# Patient Record
Sex: MALE | Race: WHITE | NOT HISPANIC OR LATINO | Employment: FULL TIME | URBAN - METROPOLITAN AREA
[De-identification: names, ages, dates, MRNs, and addresses within clinical notes are randomized per-mention and may not be internally consistent; named-entity substitution may affect disease eponyms.]

---

## 2017-01-05 DIAGNOSIS — E78.2 MIXED HYPERLIPIDEMIA: ICD-10-CM

## 2017-01-05 DIAGNOSIS — M79.642 HAND PAIN, LEFT: ICD-10-CM

## 2017-01-05 RX ORDER — AMLODIPINE BESYLATE 10 MG/1
TABLET ORAL
Qty: 90 TABLET | Refills: 1 | Status: SHIPPED | OUTPATIENT
Start: 2017-01-05 | End: 2017-07-15 | Stop reason: SDUPTHER

## 2017-01-05 RX ORDER — METOPROLOL SUCCINATE 100 MG/1
TABLET, EXTENDED RELEASE ORAL
Qty: 30 TABLET | Refills: 3 | Status: SHIPPED | OUTPATIENT
Start: 2017-01-05 | End: 2017-05-13 | Stop reason: SDUPTHER

## 2017-01-05 RX ORDER — ATORVASTATIN CALCIUM 80 MG/1
TABLET, FILM COATED ORAL
Qty: 30 TABLET | Refills: 3 | Status: SHIPPED | OUTPATIENT
Start: 2017-01-05 | End: 2017-05-09 | Stop reason: SDUPTHER

## 2017-01-16 ENCOUNTER — OFFICE VISIT (OUTPATIENT)
Dept: SPORTS MEDICINE | Facility: CLINIC | Age: 62
End: 2017-01-16
Payer: COMMERCIAL

## 2017-01-16 ENCOUNTER — HOSPITAL ENCOUNTER (OUTPATIENT)
Dept: RADIOLOGY | Facility: HOSPITAL | Age: 62
Discharge: HOME | End: 2017-01-16
Attending: FAMILY MEDICINE
Payer: COMMERCIAL

## 2017-01-16 VITALS — HEIGHT: 67 IN | BODY MASS INDEX: 31.39 KG/M2 | TEMPERATURE: 99 F | WEIGHT: 200 LBS

## 2017-01-16 DIAGNOSIS — M25.552 LEFT HIP PAIN: Primary | ICD-10-CM

## 2017-01-16 DIAGNOSIS — M25.552 LEFT HIP PAIN: ICD-10-CM

## 2017-01-16 PROCEDURE — 73502 X-RAY EXAM HIP UNI 2-3 VIEWS: CPT | Mod: 26,LT,, | Performed by: RADIOLOGY

## 2017-01-16 PROCEDURE — 99999 PR PBB SHADOW E&M-EST. PATIENT-LVL III: CPT | Mod: PBBFAC,,, | Performed by: FAMILY MEDICINE

## 2017-01-16 PROCEDURE — 73502 X-RAY EXAM HIP UNI 2-3 VIEWS: CPT | Mod: TC,PO,LT

## 2017-01-16 PROCEDURE — 99214 OFFICE O/P EST MOD 30 MIN: CPT | Mod: S$GLB,,, | Performed by: FAMILY MEDICINE

## 2017-01-16 PROCEDURE — 1159F MED LIST DOCD IN RCRD: CPT | Mod: S$GLB,,, | Performed by: FAMILY MEDICINE

## 2017-01-16 NOTE — PROGRESS NOTES
Subjective:          Chief Complaint: Dallas Garcia is a 61 y.o. male who had no chief complaint listed for this encounter.    HPI  #1  Location: posterior thigh, left   Onset: insidious,   Palliative:    Relative rest   Oral analgesics (tylenol)    HgPT, noncompliant   Provocative:    Prolonged sitting   Prior:    PMH R knee pain  Progression: plateau discomfort  Quality:    Sharp pain    Pulsating   Radiation: none  Severity: per nursing documentation  Timing: intermittent w/ use  Trauma: none     ROS  Review of systems (ROS):  A 10+ review of systems was performed with pertinent positives and negatives noted above in the history of present illness. Other systems were negative unless otherwise specified.    Pain Related Questions  Over the past 3 days, what was your average pain during activity? (I.e. running, jogging, walking, climbing stairs, getting dressed, ect.): 6  Over the past 3 days, what was your highest pain level?: 6  Over the past 3 days, what was your lowest pain level? : 4    Other  How many nights a week are you awakened by your affected body part?: 0  Was the patient's HEIGHT measured or patient reported?: Patient Reported  Was the patient's WEIGHT measured or patient reported?: Measured      Objective:        General: Dallas is well-developed, well-nourished, appears stated age, in no acute distress, alert and oriented to time, place and person.     Ortho/SPM Exam    Constitutional:     -Vital signs: height, weight, and temperature: reviewed     -General appearance: no apparent distress  Dermatologic/skin:     -Inspection: No acute lesions, ulceration, or induration of the skin noted about   the area evaluated   -Palpation: No subcutaneous crepitus noted about the area evaluated  Musculoskeletal:   Observation: There is no edema, erythema, or ecchymoses about the hip  Gait: ANTALGIC   Standing alignment: No pathologic varus or valgus deformity  Feet: No pathologic pes planus or pes  cavus   Squatting: PAINFUL      Straight leg raise:   No evidence of neuropathy at 30 degrees passive hip flexion   No evidence of neuropathy at 60 degrees passive hip flexion  Log roll is negative for evidence of intra articular hip pathology  Resisted femoral internal rotation is negative for evidence of intra articular hip pathology  Passive hip FABIR is negative for evidence of intra articular hip pathology  No tenderness with palpation of anterior iliac crest or ASIS  No pain with resisted hip flexion  Bridge test is POS for evidence of core weakness  No tenderness with palpation of greater tuberosity  Yvettes test is negative for evidence of IT band tightness  No pain with resisted hip extension  POS tenderness ischial bursa     Knee examination:  ROM appropriate.  No pain elicited with passive ROM.        Cardiovascular:   -Examined extremity is warm and well perfused   Neurologic:   -Examined extremitys sensation is appropriate     AAFP/FPM: Pocket Guide Documentation Guidelines, (c)2014    (99803=1+ systems/areas or 12+ bullets (8 MSK)   45355=3+ systems/areas or 18+ bullets (12 MSK))      Assessment:       Encounter Diagnosis   Name Primary?    Left hip pain Yes          Plan:       Assessment:   #1 concern for tearing of acetabular labrum, left   W/ [place] Grade I osteoarthritis of hip    No evidence of neurologic pathology  No evidence of vascular pathology    Imaging studies reviewed:  X-ray hip, left 17.01   Note radiologist ? Of [facility]    Plan:    Given extreme dysfunction and discomfort, coupled with physical examination suggesting acetabular labral pathology, and with non-diagnostic x-ray imaging, we will obtain MRI arthrogram imaging for further evaluation of the acetabular labrum and associated soft tissue structures of the hip.    [We discussed options including:  #1 watchful waiting  #2 physical therapy aimed at:   Core stability   RoM hip   Strengthening quadriceps  #3 injection therapy:   CSI  iahip   orthobiologics   #4 consultation re: hip  #5 further evaluation of lumbar spine     The patient chooses #]    Pain management required:    Handout given  Bracing required: none  Physical therapy required:   Activity (e.g. sports, work) restrictions: as tolerated   School/vocation: both prior [employer] and [employer] affiliations    Also see for knee     Follow up after MRIa hip  Consider csi iahip left  Should sx worsen or fail to resolve, consider:  Revisiting the above options          Patient questionnaires may have been collected.

## 2017-01-16 NOTE — MR AVS SNAPSHOT
[facility]  [address]  [facility] [address]  Phone: [phone]                  Dallas Garcia   2017 3:30 PM   Appointment    Description:  Male : 1955   Provider:  Erik Cohen MD   Department:  [facility]                To Do List           Future Appointments        Provider Department Dept Phone    2017 3:30 PM Erik Cohen MD [facility] [phone]    3/3/2017 8:30 AM LAB, [place] [facility] [phone]    3/8/2017 10:30 AM [facility] [address] [phone]      Goals (5 Years of Data)     None      [facility]     [facility] -  Assistance  Registered nurses in the [facility] provide clinical advisement, health education, appointment booking, and other advisory services.  Call for this free service at [phone].             Medications           Message regarding Medications     Verify the changes and/or additions to your medication regime listed below are the same as discussed with your clinician today.  If any of these changes or additions are incorrect, please notify your healthcare provider.             Verify that the below list of medications is an accurate representation of the medications you are currently taking.  If none reported, the list may be blank. If incorrect, please contact your healthcare provider. Carry this list with you in case of emergency.           Current Medications     amlodipine (NORVASC) 10 MG tablet take 1 tablet by mouth once daily for blood pressure    atorvastatin (LIPITOR) 80 MG tablet take 1 tablet by mouth once daily    blood sugar diagnostic (ONETOUCH ULTRA TEST) Strp TEST 4 TIMES A DAY. 90 day supply.    busPIRone (BUSPAR) 5 MG Tab Take 1 tablet (5 mg total) by mouth 2 (two) times daily.    fenofibrate 160 MG Tab take 1 tablet by mouth once daily    fenofibrate  "nanocrystallized 160 mg Tab 1 tab daily for Cholesterol/Triglycerides    fish oil-omega-3 fatty acids 300-1,000 mg capsule Take 2 g by mouth once daily.    insulin lispro (HUMALOG KWIKPEN) 100 unit/mL InPn pen Inject into skin 24 units with breakfast, 24 units with lunch, 20 units with dinner.    insulin needles, disposable, 31 X 5/16 " Ndle Use with Levemir injection daily    JENTADUETO 2.5-1,000 mg Tab TAKE 1 TABLET BY MOUTH TWICE DAILY WITH MEALS FOR DIABETES IN PLACE OF JANUMET    lancets (ONETOUCH ULTRASOFT LANCETS) Misc 1 lancet by Misc.(Non-Drug; Combo Route) route 4 (four) times daily before meals and nightly.    LEVEMIR FLEXTOUCH 100 unit/mL (3 mL) InPn pen inject 50 units subcutaneously every evening    lisinopril (PRINIVIL,ZESTRIL) 40 MG tablet Take 1 tablet (40 mg total) by mouth once daily. For Blood Pressure    metoprolol succinate (TOPROL-XL) 100 MG 24 hr tablet take 1 tablet by mouth once daily for blood pressure    omeprazole (PRILOSEC) 20 MG capsule Take 1 capsule (20 mg total) by mouth once daily. For GERD    pen needle, diabetic (BD ULTRA-FINE ADRIEN PEN NEEDLES) 32 gauge x 5/32" Ndle Use 4 times a day. 90 day supply.           Clinical Reference Information           Allergies as of 1/16/2017     No Known Allergies      Immunizations Administered on Date of Encounter - 1/16/2017     None      "

## 2017-01-18 RX ORDER — LISINOPRIL 40 MG/1
TABLET ORAL
Qty: 30 TABLET | Refills: 6 | Status: SHIPPED | OUTPATIENT
Start: 2017-01-18 | End: 2017-08-18 | Stop reason: SDUPTHER

## 2017-01-27 ENCOUNTER — HOSPITAL ENCOUNTER (OUTPATIENT)
Dept: RADIOLOGY | Facility: HOSPITAL | Age: 62
Discharge: HOME | End: 2017-01-27
Attending: FAMILY MEDICINE
Payer: COMMERCIAL

## 2017-01-27 DIAGNOSIS — M25.552 LEFT HIP PAIN: ICD-10-CM

## 2017-01-27 PROCEDURE — A9585 GADOBUTROL INJECTION: HCPCS | Performed by: FAMILY MEDICINE

## 2017-01-27 PROCEDURE — 73722 MRI JOINT OF LWR EXTR W/DYE: CPT | Mod: 26,LT,, | Performed by: RADIOLOGY

## 2017-01-27 PROCEDURE — 27093 INJECTION FOR HIP X-RAY: CPT | Mod: ,,, | Performed by: RADIOLOGY

## 2017-01-27 PROCEDURE — 73525 CONTRAST X-RAY OF HIP: CPT | Mod: TC

## 2017-01-27 PROCEDURE — 25000003 PHARM REV CODE 250: Performed by: FAMILY MEDICINE

## 2017-01-27 PROCEDURE — 73722 MRI JOINT OF LWR EXTR W/DYE: CPT | Mod: TC,LT

## 2017-01-27 PROCEDURE — 73525 CONTRAST X-RAY OF HIP: CPT | Mod: 26,,, | Performed by: RADIOLOGY

## 2017-01-27 PROCEDURE — 25500020 PHARM REV CODE 255: Performed by: FAMILY MEDICINE

## 2017-01-27 RX ORDER — LIDOCAINE HYDROCHLORIDE 10 MG/ML
3 INJECTION, SOLUTION EPIDURAL; INFILTRATION; INTRACAUDAL; PERINEURAL ONCE
Status: COMPLETED | OUTPATIENT
Start: 2017-01-27 | End: 2017-01-27

## 2017-01-27 RX ORDER — GADOBUTROL 604.72 MG/ML
5.5 INJECTION INTRAVENOUS
Status: COMPLETED | OUTPATIENT
Start: 2017-01-27 | End: 2017-01-27

## 2017-01-27 RX ADMIN — LIDOCAINE HYDROCHLORIDE 30 MG: 10 INJECTION, SOLUTION EPIDURAL; INFILTRATION; INTRACAUDAL; PERINEURAL at 01:01

## 2017-01-27 RX ADMIN — GADOBUTROL 5.5 ML: 604.72 INJECTION INTRAVENOUS at 01:01

## 2017-01-27 RX ADMIN — IOHEXOL 6 ML: 300 INJECTION, SOLUTION INTRAVENOUS at 01:01

## 2017-01-30 ENCOUNTER — TELEPHONE (OUTPATIENT)
Dept: INTERNAL MEDICINE | Facility: CLINIC | Age: 62
End: 2017-01-30

## 2017-01-31 ENCOUNTER — OFFICE VISIT (OUTPATIENT)
Dept: SPORTS MEDICINE | Facility: CLINIC | Age: 62
End: 2017-01-31
Payer: COMMERCIAL

## 2017-01-31 VITALS — HEIGHT: 67 IN | WEIGHT: 200 LBS | BODY MASS INDEX: 31.39 KG/M2 | TEMPERATURE: 98 F

## 2017-01-31 DIAGNOSIS — M70.62 GREATER TROCHANTERIC BURSITIS, LEFT: ICD-10-CM

## 2017-01-31 DIAGNOSIS — M53.3 CHRONIC LEFT SACROILIAC PAIN: Primary | ICD-10-CM

## 2017-01-31 DIAGNOSIS — M25.552 LEFT HIP PAIN: ICD-10-CM

## 2017-01-31 DIAGNOSIS — G89.29 CHRONIC LEFT SACROILIAC PAIN: Primary | ICD-10-CM

## 2017-01-31 PROCEDURE — 1159F MED LIST DOCD IN RCRD: CPT | Mod: S$GLB,,, | Performed by: FAMILY MEDICINE

## 2017-01-31 PROCEDURE — 20610 DRAIN/INJ JOINT/BURSA W/O US: CPT | Mod: 59,LT,S$GLB, | Performed by: FAMILY MEDICINE

## 2017-01-31 PROCEDURE — 99214 OFFICE O/P EST MOD 30 MIN: CPT | Mod: 25,S$GLB,, | Performed by: FAMILY MEDICINE

## 2017-01-31 PROCEDURE — 20611 DRAIN/INJ JOINT/BURSA W/US: CPT | Mod: LT,S$GLB,, | Performed by: FAMILY MEDICINE

## 2017-01-31 PROCEDURE — 99999 PR PBB SHADOW E&M-EST. PATIENT-LVL III: CPT | Mod: PBBFAC,,, | Performed by: FAMILY MEDICINE

## 2017-01-31 RX ORDER — TRIAMCINOLONE ACETONIDE 40 MG/ML
40 INJECTION, SUSPENSION INTRA-ARTICULAR; INTRAMUSCULAR
Status: DISCONTINUED | OUTPATIENT
Start: 2017-01-31 | End: 2017-01-31 | Stop reason: HOSPADM

## 2017-01-31 RX ADMIN — TRIAMCINOLONE ACETONIDE 40 MG: 40 INJECTION, SUSPENSION INTRA-ARTICULAR; INTRAMUSCULAR at 09:01

## 2017-01-31 NOTE — PROCEDURES
Large Joint Aspiration/Injection  Date/Time: 1/31/2017 9:22 AM  Performed by: JACOB FLORES  Authorized by: JACOB FLORES     Consent Done?:  Yes (Verbal)  Indications:  Pain  Procedure site marked: Yes    Timeout: Prior to procedure the correct patient, procedure, and site was verified      Location:  Hip  Site:  L hip joint  Prep: Patient was prepped and draped in usual sterile fashion    Ultrasonic Guidance for needle placement: Yes  Images are saved and documented.  Needle size:  20 G  Approach:  Posterior  Medications:  40 mg triamcinolone acetonide 40 mg/mL  Patient tolerance:  Patient tolerated the procedure well with no immediate complications    Additional Comments: Sacroiliac joint    Description of ultrasound utilization for needle guidance:   Ultrasound guidance used for needle localization.  Images saved and stored for documentation.  The sacroiliac joint was visualized.  Dynamic visualization of the 20g x 3.5  needle was continuous throughout the procedure.

## 2017-01-31 NOTE — PROGRESS NOTES
Subjective:        Chief Complaint: Dallas Garcia is a 61 y.o. male who had concerns including Pain of the Left Hip.    Pain     #1  Location: posterior thigh, left   Onset: insidious,   Palliative:    Relative rest   Oral analgesics (tylenol)    HgPT, noncompliant   Provocative:    ADLs   Prolonged sitting   Prior:    PMH R knee pain  Progression: plateau discomfort  Quality:    Sharp pain    Pulsating   Radiation: none  Severity: per nursing documentation  Timing: intermittent w/ use  Trauma: none     ROS  Review of systems (ROS):  A 10+ review of systems was performed with pertinent positives and negatives noted above in the history of present illness. Other systems were negative unless otherwise specified.    Pain Related Questions  Over the past 3 days, what was your average pain during activity? (I.e. running, jogging, walking, climbing stairs, getting dressed, ect.): 6  Over the past 3 days, what was your highest pain level?: 6  Over the past 3 days, what was your lowest pain level? : 3    Other  How many nights a week are you awakened by your affected body part?: 0  Was the patient's HEIGHT measured or patient reported?: Patient Reported  Was the patient's WEIGHT measured or patient reported?: Patient Reported      Objective:      General: Dallas is well-developed, well-nourished, appears stated age, in no acute distress, alert and oriented to time, place and person.     Ortho/SPM Exam    Constitutional:     -Vital signs: height, weight, and temperature: reviewed     -General appearance: no apparent distress  Dermatologic/skin:     -Inspection: No acute lesions, ulceration, or induration of the skin noted about   the area evaluated   -Palpation: No subcutaneous crepitus noted about the area evaluated  Musculoskeletal:   Observation: There is no edema, erythema, or ecchymoses about the hip  Gait: ANTALGIC   Standing alignment: No pathologic varus or valgus deformity  Feet: No pathologic pes planus  or pes cavus   Squatting: PAINFUL      Straight leg raise:   No evidence of neuropathy at 30 degrees passive hip flexion   No evidence of neuropathy at 60 degrees passive hip flexion  Log roll is negative for evidence of intra articular hip pathology  Resisted femoral internal rotation is negative for evidence of intra articular hip pathology  Passive hip FABIR is negative for evidence of intra articular hip pathology  No tenderness with palpation of anterior iliac crest or ASIS  No pain with resisted hip flexion  Bridge test is POS for evidence of core weakness  No tenderness with palpation of greater tuberosity  Yvettes test is negative for evidence of IT band tightness  No pain with resisted hip extension  POS tenderness ischial bursa     Knee examination:  ROM appropriate.  No pain elicited with passive ROM.        Cardiovascular:   -Examined extremity is warm and well perfused   Neurologic:   -Examined extremitys sensation is appropriate     AAFP/FPM: Pocket Guide Documentation Guidelines, (c)2014    (37179=3+ systems/areas or 12+ bullets (8 MSK)   15331=1+ systems/areas or 18+ bullets (12 MSK))      Assessment:       Encounter Diagnoses   Name Primary?    Left hip pain     Chronic left sacroiliac pain Yes    Greater trochanteric bursitis, left           Plan:       Assessment:   #1 sacroiliac dysfunction, left  #2 great troc bursitis  No evidence of neurologic pathology  No evidence of vascular pathology    Imaging studies reviewed:  X-ray hip, left 17.01   Note radiologist ? of [facility]  MRIa hip, left 17.01    Plan:    We discussed options including:  #1 watchful waiting  #2 physical therapy aimed at:   Core stability   RoM hip   Strengthening quadriceps  #3 injection therapy:   CSI gtb    Left,    CSI si    Left,    orthobiologics   #4 further evaluation of lumbar spine     The patient chooses #3 csi gtb and #3 csi si    Pain management required:    Handout given  Bracing required: none  Physical therapy  required:   Activity (e.g. sports, work) restrictions: as tolerated   School/vocation: both prior [employer] and [employer] affiliations    Also see for knee     Follow up in 7 days BY PHONE to report-->1) did it work? And 2) what % better?  A/e csi gtb  A/e csi si  Should sx worsen or fail to resolve, consider:  Revisiting the above options      Patient questionnaires may have been collected.

## 2017-01-31 NOTE — PROCEDURES
Large Joint Aspiration/Injection  Date/Time: 1/31/2017 9:23 AM  Performed by: JACOB FLORES  Authorized by: JACOB FLORES     Consent Done?:  Yes (Verbal)  Indications:  Pain  Procedure site marked: Yes    Timeout: Prior to procedure the correct patient, procedure, and site was verified      Location:  Hip  Site:  L greater trochanteric bursa  Prep: Patient was prepped and draped in usual sterile fashion    Ultrasonic Guidance for needle placement: No  Needle size:  22 G  Approach:  Lateral  Medications:  40 mg triamcinolone acetonide 40 mg/mL  Patient tolerance:  Patient tolerated the procedure well with no immediate complications

## 2017-02-07 ENCOUNTER — PATIENT MESSAGE (OUTPATIENT)
Dept: SPORTS MEDICINE | Facility: CLINIC | Age: 62
End: 2017-02-07

## 2017-03-01 RX ORDER — INSULIN LISPRO 100 [IU]/ML
INJECTION, SOLUTION INTRAVENOUS; SUBCUTANEOUS
Qty: 4 BOX | Refills: 2 | Status: SHIPPED | OUTPATIENT
Start: 2017-03-01 | End: 2017-12-17 | Stop reason: SDUPTHER

## 2017-03-02 ENCOUNTER — OFFICE VISIT (OUTPATIENT)
Dept: OPTOMETRY | Facility: CLINIC | Age: 62
End: 2017-03-02
Payer: COMMERCIAL

## 2017-03-02 DIAGNOSIS — E11.9 TYPE 2 DIABETES MELLITUS WITHOUT RETINOPATHY: ICD-10-CM

## 2017-03-02 DIAGNOSIS — Z13.5 SCREENING FOR OTHER EYE CONDITIONS: ICD-10-CM

## 2017-03-02 DIAGNOSIS — H52.222: ICD-10-CM

## 2017-03-02 DIAGNOSIS — H25.13 NUCLEAR SCLEROSIS, BILATERAL: Primary | ICD-10-CM

## 2017-03-02 DIAGNOSIS — H26.9 CORTICAL CATARACT OF RIGHT EYE: ICD-10-CM

## 2017-03-02 DIAGNOSIS — H52.4 PRESBYOPIA OU: ICD-10-CM

## 2017-03-02 DIAGNOSIS — I10 ESSENTIAL HYPERTENSION: ICD-10-CM

## 2017-03-02 PROCEDURE — 99999 PR PBB SHADOW E&M-EST. PATIENT-LVL III: CPT | Mod: PBBFAC,,, | Performed by: OPTOMETRIST

## 2017-03-02 PROCEDURE — 92015 DETERMINE REFRACTIVE STATE: CPT | Mod: S$GLB,,, | Performed by: OPTOMETRIST

## 2017-03-02 PROCEDURE — 92014 COMPRE OPH EXAM EST PT 1/>: CPT | Mod: S$GLB,,, | Performed by: OPTOMETRIST

## 2017-03-02 NOTE — PATIENT INSTRUCTIONS
Discussed findings.   Good ocular health in both eyes.  Possible trace nuclear sclerotic lens changes in both eyes, consistent with age.  Early peripheral cortical cataract in the right eye.  No need for cataract surgery in either eye.  No evidence of diabetic retinal changes in either eye.  Emmetropia at distance in the right eye, and slight astigmatic refractive error in the left eyel  Presbyopia consistent with age.   New spectacle lens Rx issued for use as desired.  Okay to use over the counter reading glasses if happy with near vision with those glasses and if happy with unaided distance VA.   Recheck in one year, or prior, if any problems in the interim.

## 2017-03-02 NOTE — MR AVS SNAPSHOT
[facility]  [address]  [facility] [address]  Phone: [phone]  Fax: [phone]                  Dallas RUFFIN Jose   3/2/2017 9:30 AM   Office Visit    Description:  Male : 1955   Provider:  Vinh Todd OD   Department:  [facility]           Reason for Visit     Diabetic Eye Exam                To Do List           Future Appointments        Provider Department Dept Phone    3/3/2017 8:30 AM LAB, [place] [facility] [phone]    3/8/2017 10:30 AM Lisette Corado MD [facility] [address] [phone]      Goals (5 Years of Data)     None      [facility]     [facility] Care [facility] -  Assistance  Registered nurses in the [facility] provide clinical advisement, health education, appointment booking, and other advisory services.  Call for this free service at [phone].             Medications           Message regarding Medications     Verify the changes and/or additions to your medication regime listed below are the same as discussed with your clinician today.  If any of these changes or additions are incorrect, please notify your healthcare provider.             Verify that the below list of medications is an accurate representation of the medications you are currently taking.  If none reported, the list may be blank. If incorrect, please contact your healthcare provider. Carry this list with you in case of emergency.           Current Medications     amlodipine (NORVASC) 10 MG tablet take 1 tablet by mouth once daily for blood pressure    atorvastatin (LIPITOR) 80 MG tablet take 1 tablet by mouth once daily    blood sugar diagnostic (ONETOUCH ULTRA TEST) Strp TEST 4 TIMES A DAY. 90 day supply.    busPIRone (BUSPAR) 5 MG Tab Take 1 tablet (5 mg total) by mouth 2 (two) times daily.    fenofibrate 160 MG Tab take 1 tablet by mouth once daily    fenofibrate nanocrystallized 160 mg Tab 1 tab  "daily for Cholesterol/Triglycerides    fish oil-omega-3 fatty acids 300-1,000 mg capsule Take 2 g by mouth once daily.    HUMALOG KWIKPEN 100 unit/mL InPn pen inject 24 units subcutaneously WITH BREAKFAST AND LUNCH THEN 20 UNITS WITH DINNER    insulin needles, disposable, 31 X 5/16 " Ndle Use with Levemir injection daily    JENTADUETO 2.5-1,000 mg Tab TAKE 1 TABLET BY MOUTH TWICE DAILY WITH MEALS FOR DIABETES IN PLACE OF JANUMET    lancets (ONETOUCH ULTRASOFT LANCETS) Misc 1 lancet by Misc.(Non-Drug; Combo Route) route 4 (four) times daily before meals and nightly.    LEVEMIR FLEXTOUCH 100 unit/mL (3 mL) InPn pen inject 50 units subcutaneously every evening    lisinopril (PRINIVIL,ZESTRIL) 40 MG tablet take 1 tablet by mouth once daily    metoprolol succinate (TOPROL-XL) 100 MG 24 hr tablet take 1 tablet by mouth once daily for blood pressure    omeprazole (PRILOSEC) 20 MG capsule Take 1 capsule (20 mg total) by mouth once daily. For GERD    pen needle, diabetic (BD ULTRA-FINE ADRIEN PEN NEEDLES) 32 gauge x 5/32" Ndle Use 4 times a day. 90 day supply.           Clinical Reference Information           Allergies as of 3/2/2017     No Known Allergies      Immunizations Administered on Date of Encounter - 3/2/2017     None      Instructions    Discussed findings.   Good ocular health in both eyes.  Possible trace nuclear sclerotic lens changes in both eyes, consistent with age.  Early peripheral cortical cataract in the right eye.  No need for cataract surgery in either eye.  No evidence of diabetic retinal changes in either eye.  Emmetropia at distance in the right eye, and slight astigmatic refractive error in the left eyel  Presbyopia consistent with age.   New spectacle lens Rx issued for use as desired.  Okay to use over the counter reading glasses if happy with near vision with those glasses and if happy with unaided distance VA.   Recheck in one year, or prior, if any problems in the interim.       Language " Assistance Services     ATTENTION: Language assistance services are available, free of charge. Please call [phone].      ATENCIÓN: Si habla kaushik, tiene a bee disposición servicios gratuitos de asistencia lingüística. Llame al [phone].     CHÚ Ý: N?u b?n nói Ti?ng Vi?t, có các d?ch v? h? tr? ngôn ng? mi?n phí dành cho b?n. G?i s? [phone].         [facility] complies with applicable Federal civil rights laws and does not discriminate on the basis of race, color, national origin, age, disability, or sex.

## 2017-03-02 NOTE — PROGRESS NOTES
"HPI     Diabetic Eye Exam    Additional comments: annual general eye examination and refraction.    Diabetic eye exam.  Uses glasses "sometimes' for near work and computer   work, only.            Comments   61 yr old WM           Approximate date of last eye examination:  04/11/2014  Name of last eye doctor seen:  Dr Todd  Wears glasses? Yes for reading only      If yes, wears full-time or part-time?  Part time    Present glasses are bifocal / S V Distance / S V Reading:  bifocals  Approximate age of present glasses:  6 yrs old   Got new glasses following last exam, or subsequently?:  no   Any problem with VA with glasses?  No  Wears CLs?:  no  Headaches? no  Eye pain/discomfort?  no                                                                                       Flashes?  no  Floaters?  no  Diplopia/Double vision?  no  Patient's Ocular History:         Any eye surgeries?  no         Any eye injury?  no         Any treatment for eye disease?  no  Family history of eye disease?  none  Significant patient medical history:         1. Diabetes?  yes       If yes, IDDM or NIDDM? IDDM x yrs- not checking blood glucose level           Hemoglobin A1C       Date                     Value               Ref Range             Status                04/13/2016               8.4 (H)             4.5 - 6.2 %           Final                 01/20/2016               9.4 (H)             4.5 - 6.2 %           Final                 01/20/2016               9.4 (H)             4.5 - 6.2 %           Final            ----------     2. HBP?  yes              3. Other (describe):  High cholesterol   ! OTC eyedrops currently using:  no   ! Prescription eye meds currently using:  no   ! Any history of allergy/adverse reaction to any eye meds used   previously?  no    ! Any history of allergy/adverse reaction to eyedrops used during prior   eye exam(s)? no    ! Any history of allergy/adverse reaction to Novacaine or similar meds? " "  no   ! Any history of allergy/adverse reaction to Epinephrine or similar meds?   no    ! Patient okay with use of anesthetic eyedrops to check eye pressure? yes            ! Patient okay with use of eyedrops to dilate pupils today? yes    !  Allergies/Medications/Medical History/Family History reviewed today?    yes    PD =   64/60  Desired reading distance =  17.5"                                                                      Last edited by Vinh Todd, OD on 3/2/2017 10:25 AM. (History)            Assessment /Plan     For exam results, see Encounter Report.    1. Nuclear sclerosis, bilateral     2. Cortical cataract of right eye     3. Type 2 diabetes mellitus without retinopathy     4. Regular astigmatism, left     5. Presbyopia OU     6. Essential hypertension     7. Screening for other eye conditions                  Discussed findings.   Good ocular health in both eyes.  Possible trace nuclear sclerotic lens changes in both eyes, consistent with age.  Early peripheral cortical cataract in the right eye.  No need for cataract surgery in either eye.  No evidence of diabetic retinal changes in either eye.  Emmetropia at distance in the right eye, and slight astigmatic refractive error in the left eyel  Presbyopia consistent with age.   New spectacle lens Rx issued for use as desired.  Okay to use over the counter reading glasses if happy with near vision with those glasses and if happy with unaided distance VA.   Recheck in one year, or prior, if any problems in the interim.        "

## 2017-03-24 RX ORDER — INSULIN DETEMIR 100 [IU]/ML
INJECTION, SOLUTION SUBCUTANEOUS
Qty: 15 ML | Refills: 12 | Status: SHIPPED | OUTPATIENT
Start: 2017-03-24 | End: 2017-10-22 | Stop reason: CLARIF

## 2017-03-29 ENCOUNTER — PATIENT MESSAGE (OUTPATIENT)
Dept: SPORTS MEDICINE | Facility: CLINIC | Age: 62
End: 2017-03-29

## 2017-03-30 ENCOUNTER — PATIENT MESSAGE (OUTPATIENT)
Dept: SPORTS MEDICINE | Facility: CLINIC | Age: 62
End: 2017-03-30

## 2017-04-12 ENCOUNTER — LAB VISIT (OUTPATIENT)
Dept: LAB | Facility: HOSPITAL | Age: 62
End: 2017-04-12
Attending: INTERNAL MEDICINE
Payer: COMMERCIAL

## 2017-04-12 DIAGNOSIS — E78.2 MIXED HYPERLIPIDEMIA: ICD-10-CM

## 2017-04-12 DIAGNOSIS — E11.9 DIABETES MELLITUS WITHOUT COMPLICATION: ICD-10-CM

## 2017-04-12 LAB
25(OH)D3+25(OH)D2 SERPL-MCNC: 16 NG/ML
ALBUMIN SERPL BCP-MCNC: 3.9 G/DL
ALP SERPL-CCNC: 78 U/L
ALT SERPL W/O P-5'-P-CCNC: 35 U/L
ANION GAP SERPL CALC-SCNC: 10 MMOL/L
AST SERPL-CCNC: 30 U/L
BASOPHILS # BLD AUTO: 0.02 K/UL
BASOPHILS NFR BLD: 0.3 %
BILIRUB DIRECT SERPL-MCNC: 0.3 MG/DL
BILIRUB SERPL-MCNC: 0.8 MG/DL
BUN SERPL-MCNC: 13 MG/DL
CALCIUM SERPL-MCNC: 9.5 MG/DL
CHLORIDE SERPL-SCNC: 101 MMOL/L
CHOLEST/HDLC SERPL: 5.1 {RATIO}
CO2 SERPL-SCNC: 25 MMOL/L
COMPLEXED PSA SERPL-MCNC: 0.17 NG/ML
CREAT SERPL-MCNC: 1.1 MG/DL
DIFFERENTIAL METHOD: ABNORMAL
EOSINOPHIL # BLD AUTO: 0.1 K/UL
EOSINOPHIL NFR BLD: 1.9 %
ERYTHROCYTE [DISTWIDTH] IN BLOOD BY AUTOMATED COUNT: 12.2 %
EST. GFR  (AFRICAN AMERICAN): >60 ML/MIN/1.73 M^2
EST. GFR  (NON AFRICAN AMERICAN): >60 ML/MIN/1.73 M^2
GLUCOSE SERPL-MCNC: 320 MG/DL
HCT VFR BLD AUTO: 40.2 %
HDL/CHOLESTEROL RATIO: 19.5 %
HDLC SERPL-MCNC: 174 MG/DL
HDLC SERPL-MCNC: 34 MG/DL
HGB BLD-MCNC: 13.8 G/DL
LDLC SERPL CALC-MCNC: ABNORMAL MG/DL
LYMPHOCYTES # BLD AUTO: 1 K/UL
LYMPHOCYTES NFR BLD: 16.6 %
MCH RBC QN AUTO: 30.9 PG
MCHC RBC AUTO-ENTMCNC: 34.3 %
MCV RBC AUTO: 90 FL
MONOCYTES # BLD AUTO: 0.6 K/UL
MONOCYTES NFR BLD: 9.3 %
NEUTROPHILS # BLD AUTO: 4.3 K/UL
NEUTROPHILS NFR BLD: 71.9 %
NONHDLC SERPL-MCNC: 140 MG/DL
PLATELET # BLD AUTO: 214 K/UL
PMV BLD AUTO: 10.1 FL
POTASSIUM SERPL-SCNC: 4.3 MMOL/L
PROT SERPL-MCNC: 7.3 G/DL
RBC # BLD AUTO: 4.47 M/UL
SODIUM SERPL-SCNC: 136 MMOL/L
TRIGL SERPL-MCNC: 488 MG/DL
TSH SERPL DL<=0.005 MIU/L-ACNC: 0.74 UIU/ML
WBC # BLD AUTO: 5.91 K/UL

## 2017-04-12 PROCEDURE — 80061 LIPID PANEL: CPT

## 2017-04-12 PROCEDURE — 80076 HEPATIC FUNCTION PANEL: CPT

## 2017-04-12 PROCEDURE — 85025 COMPLETE CBC W/AUTO DIFF WBC: CPT | Mod: PO

## 2017-04-12 PROCEDURE — 80048 BASIC METABOLIC PNL TOTAL CA: CPT

## 2017-04-12 PROCEDURE — 84443 ASSAY THYROID STIM HORMONE: CPT

## 2017-04-12 PROCEDURE — 83036 HEMOGLOBIN GLYCOSYLATED A1C: CPT

## 2017-04-12 PROCEDURE — 36415 COLL VENOUS BLD VENIPUNCTURE: CPT | Mod: PO

## 2017-04-12 PROCEDURE — 84153 ASSAY OF PSA TOTAL: CPT

## 2017-04-12 PROCEDURE — 82306 VITAMIN D 25 HYDROXY: CPT

## 2017-04-13 LAB
ESTIMATED AVG GLUCOSE: 212 MG/DL
HBA1C MFR BLD HPLC: 9 %

## 2017-04-14 ENCOUNTER — NURSE TRIAGE (OUTPATIENT)
Dept: ADMINISTRATIVE | Facility: CLINIC | Age: 62
End: 2017-04-14

## 2017-04-14 NOTE — TELEPHONE ENCOUNTER
LM x2 at 343pm     Reason for Disposition   Message left on identified answering machine.    Protocols used: ST NO CONTACT OR DUPLICATE CONTACT CALL-A-AH

## 2017-04-17 ENCOUNTER — TELEPHONE (OUTPATIENT)
Dept: INTERNAL MEDICINE | Facility: CLINIC | Age: 62
End: 2017-04-17

## 2017-04-17 NOTE — TELEPHONE ENCOUNTER
Jeanie Ventura RN   MickieCristian Knox  Staff 3 days ago                 Pt called re abx. :LM x2 fri. Thank you  (Routing comment)                        MISAEL Arias 3 days ago                       MISAEL Arias 3 days ago                       Jeanie Ventura RN 3 days ago                 LM x2 at 343pm      Reason for Disposition   Message left on identified answering machine.    Protocols used: ST NO CONTACT OR DUPLICATE CONTACT CALL-A-AH                        Documentation

## 2017-04-17 NOTE — TELEPHONE ENCOUNTER
----- Message from Marlyn Spencer sent at 4/14/2017  9:22 AM CDT -----  Contact: pt  Pt would like a new prescription on z-pack and his sick and his ent is close for the day  [facility] on [address] [facility] [phone] pt also stated that he has a sinus infection    Pt can be reached at [phone]  When ready for

## 2017-04-18 NOTE — TELEPHONE ENCOUNTER
Spoke with patient about message left needing a z pack, stated he has cough, congestion and some bronchitis going on since Thursday, and z pack has worked in the past.     Would like prescription sent to [facility] on [facility],  Phone# [phone]

## 2017-04-18 NOTE — TELEPHONE ENCOUNTER
I recommend he see someone in UC clinic or I can see him and check him out on this Thursday when I see him.  thanks

## 2017-04-19 ENCOUNTER — TELEPHONE (OUTPATIENT)
Dept: INTERNAL MEDICINE | Facility: CLINIC | Age: 62
End: 2017-04-19

## 2017-04-19 NOTE — TELEPHONE ENCOUNTER
----- Message from Aleida Romo sent at 4/19/2017  2:28 PM CDT -----  Contact: call pt at [phone]  Patient is calling to check on status of a z pack that was supposed to be called into his [facility]

## 2017-04-19 NOTE — TELEPHONE ENCOUNTER
Anais, please let Mr Garcia know that I'd like to see and examine him before I send an antibiotic in.  Thanks

## 2017-04-19 NOTE — TELEPHONE ENCOUNTER
Called pt back and he is stating that he keeps getting URI every year and he is having his normal symptoms. He sent over a message on Monday and Dr Corado was not in so forwarded the message to the Dr who was covering for her she sent back a message stating she would need more info before witting it. i had no phone where i was on Monday so i asked the other nurse to call the pt and get more info. The other nurse stated that she called. When speaking with the pt he said that some one called him on yesterday saying that the Rx was called in but when he went to pick it up the pharmacy said they had no record of it. He says he has an appt mick at 930am but he would like it called in today. i stated that i would send the message to the  today

## 2017-04-20 ENCOUNTER — OFFICE VISIT (OUTPATIENT)
Dept: INTERNAL MEDICINE | Facility: CLINIC | Age: 62
End: 2017-04-20
Payer: COMMERCIAL

## 2017-04-20 VITALS
TEMPERATURE: 98 F | OXYGEN SATURATION: 96 % | HEART RATE: 88 BPM | DIASTOLIC BLOOD PRESSURE: 74 MMHG | HEIGHT: 67 IN | SYSTOLIC BLOOD PRESSURE: 124 MMHG

## 2017-04-20 DIAGNOSIS — E55.9 VITAMIN D DEFICIENCY: ICD-10-CM

## 2017-04-20 DIAGNOSIS — J30.2 SEASONAL ALLERGIC RHINITIS, UNSPECIFIED ALLERGIC RHINITIS TRIGGER: ICD-10-CM

## 2017-04-20 DIAGNOSIS — E78.49 FAMILIAL HYPERLIPIDEMIA: ICD-10-CM

## 2017-04-20 DIAGNOSIS — J40 BRONCHITIS: ICD-10-CM

## 2017-04-20 DIAGNOSIS — Z00.00 ANNUAL PHYSICAL EXAM: ICD-10-CM

## 2017-04-20 DIAGNOSIS — Z79.4 DIABETES MELLITUS DUE TO UNDERLYING CONDITION, UNCONTROLLED, WITH HYPEROSMOLARITY WITHOUT COMA, WITH LONG-TERM CURRENT USE OF INSULIN: Primary | ICD-10-CM

## 2017-04-20 DIAGNOSIS — E08.00 DIABETES MELLITUS DUE TO UNDERLYING CONDITION, UNCONTROLLED, WITH HYPEROSMOLARITY WITHOUT COMA, WITH LONG-TERM CURRENT USE OF INSULIN: Primary | ICD-10-CM

## 2017-04-20 LAB
CREAT UR-MCNC: 77 MG/DL
MICROALBUMIN UR DL<=1MG/L-MCNC: 9 UG/ML
MICROALBUMIN/CREATININE RATIO: 11.7 UG/MG

## 2017-04-20 PROCEDURE — 3078F DIAST BP <80 MM HG: CPT | Mod: S$GLB,,, | Performed by: INTERNAL MEDICINE

## 2017-04-20 PROCEDURE — 99999 PR PBB SHADOW E&M-EST. PATIENT-LVL V: CPT | Mod: PBBFAC,,, | Performed by: INTERNAL MEDICINE

## 2017-04-20 PROCEDURE — 3074F SYST BP LT 130 MM HG: CPT | Mod: S$GLB,,, | Performed by: INTERNAL MEDICINE

## 2017-04-20 PROCEDURE — 82570 ASSAY OF URINE CREATININE: CPT

## 2017-04-20 PROCEDURE — 99396 PREV VISIT EST AGE 40-64: CPT | Mod: S$GLB,,, | Performed by: INTERNAL MEDICINE

## 2017-04-20 RX ORDER — AZITHROMYCIN 250 MG/1
TABLET, FILM COATED ORAL
Qty: 6 TABLET | Refills: 0 | Status: SHIPPED | OUTPATIENT
Start: 2017-04-20 | End: 2017-04-25

## 2017-04-20 RX ORDER — LORATADINE 10 MG/1
10 TABLET ORAL DAILY
Qty: 30 TABLET | Refills: 1 | Status: SHIPPED | OUTPATIENT
Start: 2017-04-20 | End: 2018-03-19 | Stop reason: SDUPTHER

## 2017-04-20 RX ORDER — ERGOCALCIFEROL 1.25 MG/1
50000 CAPSULE ORAL
Qty: 4 CAPSULE | Refills: 12 | Status: SHIPPED | OUTPATIENT
Start: 2017-04-20 | End: 2018-06-05 | Stop reason: SDUPTHER

## 2017-04-20 NOTE — PROGRESS NOTES
Mr. Garcia is a 61-year-old gentleman, known to myself, who presents   today for way past due annual followup diabetes and with complaints of cough.    HISTORY OF PRESENT ILLNESS:  Mr. Garcia, presents for followup of the   above.  He notes that he has had a 1-week history of cough with postnasal drip.    He has a lot of chest congestion.  He notes he has problems sleeping at night   because of the postnasal drip.  He has no fevers.  He notes the mucus is clear,   but notes that in the past he only clears if he has a course of Zithromax   antibiotics, which has worked well for him.  He has no chest pain, no shortness   of breath.  He notes that he gave up alcohol completely as of 03/01/2017 and   sees improvement in the triglyceride or lipid panel, which he is happy about.    He notes that things have been a little bit difficult.  His mother-in-law passed   away yesterday.  He notes that he unfortunately last saw Endocrinology about a   year ago, became frustrated with their inability to be more aggressive with his   insulin.    PAST MEDICAL, SURGICAL, SOCIAL HISTORY:  Please see as thoroughly stated in EPIC   chart, which has been reviewed.    REVIEW OF SYSTEMS:  HEENT:  Positive for cough, congestion, postnasal drip, see history of present   illness.  CARDIOPULMONARY:  No chest pain, no shortness of breath.  GASTROINTESTINAL:  Positive for cough and congestion, see history of present   illness.  GENITOURINARY:  No BPH symptoms.  No UTI symptoms.  EXTREMITIES:  Positive for some chronic left ankle pain and decreased range of   motion.  He has had surgery in the past.    PHYSICAL EXAMINATION:  VITAL SIGNS:  Weight per last visit in January at 200 pounds, blood pressure is   124/74, pulse 88.  GENERAL:  The patient looks uncomfortable, but in no acute distress.  HEENT:  Sinuses nontender.  Retropharynx shows erythema, but no exudates.  TMs   are slightly injected, but otherwise clear, conjunctivae  bilaterally are   injected.  No purulent drainage from either.  NECK:  Supple.  Negative for masses or thyromegaly, negative for   lymphadenopathy.  LUNGS:  Show an occasional wheeze here and there consistent with bronchitis,   otherwise clear.  HEART:  Regular rate and rhythm without arrhythmias, murmurs or gallops.  ABDOMEN:  Soft, nontender.  EXTREMITIES:  Negative edema.  Bilateral foot examination performed in toto   showing decreased sensation, rest normal.    WORKUP:  Lab work done on 04/12/2017 showing CBC unremarkable.  Basic   chemistries with glucose 320, hemoglobin A1c is 9 with last at 8.4.  Lipids   shows cholesterol 174, triglycerides 488, actually much improved from last.  Her   triglycerides had been as high as greater than 3600, PSA, TSH normal, vitamin D   is low at 16.    ASSESSMENT AND PLAN:  1.  Diabetes mellitus type 2, insulin-dependent and uncontrolled.  A.  For now, the patient continue on present therapy, which includes Jentadueto   2.5/1000 mg one p.o. b.i.d. and Humalog, which the patient notes he is presently   taking at 30 units in the a.m. and 30 units in the p.m.  B. Return to diabetes empowerment for more aggressive intervention and   assistance with diabetic control.  She will check spot urine for microalbumin   and creatinine.  2.  Mixed familial hyperlipidemia, under acceptable and best control in quite a   while.  A. Continue with Lipitor 80 mg daily, fenofibrate 160 mg daily.  B. Check lab work with return to clinic in six months.  3.  Vitamin D deficiency.  A. Ergocalciferol 50,000 unit cap once weekly.  B. Check vitamin D in 6-12 months.  4.  Bronchitis, felt to be secondary to allergies and viral infection.  A. Claritin 10 mg daily with over-the-counter Mucinex.  B. The patient's urgency have given prescription for Zithromax to be taken if no   improvement with conservative Claritin and Mucinex in the week.  5.  Essential hypertension, stable on amlodipine 10 mg daily along  with   lisinopril 40 mg one a day and metoprolol  mg daily.  6.  Health maintenance.  Screening.  Last eye check in March 2017 and normal.  Last colonoscopy in November 2014 showing diverticular disease, rest normal.  A. Return to clinic in six months with fasting prior lab work to include BMP,   LFTs, hemoglobin A1c, lipid panel, go from there or see the patient sooner if   needed.      FMS/HN  dd: 04/20/2017 10:54:36 (CDT)  td: 04/21/2017 01:52:28 (CDT)  Doc ID   #[id number]  Job ID #[id number]    CC:     This office note has been dictated.    Protective Sensation (w/ 10 gram monofilament):  Right: Decreased  Left: Decreased    Visual Inspection:  Normal -  Bilateral    Pedal Pulses:   Right: Present  Left: Present    Posterior tibialis:   Right:Present  Left: Present

## 2017-04-20 NOTE — MR AVS SNAPSHOT
[facility] [address]  [address]  [address]  [facility] [address]  Phone: [phone]                  Dallas Garcia   2017 9:30 AM   Office Visit    Description:  Male : 1955   Provider:  Lisette Corado MD   Department:  [facility] [address]           Reason for Visit     Annual Exam           Diagnoses this Visit        Comments    Diabetes mellitus due to underlying condition, uncontrolled, with hyperosmolarity without coma, with long-term current use of insulin    -  Primary     Annual physical exam         Familial hyperlipidemia         Vitamin D deficiency         Bronchitis         Seasonal allergic rhinitis, unspecified allergic rhinitis trigger                To Do List           Goals (5 Years of Data)     None      Follow-Up and Disposition     Return in about 6 months (around 10/20/2017).       These Medications        Disp Refills Start End    ergocalciferol (ERGOCALCIFEROL) 50,000 unit Cap 4 capsule 12 2017     Take 1 capsule (50,000 Units total) by mouth every 7 days. - Oral    [address] Ph #: [phone]       loratadine (CLARITIN) 10 mg tablet 30 tablet 1 2017    Take 1 tablet (10 mg total) by mouth once daily. 1 tab daily for allergy symptoms - Oral    [address] Ph #: [phone]       azithromycin (Z-AUDI) 250 MG tablet 6 tablet 0 2017    Take 2 tablets by mouth on day 1; Take 1 tablet by mouth on days 2-5    Pharmacy: [address] Ph #: [phone]         [facility]     [facility] Care [facility] -  Assistance  Unless otherwise directed by your provider, please contact [facility], our nurse care line that is available for 24/ assistance.     Registered nurses in the  [facility] provide: appointment scheduling, clinical advisement, health education, and other advisory services.  Call: [phone] (toll free)               Medications           Message regarding Medications     Verify the changes and/or additions to your medication regime listed below are the same as discussed with your clinician today.  If any of these changes or additions are incorrect, please notify your healthcare provider.        START taking these NEW medications        Refills    ergocalciferol (ERGOCALCIFEROL) 50,000 unit Cap 12    Sig: Take 1 capsule (50,000 Units total) by mouth every 7 days.    Class: Normal    Route: Oral    loratadine (CLARITIN) 10 mg tablet 1    Sig: Take 1 tablet (10 mg total) by mouth once daily. 1 tab daily for allergy symptoms    Class: Print    Route: Oral    azithromycin (Z-AUDI) 250 MG tablet 0    Sig: Take 2 tablets by mouth on day 1; Take 1 tablet by mouth on days 2-5    Class: Print           Verify that the below list of medications is an accurate representation of the medications you are currently taking.  If none reported, the list may be blank. If incorrect, please contact your healthcare provider. Carry this list with you in case of emergency.           Current Medications     amlodipine (NORVASC) 10 MG tablet take 1 tablet by mouth once daily for blood pressure    atorvastatin (LIPITOR) 80 MG tablet take 1 tablet by mouth once daily    azithromycin (Z-AUDI) 250 MG tablet Take 2 tablets by mouth on day 1; Take 1 tablet by mouth on days 2-5    blood sugar diagnostic (ONETOUCH ULTRA TEST) Strp TEST 4 TIMES A DAY. 90 day supply.    busPIRone (BUSPAR) 5 MG Tab Take 1 tablet (5 mg total) by mouth 2 (two) times daily.    ergocalciferol (ERGOCALCIFEROL) 50,000 unit Cap Take 1 capsule (50,000 Units total) by mouth every 7 days.    fenofibrate 160 MG Tab take 1 tablet by mouth once daily    fenofibrate nanocrystallized 160 mg Tab 1 tab daily for  "Cholesterol/Triglycerides    fish oil-omega-3 fatty acids 300-1,000 mg capsule Take 2 g by mouth once daily.    HUMALOG KWIKPEN 100 unit/mL InPn pen inject 24 units subcutaneously WITH BREAKFAST AND LUNCH THEN 20 UNITS WITH DINNER    insulin needles, disposable, 31 X 5/16 " Ndle Use with Levemir injection daily    JENTADUETO 2.5-1,000 mg Tab TAKE 1 TABLET BY MOUTH TWICE DAILY WITH MEALS FOR DIABETES IN PLACE OF JANUMET    lancets (ONETOUCH ULTRASOFT LANCETS) Misc 1 lancet by Misc.(Non-Drug; Combo Route) route 4 (four) times daily before meals and nightly.    LEVEMIR FLEXTOUCH 100 unit/mL (3 mL) InPn pen inject 50 units subcutaneously every evening    lisinopril (PRINIVIL,ZESTRIL) 40 MG tablet take 1 tablet by mouth once daily    loratadine (CLARITIN) 10 mg tablet Take 1 tablet (10 mg total) by mouth once daily. 1 tab daily for allergy symptoms    metoprolol succinate (TOPROL-XL) 100 MG 24 hr tablet take 1 tablet by mouth once daily for blood pressure    omeprazole (PRILOSEC) 20 MG capsule Take 1 capsule (20 mg total) by mouth once daily. For GERD    pen needle, diabetic (BD ULTRA-FINE ADRIEN PEN NEEDLES) 32 gauge x 5/32" Ndle Use 4 times a day. 90 day supply.           Clinical Reference Information           Your Vitals Were     BP Pulse Temp Height SpO2       124/74 88 98 °F (36.7 °C) (Oral) 5' 7" (1.702 m) 96%       Blood Pressure          Most Recent Value    BP  124/74      Allergies as of 4/20/2017     No Known Allergies      Immunizations Administered on Date of Encounter - 4/20/2017     None      Orders Placed During Today's Visit      Normal Orders This Visit    Ambulatory Referral to Diabetes Education     Microalbumin/creatinine urine ratio       Language Assistance Services     ATTENTION: Language assistance services are available, free of charge. Please call [phone].      ATENCIÓN: Si habla español, tiene a bee disposición servicios gratuitos de asistencia lingüística. Llame al " [phone].     NEWTON Ý: N?u b?n nói Ti?ng Vi?t, có các d?ch v? h? tr? ngôn ng? mi?n phí dành cho b?n. G?i s? [phone].         [facility] [address] complies with applicable Federal civil rights laws and does not discriminate on the basis of race, color, national origin, age, disability, or sex.

## 2017-04-24 ENCOUNTER — TELEPHONE (OUTPATIENT)
Dept: DIABETES | Facility: CLINIC | Age: 62
End: 2017-04-24

## 2017-04-24 NOTE — TELEPHONE ENCOUNTER
Spoke with pt to schedule Diabetes education.  Pt states that he has already had education before and feels that he does not need it.  Pt mentioned something about seeing someone in the diabetes specialty clinic.  I explained to the pt that he needs to be seen by the educator first prior to going to the [facility], that the educator is the one who will schedule him for that if he meets the criteria.  Pt states that he would rather wait to do this because he has too much going on right now.

## 2017-05-02 DIAGNOSIS — E11.9 DIABETES MELLITUS WITHOUT COMPLICATION: ICD-10-CM

## 2017-05-03 RX ORDER — LINAGLIPTIN AND METFORMIN HYDROCHLORIDE 2.5; 1 MG/1; MG/1
TABLET, FILM COATED ORAL
Qty: 60 TABLET | Refills: 4 | Status: SHIPPED | OUTPATIENT
Start: 2017-05-03 | End: 2017-09-23 | Stop reason: SDUPTHER

## 2017-05-09 DIAGNOSIS — E78.2 MIXED HYPERLIPIDEMIA: ICD-10-CM

## 2017-05-09 RX ORDER — ATORVASTATIN CALCIUM 80 MG/1
TABLET, FILM COATED ORAL
Qty: 30 TABLET | Refills: 6 | Status: SHIPPED | OUTPATIENT
Start: 2017-05-09 | End: 2017-12-15 | Stop reason: SDUPTHER

## 2017-05-15 RX ORDER — METOPROLOL SUCCINATE 100 MG/1
TABLET, EXTENDED RELEASE ORAL
Qty: 30 TABLET | Refills: 6 | Status: SHIPPED | OUTPATIENT
Start: 2017-05-15 | End: 2018-02-02 | Stop reason: SDUPTHER

## 2017-06-09 ENCOUNTER — HOSPITAL ENCOUNTER (OUTPATIENT)
Dept: RADIOLOGY | Facility: HOSPITAL | Age: 62
Discharge: HOME | End: 2017-06-09
Attending: FAMILY MEDICINE
Payer: COMMERCIAL

## 2017-06-09 ENCOUNTER — OFFICE VISIT (OUTPATIENT)
Dept: SPORTS MEDICINE | Facility: CLINIC | Age: 62
End: 2017-06-09
Payer: COMMERCIAL

## 2017-06-09 VITALS — BODY MASS INDEX: 31.39 KG/M2 | WEIGHT: 200 LBS | TEMPERATURE: 99 F | HEIGHT: 67 IN

## 2017-06-09 DIAGNOSIS — M25.561 RIGHT KNEE PAIN, UNSPECIFIED CHRONICITY: ICD-10-CM

## 2017-06-09 DIAGNOSIS — M17.11 PRIMARY OSTEOARTHRITIS OF RIGHT KNEE: Primary | ICD-10-CM

## 2017-06-09 PROCEDURE — 73564 X-RAY EXAM KNEE 4 OR MORE: CPT | Mod: TC,PO,RT

## 2017-06-09 PROCEDURE — 20611 DRAIN/INJ JOINT/BURSA W/US: CPT | Mod: RT,S$GLB,, | Performed by: FAMILY MEDICINE

## 2017-06-09 PROCEDURE — 73562 X-RAY EXAM OF KNEE 3: CPT | Mod: 26,59,LT, | Performed by: RADIOLOGY

## 2017-06-09 PROCEDURE — 99214 OFFICE O/P EST MOD 30 MIN: CPT | Mod: 25,S$GLB,, | Performed by: FAMILY MEDICINE

## 2017-06-09 PROCEDURE — 73564 X-RAY EXAM KNEE 4 OR MORE: CPT | Mod: 26,RT,, | Performed by: RADIOLOGY

## 2017-06-09 PROCEDURE — 99999 PR PBB SHADOW E&M-EST. PATIENT-LVL III: CPT | Mod: PBBFAC,,, | Performed by: FAMILY MEDICINE

## 2017-06-09 RX ORDER — TRIAMCINOLONE ACETONIDE 40 MG/ML
40 INJECTION, SUSPENSION INTRA-ARTICULAR; INTRAMUSCULAR
Status: DISCONTINUED | OUTPATIENT
Start: 2017-06-09 | End: 2017-06-09 | Stop reason: HOSPADM

## 2017-06-09 RX ADMIN — TRIAMCINOLONE ACETONIDE 40 MG: 40 INJECTION, SUSPENSION INTRA-ARTICULAR; INTRAMUSCULAR at 12:06

## 2017-06-09 NOTE — PROGRESS NOTES
Dallas Garcia, a 61 y.o. male, presents today for evaluation of his RIGHT knee.      HISTORY OF PRESENT ILLNESS   Location:  R knee, anterior  Onset: insidious, 2015    Palliative:     Relative rest   Oral analgesics   Asp/CSI, Nimesh, 09/24/16, significant impovement   CSI, Nimesh, 05/13/16  Provocative:     ADLs  Prior:  none  Progression: worsening discomfort   Quality:     Sharp at times   Ache   Swollen   Radiation:  none  Severity:  per nursing documentation  Timing:  Currently none, had been Intermittent w/ use  Trauma:  none    Review of systems (ROS):  A 10+ review of systems was performed with pertinent positives and negatives noted above in the history of present illness. Other systems were negative unless otherwise specified.      PHYSICAL EXAMINATION  General:  The patient is alert and oriented x 3. Mood is pleasant. Observation of ears, eyes and nose reveal no gross abnormalities. HEENT: NCAT, sclera anicteric.   Lungs: Respirations are equal and unlabored.  Gait is coordinated. Patient can toe walk and heel walk without difficulty.    RIGHT KNEE EXAMINATION    Observation/Inspection  Gait:   Antalgic   Alignment:  Neutral   Scars:   None   Muscle atrophy: Mild  Effusion:  Present   Warmth:  None   Discoloration:   none     Tenderness / Crepitus (T / C):         T / C      T / C  Patella   - / -   Lateral joint line   - / -     Peripatellar medial  -  Medial joint line    + / -  Peripatellar lateral -  Medial plica   - / -  Patellar tendon -   Popliteal fossa   - / -  Quad tendon   -   Gastrocnemius   -  Prepatellar Bursa - / -   Quadricep   -  Tibial tubercle  -  Thigh/hamstring  -  Pes anserine/HS -  Fibula    -  ITB   - / -  Tibia     -  Tib/fib joint  - / -  LCL    -    MFC   - / -   MCL: Proximal  -    LFC   - / -   Distal    -          ROM: (* = pain)  PASSIVE   ACTIVE    Left :   5 / 0 / 145   5 / 0 / 145     Right :    5 / 0 / 145*   5 / 0 / 145*    Patellofemoral examination:  See  above noted areas of tenderness.   Patella position    Subluxation / dislocation: Centered        Sup. / Inf;   Normal   Crepitus (PF):    Absent   Patellar Mobility:       Medial-lateral:   Normal    Superior-inferior:  Normal    Inferior tilt   Normal    Patellar tendon:  Normal   Lateral tilt:    Normal   J-sign:     None   Patellofemoral grind:   No pain       Meniscal Signs:     Pain on terminal extension:  +*  Pain on terminal flexion:  +*  Annmaries maneuver:  +*  Squat     NT    Ligament Examination:  ACL / Lachman:  WNL  PCL-Post.  drawer: normal 0 to 2mm  MCL- Valgus:  normal 0 to 2mm  LCL- Varus:    normal 0 to 2mm  Pivot shift:  guarding   Dial Test:   difference c/w other side   At 30° flexion: normal (< 5°)    At 90° flexion: normal (< 5°)   Reverse Pivot Shift:   normal (Equal)     Strength: (* = with pain) Painful Side  Quadriceps   5/5  Hamstrin/5    Extremity Neuro-vascular Examination:   Sensation:  Grossly intact to light touch all dermatomal regions.   Motor Function:  Fully intact motor function at hip, knee, foot and ankle    DTRs;  quadriceps and  achilles 2+.  No clonus and downgoing Babinski.    Vascular status:  DP and PT pulses 2+, brisk capillary refill, symmetric.     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, bilateral    W/ suspected medial meniscus tearing, and associated recurrent effusions, right     No evidence of neurologic pathology  No evidence of vascular pathology     Imaging studies reviewed:   X-ray knee, bilateral 17.06     Plan:    We discussed the importance of appropriate diet, weight, and regular exercise including quadriceps strengthening     We discussed options including:  #1 watchful waiting  #2 physical therapy aimed at:   Core stability   RoM knee   Strengthening quadriceps   Gait training   #3 injection therapy:   CSI iaknee     Right,     Left,    VSI iaknee    Right,     Left,     Orthobiologics   #4 MRI for further evaluation   #5 consultation      The patient chooses #3 csi iaknee       Pain management required: handout given  Bracing required:   Physical therapy required: hgPT, handout given, continue as above  Activity (e.g. sports, work) restrictions: as tolerated   school/vocation: both prior  and [employer] affiliations    Also see pt for hip     Follow up per pt  A/e csi iaknee right  Should symptoms worsen or fail to resolve, consider:  Revisiting the above options

## 2017-06-09 NOTE — PROCEDURES
"Large Joint Aspiration/Injection  Date/Time: 6/9/2017 12:08 PM  Performed by: JACOB FLORES  Authorized by: JACOB FLORES     Consent Done?:  Yes (Verbal)  Indications:  Pain  Procedure site marked: Yes    Timeout: Prior to procedure the correct patient, procedure, and site was verified      Location:  Knee  Site:  R knee  Prep: Patient was prepped and draped in usual sterile fashion    Ultrasonic Guidance for needle placement: Yes  Images are saved and documented.  Needle size:  18 G  Approach:  Lateral  Medications:  40 mg triamcinolone acetonide 40 mg/mL  Aspirate amount (ml):  20  Aspirate:  Clear  Patient tolerance:  Patient tolerated the procedure well with no immediate complications    Additional Comments: Description of ultrasound utilization for needle guidance:   Ultrasound guidance used for needle localization.  Images saved and stored for documentation.  The knee joint was visualized.  Dynamic visualization of the 20g x 1.5"  needle was continuous throughout the procedure.      "

## 2017-07-10 RX ORDER — FENOFIBRATE 160 MG/1
TABLET ORAL
Qty: 30 TABLET | Refills: 6 | Status: SHIPPED | OUTPATIENT
Start: 2017-07-10 | End: 2018-02-11 | Stop reason: SDUPTHER

## 2017-07-15 DIAGNOSIS — M79.642 HAND PAIN, LEFT: ICD-10-CM

## 2017-07-17 RX ORDER — AMLODIPINE BESYLATE 10 MG/1
TABLET ORAL
Qty: 90 TABLET | Refills: 1 | Status: SHIPPED | OUTPATIENT
Start: 2017-07-17 | End: 2018-01-23 | Stop reason: SDUPTHER

## 2017-07-24 PROBLEM — Z00.00 ANNUAL PHYSICAL EXAM: Status: RESOLVED | Noted: 2017-04-20 | Resolved: 2017-07-24

## 2017-07-26 RX ORDER — BUSPIRONE HYDROCHLORIDE 5 MG/1
TABLET ORAL
Qty: 60 TABLET | Refills: 3 | Status: SHIPPED | OUTPATIENT
Start: 2017-07-26 | End: 2018-02-22 | Stop reason: SDUPTHER

## 2017-08-01 ENCOUNTER — OFFICE VISIT (OUTPATIENT)
Dept: SPORTS MEDICINE | Facility: CLINIC | Age: 62
End: 2017-08-01
Payer: COMMERCIAL

## 2017-08-01 VITALS — HEIGHT: 67 IN | BODY MASS INDEX: 31.39 KG/M2 | TEMPERATURE: 98 F | WEIGHT: 200 LBS

## 2017-08-01 DIAGNOSIS — M53.3 SI (SACROILIAC) PAIN: ICD-10-CM

## 2017-08-01 DIAGNOSIS — M17.11 PRIMARY OSTEOARTHRITIS OF RIGHT KNEE: Primary | ICD-10-CM

## 2017-08-01 DIAGNOSIS — M25.561 MECHANICAL KNEE PAIN, RIGHT: ICD-10-CM

## 2017-08-01 DIAGNOSIS — M25.461 KNEE EFFUSION, RIGHT: ICD-10-CM

## 2017-08-01 PROCEDURE — 99214 OFFICE O/P EST MOD 30 MIN: CPT | Mod: 25,S$GLB,, | Performed by: FAMILY MEDICINE

## 2017-08-01 PROCEDURE — 99999 PR PBB SHADOW E&M-EST. PATIENT-LVL III: CPT | Mod: PBBFAC,,, | Performed by: FAMILY MEDICINE

## 2017-08-01 PROCEDURE — 20611 DRAIN/INJ JOINT/BURSA W/US: CPT | Mod: LT,S$GLB,, | Performed by: FAMILY MEDICINE

## 2017-08-01 RX ORDER — TRIAMCINOLONE ACETONIDE 40 MG/ML
40 INJECTION, SUSPENSION INTRA-ARTICULAR; INTRAMUSCULAR
Status: DISCONTINUED | OUTPATIENT
Start: 2017-08-01 | End: 2017-08-01 | Stop reason: HOSPADM

## 2017-08-01 RX ADMIN — TRIAMCINOLONE ACETONIDE 40 MG: 40 INJECTION, SUSPENSION INTRA-ARTICULAR; INTRAMUSCULAR at 09:08

## 2017-08-01 NOTE — PROGRESS NOTES
Dallas Garcia, a 61 y.o. male, is here for evaluation of LEFT hip.     HISTORY OF PRESENT ILLNESS   Location: posterior thigh, left   Onset: insidious,   Palliative:    Relative rest   Oral analgesics (tylenol)    HgPT, noncompliant    CSI, SI joint/GTB, 01/31/17, 80-90% improvement   Provocative:    ADLs  Prior:    PMH R knee pain  Progression: worsening discomfort  Quality:    Sharp pain    Pulsating   Radiation: none  Severity: per nursing documentation  Timing: intermittent w/ use  Trauma: none     Review of systems (ROS):  A 10+ review of systems was performed with pertinent positives and negatives noted above in the history of present illness. Other systems were negative unless otherwise specified.      PHYSICAL EXAMINATION  General:  The patient is alert and oriented x 3.  Mood is pleasant.  Observation of ears, eyes and nose reveal no gross abnormalities.  HEENT: NCAT, sclera nonicteric  Lungs: Respirations are equal and unlabored.   Gait is coordinated. Patient can toe walk and heel walk without difficulty.    HIP/PELVIS EXAMINATION    Observation/Inspection  Gait:   Nonantalgic   Alignment:  Neutral   Scars:   None   Muscle atrophy: None   Effusion:  None   Warmth:  None   Discoloration:   None   Leg lengths:   Equal   Pelvis:   Level     Tenderness/Crepitus (T/C):      T / C  Trochanteric bursa   - / -  Piriformis    - / -  SI joint    + / -  Psoas tendon   - / -  Rectus insertion  - / -  Adductor insertion  - / -  Pubic symphysis  - / -    ROM: (* = pain)    Flexion:      120 degrees*  External rotation:   40 degrees*  Internal rotation with axial load:  30 degrees  Internal rotation without axial load:  40 degrees  Abduction:    45 degrees  Adduction:     20 degrees    Special Tests:  Pain w/ forced internal rotation (FADIR):  +  Pain w/ forced external rotation (IMMANUEL):  +  Circumduction test:     -  Stinchfield test:     -   Log roll:       -   Snapping hip (internal):    -   Sit-up  pain:      -   Resisted sit-up pain:     -   Resisted sit-up with adductor contraction pain:  -   Step-down test:     +  Trendelenburg test:     -  Bridge test      +     Extremity Neuro-vascular Examination:   Sensation:  Grossly intact to light touch all dermatomal regions.   Motor Function:  Fully intact motor function at hip, knee, foot and ankle    DTRs;  quadriceps and  achilles 2+.  No clonus and downgoing Babinski.    Vascular status:  DP and PT pulses 2+, brisk capillary refill, symmetric.    Skin:  intact, compartments soft.    Other Findings:    ASSESSMENT & PLAN  Assessment:   #1 sacroiliac dysfunction, left  #2 great troc bursitis    No evidence of neurologic pathology  No evidence of vascular pathology     Imaging studies reviewed:  X-ray hip, left 17.01   Note radiologist ? of MM  MRIa hip, left 17.01     Plan:    We discussed options including:  #1 watchful waiting  #2 physical therapy aimed at:   Core stability   RoM hip   Strengthening quadriceps  #3 injection therapy:   CSI gtb    Left,    CSI si    Left, effective good%, repeat   orthobiologics   #4 further evaluation of lumbar spine                           The patient chooses #3 csi si left     Pain management required: Handout given  Bracing required:   Physical therapy required:   Activity (e.g. sports, work) restrictions: as tolerated   School/vocation: both prior  and [employer] affiliations    Also see for knee      Follow up in 2 w  Did he f/u w/ pcp re: MM?  A/e csi si left  Should sx worsen or fail to resolve, consider:  Revisiting the above options

## 2017-08-01 NOTE — PROCEDURES
"Large Joint Aspiration/Injection  Date/Time: 8/1/2017 9:03 AM  Performed by: JACOB FLORES  Authorized by: JACOB FLORES     Consent Done?:  Yes (Verbal)  Indications:  Pain  Procedure site marked: Yes    Timeout: Prior to procedure the correct patient, procedure, and site was verified      Location:  Knee  Site:  R knee  Prep: Patient was prepped and draped in usual sterile fashion    Ultrasonic Guidance for needle placement: Yes  Images are saved and documented.  Needle size:  18 G  Approach:  Lateral  Medications:  40 mg triamcinolone acetonide 40 mg/mL  Aspirate amount (ml):  53  Aspirate:  Clear  Patient tolerance:  Patient tolerated the procedure well with no immediate complications    Additional Comments: Description of ultrasound utilization for needle guidance:   Ultrasound guidance used for needle localization.  Images saved and stored for documentation.  The knee joint was visualized.  Dynamic visualization of the 20g x 1.5"  needle was continuous throughout the procedure.      "

## 2017-08-01 NOTE — PROGRESS NOTES
"Dallas Garcia, a 61 y.o. male, presents today for evaluation of his RIGHT knee.      HISTORY OF PRESENT ILLNESS   Location:  R knee, anterior  Onset: insidious, 2015    Palliative:     Relative rest   Oral analgesics   Asp/CSI, Nimesh, 09/24/16, significant impovement   CSI, iaStarlaee, 05/13/16   CSI, iaKnee, 06/09/17, no improvement   Provocative:     ADLs  Prior:  none  Progression: worsening discomfort   Quality:     Sharp at times   Ache   Swollen    "feels like floating calcium moving from one side to another"    Decreased ROM    Painful everyday   Pulsating   Radiation:  none  Severity:  per nursing documentation  Timing:  Currently none, had been Intermittent w/ use  Trauma:  none    Review of systems (ROS):  A 10+ review of systems was performed with pertinent positives and negatives noted above in the history of present illness. Other systems were negative unless otherwise specified.      PHYSICAL EXAMINATION  General:  The patient is alert and oriented x 3. Mood is pleasant. Observation of ears, eyes and nose reveal no gross abnormalities. HEENT: NCAT, sclera anicteric.   Lungs: Respirations are equal and unlabored.  Gait is coordinated. Patient cannot toe walk and heel walk without difficulty.    RIGHT KNEE EXAMINATION    Observation/Inspection  Gait:   Antalgic   Alignment:  Neutral   Scars:   None   Muscle atrophy: Mild  Effusion:  Present   Warmth:  None   Discoloration:   none     Tenderness / Crepitus (T / C):         T / C      T / C  Patella   - / -   Lateral joint line   + / -     Peripatellar medial  -  Medial joint line    + / -  Peripatellar lateral -  Medial plica   - / -  Patellar tendon -   Popliteal fossa   - / -  Quad tendon   -   Gastrocnemius   -  Prepatellar Bursa - / -   Quadricep   -  Tibial tubercle  -  Thigh/hamstring  -  Pes anserine/HS -  Fibula    -  ITB   - / -  Tibia     -  Tib/fib joint  - / -  LCL    -    MFC   - / -   MCL: Proximal  -    LFC   - / - "   Distal    -          ROM: (* = pain)  PASSIVE   ACTIVE    Left :   5 / 0 / 145   5 / 0 / 145     Right :    5 / 0 / 115*   5 / 0 / 115*    Patellofemoral examination:  See above noted areas of tenderness.   Patella position    Subluxation / dislocation: Centered        Sup. / Inf;   Normal   Crepitus (PF):    Absent   Patellar Mobility:       Medial-lateral:   Normal    Superior-inferior:  Normal    Inferior tilt   Normal    Patellar tendon:  Normal   Lateral tilt:    Normal   J-sign:     None   Patellofemoral grind:   No pain       Meniscal Signs:     Pain on terminal extension:  +*  Pain on terminal flexion:  +*  Annmaries maneuver:  +*  Squat     NT    Ligament Examination:  ACL / Lachman:  WNL  PCL-Post.  drawer: normal 0 to 2mm  MCL- Valgus:  normal 0 to 2mm  LCL- Varus:    normal 0 to 2mm  Pivot shift:  guarding   Dial Test:   difference c/w other side   At 30° flexion: normal (< 5°)    At 90° flexion: normal (< 5°)   Reverse Pivot Shift:   normal (Equal)     Strength: (* = with pain) Painful Side  Quadriceps   5/5  Hamstrin/5    Extremity Neuro-vascular Examination:   Sensation:  Grossly intact to light touch all dermatomal regions.   Motor Function:  Fully intact motor function at hip, knee, foot and ankle    DTRs;  quadriceps and  achilles 2+.  No clonus and downgoing Babinski.    Vascular status:  DP and PT pulses 2+, brisk capillary refill, symmetric.     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    [We discussed the importance of appropriate diet, weight, and regular exercise including quadriceps strengthening     We discussed options including:  #1 watchful waiting  #2 re start physical therapy aimed at:   Core stability   RoM knee   Strengthening quadriceps   Gait training   #3 injection therapy:   CSI iaknee     Right, effective mild%, repeat     Left,    VSI iaknee    Right,     Left,    Orthobiologics   #4 consultation re: knee arthroscopy      The patient chooses #3 csi iaknee right]     Pain management required: handout given  Bracing required:   Physical therapy required:    hgPT, handout given, prior as above  Activity (e.g. sports, work) restrictions: as tolerated   school/vocation: both prior  and [employer] affiliations    Also see pt for hip     Follow up after MRI knee right  A/e asp+csi iaknee right  Anticipate #4   Should symptoms worsen or fail to resolve, consider:  Revisiting the above options

## 2017-08-01 NOTE — PROCEDURES
"Large Joint Aspiration/Injection  Date/Time: 8/1/2017 9:03 AM  Performed by: JACOB FLORES  Authorized by: JACOB FLORES     Consent Done?:  Yes (Verbal)  Indications:  Pain  Procedure site marked: Yes    Timeout: Prior to procedure the correct patient, procedure, and site was verified      Location:  Hip  Site:  L hip joint  Prep: Patient was prepped and draped in usual sterile fashion    Ultrasonic Guidance for needle placement: Yes  Images are saved and documented.  Needle size:  20 G  Approach:  Posterior  Medications:  40 mg triamcinolone acetonide 40 mg/mL  Patient tolerance:  Patient tolerated the procedure well with no immediate complications    Additional Comments: Sacroiliac joint    Description of ultrasound utilization for needle guidance:   Ultrasound guidance used for needle localization.  Images saved and stored for documentation.  The sacroiliac joint was visualized.  Dynamic visualization of the 20g x 3.5"  needle was continuous throughout the procedure.      "

## 2017-08-04 ENCOUNTER — HOSPITAL ENCOUNTER (OUTPATIENT)
Dept: RADIOLOGY | Facility: HOSPITAL | Age: 62
Discharge: HOME | End: 2017-08-04
Attending: FAMILY MEDICINE
Payer: COMMERCIAL

## 2017-08-04 DIAGNOSIS — M25.561 MECHANICAL KNEE PAIN, RIGHT: ICD-10-CM

## 2017-08-04 PROCEDURE — 73721 MRI JNT OF LWR EXTRE W/O DYE: CPT | Mod: TC,RT

## 2017-08-04 PROCEDURE — 73721 MRI JNT OF LWR EXTRE W/O DYE: CPT | Mod: 26,RT,, | Performed by: RADIOLOGY

## 2017-08-08 ENCOUNTER — TELEPHONE (OUTPATIENT)
Dept: INTERNAL MEDICINE | Facility: CLINIC | Age: 62
End: 2017-08-08

## 2017-08-08 ENCOUNTER — OFFICE VISIT (OUTPATIENT)
Dept: SPORTS MEDICINE | Facility: CLINIC | Age: 62
End: 2017-08-08
Payer: COMMERCIAL

## 2017-08-08 VITALS — TEMPERATURE: 98 F | HEIGHT: 67 IN | BODY MASS INDEX: 31.39 KG/M2 | WEIGHT: 200 LBS

## 2017-08-08 DIAGNOSIS — M23.203 OLD BUCKET HANDLE TEAR OF MEDIAL MENISCUS OF RIGHT KNEE: Primary | ICD-10-CM

## 2017-08-08 DIAGNOSIS — M94.261 CHONDROMALACIA OF KNEE, RIGHT: ICD-10-CM

## 2017-08-08 DIAGNOSIS — M23.41 LOOSE, BODY, JOINT, KNEE, RIGHT: ICD-10-CM

## 2017-08-08 DIAGNOSIS — M17.11 PRIMARY OSTEOARTHRITIS OF RIGHT KNEE: Primary | ICD-10-CM

## 2017-08-08 PROCEDURE — 99999 PR PBB SHADOW E&M-EST. PATIENT-LVL III: CPT | Mod: PBBFAC,,, | Performed by: FAMILY MEDICINE

## 2017-08-08 PROCEDURE — 3008F BODY MASS INDEX DOCD: CPT | Mod: S$GLB,,, | Performed by: FAMILY MEDICINE

## 2017-08-08 PROCEDURE — 99214 OFFICE O/P EST MOD 30 MIN: CPT | Mod: S$GLB,,, | Performed by: FAMILY MEDICINE

## 2017-08-08 NOTE — TELEPHONE ENCOUNTER
----- Message from Freddie Lutz MA sent at 8/8/2017  2:46 PM CDT -----  Contact: self - [phone]   Having a right knee scope on 8/30/17 done by Dr Lucio Sena and will need preop clearance. Labs / EKG / Clearance. Please call. Thanks!

## 2017-08-08 NOTE — TELEPHONE ENCOUNTER
I was looking on patients chart and I noticed he has an appointment in Pre-Op clearance clinic on 8-28-17 @ 11:30am, do you need to see him also?

## 2017-08-08 NOTE — PROGRESS NOTES
"Dallas Garcia, a 61 y.o. male, presents today for evaluation of his RIGHT knee.      HISTORY OF PRESENT ILLNESS   Location:  R knee, anterior  Onset: insidious, 2015    Palliative:     Relative rest   Oral analgesics   Asp/CSI, Nimesh, 09/24/16, significant impovement   CSI, iaKnee, 05/13/16   CSI, iaKnee, 06/09/17, no improvement    CSI, iaKnee, 08/07/17, 90% improvement, but he states it will get worse once "the fluid fills back up"  Provocative:     Decreased pain with     ADLs  Prior:  none  Progression: resolving discomfort   Quality:     Sharp at times   Ache   Swollen    "feels like floating calcium moving from one side to another"    Decreased ROM    Painful everyday   Pulsating   Radiation:  none  Severity:  per nursing documentation  Timing:   intermittent w/ use  Trauma:  none    Review of systems (ROS):  A 10+ review of systems was performed with pertinent positives and negatives noted above in the history of present illness. Other systems were negative unless otherwise specified.      PHYSICAL EXAMINATION  General:  The patient is alert and oriented x 3. Mood is pleasant. Observation of ears, eyes and nose reveal no gross abnormalities. HEENT: NCAT, sclera anicteric.   Lungs: Respirations are equal and unlabored.  Gait is coordinated. Patient cannot toe walk and heel walk without difficulty.    RIGHT KNEE EXAMINATION    Observation/Inspection  Gait:   Antalgic   Alignment:  Neutral   Scars:   None   Muscle atrophy: Mild  Effusion:  Present   Warmth:  None   Discoloration:   none     Tenderness / Crepitus (T / C):         T / C      T / C  Patella   - / -   Lateral joint line   + / -     Peripatellar medial  -  Medial joint line    + / -  Peripatellar lateral -  Medial plica   - / -  Patellar tendon -   Popliteal fossa   - / -  Quad tendon   -   Gastrocnemius   -  Prepatellar Bursa - / -   Quadricep   -  Tibial tubercle  -  Thigh/hamstring  -  Pes anserine/HS -  Fibula    -  ITB   - / -  Tibia "     -  Tib/fib joint  - / -  LCL    -    MFC   - / -   MCL: Proximal  -    LFC   - / -   Distal    -          ROM: (* = pain)  PASSIVE   ACTIVE    Left :   5 / 0 / 145   5 / 0 / 145     Right :    5 / 0 / 115*   5 / 0 / 115*    Patellofemoral examination:  See above noted areas of tenderness.   Patella position    Subluxation / dislocation: Centered        Sup. / Inf;   Normal   Crepitus (PF):    Absent   Patellar Mobility:       Medial-lateral:   Normal    Superior-inferior:  Normal    Inferior tilt   Normal    Patellar tendon:  Normal   Lateral tilt:    Normal   J-sign:     None   Patellofemoral grind:   No pain       Meniscal Signs:     Pain on terminal extension:  +*  Pain on terminal flexion:  +*  Annmaries maneuver:  +*  Squat     NT    Ligament Examination:  ACL / Lachman:  WNL  PCL-Post.  drawer: normal 0 to 2mm  MCL- Valgus:  normal 0 to 2mm  LCL- Varus:    normal 0 to 2mm  Pivot shift:  guarding   Dial Test:   difference c/w other side   At 30° flexion: normal (< 5°)    At 90° flexion: normal (< 5°)   Reverse Pivot Shift:   normal (Equal)     Strength: (* = with pain) Painful Side  Quadriceps   5/5  Hamstrin/5    Extremity Neuro-vascular Examination:   Sensation:  Grossly intact to light touch all dermatomal regions.   Motor Function:  Fully intact motor function at hip, knee, foot and ankle    DTRs;  quadriceps and  achilles 2+.  No clonus and downgoing Babinski.    Vascular status:  DP and PT pulses 2+, brisk capillary refill, symmetric.     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, right   W/ complex popliteal cyst, w/ loose bodies   W/ degenerative changes in med and lat menisci     W/ recurrent effusions    No evidence of neurologic pathology  No evidence of vascular pathology     Imaging studies reviewed:   X-ray knee, bilateral 17.06  MRI knee, right 17.08     Plan:    We discussed the importance of appropriate diet,  weight, and regular exercise including quadriceps strengthening     We discussed options including:  #1 watchful waiting  #2 re start physical therapy aimed at:   Core stability   RoM knee   Strengthening quadriceps   Gait training   #3 injection therapy:   CSI iaknee     Right, effective 90%, repeat frequency    Left,    VSI iaknee    Right,     Left,    Orthobiologics   #4 consultation re: knee arthroscopy      The patient chooses #4     Pain management required: handout given  Bracing required:   Physical therapy required:    hgPT, handout given, prior as above  Activity (e.g. sports, work) restrictions: as tolerated   school/vocation: both prior  and [employer] affiliations    Also see pt for hip     Follow up should surgical options be deferred  Should symptoms worsen or fail to resolve, consider:  Revisiting the above options

## 2017-08-08 NOTE — TELEPHONE ENCOUNTER
Manasa, please schedule pt to be seen in the Pre-op Clearance clinic to be cleared for surgery.  Thanks

## 2017-08-11 ENCOUNTER — PATIENT MESSAGE (OUTPATIENT)
Dept: INTERNAL MEDICINE | Facility: CLINIC | Age: 62
End: 2017-08-11

## 2017-08-14 ENCOUNTER — TELEPHONE (OUTPATIENT)
Dept: INTERNAL MEDICINE | Facility: CLINIC | Age: 62
End: 2017-08-14

## 2017-08-14 NOTE — TELEPHONE ENCOUNTER
Prudence, please schedule pt an appt in the Pre-op Clearance clinic this week and call him with appt. He has Knee replacement 8/28. Please let me know if there's a problem.  Thanks

## 2017-08-17 ENCOUNTER — PATIENT MESSAGE (OUTPATIENT)
Dept: INTERNAL MEDICINE | Facility: CLINIC | Age: 62
End: 2017-08-17

## 2017-08-18 RX ORDER — LISINOPRIL 40 MG/1
TABLET ORAL
Qty: 30 TABLET | Refills: 6 | Status: SHIPPED | OUTPATIENT
Start: 2017-08-18 | End: 2018-03-27 | Stop reason: SDUPTHER

## 2017-08-24 ENCOUNTER — INITIAL CONSULT (OUTPATIENT)
Dept: INTERNAL MEDICINE | Facility: CLINIC | Age: 62
End: 2017-08-24
Payer: COMMERCIAL

## 2017-08-24 VITALS
HEART RATE: 81 BPM | HEIGHT: 67 IN | OXYGEN SATURATION: 95 % | DIASTOLIC BLOOD PRESSURE: 65 MMHG | WEIGHT: 204 LBS | TEMPERATURE: 97 F | BODY MASS INDEX: 32.02 KG/M2 | SYSTOLIC BLOOD PRESSURE: 121 MMHG

## 2017-08-24 DIAGNOSIS — I10 HTN (HYPERTENSION), BENIGN: ICD-10-CM

## 2017-08-24 DIAGNOSIS — G47.30 SLEEP APNEA, UNSPECIFIED TYPE: ICD-10-CM

## 2017-08-24 DIAGNOSIS — R60.9 EDEMA, UNSPECIFIED TYPE: ICD-10-CM

## 2017-08-24 DIAGNOSIS — E66.09 NON MORBID OBESITY DUE TO EXCESS CALORIES: ICD-10-CM

## 2017-08-24 DIAGNOSIS — K76.0 FATTY LIVER: ICD-10-CM

## 2017-08-24 DIAGNOSIS — K21.9 GASTROESOPHAGEAL REFLUX DISEASE, ESOPHAGITIS PRESENCE NOT SPECIFIED: ICD-10-CM

## 2017-08-24 DIAGNOSIS — F10.10 ALCOHOL ABUSE: ICD-10-CM

## 2017-08-24 DIAGNOSIS — Z01.818 PREOP EXAMINATION: Primary | ICD-10-CM

## 2017-08-24 PROCEDURE — 3074F SYST BP LT 130 MM HG: CPT | Mod: S$GLB,,, | Performed by: HOSPITALIST

## 2017-08-24 PROCEDURE — 99999 PR PBB SHADOW E&M-EST. PATIENT-LVL III: CPT | Mod: PBBFAC,,, | Performed by: HOSPITALIST

## 2017-08-24 PROCEDURE — 3045F PR MOST RECENT HEMOGLOBIN A1C LEVEL 7.0-9.0%: CPT | Mod: S$GLB,,, | Performed by: HOSPITALIST

## 2017-08-24 PROCEDURE — 4010F ACE/ARB THERAPY RXD/TAKEN: CPT | Mod: S$GLB,,, | Performed by: HOSPITALIST

## 2017-08-24 PROCEDURE — 3078F DIAST BP <80 MM HG: CPT | Mod: S$GLB,,, | Performed by: HOSPITALIST

## 2017-08-24 PROCEDURE — 3008F BODY MASS INDEX DOCD: CPT | Mod: S$GLB,,, | Performed by: HOSPITALIST

## 2017-08-24 PROCEDURE — 99215 OFFICE O/P EST HI 40 MIN: CPT | Mod: S$GLB,,, | Performed by: HOSPITALIST

## 2017-08-24 RX ORDER — IBUPROFEN 200 MG
200 TABLET ORAL
COMMUNITY

## 2017-08-24 NOTE — ASSESSMENT & PLAN NOTE
Edema- I suggested avoidance of added salt,avoidance of NSAID's and suggested Limb elevation and caitlin hose use

## 2017-08-24 NOTE — PATIENT INSTRUCTIONS
Your surgery has been scheduled for:___Wed 8/30___     You should report to:  _____[facility], located on the [place] side of the first floor of the [facility] ([phone])  ______[facility], located on the [facility] side of the Second floor of the [facility] ([phone])  ______3rd Floor [facility] located on the [facility] side of the [facility] [phone]    ___X___ Sabianism Alexxistal    Please Note  -Tell your doctors if you take asprin, products containing aspirin, herbal medications or blood thinners, such as Coumadin, Ticlid, or Plavix. (Consult your provider regarding holding or stopping before surgery).  -Arrange for someone to drive you home following surgery. You will not be allowed to leave the surgical facility alone or drive yourself home following sedation and anesthesia.      Outpatient Encounter Prescriptions as of 8/24/2017   Medication Sig Note Dispense Refill    amlodipine (NORVASC) 10 MG tablet take 1 tablet by mouth once daily for blood pressure (Patient taking differently: take 1 tablet by mouth once daily for blood pressure am) 8/24/2017: Take AM of surgery 90 tablet 1    atorvastatin (LIPITOR) 80 MG tablet take 1 tablet by mouth once daily (Patient taking differently: take 1 tablet by mouth once daily am) 8/24/2017: Take AM of surgery 30 tablet 6    busPIRone (BUSPAR) 5 MG Tab take 1 tablet by mouth twice a day (Patient taking differently: take 1 tablet by mouth twice a day  prn) 8/24/2017: Take as needed 60 tablet 3    ergocalciferol (ERGOCALCIFEROL) 50,000 unit Cap Take 1 capsule (50,000 Units total) by mouth every 7 days. 8/24/2017: Take as sched 4 capsule 12    fenofibrate 160 MG Tab take 1 tablet by mouth once daily (Patient taking differently: take 1 tablet by mouth once daily  pm) 8/24/2017: Take as sched PM 30 tablet 6    fish oil-omega-3 fatty acids 300-1,000 mg capsule Take 2 g by  "mouth 2 (two) times daily.  8/24/2017: Hold until after surgery      HUMALOG KWIKPEN 100 unit/mL InPn pen inject 24 units subcutaneously WITH BREAKFAST AND LUNCH THEN 20 UNITS WITH DINNER 8/24/2017: Hold AM of surgery 4 Box 2    JENTADUETO 2.5-1,000 mg Tab take 1 tablet by mouth twice a day with food 8/24/2017: Hold PM prior and AM of surgery 60 tablet 4    LEVEMIR FLEXTOUCH 100 unit/mL (3 mL) InPn pen inject 50 units subcutaneously every evening (Patient taking differently: inject 50 units subcutaneously every evening  - pt taking 30 units in AM and 30 units in PM) 8/24/2017: Take 80%= 24 units PM prior and 50%= 15 units AM of surgery 15 mL 12    lisinopril (PRINIVIL,ZESTRIL) 40 MG tablet take 1 tablet by mouth once daily (Patient taking differently: take 1 tablet by mouth once daily pm) 8/24/2017: Take as sched PM 30 tablet 6    loratadine (CLARITIN) 10 mg tablet Take 1 tablet (10 mg total) by mouth once daily. 1 tab daily for allergy symptoms (Patient taking differently: Take 10 mg by mouth daily as needed. 1 tab daily for allergy symptoms) 8/24/2017: Take as needed 30 tablet 1    metoprolol succinate (TOPROL-XL) 100 MG 24 hr tablet take 1 tablet by mouth once daily for blood pressure (Patient taking differently: take 1 tablet by mouth once daily for blood pressure  am) 8/24/2017: Take AM of surgery 30 tablet 6    omeprazole (PRILOSEC) 20 MG capsule Take 1 capsule (20 mg total) by mouth once daily. For GERD (Patient taking differently: Take 20 mg by mouth every morning. For GERD) 8/24/2017: Take AM of surgery 30 capsule 11    blood sugar diagnostic (ONETOUCH ULTRA TEST) Strp TEST 4 TIMES A DAY. 90 day supply.  400 each 4    fenofibrate nanocrystallized 160 mg Tab 1 tab daily for Cholesterol/Triglycerides 8/24/2017: duplicate 30 tablet 6    insulin needles, disposable, 31 X 5/16 " Ndle Use with Levemir injection daily  100 each 6    lancets (ONETOUCH ULTRASOFT LANCETS) Misc 1 lancet by Misc.(Non-Drug; " "Combo Route) route 4 (four) times daily before meals and nightly.  150 each 6    pen needle, diabetic (BD ULTRA-FINE ADRIEN PEN NEEDLES) 32 gauge x 5/32" Ndle Use 4 times a day. 90 day supply.  400 each 4     No facility-administered encounter medications on file as of 8/24/2017.          Please review the instructions regarding your above listed medications.    Before Surgery  -Stop taking all herbal medications 14 days prior to surgery  -Stop taking asprin, products containing asprin 7 days before surgery  -Stop taking blood thinners   days before surgery  -Refrain from drinking alcoholic beverages for 24 hours before and after surgery  -Stop or limit smoking   days before surgery    Night before Surgery  -DO NOT EAT OR DRINK ANYTHING AFTER MIDNIGHT, INCLUDING GUM, HARD CANDY, MINTS, OR CHEWING TOBACCO  -Take a shower or bath (shower is recommended). Bathe with Hibiciens soap or an antibacterial soap from the neck down. If not supplied by your surgeon, Hibiciens soap will need to be purchased over the counter in the pharmacy. Rinse soap off thoroughly.  -Shampoo your hair with your regular shampoo    The Day of Surgery  -Take another bath or shower with Hibiciens or any antibacterial soap, to reduce the chance of infection.  -Take heart and blood pressure medications with a small sip of water, as advised by the perioperative team.  -Do not take fluid pills  -You may brush your teeth and rinse your mouth, but do not swallow any additional water.  -Do not apply perfumes, powder, body lotions, or deodorant on the day of surgery.  -Nail polish should be removed  -Do not wear makeup or moisturizer  -Wear comfortable clothes, such as a button front shirt and loose fitting pants.  -Leave all jewelry, including body piercings, and valuables at home.  -Bring any devices you will need after surgery such as crutches or canes.  -If you have sleep apnea, please bring your CPAP machine    In the event of your physical conditions " including the onset of a cold or respiratory illness, or if you have to delay or cancel your surgery, please notify your surgeon.     If you have any questions or concerns, please don't hesitate to call.    Sincerely,    Leydi [phone]

## 2017-08-24 NOTE — HPI
History of present illness- I had the pleasure of meeting this pleasant 61 y.o. gentleman in the pre op clinic prior to his elective Orthopedic surgery. The patient is new to me . Goes by Kurt    I have obtained the history by speaking to the patient and by reviewing the electronic health records.    Events leading up to surgery / History of presenting illness -    He has been troubled with  mild- moderate Rt knee  pain for 4 years . Pain increases with certain angle , using the break pedal and activity and decreases with draining - having had to get drain more frequently .  Was athletic over the years   '  Relevant health conditions of significance for the perioperative period/ History of presenting illness -    HTN (hypertension)    Usual BP-120/80's    Fatty liver - suggested weight loss, Alcohol cessation  Not known to have cirrhosis, jaundice, liver failure      Sleep apnea   Not using CPAP .Informed the risk of worsening sleep apnea in the perioperative period   Avoidance of  supine sleep, weight gain and alcoholic beverages discussed since all of these can worsen LOUANN     Alcohol abuse- none this week and until after surgery    usual alcohol use 3-4  ( 6-8 ounces of spirits most evening )  No with drawl history , recently stopped alcohol for 90 days until June to loose weight and also held Alcohol during Lent last year     Obesity- notices that with alcohol cessation,looses weight , glucose, triglycerides  get better    Type 2  Diabetes Mellitus  On treatment with oral agents, Insulin    Hemoglobin A1c- 9.0 April 2017  Capillary glucose check-does not check very often   Pre breakfast -220  Had hypoglycemia symptoms this AM ( although the glucose was not low , it was 88 ) responded to orange juice   neuropathy ,Diabetes with proteinuria   Eats a lot of fast food  I had educated that uncontrolled DM can cause post op complications,risk of infection, wound healing problem,increased length of stay in hospital and  its associated complications.I suggest exercise as much as possible and follow diabetic diet    Suggested diet compliance , Alcohol , fast food avoidance and logging glucose regularly   American Diabetes association targets - Capillary glucose Pre meal ( pre prandial) ,1-2 hr after beginning of the meal ( post prandial ) less than 180 were discussed  Given the chronic hyperglycemia , will aim for higher end of normal readings  Suggested keeping in touch with glucose readings    Not known to have heart disease , , Lung disease

## 2017-08-24 NOTE — ASSESSMENT & PLAN NOTE
Sleep apnea- I suggest a sleep study and suggest caution with usage of medication that can cause respiratory suppression in the perioperative period  potential ramifications of untreated sleep apnea, which could include daytime sleepiness, hypertension, heart disease and stroke were discussed

## 2017-08-24 NOTE — ASSESSMENT & PLAN NOTE
Diabetes Mellitus-I suggest monitoring the glucose in the perioperative period ( Before meals and bed time,if the patient is on oral feeds or every 6 hourly ,if the patient is NPO )  Blood glucose targets in hospitalized patients are ( Critical care patients 140-180 mg/dl, Med/Surg patients ( non critical care) 100-140 mg/dl, patients on continuous enteral or parenteral nutrition 140-180 mg/dl )  .Oral Hypoglycemic agents are generally avoided during the hospital stay . If glucose is consistently elevated ,I suggest using basal ,prandial Insulin regimen to control the glucose , as elevated glucose can be associated with adverse surgical out comes. Please consider involving Hospital Medicine or Endocrinology ,if any help is needed with Glucose control. Patient will be instructed based on the pre op clinic guidelines  about adjustment of diabetic treatment  considering the NPO status for Surgery     Will follow Glucose

## 2017-08-24 NOTE — ASSESSMENT & PLAN NOTE
Hypertension-  Blood pressure is acceptable . I suggest continuation of Amlodipine, Toprol XL, Lisinopril( taking Nightly ) - during the entire perioperative period. I suggest  blood pressure, electrolyte and renal function.I suggest addressing pain control as uncontrolled pain can increased blood pressure

## 2017-08-24 NOTE — OUTPATIENT SUBJECTIVE & OBJECTIVE
Outpatient Subjective & Objective     Chief complaint-Preoperative evaluation, Perioperative Medical management, complication reduction plan     Relevant health conditions of significance for the perioperative period/ History of presenting illness -    Active cardiac conditions- none    Revised cardiac risk index predictors- insulin requiring diabetes mellitus    Functional capacity -Examples of physical activity, yard work, walks, walks at the park, swims in his back yard,   can take 1 flight of stairs, cutting the grass with a mower, raking lawn, painting, planting shrubs, weeding garden, swimming, dancing and digging----- He can undertake all the above activities without  chest pain,chest tightness, Shortness of breath ,dizziness,lightheadedness making his exercise tolerance more,   than 4 Mets.       Review of Systems   Constitutional: Negative for chills and fever.        10 pounds weight gain since back on alcohol since June 2017    HENT:        Sleep apnea   Eyes:        Uses glasses to work   Respiratory:        No Cough ,Phlegm       Cardiovascular:        As noted   Gastrointestinal:        Bowels- Regular  No overt GI/ blood losses   Endocrine:        Recent steroid use- none   Genitourinary: Negative for dysuria.        No hesitancy   Musculoskeletal:        As above      Skin: Negative for rash.   Neurological: Negative for syncope.        No unilateral weakness   Hematological:        Current use of Anticoagulants- none   Psychiatric/Behavioral:        No Depression,Anxiety       Past Medical History:   Diagnosis Date    Allergy     Anxiety     Arthritis     Blood transfusion     Cataract     Depression     Diabetes mellitus     Diabetes mellitus type II     Hyperlipidemia     Hypertension    no vascular stenting   Family History   Problem Relation Age of Onset    Hypertension Father     Diabetes Father     Diabetes Mother     Diabetes Brother     Amblyopia Neg Hx     Blindness Neg Hx      Cancer Neg Hx     Cataracts Neg Hx     Glaucoma Neg Hx     Macular degeneration Neg Hx     Retinal detachment Neg Hx     Strabismus Neg Hx     Stroke Neg Hx     Thyroid disease Neg Hx      Past Surgical History:   Procedure Laterality Date    ANKLE FUSION      left    CARPAL TUNNEL RELEASE      left    Uvalectomy      VASECTOMY     No anesthesia, bleeding , cardiac problems , PONV with previous surgeries/ procedures   FH- No anesthesia, thrombosis , early onset heart disease in family     Lives with ex wife in his house along with 2daughters  Medications and Allergies reviewed in epic.   Suggested that his insulin requirements may come down with compliance - knows to adjust insulin     Review of Medicine tests    EKG- I had independently reviewed the EKG from--April 2013   It was reported to be showing     Normal sinus rhythm  Normal ECG  When compared with ECG of 17-NOV-2008 13:19,  No significant change was found    Review of clinical lab tests-Date---4/12/207 Creatinine-1.1  Date--4/12/2017 Hemoglobin-- 13.8 Platelet count--214 2013     1 - Normal left ventricular function (EF 60%).     2 - Normal diastolic function.     No evidence of stress induced myocardial ischemia.         Physical Exam   HENT:   Head: Normocephalic.       Physical Exam   HENT:   Head: Normocephalic.     Constitutional- Vitals - Body mass index is 31.95 kg/m².,   Vitals:    08/24/17 1057   BP: 121/65   Pulse: 81   Temp: 97.1 °F (36.2 °C)     General appearance-Conscious,Coherent  Eyes- No conjunctival icterus,pupils  round  and reactive to light   ENT-Oral cavity- moist  , Hearing grossly normal   Neck- No thyromegaly ,Trachea -central, No jugular venous distension,   No Carotid Bruit   Cardiovascular -Heart Sounds- Normal  and  no murmur   , No gallop rhythm   Respiratory - Normal Respiratory Effort, Normal breath sounds,  CrepitationsRt base,  no wheeze  and  no forced expiratory wheeze    Peripheral  pitting pedal edema-- mild, no calf pain   Gastrointestinal -Soft abdomen, No palpable masses, Non Tender,Liver,Spleen not palpable. No-- free fluid and shifting dullness  Musculoskeletal- No finger Clubbing. Strength grossly normal   Lymphatic-No Palpable cervical, axillary,Inguinal lymphadenopathy   Psychiatric - normal effect,Orientation  Rt Dorsalis pedis pulses-palpable    Lt Dorsalis pedis pulses- palpable   Rt Posterior tibial pulses -palpable   Left posterior tibial pulses -palpable   Miscellaneous -  no asterixis,  no suggestion of bacterial feet infection,  no renal bruit and  onychomycosis    Investigations  .      Review of old records- Was done and information gathered regards to events leading to surgery and health conditions of significance in the perioperative period.    Outpatient Subjective & Objective

## 2017-08-24 NOTE — LETTER
August 24, 2017      Lisette Corado MD  [address]  [place] [address]           [facility]  [address] [facility] [address]  Phone: [phone]          Patient: Dallas Garcia   MR Number: [id number]   YOB: 1955   Date of Visit: 8/24/2017       Dear Dr. Lisette Corado:    Thank you for referring Dallas Garcia to me for evaluation. Attached you will find relevant portions of my assessment and plan of care.    If you have questions, please do not hesitate to call me. I look forward to following Dallas Garcia along with you.    Sincerely,    Shima Duong MD    Enclosure  CC:  MD Erik Curry MD    If you would like to receive this communication electronically, please contact [email or web address] or [phone] to request more information on [facility] access.    For providers and/or their staff who would like to refer a patient to [facility], please contact us through our one-stop-shop provider referral line, [facility] [place], at [phone].    If you feel you have received this communication in error or would no longer like to receive these types of communications, please e-mail [email or web address]

## 2017-08-24 NOTE — PROGRESS NOTES
[facility] - Pre Op Consult  Progress Note    Patient Name: Dallas Garcia  MRN: [id number]  Date of Evaluation- 08/24/2017  PCP- Lisette Corado MD    Future cases for Dallas Garcia [[id number]]     Case ID Status Date Time [place] Procedure Provider Location    [id number] [facility] 8/30/2017  7:30 AM 90 ARTHROSCOPY-KNEE Lucio Sena MD [[id number]] [facility] OR      Rt    HPI:  History of present illness- I had the pleasure of meeting this pleasant 61 y.o. gentleman in the pre op clinic prior to his elective Orthopedic surgery. The patient is new to me . Goes by Kurt    I have obtained the history by speaking to the patient and by reviewing the electronic health records.    Events leading up to surgery / History of presenting illness -    He has been troubled with  mild- moderate Rt knee  pain for 4 years . Pain increases with certain angle , using the break pedal and activity and decreases with draining - having had to get drain more frequently .  Was athletic over the years   '  Relevant health conditions of significance for the perioperative period/ History of presenting illness -    HTN (hypertension)    Usual BP-120/80's    Fatty liver - suggested weight loss, Alcohol cessation  Not known to have cirrhosis, jaundice, liver failure      Sleep apnea   Not using CPAP .Informed the risk of worsening sleep apnea in the perioperative period   Avoidance of  supine sleep, weight gain and alcoholic beverages discussed since all of these can worsen LOUANN     Alcohol abuse- none this week and until after surgery    usual alcohol use 3-4  ( 6-8 ounces of spirits most evening )  No with drawl history , recently stopped alcohol for 90 days until June to loose weight and also held Alcohol during Lent last year     Obesity- notices that with alcohol cessation,looses weight , glucose, triglycerides  get better    Type 2  Diabetes Mellitus  On treatment with oral agents, Insulin    Hemoglobin A1c- 9.0 April 2017  Capillary glucose  check-does not check very often   Pre breakfast -220  Had hypoglycemia symptoms this AM ( although the glucose was not low , it was 88 ) responded to orange juice   neuropathy ,Diabetes with proteinuria   Eats a lot of fast food  I had educated that uncontrolled DM can cause post op complications,risk of infection, wound healing problem,increased length of stay in hospital and its associated complications.I suggest exercise as much as possible and follow diabetic diet    Suggested diet compliance , Alcohol , fast food avoidance and logging glucose regularly   American Diabetes association targets - Capillary glucose Pre meal ( pre prandial) ,1-2 hr after beginning of the meal ( post prandial ) less than 180 were discussed  Given the chronic hyperglycemia , will aim for higher end of normal readings  Suggested keeping in touch with glucose readings    Not known to have heart disease , , Lung disease         Subjective/ Objective:          Chief complaint-Preoperative evaluation, Perioperative Medical management, complication reduction plan     Relevant health conditions of significance for the perioperative period/ History of presenting illness -    Active cardiac conditions- none    Revised cardiac risk index predictors- insulin requiring diabetes mellitus    Functional capacity -Examples of physical activity, yard work, walks, walks at the park, swims in his back yard,   can take 1 flight of stairs, cutting the grass with a mower, raking lawn, painting, planting shrubs, weeding garden, swimming, dancing and digging----- He can undertake all the above activities without  chest pain,chest tightness, Shortness of breath ,dizziness,lightheadedness making his exercise tolerance more,   than 4 Mets.       Review of Systems   Constitutional: Negative for chills and fever.        10 pounds weight gain since back on alcohol since June 2017    HENT:        Sleep apnea   Eyes:        Uses glasses to work   Respiratory:         No Cough ,Phlegm       Cardiovascular:        As noted   Gastrointestinal:        Bowels- Regular  No overt GI/ blood losses   Endocrine:        Recent steroid use- none   Genitourinary: Negative for dysuria.        No hesitancy   Musculoskeletal:        As above      Skin: Negative for rash.   Neurological: Negative for syncope.        No unilateral weakness   Hematological:        Current use of Anticoagulants- none   Psychiatric/Behavioral:        No Depression,Anxiety       Past Medical History:   Diagnosis Date    Allergy     Anxiety     Arthritis     Blood transfusion     Cataract     Depression     Diabetes mellitus     Diabetes mellitus type II     Hyperlipidemia     Hypertension    no vascular stenting   Family History   Problem Relation Age of Onset    Hypertension Father     Diabetes Father     Diabetes Mother     Diabetes Brother     Amblyopia Neg Hx     Blindness Neg Hx     Cancer Neg Hx     Cataracts Neg Hx     Glaucoma Neg Hx     Macular degeneration Neg Hx     Retinal detachment Neg Hx     Strabismus Neg Hx     Stroke Neg Hx     Thyroid disease Neg Hx      Past Surgical History:   Procedure Laterality Date    ANKLE FUSION      left    CARPAL TUNNEL RELEASE      left    Uvalectomy      VASECTOMY     No anesthesia, bleeding , cardiac problems , PONV with previous surgeries/ procedures   FH- No anesthesia, thrombosis , early onset heart disease in family     Lives with ex wife in his house along with 2daughters  Medications and Allergies reviewed in epic.   Suggested that his insulin requirements may come down with compliance - knows to adjust insulin     Review of Medicine tests    EKG- I had independently reviewed the EKG from--April 2013   It was reported to be showing     Normal sinus rhythm  Normal ECG  When compared with ECG of 17-NOV-2008 13:19,  No significant change was found    Review of clinical lab tests-Date---4/12/207  Creatinine-1.1  Date--4/12/2017 Hemoglobin-- 13.8 Platelet count--214    2013     1 - Normal left ventricular function (EF 60%).     2 - Normal diastolic function.     No evidence of stress induced myocardial ischemia.         Physical Exam   HENT:   Head: Normocephalic.       Physical Exam   HENT:   Head: Normocephalic.     Constitutional- Vitals - Body mass index is 31.95 kg/m².,   Vitals:    08/24/17 1057   BP: 121/65   Pulse: 81   Temp: 97.1 °F (36.2 °C)     General appearance-Conscious,Coherent  Eyes- No conjunctival icterus,pupils  round  and reactive to light   ENT-Oral cavity- moist  , Hearing grossly normal   Neck- No thyromegaly ,Trachea -central, No jugular venous distension,   No Carotid Bruit   Cardiovascular -Heart Sounds- Normal  and  no murmur   , No gallop rhythm   Respiratory - Normal Respiratory Effort, Normal breath sounds,  CrepitationsRt base,  no wheeze  and  no forced expiratory wheeze    Peripheral pitting pedal edema-- mild, no calf pain   Gastrointestinal -Soft abdomen, No palpable masses, Non Tender,Liver,Spleen not palpable. No-- free fluid and shifting dullness  Musculoskeletal- No finger Clubbing. Strength grossly normal   Lymphatic-No Palpable cervical, axillary,Inguinal lymphadenopathy   Psychiatric - normal effect,Orientation  Rt Dorsalis pedis pulses-palpable    Lt Dorsalis pedis pulses- palpable   Rt Posterior tibial pulses -palpable   Left posterior tibial pulses -palpable   Miscellaneous -  no asterixis,  no suggestion of bacterial feet infection,  no renal bruit and  onychomycosis    Investigations  .      Review of old records- Was done and information gathered regards to events leading to surgery and health conditions of significance in the perioperative period.        Assessment/Plan:     Alcohol abuse  Suggested abstinence   No suggestion of liver failure    Sleep apnea    Sleep apnea- I suggest a sleep study and suggest caution with usage of medication that can cause  respiratory suppression in the perioperative period  potential ramifications of untreated sleep apnea, which could include daytime sleepiness, hypertension, heart disease and stroke were discussed          HTN (hypertension), benign  Hypertension-  Blood pressure is acceptable . I suggest continuation of Amlodipine, Toprol XL, Lisinopril( taking Nightly ) - during the entire perioperative period. I suggest  blood pressure, electrolyte and renal function.I suggest addressing pain control as uncontrolled pain can increased blood pressure     Type 2 diabetes, uncontrolled, with neuropathy    Diabetes Mellitus-I suggest monitoring the glucose in the perioperative period ( Before meals and bed time,if the patient is on oral feeds or every 6 hourly ,if the patient is NPO )  Blood glucose targets in hospitalized patients are ( Critical care patients 140-180 mg/dl, Med/Surg patients ( non critical care) 100-140 mg/dl, patients on continuous enteral or parenteral nutrition 140-180 mg/dl )  .Oral Hypoglycemic agents are generally avoided during the hospital stay . If glucose is consistently elevated ,I suggest using basal ,prandial Insulin regimen to control the glucose , as elevated glucose can be associated with adverse surgical out comes. Please consider involving Hospital Medicine or Endocrinology ,if any help is needed with Glucose control. Patient will be instructed based on the pre op clinic guidelines  about adjustment of diabetic treatment  considering the NPO status for Surgery     Will follow Glucose    Non morbid obesity due to excess calories  Suggested fast food avoidance   Weight loss suggested    Fatty liver  Suggested weight loss, alcohol avoidance     GERD (gastroesophageal reflux disease)  GERD-  I suggest continuation of the Proton pump inhibitor in the perioperative period . I suggest aspiration precautions    Edema  Edema- I suggested avoidance of added salt,avoidance of NSAID's and suggested Limb  elevation and caitlin footee use      Preoperative cardiac risk assessment-  The patient does not have any active cardiac conditions . Revised cardiac risk index predictors- 1---.Functional capacity is more than 4 Mets. He will be undergoing a Orthopedic procedure that carries a intermediate risk     The estimated risk of the rate of adverse cardiac outcomes  0.9%    No further cardiac work up is indicated prior to proceeding with the surgery     Orders Placed This Encounter    Hemoglobin A1c       American Society of Anesthesiologists Physical status classification ( ASA ) class- - 3    Postoperative pulmonary complication risk assessment    ARISCAT ( [place]) risk index- risk class - low ( < 1 hour surgery)     [place]acia Respiratory failure index- percentage risk of respiratory failure- 0.5 %    Preventive perioperative care    Thromboembolic prophylaxis:  His risk factors for thrombosis include obesity, surgical procedure and age.I suggest  thromboembolic prophylaxis ( mechanical/pharmacological, weighing the risk benefits of pharmacological agent use considering agusto procedural bleeding )  during the perioperative period.I suggested being active in the post operative period.      Postoperative pulmonary complication prophylaxis-Risk factors for post operative pulmonary complications include obesity, sleep apnea  ASA class >2- I suggest incentive spirometry use, early ambulation and end tidal carbon dioxide monitoring , oral care, head end of bed elevation      Renal complication prophylaxis-Risk factors for renal complications include diabetes mellitus and hypertension . I suggest keeping him well hydrated .I suggested drinking 2 litre's of water a day      Surgical site Infection Prophylaxis-I  suggest appropriate antibiotic for Prophylaxis against Surgical site infections    In view of regional anesthesia, the patient  is at risk of postoperative urinary retention.  I suggest avoidance / minimizing the of   Benzodiazepines,Anticholinergic medication,antihistamines ( Benadryl) , if possible in the perioperative period. I suggest using the minimum possible use of opioids for the minimum period of time in the perioperative period. Benadryl avoidance suggested      This visit was focused on Preoperative evaluation, Perioperative Medical management, complication reduction plans. I suggest that the patient follows up with primary care or relevant sub specialists for ongoing health care.    I appreciate the opportunity to be involved in this patients care. Please feel free to contact me if there were any questions about this consultation.    Patient is pending optimization- Will follow glucose readings     Shima Duong MD  Perioperative Medicine  [facility]   Pager [phone]  ---------------------------------    8/26-- 19 41    A1c 9.6    Followed glucose readings    8/26- -223 ( 1 hour post insulin 151), 154  8/25- 230 ( 2 hours post insulin 135)  ---------------------------------------------    8/28- 14 26     Followed on glucose readings    8/27- Morning before insulin 221,2 hours after with no food 111  No alcohol this week.Lost a couple of pounds  Suggested care with short acting Insulin use without meals - due to risk of hypoglycemia    8/28 morning,226 before insulin,2nd reading after insulin 160  2 hours later after insulin no breakfast 116    Spoke to patient - trend noted of AM glucose higher - having desert with milk at bed time - suggested healthy snack like a  Piece of fruit   Suggested care with short acting Insulin use without meals - due to risk of hypoglycemia  ----    8/29-- 12 42     166 at 6.20 before dinner and insulin  Morning before insulin -196- better- had half of desert   Called and spoke to patient   No changes to medication  Insulin instructions discusesd

## 2017-08-28 ENCOUNTER — HOSPITAL ENCOUNTER (OUTPATIENT)
Dept: PREADMISSION TESTING | Facility: OTHER | Age: 62
Discharge: HOME | End: 2017-08-28
Attending: ORTHOPAEDIC SURGERY
Payer: COMMERCIAL

## 2017-08-28 ENCOUNTER — ANESTHESIA EVENT (OUTPATIENT)
Dept: SURGERY | Facility: OTHER | Age: 62
End: 2017-08-28
Payer: COMMERCIAL

## 2017-08-28 ENCOUNTER — OFFICE VISIT (OUTPATIENT)
Dept: SPORTS MEDICINE | Facility: CLINIC | Age: 62
End: 2017-08-28
Payer: COMMERCIAL

## 2017-08-28 VITALS
OXYGEN SATURATION: 97 % | WEIGHT: 204 LBS | HEART RATE: 77 BPM | RESPIRATION RATE: 16 BRPM | DIASTOLIC BLOOD PRESSURE: 80 MMHG | HEIGHT: 67 IN | TEMPERATURE: 98 F | BODY MASS INDEX: 32.02 KG/M2 | SYSTOLIC BLOOD PRESSURE: 139 MMHG

## 2017-08-28 VITALS
SYSTOLIC BLOOD PRESSURE: 126 MMHG | WEIGHT: 204 LBS | BODY MASS INDEX: 32.02 KG/M2 | HEIGHT: 67 IN | DIASTOLIC BLOOD PRESSURE: 81 MMHG | HEART RATE: 80 BPM

## 2017-08-28 DIAGNOSIS — M23.91 INTERNAL DERANGEMENT OF RIGHT KNEE: Primary | ICD-10-CM

## 2017-08-28 DIAGNOSIS — E11.40 TYPE 2 DIABETES MELLITUS WITH DIABETIC NEUROPATHY: ICD-10-CM

## 2017-08-28 PROCEDURE — 99999 PR PBB SHADOW E&M-EST. PATIENT-LVL V: CPT | Mod: PBBFAC,,, | Performed by: PHYSICIAN ASSISTANT

## 2017-08-28 PROCEDURE — 99499 UNLISTED E&M SERVICE: CPT | Mod: S$GLB,,, | Performed by: PHYSICIAN ASSISTANT

## 2017-08-28 RX ORDER — FAMOTIDINE 20 MG/1
20 TABLET, FILM COATED ORAL
Status: CANCELLED | OUTPATIENT
Start: 2017-08-28 | End: 2017-08-28

## 2017-08-28 RX ORDER — TRAMADOL HYDROCHLORIDE 50 MG/1
50-100 TABLET ORAL EVERY 6 HOURS PRN
Qty: 30 TABLET | Refills: 0 | Status: SHIPPED | OUTPATIENT
Start: 2017-08-28 | End: 2019-05-22

## 2017-08-28 RX ORDER — ASPIRIN 325 MG
325 TABLET, DELAYED RELEASE (ENTERIC COATED) ORAL EVERY 12 HOURS
Qty: 84 TABLET | Refills: 0 | Status: SHIPPED | OUTPATIENT
Start: 2017-08-28 | End: 2020-12-14 | Stop reason: ALTCHOICE

## 2017-08-28 RX ORDER — PROMETHAZINE HYDROCHLORIDE 25 MG/1
25 TABLET ORAL EVERY 6 HOURS PRN
Qty: 16 TABLET | Refills: 0 | Status: SHIPPED | OUTPATIENT
Start: 2017-08-28 | End: 2019-05-22

## 2017-08-28 RX ORDER — HYDROCODONE BITARTRATE AND ACETAMINOPHEN 10; 325 MG/1; MG/1
TABLET ORAL
Qty: 30 TABLET | Refills: 0 | Status: SHIPPED | OUTPATIENT
Start: 2017-08-28 | End: 2019-05-22

## 2017-08-28 RX ORDER — SODIUM CHLORIDE, SODIUM LACTATE, POTASSIUM CHLORIDE, CALCIUM CHLORIDE 600; 310; 30; 20 MG/100ML; MG/100ML; MG/100ML; MG/100ML
INJECTION, SOLUTION INTRAVENOUS CONTINUOUS
Status: CANCELLED | OUTPATIENT
Start: 2017-08-28

## 2017-08-28 RX ORDER — BLOOD SUGAR DIAGNOSTIC
STRIP MISCELLANEOUS
Qty: 400 STRIP | Refills: 0 | Status: SHIPPED | OUTPATIENT
Start: 2017-08-28 | End: 2019-03-04 | Stop reason: SDUPTHER

## 2017-08-28 NOTE — ANESTHESIA PREPROCEDURE EVALUATION
08/28/2017  Dallas Garcia is a 61 y.o., male.    Anesthesia Evaluation    I have reviewed the Patient Summary Reports.    I have reviewed the Nursing Notes.   I have reviewed the Medications.     Review of Systems  Anesthesia Hx:  No problems with previous Anesthesia  Denies Family Hx of Anesthesia complications.   Denies Personal Hx of Anesthesia complications.   Social:  Non-Smoker    Hematology/Oncology:  Hematology Normal   Oncology Normal     EENT/Dental:EENT/Dental Normal   Cardiovascular:   Exercise tolerance: good Hypertension, well controlled    Pulmonary:   Sleep Apnea    Renal/:   Denies Chronic Renal Disease.     Hepatic/GI:   GERD Liver Disease,    Musculoskeletal:  Musculoskeletal Normal    Neurological:   Neuromuscular Disease,    Endocrine:   Diabetes, poorly controlled, type 1, using insulin    Dermatological:  Skin Normal    Psych:   anxiety depression          Physical Exam  General:  Obesity    Airway/Jaw/Neck:  Airway Findings: Mouth Opening: Small, but > 3cm Jaw/Neck Findings:  Neck ROM: Extension Decreased, Mod.                 Anesthesia Plan  Type of Anesthesia, risks & benefits discussed:  Anesthesia Type:  general  Patient's Preference:   Intra-op Monitoring Plan:   Intra-op Monitoring Plan Comments:   Post Op Pain Control Plan:   Post Op Pain Control Plan Comments:   Induction:   IV  Beta Blocker:         Informed Consent: Patient understands risks and agrees with Anesthesia plan.  Questions answered. Anesthesia consent signed with patient.  ASA Score: 3     Day of Surgery Review of History & Physical:    H&P update referred to the surgeon.         Ready For Surgery From Anesthesia Perspective.

## 2017-08-28 NOTE — DISCHARGE INSTRUCTIONS
PRE-ADMIT TESTING -  [phone]    [address]  [facility]        OUTPATIENT SURGERY UNIT - [phone]    Your surgery has been scheduled at [facility]. We are pleased to have the opportunity to serve you. For Further Information please call [phone].    On the day of surgery please report to the Information Desk on the 1st floor.    · CONTACT YOUR PHYSICIAN'S OFFICE THE DAY PRIOR TO YOUR SURGERY TO OBTAIN YOUR ARRIVAL TIME.     · The evening before surgery do not eat anything after 9 p.m. ( this includes hard candy, chewing gum and mints).  You may only have GATORADE, POWERADE AND WATER  from 9 p.m. until you leave your home.   DO NOT DRINK ANY LIQUIDS ON THE WAY TO THE HOSPITAL.      SPECIAL MEDICATION INSTRUCTIONS: TAKE medications checked off by the Anesthesiologist on your Medication List.    Angiogram Patients: Take medications as instructed by your physician, including aspirin.     Surgery Patients:    If you take ASPIRIN - Your PHYSICIAN/SURGEON will need to inform you IF/OR when you need to stop taking aspirin prior to your surgery.     Do Not take any medications containing IBUPROFEN.  Do Not Wear any make-up or dark nail polish   (especially eye make-up) to surgery. If you come to surgery with makeup on you will be required to remove the makeup or nail polish.    Do not shave your surgical area at least 5 days prior to your surgery. The surgical prep will be performed at the hospital according to Infection Control regulations.    Leave all valuables at home.   Do Not wear any jewelry or watches, including any metal in body piercings.  Contact Lens must be removed before surgery. Either do not wear the contact lens or bring a case and solution for storage.  Please bring a container for eyeglasses or dentures as required.  Bring any paperwork your physician has provided, such as consent forms,  history and physicals, doctor's orders, etc.   Bring comfortable clothes  that are loose fitting to wear upon discharge. Take into consideration the type of surgery being performed.  Maintain your diet as advised per your physician the day prior to surgery.      Adequate rest the night before surgery is advised.   Park in the Parking lot behind the hospital or in the [facility] [facility] across the street from the parking lot. Parking is complimentary.  If you will be discharged the same day as your procedure, please arrange for a responsible adult to drive you home or to accompany you if traveling by taxi.   YOU WILL NOT BE PERMITTED TO DRIVE OR TO LEAVE THE HOSPITAL ALONE AFTER SURGERY.   It is strongly recommended that you arrange for someone to remain with you for the first 24 hrs following your surgery.       Thank you for your cooperation.  The Staff of [facility].        Bathing Instructions                                                                 Please shower the evening before and morning of your procedure with    ANTIBACTERIAL SOAP. ( DIAL, etc )  Concentrate on the surgical area   for at least 3 minutes and rinse completely. Dry off as usual.   Do not use any deodorant, powder, body lotions, perfume, after shave or    cologne.

## 2017-08-28 NOTE — PRE-PROCEDURE INSTRUCTIONS
Reviewed 's knee arthroscopy discharge instructions and demonstrated the postoperative equipment (polar ice), with verbalized understanding per patient.  The patient states, he has crutches fitted to him at home.                        .

## 2017-08-28 NOTE — TELEPHONE ENCOUNTER
Please call patient and inform him I will refill this prescription with no refill as he has not been seen in our clinic since April 2016. He needs to schedule a follow up appointment if he would like for us to continue to refill his prescriptions.

## 2017-08-28 NOTE — H&P
Dallas RUFFIN Jose  is here for a completion of his perioperative paperwork. he  Is scheduled to undergo     Right knee menisectomy, chondroplasty, and LBR on 8/30/17.      He is a healthy individual and does need clearance for this procedure which he has received from Dr. Duong at [facility].     Risks, indications and benefits of the surgical procedure were discussed with the patient. All questions with regard to surgery, rehab, expected return to functional activities, activities of daily living and recreational endeavors were answered to his satisfaction.    The patient verbalized that he/she does not have any additional clotting, bleeding, or blood disorders, other than what is list in her chart on today's review.     Then a brief history and physical exam were performed.      PHYSICAL EXAM:  GEN: A&Ox3, WD WN NAD  HEENT: WNL  CHEST: CTAB, no W/R/R  HEART: RRR, no M/R/G  ABD: Soft, NT ND, BS x4 QUADS  MS; See Epic  NEURO: CN II-XII intact       The surgical consent was then reviewed with the patient, who agreed with all the contents of the consent form and it was signed. he was then given the [facility] surgery packet to bring with him to [facility] for the anesthesia portion of his perioperative paperwork.   For all physicians except for Dr. Sena, we will email and possibly fax the consent forms and booking sheets to [facility] pre-admit.    The patient was given the opportunity to ask questions about the surgical plan and consent form, and once no other questions were asked, I proceeded with the pre-op appointment.    PHYSICAL THERAPY:  He was also instructed regarding physical therapy and will begin on  POD1. He was given a copy of the original prescription to schedule. Another copy of this prescription was also faxed to [facility].    POST OP CARE:instructions were reviewed including care of the wound and dressing after surgery and when he can shower.     PAIN MANAGEMENT: Dallas RUFFIN  Jose was also given his pain management regime, which includes the TENS unit given to him by chi along with the education required for its use. He was also instructed regarding the Polar ice unit that will be in place after surgery and his postoperative pain medications.     PAIN MEDICATION:  norco 10/325mg 1 po q 4-6 hours prn pain  Ultram 50 mg Take 1-2 p.o. q.6 hours p.r.n. breakthrough pain,   Phenergan 25 mg one p.o. q.6 hours p.r.n. nausea and vomiting.    DVT prophylaxis was discussed with the patient today including risk factors for developing DVTs and history of DVTs. The patient was asked if any specific recommendations were given from the doctor/s that did pre-operative surgical clearance. The patient was then given an education sheet about DVTs and PE with warning signs and symptoms of both and steps to take if they suspect either of these.    This along with the Modified Caprini risk assessment model for VTE in general surgical patients was used to determine the patient's DVT risk.     From: Justice MK, Rosendo DA, Crory SM, et al. Prevention of VTE in nonorthopedic surgical patients: antithrombotic therapy and prevention of thrombosis, 9th ed: American College of Chest Physicians evidence-based clinical practical guidelines. Chest 2012; 141:e227S. Copyright © 2012. Reproduced with permission from the American College of Chest Physicians.    The below listed DVT prophylaxis regimen along with bilateral RUBEN compression stockings will be used post-op. Length of treatment has been determined to be 10-42 days post-op by the above noted Caprini assessment model.     The patient was instructed to buy and take:  Aspirin 325mg BID x 6 weeks for DVT prophylaxis starting on the evening after surgery.  Patient will also use bilateral TEDs on lower extremities, SCDs during surgery, and early ambulation post-op. If the patient was previously taking 81mg baby aspirin, they were told to not take it will using  the above stated aspirin and to restart the 81mg aspirin after completion of the aspirin dose.     Patient was also told to buy over the counter Prilosec medication and take it once daily for GI protection as long as they are taking NSAIDs or Aspirin.     Published data in Charlie SHEARER, et al. J Arthroplasty. Oct; 31(10):2237-40, 2016; showed that aspirin use as prophylaxis during revision total joint arthroplasty was more effective than warfarin in preventing symptomatic venous thromboembolic events and was associated with lower complications.  Patients in the study were also treated with intermittent pneumatic compression devices. Compression stockings would be our method of mechanical prophylaxis, which has been shown to be similar to pneumatic compression in the systematic review, Maynor RJ, et al. Salma Surg. Feb; 239(2): 162-171, 2004.     Results showed a significantly higher incidence of symptomatic venous thromboembolic events among patients in the warfarin group vs. the aspirin group (1.75% vs. 0.56%). Researchers also noted a bleeding event rate of 1.5% among patients who received warfarin compared with a rate of 0.4% among patients who received aspirin.    Patient denies history of seizures.       The patient was told that narcotic pain medications may make them drowsy and instructions were given to not sign legal documents, drive or operate heavy machinery, cars, or equipment while under the influence of narcotic medications.     As there were no other questions to be asked, he was given my business card along with Lucio Sena MD business card if he has any questions or concerns prior to surgery or in the postop period.

## 2017-08-29 ENCOUNTER — TELEPHONE (OUTPATIENT)
Dept: SPORTS MEDICINE | Facility: CLINIC | Age: 62
End: 2017-08-29

## 2017-08-30 ENCOUNTER — SURGERY (OUTPATIENT)
Age: 62
End: 2017-08-30

## 2017-08-30 ENCOUNTER — ANESTHESIA (OUTPATIENT)
Dept: SURGERY | Facility: OTHER | Age: 62
End: 2017-08-30
Payer: COMMERCIAL

## 2017-08-30 ENCOUNTER — HOSPITAL ENCOUNTER (OUTPATIENT)
Facility: OTHER | Age: 62
Discharge: HOME | End: 2017-08-30
Attending: ORTHOPAEDIC SURGERY | Admitting: ORTHOPAEDIC SURGERY
Payer: COMMERCIAL

## 2017-08-30 VITALS
RESPIRATION RATE: 18 BRPM | HEART RATE: 84 BPM | BODY MASS INDEX: 32.02 KG/M2 | OXYGEN SATURATION: 97 % | DIASTOLIC BLOOD PRESSURE: 79 MMHG | HEIGHT: 67 IN | WEIGHT: 204 LBS | TEMPERATURE: 98 F | SYSTOLIC BLOOD PRESSURE: 159 MMHG

## 2017-08-30 DIAGNOSIS — M23.91 INTERNAL DERANGEMENT OF RIGHT KNEE: ICD-10-CM

## 2017-08-30 LAB — POCT GLUCOSE: 198 MG/DL (ref 70–110)

## 2017-08-30 PROCEDURE — 25000003 PHARM REV CODE 250: Performed by: ORTHOPAEDIC SURGERY

## 2017-08-30 PROCEDURE — 25000003 PHARM REV CODE 250: Performed by: ANESTHESIOLOGY

## 2017-08-30 PROCEDURE — 71000016 HC POSTOP RECOV ADDL HR: Performed by: ORTHOPAEDIC SURGERY

## 2017-08-30 PROCEDURE — 37000009 HC ANESTHESIA EA ADD 15 MINS: Performed by: ORTHOPAEDIC SURGERY

## 2017-08-30 PROCEDURE — 36000711: Performed by: ORTHOPAEDIC SURGERY

## 2017-08-30 PROCEDURE — 63600175 PHARM REV CODE 636 W HCPCS: Performed by: NURSE ANESTHETIST, CERTIFIED REGISTERED

## 2017-08-30 PROCEDURE — 37000008 HC ANESTHESIA 1ST 15 MINUTES: Performed by: ORTHOPAEDIC SURGERY

## 2017-08-30 PROCEDURE — 27201423 OPTIME MED/SURG SUP & DEVICES STERILE SUPPLY: Performed by: ORTHOPAEDIC SURGERY

## 2017-08-30 PROCEDURE — 29881 ARTHRS KNE SRG MNISECTMY M/L: CPT | Mod: RT,,, | Performed by: ORTHOPAEDIC SURGERY

## 2017-08-30 PROCEDURE — 36000710: Performed by: ORTHOPAEDIC SURGERY

## 2017-08-30 PROCEDURE — 63600175 PHARM REV CODE 636 W HCPCS: Performed by: ORTHOPAEDIC SURGERY

## 2017-08-30 PROCEDURE — 71000033 HC RECOVERY, INTIAL HOUR: Performed by: ORTHOPAEDIC SURGERY

## 2017-08-30 PROCEDURE — 63600175 PHARM REV CODE 636 W HCPCS: Performed by: PHYSICIAN ASSISTANT

## 2017-08-30 PROCEDURE — 82962 GLUCOSE BLOOD TEST: CPT | Performed by: ORTHOPAEDIC SURGERY

## 2017-08-30 PROCEDURE — 71000015 HC POSTOP RECOV 1ST HR: Performed by: ORTHOPAEDIC SURGERY

## 2017-08-30 PROCEDURE — 25000003 PHARM REV CODE 250: Performed by: NURSE ANESTHETIST, CERTIFIED REGISTERED

## 2017-08-30 RX ORDER — FENTANYL CITRATE 50 UG/ML
INJECTION, SOLUTION INTRAMUSCULAR; INTRAVENOUS
Status: DISCONTINUED | OUTPATIENT
Start: 2017-08-30 | End: 2017-08-30

## 2017-08-30 RX ORDER — MEPERIDINE HYDROCHLORIDE 50 MG/ML
12.5 INJECTION INTRAMUSCULAR; INTRAVENOUS; SUBCUTANEOUS ONCE AS NEEDED
Status: DISCONTINUED | OUTPATIENT
Start: 2017-08-30 | End: 2017-08-30 | Stop reason: HOSPADM

## 2017-08-30 RX ORDER — FAMOTIDINE 20 MG/1
20 TABLET, FILM COATED ORAL
Status: COMPLETED | OUTPATIENT
Start: 2017-08-30 | End: 2017-08-30

## 2017-08-30 RX ORDER — ONDANSETRON 8 MG/1
8 TABLET, ORALLY DISINTEGRATING ORAL EVERY 8 HOURS PRN
Status: DISCONTINUED | OUTPATIENT
Start: 2017-08-30 | End: 2017-08-30 | Stop reason: HOSPADM

## 2017-08-30 RX ORDER — FENTANYL CITRATE 50 UG/ML
25 INJECTION, SOLUTION INTRAMUSCULAR; INTRAVENOUS EVERY 5 MIN PRN
Status: DISCONTINUED | OUTPATIENT
Start: 2017-08-30 | End: 2017-08-30 | Stop reason: HOSPADM

## 2017-08-30 RX ORDER — MIDAZOLAM HYDROCHLORIDE 1 MG/ML
INJECTION INTRAMUSCULAR; INTRAVENOUS
Status: DISCONTINUED | OUTPATIENT
Start: 2017-08-30 | End: 2017-08-30

## 2017-08-30 RX ORDER — OXYCODONE HYDROCHLORIDE 5 MG/1
5 TABLET ORAL EVERY 4 HOURS PRN
Status: DISCONTINUED | OUTPATIENT
Start: 2017-08-30 | End: 2017-08-30 | Stop reason: HOSPADM

## 2017-08-30 RX ORDER — BUPIVACAINE HYDROCHLORIDE 2.5 MG/ML
INJECTION, SOLUTION EPIDURAL; INFILTRATION; INTRACAUDAL
Status: DISCONTINUED | OUTPATIENT
Start: 2017-08-30 | End: 2017-08-30 | Stop reason: HOSPADM

## 2017-08-30 RX ORDER — HYDROMORPHONE HYDROCHLORIDE 2 MG/ML
0.4 INJECTION, SOLUTION INTRAMUSCULAR; INTRAVENOUS; SUBCUTANEOUS EVERY 5 MIN PRN
Status: DISCONTINUED | OUTPATIENT
Start: 2017-08-30 | End: 2017-08-30 | Stop reason: HOSPADM

## 2017-08-30 RX ORDER — PROMETHAZINE HYDROCHLORIDE 25 MG/1
25 TABLET ORAL EVERY 6 HOURS PRN
Status: DISCONTINUED | OUTPATIENT
Start: 2017-08-30 | End: 2017-08-30 | Stop reason: HOSPADM

## 2017-08-30 RX ORDER — ONDANSETRON 2 MG/ML
INJECTION INTRAMUSCULAR; INTRAVENOUS
Status: DISCONTINUED | OUTPATIENT
Start: 2017-08-30 | End: 2017-08-30

## 2017-08-30 RX ORDER — SODIUM CHLORIDE 0.9 % (FLUSH) 0.9 %
3 SYRINGE (ML) INJECTION
Status: DISCONTINUED | OUTPATIENT
Start: 2017-08-30 | End: 2017-08-30 | Stop reason: HOSPADM

## 2017-08-30 RX ORDER — GLYCOPYRROLATE 0.2 MG/ML
INJECTION INTRAMUSCULAR; INTRAVENOUS
Status: DISCONTINUED | OUTPATIENT
Start: 2017-08-30 | End: 2017-08-30

## 2017-08-30 RX ORDER — ACETAMINOPHEN 10 MG/ML
INJECTION, SOLUTION INTRAVENOUS
Status: DISCONTINUED | OUTPATIENT
Start: 2017-08-30 | End: 2017-08-30

## 2017-08-30 RX ORDER — OXYCODONE HYDROCHLORIDE 5 MG/1
5 TABLET ORAL
Status: DISCONTINUED | OUTPATIENT
Start: 2017-08-30 | End: 2017-08-30 | Stop reason: HOSPADM

## 2017-08-30 RX ORDER — LIDOCAINE HCL/PF 100 MG/5ML
SYRINGE (ML) INTRAVENOUS
Status: DISCONTINUED | OUTPATIENT
Start: 2017-08-30 | End: 2017-08-30

## 2017-08-30 RX ORDER — PHENYLEPHRINE HYDROCHLORIDE 10 MG/ML
INJECTION INTRAVENOUS
Status: DISCONTINUED | OUTPATIENT
Start: 2017-08-30 | End: 2017-08-30

## 2017-08-30 RX ORDER — HYDROMORPHONE HYDROCHLORIDE 2 MG/ML
1 INJECTION, SOLUTION INTRAMUSCULAR; INTRAVENOUS; SUBCUTANEOUS EVERY 4 HOURS PRN
Status: DISCONTINUED | OUTPATIENT
Start: 2017-08-30 | End: 2017-08-30 | Stop reason: HOSPADM

## 2017-08-30 RX ORDER — SODIUM CHLORIDE, SODIUM LACTATE, POTASSIUM CHLORIDE, CALCIUM CHLORIDE 600; 310; 30; 20 MG/100ML; MG/100ML; MG/100ML; MG/100ML
INJECTION, SOLUTION INTRAVENOUS CONTINUOUS
Status: DISCONTINUED | OUTPATIENT
Start: 2017-08-30 | End: 2017-08-30 | Stop reason: HOSPADM

## 2017-08-30 RX ORDER — EPINEPHRINE 1 MG/ML
INJECTION, SOLUTION INTRACARDIAC; INTRAMUSCULAR; INTRAVENOUS; SUBCUTANEOUS
Status: DISCONTINUED | OUTPATIENT
Start: 2017-08-30 | End: 2017-08-30 | Stop reason: HOSPADM

## 2017-08-30 RX ORDER — CEFAZOLIN SODIUM 2 G/50ML
2 SOLUTION INTRAVENOUS
Status: COMPLETED | OUTPATIENT
Start: 2017-08-30 | End: 2017-08-30

## 2017-08-30 RX ORDER — PROPOFOL 10 MG/ML
VIAL (ML) INTRAVENOUS
Status: DISCONTINUED | OUTPATIENT
Start: 2017-08-30 | End: 2017-08-30

## 2017-08-30 RX ADMIN — EPHEDRINE SULFATE 10 MG: 50 INJECTION INTRAMUSCULAR; INTRAVENOUS; SUBCUTANEOUS at 10:08

## 2017-08-30 RX ADMIN — CEFAZOLIN SODIUM 2 G: 2 SOLUTION INTRAVENOUS at 09:08

## 2017-08-30 RX ADMIN — EPINEPHRINE 9 ML: 1 INJECTION, SOLUTION INTRAMUSCULAR; SUBCUTANEOUS at 10:08

## 2017-08-30 RX ADMIN — LIDOCAINE HYDROCHLORIDE 75 MG: 20 INJECTION, SOLUTION INTRAVENOUS at 09:08

## 2017-08-30 RX ADMIN — GLYCOPYRROLATE 0.2 MG: 0.2 INJECTION, SOLUTION INTRAMUSCULAR; INTRAVENOUS at 09:08

## 2017-08-30 RX ADMIN — OXYCODONE HYDROCHLORIDE 5 MG: 5 TABLET ORAL at 10:08

## 2017-08-30 RX ADMIN — FENTANYL CITRATE 100 MCG: 50 INJECTION, SOLUTION INTRAMUSCULAR; INTRAVENOUS at 09:08

## 2017-08-30 RX ADMIN — ACETAMINOPHEN 1000 MG: 10 INJECTION, SOLUTION INTRAVENOUS at 09:08

## 2017-08-30 RX ADMIN — CARBOXYMETHYLCELLULOSE SODIUM 2 DROP: 2.5 SOLUTION/ DROPS OPHTHALMIC at 09:08

## 2017-08-30 RX ADMIN — PROPOFOL 200 MG: 10 INJECTION, EMULSION INTRAVENOUS at 09:08

## 2017-08-30 RX ADMIN — ONDANSETRON 4 MG: 2 INJECTION INTRAMUSCULAR; INTRAVENOUS at 10:08

## 2017-08-30 RX ADMIN — SODIUM CHLORIDE, SODIUM LACTATE, POTASSIUM CHLORIDE, AND CALCIUM CHLORIDE: 600; 310; 30; 20 INJECTION, SOLUTION INTRAVENOUS at 06:08

## 2017-08-30 RX ADMIN — FAMOTIDINE 20 MG: 20 TABLET, FILM COATED ORAL at 06:08

## 2017-08-30 RX ADMIN — MIDAZOLAM HYDROCHLORIDE 2 MG: 1 INJECTION, SOLUTION INTRAMUSCULAR; INTRAVENOUS at 07:08

## 2017-08-30 RX ADMIN — BUPIVACAINE HYDROCHLORIDE 30 ML: 2.5 INJECTION, SOLUTION EPIDURAL; INFILTRATION; INTRACAUDAL; PERINEURAL at 10:08

## 2017-08-30 RX ADMIN — PHENYLEPHRINE HYDROCHLORIDE 200 MCG: 10 INJECTION INTRAVENOUS at 09:08

## 2017-08-30 RX ADMIN — PHENYLEPHRINE HYDROCHLORIDE 100 MCG: 10 INJECTION INTRAVENOUS at 09:08

## 2017-08-30 NOTE — H&P (VIEW-ONLY)
Dallas RUFFIN Jose  is here for a completion of his perioperative paperwork. he  Is scheduled to undergo     Right knee menisectomy, chondroplasty, and LBR on 8/30/17.      He is a healthy individual and does need clearance for this procedure which he has received from Dr. Duong at [facility].     Risks, indications and benefits of the surgical procedure were discussed with the patient. All questions with regard to surgery, rehab, expected return to functional activities, activities of daily living and recreational endeavors were answered to his satisfaction.    The patient verbalized that he/she does not have any additional clotting, bleeding, or blood disorders, other than what is list in her chart on today's review.     Then a brief history and physical exam were performed.      PHYSICAL EXAM:  GEN: A&Ox3, WD WN NAD  HEENT: WNL  CHEST: CTAB, no W/R/R  HEART: RRR, no M/R/G  ABD: Soft, NT ND, BS x4 QUADS  MS; See Epic  NEURO: CN II-XII intact       The surgical consent was then reviewed with the patient, who agreed with all the contents of the consent form and it was signed. he was then given the [facility] surgery packet to bring with him to [facility] for the anesthesia portion of his perioperative paperwork.   For all physicians except for Dr. Sena, we will email and possibly fax the consent forms and booking sheets to [facility] pre-admit.    The patient was given the opportunity to ask questions about the surgical plan and consent form, and once no other questions were asked, I proceeded with the pre-op appointment.    PHYSICAL THERAPY:  He was also instructed regarding physical therapy and will begin on  POD1. He was given a copy of the original prescription to schedule. Another copy of this prescription was also faxed to [facility].    POST OP CARE:instructions were reviewed including care of the wound and dressing after surgery and when he can shower.     PAIN MANAGEMENT: Dallas RUFFIN  Jose was also given his pain management regime, which includes the TENS unit given to him by chi along with the education required for its use. He was also instructed regarding the Polar ice unit that will be in place after surgery and his postoperative pain medications.     PAIN MEDICATION:  norco 10/325mg 1 po q 4-6 hours prn pain  Ultram 50 mg Take 1-2 p.o. q.6 hours p.r.n. breakthrough pain,   Phenergan 25 mg one p.o. q.6 hours p.r.n. nausea and vomiting.    DVT prophylaxis was discussed with the patient today including risk factors for developing DVTs and history of DVTs. The patient was asked if any specific recommendations were given from the doctor/s that did pre-operative surgical clearance. The patient was then given an education sheet about DVTs and PE with warning signs and symptoms of both and steps to take if they suspect either of these.    This along with the Modified Caprini risk assessment model for VTE in general surgical patients was used to determine the patient's DVT risk.     From: Justice MK, Rosendo DA, Corry SM, et al. Prevention of VTE in nonorthopedic surgical patients: antithrombotic therapy and prevention of thrombosis, 9th ed: American College of Chest Physicians evidence-based clinical practical guidelines. Chest 2012; 141:e227S. Copyright © 2012. Reproduced with permission from the American College of Chest Physicians.    The below listed DVT prophylaxis regimen along with bilateral RUBEN compression stockings will be used post-op. Length of treatment has been determined to be 10-42 days post-op by the above noted Caprini assessment model.     The patient was instructed to buy and take:  Aspirin 325mg BID x 6 weeks for DVT prophylaxis starting on the evening after surgery.  Patient will also use bilateral TEDs on lower extremities, SCDs during surgery, and early ambulation post-op. If the patient was previously taking 81mg baby aspirin, they were told to not take it will using  the above stated aspirin and to restart the 81mg aspirin after completion of the aspirin dose.     Patient was also told to buy over the counter Prilosec medication and take it once daily for GI protection as long as they are taking NSAIDs or Aspirin.     Published data in Charlie SHEARER, et al. J Arthroplasty. Oct; 31(10):2237-40, 2016; showed that aspirin use as prophylaxis during revision total joint arthroplasty was more effective than warfarin in preventing symptomatic venous thromboembolic events and was associated with lower complications.  Patients in the study were also treated with intermittent pneumatic compression devices. Compression stockings would be our method of mechanical prophylaxis, which has been shown to be similar to pneumatic compression in the systematic review, Maynor RJ, et al. Salma Surg. Feb; 239(2): 162-171, 2004.     Results showed a significantly higher incidence of symptomatic venous thromboembolic events among patients in the warfarin group vs. the aspirin group (1.75% vs. 0.56%). Researchers also noted a bleeding event rate of 1.5% among patients who received warfarin compared with a rate of 0.4% among patients who received aspirin.    Patient denies history of seizures.       The patient was told that narcotic pain medications may make them drowsy and instructions were given to not sign legal documents, drive or operate heavy machinery, cars, or equipment while under the influence of narcotic medications.     As there were no other questions to be asked, he was given my business card along with Lucio Sena MD business card if he has any questions or concerns prior to surgery or in the postop period.

## 2017-08-30 NOTE — BRIEF OP NOTE
[facility]  Brief Operative Note     SUMMARY     Surgery Date: 8/30/2017     Surgeon(s) and Role:     * Lucio Sena MD - Primary    Assisting Surgeon: None    Pre-op Diagnosis:  Chondromalacia of knee, right [M94.261]  Loose, body, joint, knee, right [M23.41]  Old bucket handle tear of medial meniscus of right knee [M23.203]    Post-op Diagnosis:  Post-Op Diagnosis Codes:     * Chondromalacia of knee, right [M94.261]     * Loose, body, joint, knee, right [M23.41]     * Old bucket handle tear of medial meniscus of right knee [M23.203]    Procedure(s) (LRB):  ARTHROSCOPY-KNEE (Right)  MENISCECTOMY (Right)  CHONDROPLASTY-KNEE (Right)    Anesthesia: General    Description of the findings of the procedure: above    Findings/Key Components: above    Estimated Blood Loss: * No values recorded between 8/30/2017 10:02 AM and 8/30/2017 10:27 AM *         Specimens:   Specimen (12h ago through future)    None          Discharge Note    SUMMARY     Admit Date: 8/30/2017    Discharge Date and Time:  08/30/2017     Hospital Course (synopsis of major diagnoses, care, treatment, and services provided during the course of the hospital stay): Pt admitted for outpatient procedure, tolerated well.  Recovered in PACU and was discharged home on day of surgery.        Final Diagnosis: Post-Op Diagnosis Codes:     * Chondromalacia of knee, right [M94.261]     * Loose, body, joint, knee, right [M23.41]     * Old bucket handle tear of medial meniscus of right knee [M23.203]    Disposition: Home or Self Care    Follow Up/Patient Instructions:     Medications:  Reconciled Home Medications:   Current Discharge Medication List      CONTINUE these medications which have NOT CHANGED    Details   amlodipine (NORVASC) 10 MG tablet take 1 tablet by mouth once daily for blood pressure  Qty: 90 tablet, Refills: 1    Associated Diagnoses: Hand pain, left      atorvastatin (LIPITOR) 80 MG tablet take 1 tablet by mouth once  daily  Qty: 30 tablet, Refills: 6    Associated Diagnoses: Mixed hyperlipidemia      busPIRone (BUSPAR) 5 MG Tab take 1 tablet by mouth twice a day  Qty: 60 tablet, Refills: 3      ergocalciferol (ERGOCALCIFEROL) 50,000 unit Cap Take 1 capsule (50,000 Units total) by mouth every 7 days.  Qty: 4 capsule, Refills: 12    Associated Diagnoses: Vitamin D deficiency      fenofibrate 160 MG Tab take 1 tablet by mouth once daily  Qty: 30 tablet, Refills: 6      fish oil-omega-3 fatty acids 300-1,000 mg capsule Take 2 g by mouth 2 (two) times daily.       HUMALOG KWIKPEN 100 unit/mL InPn pen inject 24 units subcutaneously WITH BREAKFAST AND LUNCH THEN 20 UNITS WITH DINNER  Qty: 4 Box, Refills: 2    Associated Diagnoses: Type 2 diabetes, uncontrolled, with neuropathy      lancets (ONETOUCH ULTRASOFT LANCETS) Misc 1 lancet by Misc.(Non-Drug; Combo Route) route 4 (four) times daily before meals and nightly.  Qty: 150 each, Refills: 6    Associated Diagnoses: Type 2 diabetes mellitus without complication      LEVEMIR FLEXTOUCH 100 unit/mL (3 mL) InPn pen inject 50 units subcutaneously every evening  Qty: 15 mL, Refills: 12      lisinopril (PRINIVIL,ZESTRIL) 40 MG tablet take 1 tablet by mouth once daily  Qty: 30 tablet, Refills: 6      loratadine (CLARITIN) 10 mg tablet Take 1 tablet (10 mg total) by mouth once daily. 1 tab daily for allergy symptoms  Qty: 30 tablet, Refills: 1    Associated Diagnoses: Seasonal allergic rhinitis, unspecified allergic rhinitis trigger      metoprolol succinate (TOPROL-XL) 100 MG 24 hr tablet take 1 tablet by mouth once daily for blood pressure  Qty: 30 tablet, Refills: 6      omeprazole (PRILOSEC) 20 MG capsule Take 1 capsule (20 mg total) by mouth once daily. For GERD  Qty: 30 capsule, Refills: 11      ONETOUCH ULTRA TEST Strp TEST four times a day  Qty: 400 strip, Refills: 0    Associated Diagnoses: Type 2 diabetes mellitus with diabetic neuropathy      pen needle, diabetic (BD ULTRA-FINE ADRIEN  "PEN NEEDLES) 32 gauge x 5/32" Ndle Use 4 times a day. 90 day supply.  Qty: 400 each, Refills: 4    Associated Diagnoses: Type 2 diabetes mellitus with diabetic neuropathy, unspecified long term insulin use status      aspirin (ECOTRIN) 325 MG EC tablet Take 1 tablet (325 mg total) by mouth every 12 (twelve) hours.  Qty: 84 tablet, Refills: 0    Associated Diagnoses: Internal derangement of right knee      fenofibrate nanocrystallized 160 mg Tab 1 tab daily for Cholesterol/Triglycerides  Qty: 30 tablet, Refills: 6    Associated Diagnoses: Hyperlipidemia      hydrocodone-acetaminophen 10-325mg (NORCO)  mg Tab Take 1 tablet by mouth every 4-6 hours for pain.  Qty: 30 tablet, Refills: 0    Associated Diagnoses: Internal derangement of right knee      ibuprofen (ADVIL) 200 MG tablet Take 200 mg by mouth as needed for Pain.      insulin needles, disposable, 31 X 5/16 " Ndle Use with Levemir injection daily  Qty: 100 each, Refills: 6    Associated Diagnoses: Uncontrolled type 2 diabetes with neuropathy      JENTADUETO 2.5-1,000 mg Tab take 1 tablet by mouth twice a day with food  Qty: 60 tablet, Refills: 4    Associated Diagnoses: Diabetes mellitus without complication      promethazine (PHENERGAN) 25 MG tablet Take 1 tablet (25 mg total) by mouth every 6 (six) hours as needed for Nausea.  Qty: 16 tablet, Refills: 0    Associated Diagnoses: Internal derangement of right knee      tramadol (ULTRAM) 50 mg tablet Take 1-2 tablets ( mg total) by mouth every 6 (six) hours as needed.  Qty: 30 tablet, Refills: 0    Associated Diagnoses: Internal derangement of right knee             Discharge Procedure Orders  Diet general     Shower on day dressing removed (No bath)     Ice to affected area     Call MD for:  temperature >100.4     Call MD for:  persistent nausea and vomiting     Call MD for:  severe uncontrolled pain     Call MD for:  difficulty breathing, headache or visual disturbances     Call MD for:  redness, " tenderness, or signs of infection (pain, swelling, redness, odor or green/yellow discharge around incision site)     Call MD for:  hives     Call MD for:  persistent dizziness or light-headedness     Call MD for:  extreme fatigue     Remove dressing in 72 hours

## 2017-08-30 NOTE — PLAN OF CARE
Patient prefers to have Elmira (ex-wife) present for discharge teaching. Please contact them @[phone].

## 2017-08-30 NOTE — ANESTHESIA POSTPROCEDURE EVALUATION
"Anesthesia Post Evaluation    Patient: Dallas Garcia    Procedure(s) Performed: Procedure(s) (LRB):  ARTHROSCOPY-KNEE (Right)  MENISCECTOMY (Right)  CHONDROPLASTY-KNEE (Right)    Final Anesthesia Type: general  Patient location during evaluation: PACU  Patient participation: Yes- Able to Participate  Level of consciousness: awake and alert  Post-procedure vital signs: reviewed and stable  Pain management: adequate  Airway patency: patent  PONV status at discharge: No PONV  Anesthetic complications: no      Cardiovascular status: blood pressure returned to baseline  Respiratory status: unassisted, spontaneous ventilation and room air  Hydration status: euvolemic  Follow-up not needed.        Visit Vitals  BP (!) 148/82   Pulse 77   Temp 36.6 °C (97.8 °F) (Oral)   Resp 18   Ht 5' 7" (1.702 m)   Wt 92.5 kg (204 lb)   SpO2 97%   BMI 31.95 kg/m²       Pain/Paulo Score: Pain Assessment Performed: Yes (8/30/2017 10:32 AM)  Presence of Pain: complains of pain/discomfort (8/30/2017 10:32 AM)  Pain Rating Prior to Med Admin: 2 (8/30/2017 10:46 AM)  Pain Rating Post Med Admin: 2 (8/30/2017 11:20 AM)  Paulo Score: 10 (8/30/2017 11:20 AM)      "

## 2017-08-30 NOTE — OP NOTE
DATE OF PROCEDURE: 8/30/2017    PREOPERATIVE DIAGNOSES:   1. Right knee chondromalacia  2. Right knee lateral meniscus tear     POSTOPERATIVE DIAGNOSES:   1. Right knee chondromalacia  2. Right knee lateral meniscus tear     PROCEDURES:   1. Right knee arthroscopic chondroplasty  2. Right knee arthroscopic partial lateral meniscectomy      SURGEON: Lucio Sena M.D.     ASSISTANT: Joey Hu MD     COMPLICATIONS: None.     POSITION: Supine with arthroscopic leg matthews.     ANESTHESIA: General endotracheal plus local.     COMPLICATIONS: None.     TOURNIQUET TIME:  None    EBL:  Minimal    EXAMINATION UNDER ANESTHESIA:   Knee: full range of motion, no evidence of instability.     ARTHROSCOPIC FINDINGS:   1. Intact medial meniscus.   2. Free edge tear,  lateral meniscus    INDICATIONS: The patient is a 61 y.o.-year-old male with a history of right knee pain. MRI confirms chondromalacia and possible meniscus tear.  After the risks and benefits of surgery were discussed with the patient, including bleeding, infection, scarring, persistent pain, risk of blood clots (DVT), pulmonary embolism, compartment syndrome, damage to cartilage, damage to neurovascular structures, plus the risks of anesthesia, including, death, stroke, and heart attack, the patient wished to proceed with operative intervention.      DESCRIPTION OF PROCEDURE:     The patient and correct limb were identified and marked in the pre-op holding area.     The patient was brought in the room. After undergoing general endotracheal anesthesia,  he was placed on a well-padded operating table. his right lower extremity was prepped and draped in usual sterile fashion. A nonsterile tourniquet was placed high up around the thigh. A well padded arthroscopic leg matthews was used during the case. The contralateral leg was placed in a well padded Brett stirrup with bony prominences all being well padded.      After infiltrating the joint and portal sites with a  total of 30 cc of 0.25% marcaine, the standard inferolateral and inferomedial portals were created. Diagnostic arthroscopy performed.     CHONDROPLASTY:  The patellofemoral joint was entered. There was no significant chondromalacia on the trochlea.  The underface of the patella had grade 2/3 changes over a 1 x 0.5 cm area.  Loose flaps were seen.  Using a combination of biters and jaya, the unstable rim of cartilage was trimmed down to a stable rim.    There was a mild synovitis noted within the anterior interval. An VAPR device was used to remove areas of irritating synovium from the overlying trochlea in the anterior interval to allow for visualization to minimize abrasion.    Attention turned to the medial compartment. Medial femoral condyle had some small areas of grade 2/3 changes.  Loose flaps were trimmed out. The medial meniscus was intact and stable to probe.     Attention was turned to the intercondylar notch. The ACL and PCL were intact.     Attention was turned to the lateral compartment. The lateral meniscus had a free edge tear.  Using a combination of biters and jaya, the unstable central 10-15% of the meniscus was trimmed down to a stable rim.  The lateral femoral condyle and lateral tibial plateau were intact.      Portal sites were closed with 4-0 Monocryl, covered with Mastisol, Steri-Strips, Xeroform, 4 x 4 fluffs, ABD pads and thigh-high RUBEN hose.    The patient was extubated in the room, transferred to Recovery Room in stable condition accompanied by his physician.  There were no complications in the case.     I was present, scrubbed and did perform all critical portions of the case.      JYOTSNA CONTRERAS MD

## 2017-08-30 NOTE — DISCHARGE INSTRUCTIONS
Anesthesia: After Your Surgery  Youve just had surgery. During surgery, you received medication called anesthesia to keep you comfortable and pain-free. After surgery, you may experience some pain or nausea. This is common. Here are some tips for feeling better and recovering after surgery.    Going home  Your doctor or nurse will show you how to take care of yourself when you go home. He or she will also answer your questions. Have an adult family member or friend drive you home. For the first 24 hours after your surgery:  · Do not drive or use heavy equipment.  · Do not make important decisions or sign legal documents.  · Avoid alcohol.  · Have someone stay with you, if needed. He or she can watch for problems and help keep you safe.  Be sure to keep all follow-up appointments with your doctor. And rest after your procedure for as long as your doctor tells you to.    Coping with pain  If you have pain after surgery, pain medication will help you feel better. Take it as directed, before pain becomes severe. Also, ask your doctor or pharmacist about other ways to control pain, such as with heat, ice, and relaxation. And follow any other instructions your surgeon or nurse gives you.    Tips for taking pain medication  To get the best relief possible, remember these points:  · Pain medications can upset your stomach. Taking them with a little food may help.  · Most pain relievers taken by mouth need at least 20 to 30 minutes to take effect.  · Taking medication on a schedule can help you remember to take it. Try to time your medication so that you can take it before beginning an activity, such as dressing, walking, or sitting down for dinner.  · Constipation is a common side effect of pain medications. Contact your doctor before taking any medications like laxatives or stool softeners to help relieve constipation. Also ask about any dietary restrictions, because drinking lots of fluids and eating foods like fruits  and vegetables that are high in fiber can also help. Remember, dont take laxatives unless your surgeon has prescribed them.  · Mixing alcohol and pain medication can cause dizziness and slow your breathing. It can even be fatal. Dont drink alcohol while taking pain medication.  · Pain medication can slow your reflexes. Dont drive or operate machinery while taking pain medication.  If your health care provider tells you to take acetaminophen to help relieve your pain, ask him or her how much you are supposed to take each day. (Acetaminophen is the generic name for Tylenol and other brand-name pain relievers.) Acetaminophen or other pain relievers may interact with your prescription medicines or other over-the-counter (OTC) drugs. Some prescription medications contain acetaminophen along with other active ingredients. Using both prescription and OTC acetaminophen for pain can cause you to overdose. The FDA recommends that you read the labels on your OTC medications carefully. This will help you to clearly understand the list of active ingredients, dosing instructions, and any warnings. It may also help you avoid taking too much acetaminophen. If you have questions or don't understand the information, ask your pharmacist or health care provider to explain it to you before you take the OTC medication.    Managing nausea  Some people have an upset stomach after surgery. This is often due to anesthesia, pain, pain medications, or the stress of surgery. The following tips will help you manage nausea and get good nutrition as you recover. If you were on a special diet before surgery, ask your doctor if you should follow it during recovery. These tips may help:  · Dont push yourself to eat. Your body will tell you when to eat and how much.  · Start off with clear liquids and soup. They are easier to digest.  · Progress to semi-solid foods (mashed potatoes, applesauce, and gelatin) as you feel ready.  · Slowly move to  solid foods. Dont eat fatty, rich, or spicy foods at first.  · Dont force yourself to have three large meals a day. Instead, eat smaller amounts more often.  · Take pain medications with a small amount of solid food, such as crackers or toast to avoid nausea.      Call your surgeon if  · You still have pain an hour after taking medication (it may not be strong enough).  · You feel too sleepy, dizzy, or groggy (medication may be too strong).  · You have side effects like nausea, vomiting, or skin changes (rash, itching, or hives).   © 6444-9271 [employer] [address] [place] [address], [place] [address]. All rights reserved. This information is not intended as a substitute for professional medical care. Always follow your healthcare professional's instructions.                     [facility] [address], [place]                                                                                          [phone]                   Postoperative Instructions for Knee Surgery                 Your Surgery Included:   Open               Arthroscopic   [] Ligament Repair       [x] Diagnostic           [] ACL     [] PCL     [] MCL     [] PLLC      [] Synovectomy / Plica Removal [] Meniscal Cartilage Repair / Transplantation      [] Lysis of Adhesions / Manipulation [] Articular Cartilage Repair      [] Interval Release           [] Microfracture       [] OATS   [] ACI      [x] Meniscectomy           [] Osteochondral Allograft      [] Meniscal Cartilage Repair  [] Patellar Realignment       [x] Debridement / Chondroplasty         [] Lateral Release   [] Ligament Repair      [] Articular Cartilage Repair          [] Extensor Mechanism             []   Microfracture  []  OATS         []  Cartilage Biopsy [] Tendon Repair          [] Ligament Reconstruction          [] Patella                  [] Quadriceps             []   ACL    []   PCL  [] High Tibial Osteotomy       [] PRP Arthrocentesis  []  Joint Replacement         [] Amniox Arthrocentesis           [] Unicompartmental   [] Patellofemoral                    [] Total Knee                  Call our office ([phone]) immediately if you experience any of the following:       Excessive bleeding or pus like drainage at the incision site       Uncontrollable pain not relieved by pain medication       Excessive swelling or redness at the incision site       Fever above 101.5 degrees not controlled with Tylenol or Motrin       Shortness of Breath       Any foul odor or blistering from the surgery site    FOR EMERGENCIES: If any unusual problems or difficulties occur, call our office at [phone], or page the  at [phone] who will direct your call appropriately    1.   Pain Management: A cold therapy cuff, pain medications, local injections, and in some cases, regional anesthesia injections are used to manage your post-operative pain. The decision to use each of these options is based on their risks and benefits.     Medications: You were given one or more of the following medication prescriptions before leaving the hospital. Have the prescriptions filled at a pharmacy on your way home and follow the instructions on the bottles. If you need a refill, please call your pharmacy.      Narcotic Medication (usually Vicodin ES, Lortab, Percocet or Nucynta): Begin taking the medication before your knee starts to hurt. Some patients do not like to take any medication, but if you wait until your pain is severe before taking, you will be very uncomfortable for several hours waiting for the narcotic to work. Always take with food.     Nausea / Vomiting: For this issue, we prescribe Phenergan, use this medication as directed.     Cold Therapy: You may have been sent home with a [facility] cold therapy unit and wrap for your knee. Fill with ice and water to the indicated fill line and use throughout the day for the first two days and then as  needed to help relieve pain and control swelling.      Regional Anesthesia Injections (Blocks): You may have been given a regional nerve block either before or after surgery. This may make your entire leg numb for 24-36 hours.                            * Proceed with caution when bearing weight on your leg.     2.   Diet: Eat a bland diet for the first day after surgery. Progress your diet as tolerated. Constipation may occur with Narcotic usage, contact our office if you are experiencing constipation.    3.   Activity: Limit your activity during the first 48 hours, keep your leg elevated with pillows under your heel. After the first 48 hours at home, increase your activity level based on your symptoms.    4.   Dressing Change: Remove the dressing on the 3rd day. It is normal for some blood to be seen on the dressings. It is also normal for you to see apparent bruising on the skin around your knee when you remove the dressing. If present, leave the steri-strip tape across the incisions. If you are concerned by the drainage or the appearance of your knee, please call one of the numbers listed below.    5.   Showering: You may shower on the 3rd day after surgery with waterproof bandages over small incisions. If you have an incision with Prineo (clear mesh), it does not need to be covered. Do not submerge in any water until after your postoperative appointment in clinic.    6.   Your procedure did not require a post-operative brace.    7.   Your procedure did not require a Continuous Passive Motion (CPM) device.    8.   Weight Bearing: You may have been sent home with crutches. If instructed (see below), use these crutches at all times unless at complete rest.      [] Non-weight bearing for     {NUMBER:82634} weeks (you may touch your toes to the floor)      [] Partial weight bearing for  {NUMBER:26465} weeks    [] 25% Body Weight   [] 50% Body Weight      [x] Full weight bearing            [x]  NOW    []  after  "{NUMBER:65386} weeks     9.  Knee Exercises: Begin these exercises the first day after surgery in order to help you regain your motion and strength. You may do the following marked exercises:     [x] Quad Sets - Begin activating your quadriceps muscle by driving your          knee downward into full knee extension while seated on a table or bed   with a towel rolled and propped under your heel     [x] Straight Leg Raise (SLR) - While neda your quadriceps muscle, lift     your fully extended leg to the level of your non-operative knee (as shown)     [x] Heel Slides - With the knee straight, slide your heel slowly toward your   buttocks, hold at the endpoint for 10-15 seconds, then slowly straighten     [x] Ankle pumps - With your knee straight, move your ankle in a "pumping"    fashion to activate your calf and leg muscles      10.  Physical Therapy: Physical therapy is an essential component to your recovery from surgery. Your physical therapy will start in 3 days.    FIRST POSTOPERATIVE VISIT: As scheduled.       "

## 2017-08-30 NOTE — TRANSFER OF CARE
"Anesthesia Transfer of Care Note    Patient: Dallas Garcia    Procedure(s) Performed: Procedure(s) (LRB):  ARTHROSCOPY-KNEE (Right)  MENISCECTOMY (Right)  CHONDROPLASTY-KNEE (Right)    Patient location: PACU    Anesthesia Type: general    Transport from OR: Transported from OR on room air with adequate spontaneous ventilation    Post pain: adequate analgesia    Post assessment: no apparent anesthetic complications    Post vital signs: stable    Level of consciousness: awake and alert    Nausea/Vomiting: no nausea/vomiting    Complications: none    Transfer of care protocol was followed      Last vitals:   Visit Vitals  BP (!) 154/88 (BP Location: Left arm, Patient Position: Lying)   Pulse 76   Temp 36.8 °C (98.3 °F) (Oral)   Resp 16   Ht 5' 7" (1.702 m)   Wt 92.5 kg (204 lb)   SpO2 97%   BMI 31.95 kg/m²     "

## 2017-08-30 NOTE — ANESTHESIA POSTPROCEDURE EVALUATION
"Anesthesia Post Evaluation    Patient: Dallas Garcia    Procedure(s) Performed: Procedure(s) (LRB):  ARTHROSCOPY-KNEE (Right)  MENISCECTOMY (Right)  CHONDROPLASTY-KNEE (Right)    Final Anesthesia Type: general  Patient location during evaluation: PACU  Patient participation: Yes- Able to Participate  Level of consciousness: awake and alert and oriented  Post-procedure vital signs: reviewed and stable  Pain management: adequate  Airway patency: patent  PONV status at discharge: No PONV  Anesthetic complications: no      Cardiovascular status: blood pressure returned to baseline and hemodynamically stable  Respiratory status: unassisted, spontaneous ventilation and room air  Hydration status: euvolemic  Follow-up not needed.        Visit Vitals  BP (!) 148/82   Pulse 77   Temp 36.6 °C (97.8 °F) (Oral)   Resp 18   Ht 5' 7" (1.702 m)   Wt 92.5 kg (204 lb)   SpO2 97%   BMI 31.95 kg/m²       Pain/Paulo Score: Pain Assessment Performed: Yes (8/30/2017 10:32 AM)  Presence of Pain: complains of pain/discomfort (8/30/2017 10:32 AM)  Pain Rating Prior to Med Admin: 2 (8/30/2017 10:46 AM)  Pain Rating Post Med Admin: 2 (8/30/2017 11:20 AM)  Paulo Score: 10 (8/30/2017 11:20 AM)      "

## 2017-08-30 NOTE — INTERVAL H&P NOTE
The patient has been examined and the H&P has been reviewed:    I concur with the findings and no changes have occurred since H&P was written.    Anesthesia/Surgery risks, benefits and alternative options discussed and understood by patient/family.          Active Hospital Problems    Diagnosis  POA    Internal derangement of right knee [M23.91]  Yes      Resolved Hospital Problems    Diagnosis Date Resolved POA   No resolved problems to display.

## 2017-08-30 NOTE — ANESTHESIA POSTPROCEDURE EVALUATION
"Anesthesia Post Evaluation    Patient: Dallas Garcia    Procedure(s) Performed: Procedure(s) (LRB):  ARTHROSCOPY-KNEE (Right)  MENISCECTOMY (Right)  CHONDROPLASTY-KNEE (Right)    Final Anesthesia Type: general  Patient location during evaluation: PACU  Patient participation: Yes- Able to Participate  Level of consciousness: awake and alert  Post-procedure vital signs: reviewed and stable  Pain management: adequate  Airway patency: patent  PONV status at discharge: No PONV  Anesthetic complications: no      Cardiovascular status: blood pressure returned to baseline  Respiratory status: unassisted  Hydration status: euvolemic  Follow-up not needed.        Visit Vitals  BP (!) 154/85 (BP Location: Right arm, Patient Position: Lying)   Pulse 85   Temp 36.6 °C (97.8 °F) (Oral)   Resp 20   Ht 5' 7" (1.702 m)   Wt 92.5 kg (204 lb)   SpO2 98%   BMI 31.95 kg/m²       Pain/Paulo Score: Pain Assessment Performed: Yes (8/30/2017 11:30 AM)  Presence of Pain: denies (8/30/2017 11:30 AM)  Pain Rating Prior to Med Admin: 2 (8/30/2017 10:46 AM)  Pain Rating Post Med Admin: 2 (8/30/2017 11:20 AM)  Paulo Score: 10 (8/30/2017 11:20 AM)      "

## 2017-08-30 NOTE — PLAN OF CARE
Dallas Garcia has met all discharge criteria from Phase II. Vital Signs are stable, ambulating  without difficulty. Discharge instructions given, patient verbalized understanding. Discharged from facility via wheelchair in stable condition.

## 2017-08-31 ENCOUNTER — CLINICAL SUPPORT (OUTPATIENT)
Dept: REHABILITATION | Facility: HOSPITAL | Age: 62
End: 2017-08-31
Attending: PHYSICIAN ASSISTANT
Payer: COMMERCIAL

## 2017-08-31 DIAGNOSIS — Z98.890 S/P MENISCECTOMY: ICD-10-CM

## 2017-08-31 DIAGNOSIS — M25.561 ACUTE PAIN OF RIGHT KNEE: Primary | ICD-10-CM

## 2017-08-31 PROCEDURE — G8978 MOBILITY CURRENT STATUS: HCPCS | Mod: CL | Performed by: INTERNAL MEDICINE

## 2017-08-31 PROCEDURE — 97161 PT EVAL LOW COMPLEX 20 MIN: CPT | Performed by: INTERNAL MEDICINE

## 2017-08-31 PROCEDURE — G8979 MOBILITY GOAL STATUS: HCPCS | Mod: CJ | Performed by: INTERNAL MEDICINE

## 2017-08-31 PROCEDURE — 97110 THERAPEUTIC EXERCISES: CPT | Performed by: INTERNAL MEDICINE

## 2017-09-01 ENCOUNTER — TELEPHONE (OUTPATIENT)
Dept: REHABILITATION | Facility: HOSPITAL | Age: 62
End: 2017-09-01

## 2017-09-01 DIAGNOSIS — G89.29 CHRONIC PAIN OF RIGHT KNEE: Primary | ICD-10-CM

## 2017-09-01 DIAGNOSIS — M25.561 CHRONIC PAIN OF RIGHT KNEE: Primary | ICD-10-CM

## 2017-09-01 PROBLEM — Z98.890 S/P MENISCECTOMY: Status: ACTIVE | Noted: 2017-09-01

## 2017-09-01 NOTE — PROGRESS NOTES
Physician:Chidi Santos III, */ Dr. Sena  Diagnosis:   Encounter Diagnoses   Name Primary?    Acute pain of right knee Yes    S/P meniscectomy       DOS/ PROCEDURE:  8/30/2017  Procedure(s) (LRB):  ARTHROSCOPY-KNEE (Right)  MENISCECTOMY (Right)  CHONDROPLASTY-KNEE (Right)     Orders:  Physical Therapy evaluate and treat  Date of eval:  8/31/2017    Subjective:    Onset of pain /Mechanism of Onset: nsmoi pain last 3 yrs R knee. Reports probably injuring knee playing sports.    Chief complaint:  No pain, taking med as ordered. Having trouble connecting TENS unit. Feels he can walk without AD but was told not to do so.  Radicular symptoms:  None  Aggravating factors:   rom  Easing factors:  Rest, ice  :   Previous functional status includes    Golf, walking 3-4 miles / 3 x per week. Golf ( walks 1 1/2 miles), fishing  Current functional status:   Cont with L ankle pain with rainy weather . Cont with SC amb.     Medical history   L triple arthrodesis  Ankle 15 yrs ago with pins removed 5 yrs after sx., DM II, HTN, hyperlipidema    Educational needs:no barriers noted   Spiritual/Cultural: no specific needs identified      Work:                        Job description includes  sitting        Pts goals:  Decrease pain, r/t golf and fishing  Pain:  Pain ranges from 0 to 1 on VAS scale of 0- 10.    OBJECTIVE:       Functional assessment:   - walking:   SC MI L UE        - sit to stand: MI     AROM:  Flex R 95, L 135  Ext R -2 , L + 6  prone    8/31 Functional Limitations Reports - G Codes  Category: mobility  Tool: FOTO  Score: 39  Current/  60-80%  Goal/ : 20-40%  Discharge/   NA  Reviewed FOTO scores with pt.    Special tests:   Tyra's   Neg R    Palpation/ joint mob: :  Compression stocking and dressings intact from surgery with no signs of infection. Ice wrap in place.  Good patellar jm    TREATMENT:    Ed pt okay to remove dressings after 3 days as instructed by MD and to  call MD/ go to er if drainage is pus- like with pt verb understanding.   Ed in  wb orders : WBAT, cont to use sc for next couple days . Then wean off as tristan. Pt verbalized  understanding.     Pt performed LE rom and strengthening as tolerated, including:     QS 15 x  ankle pumps x 15  SLR  10x  side hip abd  10 x  Side hip add  10 x  gss seated with strap 30 sec x 3  Soleus stretch seated with strap 30 sec x 3  aarom seated knee flex 20 sec x 3  supine ext hang 3 min    Exercises were reviewed and pt was able to demonstrate them prior to the end of the session. Pt received a written copy of exercises to perform at home.  Ed in importance of rom alessandra ext.    Pt received patella tendon m/ l mobs, quad tendon m/ l mobs, inf patella glides 30 sec x 3. Performed jm on pt x 4 min as tristan.    Ed in use of tens unit and cp frequency.    Ed pt to use ice prn 10- 20 min when pain or swelling occurs.    Treatment today also included: cp x 10 min  Jason Jensen issued another pair of tens wires and ed pt in using machine.    ASSESSMENT:  PT diagnosis: R knee pain, decreased rom, edema s/p sx   Patient can benefit from outpatient physical therapy and a home program  Prognosis is Good.    Medical necessity is demonstrated by the following  IMPAIRMENTS:  Unable to participate in daily activities, Continued inability to participate in vocational pursuits, Pain limits function of effected part for some activities, Unable to participate fully in daily activities, Requires skilled supervision to complete and progress HEP and Weakness    GOALS:    12_   weeks. Pt agrees with goals set  1. Independent with HEP.  2. Report decreased    R    pain  <   / =  1  /10 with adls such as walking, playing golf  3. Increased MMT  for  R LE to 5/5    4. Increased arom  for  R knee flex > 125,  R knee ext to + 5  5.   Demonstrate improved functional ability on FOTO mobility knee score   To 20-40%      History  Co-morbidities and personal factors that may  impact the plan of care Low    Stable Clinical Presentation     Co-morbidities:       Personal Factors:     Body regions    Body systems    Activity Limitations    Participation Restrictons   No personal factors and/ or  comorbidites          Address 1-2 elements:     ROM impaired  MMT impaired  Gait impaired  Decreased FOTO score           PLAN:  Outpatient physical therapy 1- 2 times weekly to include: pt ed, hep, therapeutic exercises, neuromuscular re-education/ balance exercises, joint mobilizations, modalities prn.    Pt may see PTA as part of treatment plan.  Cont PT for  12         weeks.   I certify the need for these services   furnished under this plan of treatment and while under my   care.____________________________________ Physician/Referring Practitioner Date   of Signature

## 2017-09-05 ENCOUNTER — TELEPHONE (OUTPATIENT)
Dept: INTERNAL MEDICINE | Facility: CLINIC | Age: 62
End: 2017-09-05

## 2017-09-05 ENCOUNTER — CLINICAL SUPPORT (OUTPATIENT)
Dept: REHABILITATION | Facility: HOSPITAL | Age: 62
End: 2017-09-05
Attending: PHYSICIAN ASSISTANT
Payer: COMMERCIAL

## 2017-09-05 PROCEDURE — 97110 THERAPEUTIC EXERCISES: CPT

## 2017-09-05 PROCEDURE — 97140 MANUAL THERAPY 1/> REGIONS: CPT

## 2017-09-05 NOTE — TELEPHONE ENCOUNTER
----- Message from Freddie Lutz MA sent at 9/5/2017  4:28 PM CDT -----  Contact: self - [phone]   Type: Orders Request    What orders/ testing are being requested? Labs     Is there a future appointment scheduled for the patient with PCP? Yes     When? 10/26/17    Comments: Please call. Thanks!

## 2017-09-05 NOTE — TELEPHONE ENCOUNTER
Manasa, I have placed orders for fasting lab to be scheduled 1 week prior to pt's RTC Appt with me.  Thanks

## 2017-09-05 NOTE — PROGRESS NOTES
"Physician:Chidi Santos III, */ Dr. Sena  Diagnosis:   No diagnosis found.   DOS/ PROCEDURE:  8/30/2017  Procedure(s) (LRB):  ARTHROSCOPY-KNEE (Right)  MENISCECTOMY (Right)  CHONDROPLASTY-KNEE (Right)     Orders:  Physical Therapy   Date of eval:  8/31/2017    Time in 0830  Time out 0930    Subjective:  Pt denies pain when arrived for tx. Stated compliance with HEP. Pain scale 0/10.    OBJECTIVE:  NAD when arrived for tx w/o AD    TREATMENT:    Pt performed LE rom and strengthening as tolerated, including:     Stationary bike x 10' for increased ROM and strength  Manual therapy R knee Patella MOBS, PROM and stretching x10'  QS 3x10/3"  SLR 3x10/3"  R hip abd 3x10  R hip add 3x10  Bridges 3x10  Prone knee hang 5'  Prone R hip ext 3x10'     CP x10'    ASSESSMENT:   Pt tolerating tx well, No increased pain in R knee but noted min fatigue after tx. VC/TC for correcting form/technique along with quad and core engagement.    PT diagnosis: R knee pain, decreased rom, edema s/p sx   Patient can benefit from outpatient physical therapy and a home program  Prognosis is Good.    Medical necessity is demonstrated by the following  IMPAIRMENTS:  Unable to participate in daily activities, Continued inability to participate in vocational pursuits, Pain limits function of effected part for some activities, Unable to participate fully in daily activities, Requires skilled supervision to complete and progress HEP and Weakness    GOALS:    12_   weeks. Pt agrees with goals set  1. Independent with HEP.  2. Report decreased    R    pain  <   / =  1  /10 with adls such as walking, playing golf  3. Increased MMT  for  R LE to 5/5    4. Increased arom  for  R knee flex > 125,  R knee ext to + 5  5.   Demonstrate improved functional ability on FOTO mobility knee score   To 20-40%            PLAN:  Outpatient physical therapy 1- 2 times weekly to include: pt ed, hep, therapeutic exercises, neuromuscular re-education/ balance " exercises, joint mobilizations, modalities prn.    Pt may see PTA as part of treatment plan.  Cont PT for  12         weeks.     Lucio Malone PTA, STS

## 2017-09-07 ENCOUNTER — CLINICAL SUPPORT (OUTPATIENT)
Dept: REHABILITATION | Facility: HOSPITAL | Age: 62
End: 2017-09-07
Attending: PHYSICIAN ASSISTANT
Payer: COMMERCIAL

## 2017-09-07 DIAGNOSIS — M25.561 CHRONIC PAIN OF RIGHT KNEE: Primary | ICD-10-CM

## 2017-09-07 DIAGNOSIS — G89.29 CHRONIC PAIN OF RIGHT KNEE: Primary | ICD-10-CM

## 2017-09-07 PROCEDURE — 97110 THERAPEUTIC EXERCISES: CPT

## 2017-09-07 PROCEDURE — 97140 MANUAL THERAPY 1/> REGIONS: CPT

## 2017-09-07 NOTE — PROGRESS NOTES
"Physician:Chidi Santos III, */ Dr. Sena  Diagnosis:   Encounter Diagnosis   Name Primary?    Chronic pain of right knee Yes      DOS/ PROCEDURE:  8/30/2017  Procedure(s) (LRB):  ARTHROSCOPY-KNEE (Right)  MENISCECTOMY (Right)  CHONDROPLASTY-KNEE (Right)     Orders:  Physical Therapy   Date of eval:  8/31/2017    Time in 0830  Time out 0930    Subjective:  Pt reporting very minimal pain R knee this morning. Pain scale 0.5/10.  Stated compliance with HEP but limited compliance with RICE.     OBJECTIVE:  NAD when arrived for tx w/o AD. Min swelling R quad tendon.   R knee AROM 0-120', PROM 125'    TREATMENT:    Pt performed LE rom and strengthening as tolerated, including:     Stationary bike x 10' for increased ROM and strength  Manual therapy R knee Patella MOBS, PROM and stretching x10'  QS 3x10/3"  SLR 3x10/3"  R hip abd 3x10  R hip add 3x10  Bridges 3x10  Prone knee hang 5'  Prone R hip ext 3x10'   Prone B knee TKE 30x.3"    CP x10'    ASSESSMENT:   Pt tolerating tx well, with min fatigue after.  VC/TC for correcting form/technique along with quad and core engagement.    PT diagnosis: R knee pain, decreased rom, edema s/p sx   Patient can benefit from outpatient physical therapy and a home program  Prognosis is Good.    Medical necessity is demonstrated by the following  IMPAIRMENTS:  Unable to participate in daily activities, Continued inability to participate in vocational pursuits, Pain limits function of effected part for some activities, Unable to participate fully in daily activities, Requires skilled supervision to complete and progress HEP and Weakness    GOALS:    12_   weeks. Pt agrees with goals set  1. Independent with HEP.  2. Report decreased    R    pain  <   / =  1  /10 with adls such as walking, playing golf  3. Increased MMT  for  R LE to 5/5    4. Increased arom  for  R knee flex > 125,  R knee ext to + 5  5.   Demonstrate improved functional ability on FOTO mobility knee score   To " 20-40%            PLAN:  Outpatient physical therapy 1- 2 times weekly to include: pt ed, hep, therapeutic exercises, neuromuscular re-education/ balance exercises, joint mobilizations, modalities prn.    Pt may see PTA as part of treatment plan.  Cont PT for  12         weeks.     Lucio Malone PTA, STS

## 2017-09-07 NOTE — TELEPHONE ENCOUNTER
Left VM for patient regarding below message, appointment scheduled for 10-19-17 @ 8 am.  Appointment letter mailed to patient.

## 2017-09-11 ENCOUNTER — CLINICAL SUPPORT (OUTPATIENT)
Dept: REHABILITATION | Facility: HOSPITAL | Age: 62
End: 2017-09-11
Attending: PHYSICIAN ASSISTANT
Payer: COMMERCIAL

## 2017-09-11 DIAGNOSIS — Z98.890 S/P MENISCECTOMY: ICD-10-CM

## 2017-09-11 DIAGNOSIS — G89.29 CHRONIC PAIN OF RIGHT KNEE: Primary | ICD-10-CM

## 2017-09-11 DIAGNOSIS — M25.561 CHRONIC PAIN OF RIGHT KNEE: Primary | ICD-10-CM

## 2017-09-11 PROCEDURE — 97110 THERAPEUTIC EXERCISES: CPT | Performed by: INTERNAL MEDICINE

## 2017-09-11 NOTE — PROGRESS NOTES
Physician:Chidi Santos III, */ Dr. Sena  Diagnosis:   Encounter Diagnoses   Name Primary?    Acute pain of right knee Yes    S/P meniscectomy       DOS/ PROCEDURE:  8/30/2017  Procedure(s) (LRB):  ARTHROSCOPY-KNEE (Right)  MENISCECTOMY (Right)  CHONDROPLASTY-KNEE (Right)     Orders:  Physical Therapy evaluate and treat  Date of eval:  8/31/2017    Subjective:  Pt reports mild pain R post/ lat knee starting yesterday. 2/10        Pts goals:  Decrease pain, r/t golf and fishing  Pain:  Pain ranges from 0 to 1 on VAS scale of 0- 10.    OBJECTIVE:       Functional assessment:   - walking:   I, good rom  - sit to stand: MI     AROM:  Flex R  125, L 135  Ext R + 3 , L + 6  prone    8/31 Functional Limitations Reports - G Codes  Category: mobility  Tool: FOTO  Score: 39  Current/  60-80%  Goal/ : 20-40%  Discharge/   NA  Reviewed FOTO scores with pt.    Palpation/ joint mob:   Good patellar jm    TREATMENT:      Pt performed LE rom and strengthening as tolerated, including:     Bike 10 min  QS 15 x np  Prone knee flex 2 min strap  HR U 20x  SLR  10x 3  Lat step otb 60 ft ( L hs pain with moving L )  gss slant 90 sec  sls 15 sec x 3 B  U leg press 37# 30x  m squats 15x  laq 20x  hss table 90 sec B  Prone  ext hang 5 min    Exercises were reviewed and pt was able to demonstrate them prior to the end of the session. Pt received a written copy of exercises to perform at home- U HR, m squats,  SLS, slr, laq, hss, prone flex , prone hang.    Pt received patella tendon m/ l mobs, quad tendon m/ l mobs, inf patella glides 30 sec x 3. Performed jm on pt x 4 min as tristan.    Treatment today also included: cp x 10 min      ASSESSMENT:  Progressing with improved rom , decreased pain, and improved gait. Patient can benefit from outpatient physical therapy and a home program  Prognosis is Good.    Medical necessity is demonstrated by the following  IMPAIRMENTS:  Unable to participate in daily activities,  Continued inability to participate in vocational pursuits, Pain limits function of effected part for some activities, Unable to participate fully in daily activities, Requires skilled supervision to complete and progress HEP and Weakness    GOALS:    12_   weeks. Pt agrees with goals set  1. Independent with HEP.  2. Report decreased    R    pain  <   / =  1  /10 with adls such as walking, playing golf  3. Increased MMT  for  R LE to 5/5    4. Increased arom  for  R knee flex > 125,  R knee ext to + 5  5.   Demonstrate improved functional ability on FOTO mobility knee score   To 20-40%        PLAN:  Outpatient physical therapy 1- 2 times weekly to include: pt ed, hep, therapeutic exercises, neuromuscular re-education/ balance exercises, joint mobilizations, modalities prn.    Pt may see PTA as part of treatment plan.  Cont PT for  11        weeks.

## 2017-09-14 ENCOUNTER — CLINICAL SUPPORT (OUTPATIENT)
Dept: REHABILITATION | Facility: HOSPITAL | Age: 62
End: 2017-09-14
Attending: PHYSICIAN ASSISTANT
Payer: COMMERCIAL

## 2017-09-14 DIAGNOSIS — G89.29 CHRONIC PAIN OF RIGHT KNEE: Primary | ICD-10-CM

## 2017-09-14 DIAGNOSIS — Z98.890 S/P MENISCECTOMY: ICD-10-CM

## 2017-09-14 DIAGNOSIS — M25.561 CHRONIC PAIN OF RIGHT KNEE: Primary | ICD-10-CM

## 2017-09-14 PROCEDURE — 97110 THERAPEUTIC EXERCISES: CPT | Performed by: INTERNAL MEDICINE

## 2017-09-14 NOTE — PROGRESS NOTES
Physician:Chidi Santos III, */ Dr. Sena  Diagnosis:   Encounter Diagnoses   Name Primary?    Acute pain of right knee Yes    S/P meniscectomy       DOS/ PROCEDURE:  8/30/2017  Procedure(s) (LRB):  ARTHROSCOPY-KNEE (Right)  MENISCECTOMY (Right)  CHONDROPLASTY-KNEE (Right)     Orders:  Physical Therapy evaluate and treat  Date of eval:  8/31/2017    Subjective:  Pt reports mild medial pain R with stairs 2/10. Admits to slight jogging a couple of times playing ball with his children in the park.         Pts goals:  Decrease pain, r/t golf and fishing  Pain:  Pain ranges from 0 to 1 on VAS scale of 0- 10.    OBJECTIVE:       Functional assessment:   - walking:   I, good rom  - sit to stand: MI     AROM:  Flex R  126, L 135  Ext R + 3 , L + 6  prone    8/31 Functional Limitations Reports - G Codes  Category: mobility  Tool: FOTO  Score: 39  Current/  60-80%  Goal/ : 20-40%  Discharge/   NA    Palpation/ joint mob:   Good patellar jm    TREATMENT:      Pt performed LE rom and strengthening as tolerated, including:     Bike 10 min  Stick itb 3 min  QS 15 x np  Prone knee flex 2 min strap  HR U 20x  SLR  10x 3  Lat step otb 60 ft ( L hs pain with moving L ) np  gss slant 90 sec np  hss 20 sec x 3 strap GSS B  sls 15 sec x 3 B  U leg press 37# 30x  m squats 15x 2  laq 30x 2#  Prone  ext hang 5 min      Pt received patella tendon m/ l mobs, quad tendon m/ l mobs, inf patella glides 30 sec x 3. Performed jm on pt x 4 min as tristan.    Treatment today also included: cp x 10 min      ASSESSMENT:  Progressing with improved rom , decreased pain, and improved gait. Patient can benefit from outpatient physical therapy and a home program  Prognosis is Good.    Medical necessity is demonstrated by the following  IMPAIRMENTS:  Unable to participate in daily activities, Continued inability to participate in vocational pursuits, Pain limits function of effected part for some activities, Unable to participate  fully in daily activities, Requires skilled supervision to complete and progress HEP and Weakness    GOALS:    12_   weeks. Pt agrees with goals set  1. Independent with HEP.  2. Report decreased    R    pain  <   / =  1  /10 with adls such as walking, playing golf  3. Increased MMT  for  R LE to 5/5    4. Increased arom  for  R knee flex > 125,  R knee ext to + 5  5.   Demonstrate improved functional ability on FOTO mobility knee score   To 20-40%        PLAN:  Outpatient physical therapy 1- 2 times weekly to include: pt ed, hep, therapeutic exercises, neuromuscular re-education/ balance exercises, joint mobilizations, modalities prn.    Pt may see PTA as part of treatment plan.  Cont PT for  11        weeks.

## 2017-09-15 ENCOUNTER — OFFICE VISIT (OUTPATIENT)
Dept: SPORTS MEDICINE | Facility: CLINIC | Age: 62
End: 2017-09-15
Payer: COMMERCIAL

## 2017-09-15 VITALS
SYSTOLIC BLOOD PRESSURE: 144 MMHG | BODY MASS INDEX: 32.02 KG/M2 | HEART RATE: 84 BPM | WEIGHT: 204 LBS | DIASTOLIC BLOOD PRESSURE: 88 MMHG | HEIGHT: 67 IN

## 2017-09-15 DIAGNOSIS — Z98.890 S/P ARTHROSCOPIC SURGERY OF RIGHT KNEE: Primary | ICD-10-CM

## 2017-09-15 PROCEDURE — 99999 PR PBB SHADOW E&M-EST. PATIENT-LVL V: CPT | Mod: PBBFAC,,, | Performed by: PHYSICIAN ASSISTANT

## 2017-09-15 PROCEDURE — 99499 UNLISTED E&M SERVICE: CPT | Mod: S$GLB,,, | Performed by: PHYSICIAN ASSISTANT

## 2017-09-15 NOTE — PROGRESS NOTES
HISTORY OF PRESENT ILLNESS:   Pt is here today for first post-operative followup of knee arthroscopy.  he is doing well.  We have reviewed his findings and discussed plan of care and future treatment options.      He states that the knee is feeling great. Only has occasional has some medial knee pain with stairs.     DATE OF PROCEDURE: 8/30/2017     PROCEDURES:   1. Right knee arthroscopic chondroplasty  2. Right knee arthroscopic partial lateral meniscectomy     SURGEON: Lucio Sena M.D.                                                                                PHYSICAL EXAMINATION:     Incision sites healed well  No evidence of any erythema, infection or induration  Range of motion -5-115 degrees  Mild effusion  2+ DP pulse  no swelling, no calf tenderness  - Frantz's sign  Negative medial joint line tendernes  Moderate quad atrophy                                                                                 ASSESSMENT:                                                                                                                                               1. Status post above, doing well.                                                                                                                               PLAN:                                                                                                                                                     1. Continue with PT. Patient given knee sleeve today.    2. Emphasized quad function.  3. I have discussed return to activity in detail.  4.Patient will see us back at 6 week post-op nettie.                                    5. All questions were answered and patient should contact us if he  has any questions or concerns in the interim.

## 2017-09-18 ENCOUNTER — CLINICAL SUPPORT (OUTPATIENT)
Dept: REHABILITATION | Facility: HOSPITAL | Age: 62
End: 2017-09-18
Attending: PHYSICIAN ASSISTANT
Payer: COMMERCIAL

## 2017-09-18 DIAGNOSIS — M25.561 CHRONIC PAIN OF RIGHT KNEE: Primary | ICD-10-CM

## 2017-09-18 DIAGNOSIS — Z98.890 S/P MENISCECTOMY: ICD-10-CM

## 2017-09-18 DIAGNOSIS — G89.29 CHRONIC PAIN OF RIGHT KNEE: Primary | ICD-10-CM

## 2017-09-18 PROCEDURE — G8978 MOBILITY CURRENT STATUS: HCPCS | Mod: CJ | Performed by: INTERNAL MEDICINE

## 2017-09-18 PROCEDURE — 97110 THERAPEUTIC EXERCISES: CPT | Performed by: INTERNAL MEDICINE

## 2017-09-18 PROCEDURE — G8979 MOBILITY GOAL STATUS: HCPCS | Mod: CJ | Performed by: INTERNAL MEDICINE

## 2017-09-18 PROCEDURE — 97112 NEUROMUSCULAR REEDUCATION: CPT | Performed by: INTERNAL MEDICINE

## 2017-09-18 NOTE — PROGRESS NOTES
Physician:Chidi Santos III, */ Dr. Sena  Diagnosis:   Encounter Diagnoses   Name Primary?    Acute pain of right knee Yes    S/P meniscectomy       DOS/ PROCEDURE:  8/30/2017  Procedure(s) (LRB):  ARTHROSCOPY-KNEE (Right)  MENISCECTOMY (Right)  CHONDROPLASTY-KNEE (Right)     Orders:  Physical Therapy evaluate and treat  Date of eval:  8/31/2017    Subjective:  Pt reports no c/o. PA stated he was doing well.        Pts goals:  Decrease pain, r/t golf and fishing  Pain:  Pain ranges from 0 to 1 on VAS scale of 0- 10.    OBJECTIVE:       Functional assessment:   - walking:   I, good rom  - sit to stand: MI     AROM:  Flex R  126, L 135  Ext R + 4 , L + 6  prone    9/18 Functional Limitations Reports - G Codes  Category: mobility  Tool: FOTO  Score: 77  Current/ 20-40%  Goal/ : 20-40%  Discharge/   NA    Palpation/ joint mob:   Good patellar jm    TREATMENT:      Pt performed LE rom and strengthening as tolerated, including:     Bike 10 min  Stick itb 3 min np  QS 15 x np  Prone knee flex 2 min strap  HR U 30x both legs  SLR  10x 3 np  Hip circles 15x B   Lat step otb 60 ft ( L hs pain with moving L ) np  gss slant 90 sec   hss 20 sec x 3 strap GSS B  sls 15 sec x 3 B  U leg press 37# 30x np  m squats 15x 2 np  Wall sits 20 sec x 3  Lat step ups 2 in 20x  laq 30x 3#  laq hammer nv  Prone  ext hang 5 min      Pt received patella tendon m/ l mobs, quad tendon m/ l mobs, inf patella glides 30 sec x 3. Performed jm on pt x 4 min as tristan.    Treatment today also included: cp x 10 min      ASSESSMENT:  Progressing with improved rom , decreased pain, and improved gait. Patient can benefit from outpatient physical therapy and a home program  Prognosis is Good.    Medical necessity is demonstrated by the following  IMPAIRMENTS:  Unable to participate in daily activities, Continued inability to participate in vocational pursuits, Pain limits function of effected part for some activities, Unable to  participate fully in daily activities, Requires skilled supervision to complete and progress HEP and Weakness    GOALS:    12_   weeks.   1. Independent with HEP.  2. Report decreased    R    pain  <   / =  1  /10 with adls such as walking, playing golf  3. Increased MMT  for  R LE to 5/5    4. Increased arom  for  R knee flex > 125,  R knee ext to + 5  5.   Demonstrate improved functional ability on FOTO mobility knee score   To 20-40% (MET)        PLAN:  Outpatient physical therapy 1- 2 times weekly to include: pt ed, hep, therapeutic exercises, neuromuscular re-education/ balance exercises, joint mobilizations, modalities prn.    Pt may see PTA as part of treatment plan.  Cont PT for  8       weeks.

## 2017-09-21 ENCOUNTER — CLINICAL SUPPORT (OUTPATIENT)
Dept: REHABILITATION | Facility: HOSPITAL | Age: 62
End: 2017-09-21
Attending: PHYSICIAN ASSISTANT
Payer: COMMERCIAL

## 2017-09-21 DIAGNOSIS — M25.561 CHRONIC PAIN OF RIGHT KNEE: Primary | ICD-10-CM

## 2017-09-21 DIAGNOSIS — Z98.890 S/P MENISCECTOMY: ICD-10-CM

## 2017-09-21 DIAGNOSIS — G89.29 CHRONIC PAIN OF RIGHT KNEE: Primary | ICD-10-CM

## 2017-09-21 PROCEDURE — 97110 THERAPEUTIC EXERCISES: CPT | Performed by: INTERNAL MEDICINE

## 2017-09-21 NOTE — PROGRESS NOTES
Physician:Chidi Santos III, */ Dr. Sena  Diagnosis:   Encounter Diagnoses   Name Primary?    Acute pain of right knee Yes    S/P meniscectomy       DOS/ PROCEDURE:  8/30/2017  Procedure(s) (LRB):  ARTHROSCOPY-KNEE (Right)  MENISCECTOMY (Right)  CHONDROPLASTY-KNEE (Right)     Orders:  Physical Therapy evaluate and treat  Date of eval:  8/31/2017    Subjective:  Pt reports no c/o. Walked 3/5 mile yesterday with no c/o.        Pts goals:  Decrease pain, r/t golf and fishing  Pain:  Pain ranges from 0 to 1 on VAS scale of 0- 10.    OBJECTIVE:       Functional assessment:   - walking:   I, good rom  - sit to stand: MI     AROM:  Flex R  126, L 133  Ext R + 4 , L + 6  prone      9/18 Functional Limitations Reports - G Codes  Category: mobility  Tool: FOTO  Score: 77  Current/ 20-40%  Goal/ : 20-40%  Discharge/   NA    Palpation/ joint mob:   Good patellar jm    TREATMENT:      Pt performed LE rom and strengthening as tolerated, including:     Bike 10 min  Stick itb 3 min np  QS 15 x np  Prone knee flex 2 min strap  HR U 30x both legs  SLR  10x 3 np  Hip circles 15x B   Lat step otb 60 ft ( L hs pain with moving L ) np  gss slant 90 sec   hss 20 sec x 3 strap GSS B  sls 20 sec x 3 B  U leg press 50# 30x   m squats 15x 2 np  Wall sits 20 sec x 3  Lat step ups 3  in 30x  laq 30x 7. 5 # hammer    Prone  ext hang 5 min 2#      Pt received patella tendon m/ l mobs, quad tendon m/ l mobs, inf patella glides 30 sec x 3. Performed jm on pt x 2 min as tristan.    Treatment today also included: cp x 10 min      ASSESSMENT:  Progressing with improved rom , decreased pain, and improved gait. Patient can benefit from outpatient physical therapy and a home program  Prognosis is Good.    Medical necessity is demonstrated by the following  IMPAIRMENTS:  Continued inability to participate in vocational pursuits, Pain limits function of effected part for some activities, Unable to participate fully in daily  activities, Requires skilled supervision to complete and progress HEP and Weakness    GOALS:    12_   weeks.   1. Independent with HEP.  2. Report decreased    R    pain  <   / =  1  /10 with adls such as walking, playing golf  3. Increased MMT  for  R LE to 5/5    4. Increased arom  for  R knee flex > 125,  R knee ext to + 5  5.   Demonstrate improved functional ability on FOTO mobility knee score   To 20-40% (MET)        PLAN:  Outpatient physical therapy 1- 2 times weekly to include: pt ed, hep, therapeutic exercises, neuromuscular re-education/ balance exercises, joint mobilizations, modalities prn.    Pt may see PTA as part of treatment plan.  Cont PT for  7      weeks.

## 2017-09-23 DIAGNOSIS — E11.9 DIABETES MELLITUS WITHOUT COMPLICATION: ICD-10-CM

## 2017-09-25 ENCOUNTER — CLINICAL SUPPORT (OUTPATIENT)
Dept: REHABILITATION | Facility: HOSPITAL | Age: 62
End: 2017-09-25
Attending: PHYSICIAN ASSISTANT
Payer: COMMERCIAL

## 2017-09-25 ENCOUNTER — TELEPHONE (OUTPATIENT)
Dept: INTERNAL MEDICINE | Facility: CLINIC | Age: 62
End: 2017-09-25

## 2017-09-25 DIAGNOSIS — M25.561 CHRONIC PAIN OF RIGHT KNEE: Primary | ICD-10-CM

## 2017-09-25 DIAGNOSIS — Z98.890 S/P MENISCECTOMY: ICD-10-CM

## 2017-09-25 DIAGNOSIS — G89.29 CHRONIC PAIN OF RIGHT KNEE: Primary | ICD-10-CM

## 2017-09-25 PROCEDURE — 97110 THERAPEUTIC EXERCISES: CPT | Performed by: INTERNAL MEDICINE

## 2017-09-25 NOTE — TELEPHONE ENCOUNTER
Prudence, please call and R/S pt; Freddie on the phones over-rode him into an  slot. Please hold that time for our use. Thanks.

## 2017-09-26 RX ORDER — LINAGLIPTIN AND METFORMIN HYDROCHLORIDE 2.5; 1 MG/1; MG/1
TABLET, FILM COATED ORAL
Qty: 60 TABLET | Refills: 6 | Status: SHIPPED | OUTPATIENT
Start: 2017-09-26 | End: 2018-06-28 | Stop reason: SDUPTHER

## 2017-09-26 NOTE — PROGRESS NOTES
Physician:Chidi Santos III, */ Dr. Sena  Diagnosis:   Encounter Diagnoses   Name Primary?    Acute pain of right knee Yes    S/P meniscectomy       DOS/ PROCEDURE:  8/30/2017  Procedure(s) (LRB):  ARTHROSCOPY-KNEE (Right)  MENISCECTOMY (Right)  CHONDROPLASTY-KNEE (Right)     Orders:  Physical Therapy evaluate and treat  Date of eval:  8/31/2017    Subjective:  Pt reports no c/o. Walked 3/5 mile yesterday with no c/o.        Pts goals:  Decrease pain, r/t golf and fishing  Pain:  Pain ranges from 0 to 1 on VAS scale of 0- 10.    OBJECTIVE:  Functional assessment:   - walking:   I, good rom  - sit to stand: MI     AROM:  Flex R  126, L 133  Ext R + 4 , L + 6  prone      9/18 Functional Limitations Reports - G Codes  Category: mobility  Tool: FOTO  Score: 77  Current/ 20-40%  Goal/ : 20-40%  Discharge/   NA    Palpation/ joint mob:   Good patellar jm    TREATMENT:      Pt performed LE rom and strengthening as tolerated, including:     Bike 10 min  Stick itb 3 min np  QS 15 x np  Prone knee flex 2 min strap  HR U 30x both legs  SLR  10x 3 np  Hip circles 15x B   Lat step otb 60 ft ( L hs pain with moving L ) np  gss slant 90 sec   hss 20 sec x 3 strap GSS B  sls 30 sec x 3 B  U leg press 80# 30x   Wall sits 20 sec x 3  Lat step ups 4  in 30x  laq 30x 10 # hammer  HS hammmer U  15 # 3x 10     Prone  ext hang 5 min 2#      Pt received patella tendon m/ l mobs, quad tendon m/ l mobs, inf patella glides 30 sec x 3. Performed jm on pt x 2 min as tristan.    Treatment today also included: cp x 10 min      ASSESSMENT:  Progressing with improved rom , decreased pain, and improved gait. Patient can benefit from outpatient physical therapy and a home program  Prognosis is Good.    Medical necessity is demonstrated by the following  IMPAIRMENTS:  Continued inability to participate in vocational pursuits, Pain limits function of effected part for some activities, Unable to participate fully in daily  activities, Requires skilled supervision to complete and progress HEP and Weakness    GOALS:    12_   weeks.   1. Independent with HEP.  2. Report decreased    R    pain  <   / =  1  /10 with adls such as walking, playing golf  3. Increased MMT  for  R LE to 5/5    4. Increased arom  for  R knee flex > 125,  R knee ext to + 5  5.   Demonstrate improved functional ability on FOTO mobility knee score   To 20-40% (MET)        PLAN:  Outpatient physical therapy 1- 2 times weekly to include: pt ed, hep, therapeutic exercises, neuromuscular re-education/ balance exercises, joint mobilizations, modalities prn.    Pt may see PTA as part of treatment plan.  Cont PT for  7      weeks.

## 2017-09-28 ENCOUNTER — CLINICAL SUPPORT (OUTPATIENT)
Dept: REHABILITATION | Facility: HOSPITAL | Age: 62
End: 2017-09-28
Attending: PHYSICIAN ASSISTANT
Payer: COMMERCIAL

## 2017-09-28 DIAGNOSIS — Z98.890 S/P MENISCECTOMY: Primary | ICD-10-CM

## 2017-09-28 DIAGNOSIS — G89.29 CHRONIC PAIN OF RIGHT KNEE: ICD-10-CM

## 2017-09-28 DIAGNOSIS — M25.561 CHRONIC PAIN OF RIGHT KNEE: ICD-10-CM

## 2017-09-28 PROCEDURE — 97110 THERAPEUTIC EXERCISES: CPT | Performed by: INTERNAL MEDICINE

## 2017-09-28 NOTE — PROGRESS NOTES
Physician:Chidi Santos III, */ Dr. Sena  Diagnosis:   Encounter Diagnoses   Name Primary?    Acute pain of right knee Yes    S/P meniscectomy       DOS/ PROCEDURE:  8/30/2017  Procedure(s) (LRB):  ARTHROSCOPY-KNEE (Right)  MENISCECTOMY (Right)  CHONDROPLASTY-KNEE (Right)     Orders:  Physical Therapy evaluate and treat  Date of eval:  8/31/2017    Subjective:  Pt reports min c/o superior patella with descending 2 flights stairs yesterday.        Pts goals:  Decrease pain, r/t golf and fishing  Pain:  Pain ranges from 0 to 1 on VAS scale of 0- 10.    OBJECTIVE:  Functional assessment:   - walking:   I, good rom  - sit to stand: MI     AROM:  Flex R  126, L 133  Ext R + 4 , L + 6  prone      9/18 Functional Limitations Reports - G Codes  Category: mobility  Tool: FOTO  Score: 77  Current/ 20-40%  Goal/ : 20-40%  Discharge/   NA    Palpation/ joint mob:   Good patellar jm    TREATMENT:      Pt performed LE rom and strengthening as tolerated, including:     Bike 10 min  Stick itb 3 min np  QS 15 x np  Prone knee flex 2 min strap  HR U 30x both legs  SLR  10x 3 np  Hip circles 15x B   Lat step otb 60 ft ( L hs pain with moving L ) np  gss slant 90 sec   hss 20 sec x 3 strap GSS B  sls 30 sec x 3 B np  March 3 hold foam 3 min  U leg press 80# 30x    Wall sits 20 sec x 3  Lat step ups 4  in 30x  laq 30x 10 # hammer  HS hammmer U  15 # 3x 10     Prone  ext hang 5 min 2#      Pt received patella tendon m/ l mobs, quad tendon m/ l mobs, inf patella glides 30 sec x 3. Performed jm on pt x 2 min as tristan.    Treatment today also included: cp x 10 min      ASSESSMENT:  Progressing with improved rom , decreased pain, and improved gait. Patient can benefit from outpatient physical therapy and a home program  Prognosis is Good.    Medical necessity is demonstrated by the following  IMPAIRMENTS:  Continued inability to participate in vocational pursuits, Pain limits function of effected part for some  activities, Unable to participate fully in daily activities, Requires skilled supervision to complete and progress HEP and Weakness    GOALS:    12_   weeks.   1. Independent with HEP.  2. Report decreased    R    pain  <   / =  1  /10 with adls such as walking, playing golf  3. Increased MMT  for  R LE to 5/5    4. Increased arom  for  R knee flex > 125,  R knee ext to + 5  5.   Demonstrate improved functional ability on FOTO mobility knee score   To 20-40% (MET)        PLAN:  Outpatient physical therapy 1- 2 times weekly to include: pt ed, hep, therapeutic exercises, neuromuscular re-education/ balance exercises, joint mobilizations, modalities prn.    Pt may see PTA as part of treatment plan.  Cont PT for  7      weeks.

## 2017-09-29 DIAGNOSIS — E11.9 TYPE 2 DIABETES MELLITUS WITHOUT COMPLICATION: ICD-10-CM

## 2017-09-29 RX ORDER — PEN NEEDLE, DIABETIC 31 GX5/16"
NEEDLE, DISPOSABLE MISCELLANEOUS
Qty: 100 EACH | Refills: 6 | Status: SHIPPED | OUTPATIENT
Start: 2017-09-29 | End: 2018-10-03 | Stop reason: SDUPTHER

## 2017-09-29 RX ORDER — LANCETS
EACH MISCELLANEOUS
Qty: 150 EACH | Refills: 6 | Status: SHIPPED | OUTPATIENT
Start: 2017-09-29 | End: 2021-09-16 | Stop reason: SDUPTHER

## 2017-10-03 ENCOUNTER — CLINICAL SUPPORT (OUTPATIENT)
Dept: REHABILITATION | Facility: HOSPITAL | Age: 62
End: 2017-10-03
Attending: PHYSICIAN ASSISTANT
Payer: COMMERCIAL

## 2017-10-03 DIAGNOSIS — G89.29 CHRONIC PAIN OF RIGHT KNEE: Primary | ICD-10-CM

## 2017-10-03 DIAGNOSIS — M25.561 CHRONIC PAIN OF RIGHT KNEE: Primary | ICD-10-CM

## 2017-10-03 DIAGNOSIS — Z98.890 S/P MENISCECTOMY: ICD-10-CM

## 2017-10-03 PROCEDURE — 97110 THERAPEUTIC EXERCISES: CPT | Performed by: INTERNAL MEDICINE

## 2017-10-03 NOTE — PROGRESS NOTES
Physician:Chidi Santos III, */ Dr. Sena  Diagnosis:   Encounter Diagnoses   Name Primary?    Acute pain of right knee Yes    S/P meniscectomy       DOS/ PROCEDURE:  8/30/2017  Procedure(s) (LRB):  ARTHROSCOPY-KNEE (Right)  MENISCECTOMY (Right)  CHONDROPLASTY-KNEE (Right)     Orders:  Physical Therapy evaluate and treat  Date of eval:  8/31/2017    Subjective:  Pt reports min c/o medial  patella pain last week intermittently one day. Feels comfortable with this week being his last of PT.    He cannot jog more than 10 yards bc of his L ankle.  Pts goals:  Decrease pain, r/t golf and fishing  Pain:  Pain ranges from 0 to 1 on VAS scale of 0- 10.    OBJECTIVE:  Functional assessment:   - walking:   I, good rom  - sit to stand: MI     AROM:  Flex R  126, L 133  Ext R + 4 , L + 6  prone    Hip abd/ Hip ext  R 5/5    9/18 Functional Limitations Reports - G Codes  Category: mobility  Tool: FOTO  Score: 77  Current/ 20-40%  Goal/ : 20-40%  Discharge/   NA    Palpation/ joint mob:   Good patellar jm    TREATMENT:      Pt performed LE rom and strengthening as tolerated, including:     Bike 10 min  Stick itb 3 min np  QS 15 x np  Prone knee flex 2 min strap  HR U 30x both legs  SLR  10x 3 np  Hip circles 15x B   Lat step otb 60 ft ( L hs pain with moving L ) np  gss slant 90 sec   hss 20 sec x 3 strap GSS B  sls 30 sec x 3 B np  March 3 hold foam 3 min  U shuttle  67# 30x    Wall sits 20 sec x 3  Lat step ups 5 in 30x  laq 30x 10 # hammer  HS hammmer U  15 # 3x 10   u bridges 5 sec x 10  Prone  ext hang 5 min 2#      Pt received patella tendon m/ l mobs, quad tendon m/ l mobs, inf patella glides 30 sec x 3. Performed jm on pt x 2 min as tristan.    Treatment today also included: cp x 10 min      ASSESSMENT:  Progressing with  decreased pain, and improved gait. Patient can benefit from outpatient physical therapy and a home program  Prognosis is Good.    Medical necessity is demonstrated by the following   IMPAIRMENTS:  Continued inability to participate in vocational pursuits, Pain limits function of effected part for some activities, Unable to participate fully in daily activities, Requires skilled supervision to complete and progress HEP and Weakness    GOALS:    12_   weeks.   1. Independent with HEP.  2. Report decreased    R    pain  <   / =  1  /10 with adls such as walking, playing golf  3. Increased MMT  for  R LE to 5/5    4. Increased arom  for  R knee flex > 125,  R knee ext to + 5  5.   Demonstrate improved functional ability on FOTO mobility knee score   To 20-40% (MET)        PLAN:  Outpatient physical therapy 1- 2 times weekly to include: pt ed, hep, therapeutic exercises, neuromuscular re-education/ balance exercises, joint mobilizations, modalities prn.    Pt may see PTA as part of treatment plan.  Cont PT for  1-2 visits

## 2017-10-06 ENCOUNTER — TELEPHONE (OUTPATIENT)
Dept: SPORTS MEDICINE | Facility: CLINIC | Age: 62
End: 2017-10-06

## 2017-10-06 NOTE — TELEPHONE ENCOUNTER
----- Message from Henrietta Al sent at 10/6/2017 11:40 AM CDT -----  Contact: pt  Pt requests to reschedule his time frame on 10/12 for 8:30 am. Pt can be reached at [phone].

## 2017-10-06 NOTE — TELEPHONE ENCOUNTER
----- Message from Kyree Brooks sent at 10/6/2017  2:29 PM CDT -----  Contact: Self/Home [phone]  Pt returning Dolores's call in regards to 10/12 appt for 8:30.

## 2017-10-19 NOTE — TELEPHONE ENCOUNTER
"----- Message from Nia Henderson sent at 10/18/2017  4:38 PM CDT -----  Contact: Self [phone]  .Medication question / problems.  Pt"s insurance has moved Levemir to a higher tier which will cost him more.  Basaglar is the alternative insulin.   ..  [facility]. - [place], [place] - [address] [id number] [facility] [address]  Phone: [phone] Fax: [phone]        "

## 2017-10-22 RX ORDER — INSULIN GLARGINE 100 [IU]/ML
INJECTION, SOLUTION SUBCUTANEOUS
Qty: 15 ML | Refills: 0 | Status: SHIPPED | OUTPATIENT
Start: 2017-10-22 | End: 2017-12-07 | Stop reason: SDUPTHER

## 2017-10-23 NOTE — TELEPHONE ENCOUNTER
Last seen 4/2016.  Refill 30 days only.   Needs appointment in DM clinic or may have additional fills sent to PCP.  Please notify pt.

## 2017-11-17 ENCOUNTER — DOCUMENTATION ONLY (OUTPATIENT)
Dept: REHABILITATION | Facility: HOSPITAL | Age: 62
End: 2017-11-17

## 2017-11-17 NOTE — PROGRESS NOTES
Physician:Chidi Santos III, */ Dr. Sena  Diagnosis:   Encounter Diagnoses   Name Primary?    Acute pain of right knee Yes    S/P meniscectomy        DOS/ PROCEDURE:  8/30/2017  Procedure(s) (LRB):  ARTHROSCOPY-KNEE (Right)  MENISCECTOMY (Right)  CHONDROPLASTY-KNEE (Right)     Orders:  Physical Therapy evaluate and treat  Date of eval:  8/31/2017 9/18 Functional Limitations Reports - G Codes  Category: mobility  Tool: FOTO  Score: 77    Goal/ : 20-40%  Discharge/   20-40%     TREATMENT:        Pt ed, hep, nmr, ex, cp        GOALS:    12_   weeks. Pt missed last visit. Appeared to have met all goals.  1. Independent with HEP.  2. Report decreased    R    pain  <   / =  1  /10 with adls such as walking, playing golf  3. Increased MMT  for  R LE to 5/5    4. Increased arom  for  R knee flex > 125,  R knee ext to + 5  5.   Demonstrate improved functional ability on FOTO mobility knee score   To 20-40% (MET)

## 2017-12-07 RX ORDER — INSULIN GLARGINE 100 [IU]/ML
INJECTION, SOLUTION SUBCUTANEOUS
Qty: 15 SYRINGE | Refills: 0 | Status: SHIPPED | OUTPATIENT
Start: 2017-12-07 | End: 2018-02-22 | Stop reason: SDUPTHER

## 2017-12-15 DIAGNOSIS — E11.42 DIABETIC POLYNEUROPATHY ASSOCIATED WITH TYPE 2 DIABETES MELLITUS: Primary | ICD-10-CM

## 2017-12-15 DIAGNOSIS — E78.2 MIXED HYPERLIPIDEMIA: ICD-10-CM

## 2017-12-15 DIAGNOSIS — Z12.5 PROSTATE CANCER SCREENING: ICD-10-CM

## 2017-12-15 RX ORDER — ATORVASTATIN CALCIUM 80 MG/1
TABLET, FILM COATED ORAL
Qty: 30 TABLET | Refills: 4 | Status: SHIPPED | OUTPATIENT
Start: 2017-12-15 | End: 2018-06-07 | Stop reason: SDUPTHER

## 2017-12-15 NOTE — TELEPHONE ENCOUNTER
Silvana, please call Mr Garcia to schedule a next available Physical with 1 week prior fasting lab.  He's due by April. Thanks

## 2017-12-18 RX ORDER — INSULIN LISPRO 100 [IU]/ML
INJECTION, SOLUTION INTRAVENOUS; SUBCUTANEOUS
Qty: 60 SYRINGE | Refills: 2 | Status: SHIPPED | OUTPATIENT
Start: 2017-12-18 | End: 2018-09-04 | Stop reason: SDUPTHER

## 2017-12-20 ENCOUNTER — TELEPHONE (OUTPATIENT)
Dept: INTERNAL MEDICINE | Facility: CLINIC | Age: 62
End: 2017-12-20

## 2017-12-20 NOTE — TELEPHONE ENCOUNTER
Silvana, please call Mr Garcia to schedule a next available Physical with 1 week prior fasting lab.  He's due by April. Thanks    Patient schedule for Phys aoot/labs.  Mail out to home address on file.

## 2018-01-13 DIAGNOSIS — E11.40 TYPE 2 DIABETES MELLITUS WITH DIABETIC NEUROPATHY, UNSPECIFIED LONG TERM INSULIN USE STATUS: ICD-10-CM

## 2018-01-15 RX ORDER — PEN NEEDLE, DIABETIC 31 GX5/16"
NEEDLE, DISPOSABLE MISCELLANEOUS
Qty: 400 EACH | Refills: 4 | Status: SHIPPED | OUTPATIENT
Start: 2018-01-15 | End: 2021-01-27 | Stop reason: SDUPTHER

## 2018-01-23 DIAGNOSIS — M79.642 HAND PAIN, LEFT: ICD-10-CM

## 2018-01-23 RX ORDER — AMLODIPINE BESYLATE 10 MG/1
TABLET ORAL
Qty: 90 TABLET | Refills: 1 | Status: SHIPPED | OUTPATIENT
Start: 2018-01-23 | End: 2018-08-02 | Stop reason: SDUPTHER

## 2018-02-02 RX ORDER — METOPROLOL SUCCINATE 100 MG/1
TABLET, EXTENDED RELEASE ORAL
Qty: 30 TABLET | Refills: 6 | Status: SHIPPED | OUTPATIENT
Start: 2018-02-02 | End: 2018-09-12 | Stop reason: SDUPTHER

## 2018-02-11 RX ORDER — FENOFIBRATE 160 MG/1
TABLET ORAL
Qty: 30 TABLET | Refills: 6 | Status: SHIPPED | OUTPATIENT
Start: 2018-02-11 | End: 2018-09-12 | Stop reason: SDUPTHER

## 2018-02-22 RX ORDER — INSULIN GLARGINE 100 [IU]/ML
INJECTION, SOLUTION SUBCUTANEOUS
Qty: 45 ML | Refills: 0 | Status: SHIPPED | OUTPATIENT
Start: 2018-02-22 | End: 2018-05-04 | Stop reason: SDUPTHER

## 2018-02-22 RX ORDER — BUSPIRONE HYDROCHLORIDE 5 MG/1
TABLET ORAL
Qty: 60 TABLET | Refills: 3 | Status: SHIPPED | OUTPATIENT
Start: 2018-02-22 | End: 2018-09-04 | Stop reason: SDUPTHER

## 2018-03-16 ENCOUNTER — TELEPHONE (OUTPATIENT)
Dept: DERMATOLOGY | Facility: CLINIC | Age: 63
End: 2018-03-16

## 2018-03-16 NOTE — TELEPHONE ENCOUNTER
----- Message from Orlin Dela Cruz MA sent at 3/16/2018  1:13 PM CDT -----  Contact: Pt  Pt called and would like to schedule a appt regarding a rash on left side of his face.    Pt can be reached at [phone].    Thanks

## 2018-03-19 DIAGNOSIS — J30.2 SEASONAL ALLERGIC RHINITIS: ICD-10-CM

## 2018-03-19 RX ORDER — LORATADINE 10 MG/1
TABLET ORAL
Qty: 30 TABLET | Refills: 3 | Status: SHIPPED | OUTPATIENT
Start: 2018-03-19 | End: 2022-06-10

## 2018-03-27 RX ORDER — LISINOPRIL 40 MG/1
TABLET ORAL
Qty: 30 TABLET | Refills: 2 | Status: SHIPPED | OUTPATIENT
Start: 2018-03-27 | End: 2018-06-28 | Stop reason: SDUPTHER

## 2018-04-18 ENCOUNTER — PATIENT MESSAGE (OUTPATIENT)
Dept: INTERNAL MEDICINE | Facility: CLINIC | Age: 63
End: 2018-04-18

## 2018-05-04 RX ORDER — INSULIN GLARGINE 100 [IU]/ML
INJECTION, SOLUTION SUBCUTANEOUS
Qty: 45 ML | Refills: 0 | Status: SHIPPED | OUTPATIENT
Start: 2018-05-04 | End: 2018-07-27 | Stop reason: SDUPTHER

## 2018-05-29 DIAGNOSIS — E78.2 MIXED HYPERLIPIDEMIA: ICD-10-CM

## 2018-05-29 RX ORDER — ATORVASTATIN CALCIUM 80 MG/1
TABLET, FILM COATED ORAL
Qty: 30 TABLET | Refills: 4 | OUTPATIENT
Start: 2018-05-29

## 2018-06-05 DIAGNOSIS — E55.9 VITAMIN D DEFICIENCY: ICD-10-CM

## 2018-06-05 DIAGNOSIS — E78.2 MIXED HYPERLIPIDEMIA: ICD-10-CM

## 2018-06-05 RX ORDER — ERGOCALCIFEROL 1.25 MG/1
CAPSULE ORAL
Qty: 4 CAPSULE | Refills: 6 | Status: SHIPPED | OUTPATIENT
Start: 2018-06-05 | End: 2018-12-20 | Stop reason: SDUPTHER

## 2018-06-05 RX ORDER — ATORVASTATIN CALCIUM 80 MG/1
TABLET, FILM COATED ORAL
Qty: 30 TABLET | Refills: 4 | Status: CANCELLED | OUTPATIENT
Start: 2018-06-05

## 2018-06-07 DIAGNOSIS — E78.2 MIXED HYPERLIPIDEMIA: ICD-10-CM

## 2018-06-07 RX ORDER — ATORVASTATIN CALCIUM 80 MG/1
80 TABLET, FILM COATED ORAL DAILY
Qty: 30 TABLET | Refills: 4 | Status: SHIPPED | OUTPATIENT
Start: 2018-06-07 | End: 2018-11-28 | Stop reason: SDUPTHER

## 2018-06-28 DIAGNOSIS — E11.9 DIABETES MELLITUS WITHOUT COMPLICATION: ICD-10-CM

## 2018-06-28 RX ORDER — LISINOPRIL 40 MG/1
TABLET ORAL
Qty: 30 TABLET | Refills: 2 | Status: SHIPPED | OUTPATIENT
Start: 2018-06-28 | End: 2018-09-13 | Stop reason: SDUPTHER

## 2018-06-28 RX ORDER — LINAGLIPTIN AND METFORMIN HYDROCHLORIDE 2.5; 1 MG/1; MG/1
TABLET, FILM COATED ORAL
Qty: 60 TABLET | Refills: 2 | Status: SHIPPED | OUTPATIENT
Start: 2018-06-28 | End: 2018-11-08 | Stop reason: SDUPTHER

## 2018-07-18 ENCOUNTER — OFFICE VISIT (OUTPATIENT)
Dept: URGENT CARE | Facility: CLINIC | Age: 63
End: 2018-07-18
Payer: COMMERCIAL

## 2018-07-18 VITALS
TEMPERATURE: 98 F | WEIGHT: 200 LBS | SYSTOLIC BLOOD PRESSURE: 126 MMHG | BODY MASS INDEX: 31.39 KG/M2 | HEART RATE: 80 BPM | OXYGEN SATURATION: 98 % | DIASTOLIC BLOOD PRESSURE: 73 MMHG | HEIGHT: 67 IN

## 2018-07-18 DIAGNOSIS — L40.9 PSORIASIS: ICD-10-CM

## 2018-07-18 DIAGNOSIS — L03.811 CELLULITIS OF SCALP: Primary | ICD-10-CM

## 2018-07-18 PROCEDURE — 99214 OFFICE O/P EST MOD 30 MIN: CPT | Mod: S$GLB,,, | Performed by: PHYSICIAN ASSISTANT

## 2018-07-18 PROCEDURE — 3074F SYST BP LT 130 MM HG: CPT | Mod: CPTII,S$GLB,, | Performed by: PHYSICIAN ASSISTANT

## 2018-07-18 PROCEDURE — 3078F DIAST BP <80 MM HG: CPT | Mod: CPTII,S$GLB,, | Performed by: PHYSICIAN ASSISTANT

## 2018-07-18 RX ORDER — CLINDAMYCIN HYDROCHLORIDE 150 MG/1
300 CAPSULE ORAL 4 TIMES DAILY
Qty: 80 CAPSULE | Refills: 0 | Status: SHIPPED | OUTPATIENT
Start: 2018-07-18 | End: 2018-07-28

## 2018-07-18 NOTE — PROGRESS NOTES
"Subjective:       Patient ID: Dallas Garcia is a 62 y.o. male.    Vitals:  height is 5' 7" (1.702 m) and weight is 90.7 kg (200 lb). His tympanic temperature is 97.9 °F (36.6 °C). His blood pressure is 126/73 and his pulse is 80. His oxygen saturation is 98%.     Chief Complaint: Abscess    Pt noticed a bump on the back of his head/neck for two months now. It began to become bothersome on Monday. Also has psoriases.       Abscess   Chronicity:  NewProgression Since Onset: rapidly worsening  Location:  Head/neck  Associated Symptoms: no fever, no chills  Characteristics: itching, painful, redness, dryness and scaling    Pain Scale:  6/10  Treatments Tried:  NSAIDs (ibuprofin)  Relieved by:  Nothing    Review of Systems   Constitution: Negative for chills and fever.   HENT: Negative for sore throat.    Eyes: Negative for blurred vision.   Cardiovascular: Negative for chest pain.   Respiratory: Negative for shortness of breath.    Skin: Positive for dry skin and itching. Negative for rash.   Musculoskeletal: Negative for back pain and joint pain.   Gastrointestinal: Negative for abdominal pain, diarrhea, nausea and vomiting.   Neurological: Positive for headaches.   Psychiatric/Behavioral: The patient is not nervous/anxious.        Objective:      Physical Exam   Constitutional: He is oriented to person, place, and time. He appears well-developed and well-nourished. No distress.   HENT:   Head: Normocephalic and atraumatic.   Eyes: Conjunctivae are normal.   Neck: Normal range of motion. Neck supple.   Cardiovascular: Normal rate and regular rhythm.  Exam reveals no gallop and no friction rub.    No murmur heard.  Pulmonary/Chest: Effort normal and breath sounds normal. He has no wheezes. He has no rales.   Musculoskeletal: Normal range of motion.   Neurological: He is alert and oriented to person, place, and time.   Skin: Skin is warm and dry. No rash noted. No erythema.   He has plaque psoriasis to the " posterior scalp.  There is an area to the left lower posterior scalp that is more erythematous and TTP.  No induration or fluctuance.  No obvious abscess.   Psychiatric: He has a normal mood and affect. His behavior is normal. Judgment and thought content normal.   Nursing note and vitals reviewed.      Assessment:       1. Cellulitis of scalp    2. Psoriasis        Plan:         Cellulitis of scalp  -     clindamycin (CLEOCIN) 150 MG capsule; Take 2 capsules (300 mg total) by mouth 4 (four) times daily. for 10 days  Dispense: 80 capsule; Refill: 0    Psoriasis      Dallas was seen today for abscess.    Diagnoses and all orders for this visit:    Cellulitis of scalp  -     clindamycin (CLEOCIN) 150 MG capsule; Take 2 capsules (300 mg total) by mouth 4 (four) times daily. for 10 days    Psoriasis      Patient Instructions   - Rest.    - Drink plenty of fluids.    - Tylenol or Ibuprofen as directed as needed for fever/pain.    - Warm compresses to the affected area for 20 minutes at a time.   - Follow up with your PCP or specialty clinic as directed in the next 1-2 weeks if not improved or as needed.  You can call [phone] to schedule an appointment with the appropriate provider.    - Go to the ED if your symptoms worsen.    Cellulitis  Cellulitis is an infection of the deep layers of skin. A break in the skin, such as a cut or scratch, can let bacteria under the skin. If the bacteria get to deep layers of the skin, it can be serious. If not treated, cellulitis can get into the bloodstream and lymph nodes. The infection can then spread throughout the body. This causes serious illness.  Cellulitis causes the affected skin to become red, swollen, warm, and sore. The reddened areas have a visible border. An open sore may leak fluid (pus). You may have a fever, chills, and pain.  Cellulitis is treated with antibiotics taken for 7 to 10 days. An open sore may be cleaned and covered with cool wet gauze. Symptoms  should get better 1 to 2 days after treatment is started. Make sure to take all the antibiotics for the full number of days until they are gone. Keep taking the medicine even if your symptoms go away.  Home care  Follow these tips:  · Limit the use of the part of your body with cellulitis.   · If the infection is on your leg, keep your leg raised while sitting. This will help to reduce swelling.  · Take all of the antibiotic medicine exactly as directed until it is gone. Do not miss any doses, especially during the first 7 days. Dont stop taking the medicine when your symptoms get better.  · Keep the affected area clean and dry.  · Wash your hands with soap and warm water before and after touching your skin. Anyone else who touches your skin should also wash his or her hands. Don't share towels.  Follow-up care  Follow up with your healthcare provider, or as advised. If your infection does not go away on the first antibiotic, your healthcare provider will prescribe a different one.  When to seek medical advice  Call your healthcare provider right away if any of these occur:  · Red areas that spread  · Swelling or pain that gets worse  · Fluid leaking from the skin (pus)  · Fever higher of 100.4º F (38.0º C) or higher after 2 days on antibiotics  Date Last Reviewed: 9/1/2016 © 2000-2017 [employer]. [address] [place] [address], [place] [address]. All rights reserved. This information is not intended as a substitute for professional medical care. Always follow your healthcare professional's instructions.        Psoriasis  Psoriasis is an inflammatory condition that affects the skin and nails. You may have patches of thick, red skin (plaques) covered with silvery scales. These often appear on the elbows, knees, legs, lower back, and scalp.  The plaques itch and can be painful. People with this condition are more likely to have emotional stress and depression.  Psoriasis is not contagious. It cant spread to  someone else who touches it. But it can be inherited. It is an autoimmune skin disease. This means that the immune system has an abnormal reaction. It treats healthy skin like it is a foreign substance. This causes skin cells to grow faster than normal and to stack up in raised red patches. Psoriasis is a long-term (chronic) disease. You will have flare-ups that come and go over time.  Smoking, sun exposure, and alcohol use may affect how often the psoriasis occurs and how long the flare-ups last.  There is no cure, but treatments can offer relief. Treatment can include topical creams, light therapy (phototherapy), and oral or injectable medicines.  Home care  · No specific diet is needed. Eat a healthy, well-balanced diet that includes fresh fruits and vegetables, whole grains, and lean meats. Psoriasis can increase your risk for diabetes and heart disease.  · Increasing omega-3 fatty acids in your diet can help improve dry skin. The best dietary sources are fatty fish (salmon, mackerel, lake trout, albacore tuna) or fish oil (such as cod liver oil). A great way to take fish oil is to add it to a juice, shake, or smoothie. Flaxseeds and flaxseed oil, canola oil, walnuts, soybean, and tofu are converted to omega-3 fatty acid in the body.  · Stay at a healthy weight. Overlapping skin folds can be a site for psoriasis plaques. If you are overweight, talk to your healthcare provider about a weight-loss program.  · Bathing daily can help remove scales and calm inflamed skin. Use lukewarm water and mild soaps that have added oils, fats, and moisturizers. Avoid deodorants, antiperspirants, and antibacterial soaps. These have a drying effect. Many people find it helpful to soak in a tub with added bath oils, oatmeal, apple cider vinegar, or Epsom salts.  · After bathing, put on skin cream (or a skin oil for a stronger effect).  · Some exposure to UV rays from the sun can improve psoriasis. But too much sun can trigger an  outbreak. It also raises your risk for skin cancer. Limit sun exposure and use sunscreen on healthy skin (at least 15 SPF).  · If you are prescribed medicine, take it as directed.  · Unless another steroid cream was prescribed, you may use over-the-counter hydrocortisone cream for a few weeks during symptom flare-ups.  · Stop smoking. If you are a long-time smoker, this can be hard. Think about joining a stop-smoking program.  · Tell your provider if your joints start to ache or get stiff.  · Tell your provider if you notice changes in your fingernails.  · Depression is more common among psoriasis patients. Get help if you notice changes in your mood.  Follow-up care  Follow up with your healthcare provider, or as advised.  When to seek medical advice  Call your healthcare provider right away if any of these occur:  · Skin pain gets worse  · Bleeding from the skin plaques that is hard to control  · Signs of skin infection (redness, increasing pain, swelling, pus)  · Fever of 1 degree, or higher, above your normal temperature, or as directed by your provider  Date Last Reviewed: 8/1/2016 © 2000-2017 [employer]. [address] [place] [id number]. All rights reserved. This information is not intended as a substitute for professional medical care. Always follow your healthcare professional's instructions.

## 2018-07-18 NOTE — PATIENT INSTRUCTIONS
- Rest.    - Drink plenty of fluids.    - Tylenol or Ibuprofen as directed as needed for fever/pain.    - Warm compresses to the affected area for 20 minutes at a time.   - Follow up with your PCP or specialty clinic as directed in the next 1-2 weeks if not improved or as needed.  You can call [phone] to schedule an appointment with the appropriate provider.    - Go to the ED if your symptoms worsen.    Cellulitis  Cellulitis is an infection of the deep layers of skin. A break in the skin, such as a cut or scratch, can let bacteria under the skin. If the bacteria get to deep layers of the skin, it can be serious. If not treated, cellulitis can get into the bloodstream and lymph nodes. The infection can then spread throughout the body. This causes serious illness.  Cellulitis causes the affected skin to become red, swollen, warm, and sore. The reddened areas have a visible border. An open sore may leak fluid (pus). You may have a fever, chills, and pain.  Cellulitis is treated with antibiotics taken for 7 to 10 days. An open sore may be cleaned and covered with cool wet gauze. Symptoms should get better 1 to 2 days after treatment is started. Make sure to take all the antibiotics for the full number of days until they are gone. Keep taking the medicine even if your symptoms go away.  Home care  Follow these tips:  · Limit the use of the part of your body with cellulitis.   · If the infection is on your leg, keep your leg raised while sitting. This will help to reduce swelling.  · Take all of the antibiotic medicine exactly as directed until it is gone. Do not miss any doses, especially during the first 7 days. Dont stop taking the medicine when your symptoms get better.  · Keep the affected area clean and dry.  · Wash your hands with soap and warm water before and after touching your skin. Anyone else who touches your skin should also wash his or her hands. Don't share towels.  Follow-up care  Follow up with  your healthcare provider, or as advised. If your infection does not go away on the first antibiotic, your healthcare provider will prescribe a different one.  When to seek medical advice  Call your healthcare provider right away if any of these occur:  · Red areas that spread  · Swelling or pain that gets worse  · Fluid leaking from the skin (pus)  · Fever higher of 100.4º F (38.0º C) or higher after 2 days on antibiotics  Date Last Reviewed: 9/1/2016 © 2000-2017 [employer]. [address] [place] [address]. All rights reserved. This information is not intended as a substitute for professional medical care. Always follow your healthcare professional's instructions.        Psoriasis  Psoriasis is an inflammatory condition that affects the skin and nails. You may have patches of thick, red skin (plaques) covered with silvery scales. These often appear on the elbows, knees, legs, lower back, and scalp.  The plaques itch and can be painful. People with this condition are more likely to have emotional stress and depression.  Psoriasis is not contagious. It cant spread to someone else who touches it. But it can be inherited. It is an autoimmune skin disease. This means that the immune system has an abnormal reaction. It treats healthy skin like it is a foreign substance. This causes skin cells to grow faster than normal and to stack up in raised red patches. Psoriasis is a long-term (chronic) disease. You will have flare-ups that come and go over time.  Smoking, sun exposure, and alcohol use may affect how often the psoriasis occurs and how long the flare-ups last.  There is no cure, but treatments can offer relief. Treatment can include topical creams, light therapy (phototherapy), and oral or injectable medicines.  Home care  · No specific diet is needed. Eat a healthy, well-balanced diet that includes fresh fruits and vegetables, whole grains, and lean meats. Psoriasis can increase your risk  for diabetes and heart disease.  · Increasing omega-3 fatty acids in your diet can help improve dry skin. The best dietary sources are fatty fish (salmon, mackerel, lake trout, albacore tuna) or fish oil (such as cod liver oil). A great way to take fish oil is to add it to a juice, shake, or smoothie. Flaxseeds and flaxseed oil, canola oil, walnuts, soybean, and tofu are converted to omega-3 fatty acid in the body.  · Stay at a healthy weight. Overlapping skin folds can be a site for psoriasis plaques. If you are overweight, talk to your healthcare provider about a weight-loss program.  · Bathing daily can help remove scales and calm inflamed skin. Use lukewarm water and mild soaps that have added oils, fats, and moisturizers. Avoid deodorants, antiperspirants, and antibacterial soaps. These have a drying effect. Many people find it helpful to soak in a tub with added bath oils, oatmeal, apple cider vinegar, or Epsom salts.  · After bathing, put on skin cream (or a skin oil for a stronger effect).  · Some exposure to UV rays from the sun can improve psoriasis. But too much sun can trigger an outbreak. It also raises your risk for skin cancer. Limit sun exposure and use sunscreen on healthy skin (at least 15 SPF).  · If you are prescribed medicine, take it as directed.  · Unless another steroid cream was prescribed, you may use over-the-counter hydrocortisone cream for a few weeks during symptom flare-ups.  · Stop smoking. If you are a long-time smoker, this can be hard. Think about joining a stop-smoking program.  · Tell your provider if your joints start to ache or get stiff.  · Tell your provider if you notice changes in your fingernails.  · Depression is more common among psoriasis patients. Get help if you notice changes in your mood.  Follow-up care  Follow up with your healthcare provider, or as advised.  When to seek medical advice  Call your healthcare provider right away if any of these occur:  · Skin pain  gets worse  · Bleeding from the skin plaques that is hard to control  · Signs of skin infection (redness, increasing pain, swelling, pus)  · Fever of 1 degree, or higher, above your normal temperature, or as directed by your provider  Date Last Reviewed: 8/1/2016  © 6327-7945 [employer]. [address] [place] [address], [place] [address]. All rights reserved. This information is not intended as a substitute for professional medical care. Always follow your healthcare professional's instructions.

## 2018-07-27 RX ORDER — INSULIN GLARGINE 100 [IU]/ML
50 INJECTION, SOLUTION SUBCUTANEOUS NIGHTLY
Qty: 45 ML | Refills: 0 | Status: SHIPPED | OUTPATIENT
Start: 2018-07-27 | End: 2018-10-22 | Stop reason: SDUPTHER

## 2018-08-02 DIAGNOSIS — M79.642 HAND PAIN, LEFT: ICD-10-CM

## 2018-08-03 RX ORDER — AMLODIPINE BESYLATE 10 MG/1
10 TABLET ORAL DAILY
Qty: 90 TABLET | Refills: 1 | Status: SHIPPED | OUTPATIENT
Start: 2018-08-03 | End: 2018-12-17 | Stop reason: SDUPTHER

## 2018-08-17 ENCOUNTER — LAB VISIT (OUTPATIENT)
Dept: LAB | Facility: HOSPITAL | Age: 63
End: 2018-08-17
Attending: INTERNAL MEDICINE
Payer: COMMERCIAL

## 2018-08-17 DIAGNOSIS — E78.2 MIXED HYPERLIPIDEMIA: ICD-10-CM

## 2018-08-17 DIAGNOSIS — Z12.5 PROSTATE CANCER SCREENING: ICD-10-CM

## 2018-08-17 DIAGNOSIS — E11.42 DIABETIC POLYNEUROPATHY ASSOCIATED WITH TYPE 2 DIABETES MELLITUS: ICD-10-CM

## 2018-08-17 LAB
25(OH)D3+25(OH)D2 SERPL-MCNC: 25 NG/ML
ALBUMIN SERPL BCP-MCNC: 4.1 G/DL
ALP SERPL-CCNC: 82 U/L
ALT SERPL W/O P-5'-P-CCNC: 47 U/L
ANION GAP SERPL CALC-SCNC: 10 MMOL/L
AST SERPL-CCNC: 48 U/L
BASOPHILS # BLD AUTO: 0.02 K/UL
BASOPHILS NFR BLD: 0.5 %
BILIRUB SERPL-MCNC: 0.6 MG/DL
BUN SERPL-MCNC: 15 MG/DL
CALCIUM SERPL-MCNC: 10 MG/DL
CHLORIDE SERPL-SCNC: 105 MMOL/L
CHOLEST SERPL-MCNC: 149 MG/DL
CHOLEST/HDLC SERPL: 4.3 {RATIO}
CO2 SERPL-SCNC: 25 MMOL/L
COMPLEXED PSA SERPL-MCNC: 0.16 NG/ML
CREAT SERPL-MCNC: 1.1 MG/DL
DIFFERENTIAL METHOD: ABNORMAL
EOSINOPHIL # BLD AUTO: 0.2 K/UL
EOSINOPHIL NFR BLD: 5.1 %
ERYTHROCYTE [DISTWIDTH] IN BLOOD BY AUTOMATED COUNT: 11.7 %
EST. GFR  (AFRICAN AMERICAN): >60 ML/MIN/1.73 M^2
EST. GFR  (NON AFRICAN AMERICAN): >60 ML/MIN/1.73 M^2
ESTIMATED AVG GLUCOSE: 212 MG/DL
GLUCOSE SERPL-MCNC: 184 MG/DL
HBA1C MFR BLD HPLC: 9 %
HCT VFR BLD AUTO: 40.3 %
HDLC SERPL-MCNC: 35 MG/DL
HDLC SERPL: 23.5 %
HGB BLD-MCNC: 13.9 G/DL
IMM GRANULOCYTES # BLD AUTO: 0.01 K/UL
IMM GRANULOCYTES NFR BLD AUTO: 0.2 %
LDLC SERPL CALC-MCNC: 52.4 MG/DL
LYMPHOCYTES # BLD AUTO: 1.4 K/UL
LYMPHOCYTES NFR BLD: 34.8 %
MCH RBC QN AUTO: 31.9 PG
MCHC RBC AUTO-ENTMCNC: 34.5 G/DL
MCV RBC AUTO: 92 FL
MONOCYTES # BLD AUTO: 0.4 K/UL
MONOCYTES NFR BLD: 10.5 %
NEUTROPHILS # BLD AUTO: 2 K/UL
NEUTROPHILS NFR BLD: 48.9 %
NONHDLC SERPL-MCNC: 114 MG/DL
NRBC BLD-RTO: 0 /100 WBC
PLATELET # BLD AUTO: 236 K/UL
PMV BLD AUTO: 11.6 FL
POTASSIUM SERPL-SCNC: 4.4 MMOL/L
PROT SERPL-MCNC: 7.1 G/DL
RBC # BLD AUTO: 4.36 M/UL
SODIUM SERPL-SCNC: 140 MMOL/L
TRIGL SERPL-MCNC: 308 MG/DL
TSH SERPL DL<=0.005 MIU/L-ACNC: 1.05 UIU/ML
WBC # BLD AUTO: 4.11 K/UL

## 2018-08-17 PROCEDURE — 82306 VITAMIN D 25 HYDROXY: CPT

## 2018-08-17 PROCEDURE — 84153 ASSAY OF PSA TOTAL: CPT

## 2018-08-17 PROCEDURE — 80061 LIPID PANEL: CPT

## 2018-08-17 PROCEDURE — 84443 ASSAY THYROID STIM HORMONE: CPT

## 2018-08-17 PROCEDURE — 36415 COLL VENOUS BLD VENIPUNCTURE: CPT | Mod: PO

## 2018-08-17 PROCEDURE — 83036 HEMOGLOBIN GLYCOSYLATED A1C: CPT

## 2018-08-17 PROCEDURE — 85025 COMPLETE CBC W/AUTO DIFF WBC: CPT

## 2018-08-17 PROCEDURE — 80053 COMPREHEN METABOLIC PANEL: CPT

## 2018-08-24 ENCOUNTER — OFFICE VISIT (OUTPATIENT)
Dept: INTERNAL MEDICINE | Facility: CLINIC | Age: 63
End: 2018-08-24
Payer: COMMERCIAL

## 2018-08-24 ENCOUNTER — TELEPHONE (OUTPATIENT)
Dept: INTERNAL MEDICINE | Facility: CLINIC | Age: 63
End: 2018-08-24

## 2018-08-24 VITALS
TEMPERATURE: 98 F | HEART RATE: 80 BPM | OXYGEN SATURATION: 96 % | SYSTOLIC BLOOD PRESSURE: 124 MMHG | DIASTOLIC BLOOD PRESSURE: 80 MMHG | HEIGHT: 67 IN | WEIGHT: 195 LBS | BODY MASS INDEX: 30.61 KG/M2

## 2018-08-24 DIAGNOSIS — F41.9 ANXIETY: ICD-10-CM

## 2018-08-24 DIAGNOSIS — E78.49 FAMILIAL HYPERLIPIDEMIA: ICD-10-CM

## 2018-08-24 DIAGNOSIS — K76.0 FATTY LIVER: ICD-10-CM

## 2018-08-24 DIAGNOSIS — I10 HTN (HYPERTENSION), BENIGN: ICD-10-CM

## 2018-08-24 DIAGNOSIS — Z00.00 ANNUAL PHYSICAL EXAM: Primary | ICD-10-CM

## 2018-08-24 LAB
ALBUMIN/CREAT UR: 18.7 UG/MG
CREAT UR-MCNC: 123 MG/DL
MICROALBUMIN UR DL<=1MG/L-MCNC: 23 UG/ML

## 2018-08-24 PROCEDURE — 3074F SYST BP LT 130 MM HG: CPT | Mod: CPTII,S$GLB,, | Performed by: INTERNAL MEDICINE

## 2018-08-24 PROCEDURE — 82043 UR ALBUMIN QUANTITATIVE: CPT

## 2018-08-24 PROCEDURE — 3045F PR MOST RECENT HEMOGLOBIN A1C LEVEL 7.0-9.0%: CPT | Mod: CPTII,S$GLB,, | Performed by: INTERNAL MEDICINE

## 2018-08-24 PROCEDURE — 99396 PREV VISIT EST AGE 40-64: CPT | Mod: S$GLB,,, | Performed by: INTERNAL MEDICINE

## 2018-08-24 PROCEDURE — 99999 PR PBB SHADOW E&M-EST. PATIENT-LVL IV: CPT | Mod: PBBFAC,,, | Performed by: INTERNAL MEDICINE

## 2018-08-24 PROCEDURE — 3079F DIAST BP 80-89 MM HG: CPT | Mod: CPTII,S$GLB,, | Performed by: INTERNAL MEDICINE

## 2018-08-24 NOTE — TELEPHONE ENCOUNTER
----- Message from Mary Anne Yoon sent at 8/24/2018 12:02 PM CDT -----  Contact: Dallas Lockeworth [phone]  Caller is requesting a sooner appointment. Caller declined first available appointment listed below. Caller will not accept being placed on the wait list and is requesting a message be sent to the provider.    When is the next available appointment:  N/A  Did you offer to schedule the next available appt and put the patient on the wait list?:     What visit type: Ep  Symptoms:    Patient preference of timeframe to be scheduled:  11/22 at 8:30  What is the reason the patient is requesting a sooner appointment? (insurance terminating, changing jobs):  3 mo follow up   Would you prefer an answer via [employer]?:  No  Comments:  Patient would also like a call back to know if he needs labs to go along with this appointment.
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
## 2018-08-29 ENCOUNTER — TELEPHONE (OUTPATIENT)
Dept: INTERNAL MEDICINE | Facility: CLINIC | Age: 63
End: 2018-08-29

## 2018-08-29 NOTE — TELEPHONE ENCOUNTER
----- Message from Jarrod Gtz sent at 8/29/2018  1:24 PM CDT -----  Contact: pt   PT missed a call and would the nurse to return their call    Pt can be reached at [phone]

## 2018-09-04 RX ORDER — BUSPIRONE HYDROCHLORIDE 5 MG/1
5 TABLET ORAL 2 TIMES DAILY
Qty: 60 TABLET | Refills: 3 | Status: SHIPPED | OUTPATIENT
Start: 2018-09-04 | End: 2018-12-05 | Stop reason: SDUPTHER

## 2018-09-04 RX ORDER — INSULIN LISPRO 100 [IU]/ML
INJECTION, SOLUTION INTRAVENOUS; SUBCUTANEOUS
Qty: 60 SYRINGE | Refills: 2 | Status: SHIPPED | OUTPATIENT
Start: 2018-09-04 | End: 2019-03-09 | Stop reason: SDUPTHER

## 2018-09-04 NOTE — TELEPHONE ENCOUNTER
Ruth LARKIN Davenport Lisette RUFFIN Staff   Caller: [phone] (Today, 10:50 AM)             Patient states he really needs these medication refilled busPIRone (BUSPAR) 5 MG Tab, and HUMALOG KWIKPEN 100 unit/mL InPn pen.  Patient states he and pharmacy called and never got a response.     Please advise, thank you

## 2018-09-11 ENCOUNTER — TELEPHONE (OUTPATIENT)
Dept: INTERNAL MEDICINE | Facility: CLINIC | Age: 63
End: 2018-09-11

## 2018-09-11 NOTE — TELEPHONE ENCOUNTER
----- Message from Luisa Gracia sent at 9/11/2018 11:35 AM CDT -----  Contact: /[facility]/[phone] ref # [id number]  Pharmacy is calling to speak with someone in the office in regards to the medication insulin lispro (HUMALOG KWIKPEN INSULIN) 100 unit/mL InPn pen. Please advise.        Thanks

## 2018-09-11 NOTE — TELEPHONE ENCOUNTER
----- Message from Jennifer THUY Colón sent at 9/11/2018  4:16 PM CDT -----  Contact: Pt Mobile/Home [phone]  Patient is calling in regards to trying to get his medications for insulin lispro (HUMALOG KWIKPEN INSULIN) 100 unit/mL InPn pen, and busPIRone (BUSPAR) 5 MG Tab refilled. He said one of them was cancel because [facility] said that they have not received a script request since the end of August for one of the medications. Patient would like to know which one was cancelled because he would like to have it refilled please? Here is the order number that [facility] sent to him by e-mail [id number].    Patient's pharmacy: [facility] - [address]  Phone# [phone], Fax# [phone]

## 2018-09-12 RX ORDER — FENOFIBRATE 160 MG/1
160 TABLET ORAL DAILY
Qty: 30 TABLET | Refills: 6 | Status: SHIPPED | OUTPATIENT
Start: 2018-09-12 | End: 2018-10-17 | Stop reason: SDUPTHER

## 2018-09-12 RX ORDER — METOPROLOL SUCCINATE 100 MG/1
100 TABLET, EXTENDED RELEASE ORAL DAILY
Qty: 30 TABLET | Refills: 6 | Status: SHIPPED | OUTPATIENT
Start: 2018-09-12 | End: 2019-04-02 | Stop reason: SDUPTHER

## 2018-09-12 RX ORDER — METOPROLOL SUCCINATE 100 MG/1
100 TABLET, EXTENDED RELEASE ORAL DAILY
Qty: 30 TABLET | Refills: 6 | Status: CANCELLED | OUTPATIENT
Start: 2018-09-12

## 2018-09-12 RX ORDER — FENOFIBRATE 160 MG/1
160 TABLET ORAL DAILY
Qty: 30 TABLET | Refills: 6 | Status: CANCELLED | OUTPATIENT
Start: 2018-09-12

## 2018-09-12 NOTE — TELEPHONE ENCOUNTER
"----- Message from Manasalinda Hill sent at 9/12/2018  2:41 PM CDT -----  Contact: self/[phone]  RX request - refill or new RX.  Is this a refill or new RX: refill   RX name and strength: metoprolol succinate (TOPROL-XL) 100 MG 24 hr tablet  fenofibrate nanocrystallized 160 mg Tab  Directions:   Is this a 30 day or 90 day RX:    Local pharmacy or mail order pharmacy:  local  Pharmacy name and phone # (DON'T enter "on file" or "in chart"): [id number] - [place], [place] - [address] AT [address] & [address] [phone] (Phone)[phone] (Fax)  Comments:  Patient is out of the metoprolol. Please call and advise. Thank you!!!        "

## 2018-09-13 RX ORDER — LISINOPRIL 40 MG/1
40 TABLET ORAL DAILY
Qty: 30 TABLET | Refills: 6 | Status: SHIPPED | OUTPATIENT
Start: 2018-09-13 | End: 2019-04-02 | Stop reason: SDUPTHER

## 2018-10-03 RX ORDER — PEN NEEDLE, DIABETIC 30 GX3/16"
NEEDLE, DISPOSABLE MISCELLANEOUS
Qty: 100 EACH | Refills: 6 | Status: SHIPPED | OUTPATIENT
Start: 2018-10-03 | End: 2019-12-20

## 2018-10-17 RX ORDER — FENOFIBRATE 160 MG/1
160 TABLET ORAL DAILY
Qty: 30 TABLET | Refills: 6 | Status: SHIPPED | OUTPATIENT
Start: 2018-10-17 | End: 2019-04-02 | Stop reason: SDUPTHER

## 2018-10-22 RX ORDER — INSULIN GLARGINE 100 [IU]/ML
INJECTION, SOLUTION SUBCUTANEOUS
Qty: 45 ML | Refills: 0 | Status: SHIPPED | OUTPATIENT
Start: 2018-10-22 | End: 2018-12-17 | Stop reason: SDUPTHER

## 2018-11-08 DIAGNOSIS — E11.9 DIABETES MELLITUS WITHOUT COMPLICATION: ICD-10-CM

## 2018-11-08 NOTE — TELEPHONE ENCOUNTER
----- Message from Luisa Basil sent at 11/8/2018  1:37 PM CST -----  Contact: self/[phone]  Type: Rx    Name of medication(s): JENTADUETO 2.5-1,000 mg Tab    Is this a refill? New rx? Refill     Who prescribed medication? Dr.St Foster     Pharmacy Name, Phone, & Location:[id number] - [place] - [address] AT [address]    Comments: Pharmacy has sent medication over for a refill request 3 times already will be faxing for a forth time. Pt is running out of medication. Please advise.          Thanks

## 2018-11-12 DIAGNOSIS — E78.2 MIXED HYPERLIPIDEMIA: ICD-10-CM

## 2018-11-12 RX ORDER — ATORVASTATIN CALCIUM 80 MG/1
TABLET, FILM COATED ORAL
Qty: 30 TABLET | Refills: 0 | OUTPATIENT
Start: 2018-11-12

## 2018-11-20 DIAGNOSIS — E78.2 MIXED HYPERLIPIDEMIA: ICD-10-CM

## 2018-11-20 RX ORDER — ATORVASTATIN CALCIUM 80 MG/1
TABLET, FILM COATED ORAL
Qty: 90 TABLET | Refills: 0 | OUTPATIENT
Start: 2018-11-20

## 2018-11-26 PROBLEM — Z00.00 ANNUAL PHYSICAL EXAM: Status: RESOLVED | Noted: 2017-04-20 | Resolved: 2018-11-26

## 2018-11-28 DIAGNOSIS — E78.2 MIXED HYPERLIPIDEMIA: ICD-10-CM

## 2018-11-28 DIAGNOSIS — E11.65 UNCONTROLLED TYPE 2 DIABETES MELLITUS WITH HYPERGLYCEMIA: Primary | ICD-10-CM

## 2018-11-28 RX ORDER — ATORVASTATIN CALCIUM 80 MG/1
80 TABLET, FILM COATED ORAL DAILY
Qty: 30 TABLET | Refills: 4 | Status: SHIPPED | OUTPATIENT
Start: 2018-11-28 | End: 2019-04-02 | Stop reason: SDUPTHER

## 2018-11-28 NOTE — TELEPHONE ENCOUNTER
Zita, please call pt to schedule a RTC by March with 1 week prior fasting lab.  I've placed lab orders.  Thanks

## 2018-11-28 NOTE — TELEPHONE ENCOUNTER
----- Message from Merary Garecs sent at 11/28/2018 10:15 AM CST -----  Contact: [phone]  Patient is requesting a refill on medication atorvastatin (LIPITOR) 80 MG tablet.      [id number] - [place], [place] - [address] AT [address]   [phone] (Phone)  [phone] (Fax)      Please advise, thanks

## 2018-12-01 NOTE — TELEPHONE ENCOUNTER
Norris, please call Mr Garcia and schedule him a RTC by March with prior lab.  He wants an 8:30 but none available; see if he can do a 1PM?

## 2018-12-05 RX ORDER — BUSPIRONE HYDROCHLORIDE 5 MG/1
5 TABLET ORAL 2 TIMES DAILY
Qty: 60 TABLET | Refills: 3 | Status: SHIPPED | OUTPATIENT
Start: 2018-12-05 | End: 2018-12-12 | Stop reason: SDUPTHER

## 2018-12-12 RX ORDER — BUSPIRONE HYDROCHLORIDE 5 MG/1
5 TABLET ORAL 2 TIMES DAILY
Qty: 60 TABLET | Refills: 3 | Status: SHIPPED | OUTPATIENT
Start: 2018-12-12 | End: 2019-05-22

## 2018-12-17 DIAGNOSIS — M79.642 HAND PAIN, LEFT: ICD-10-CM

## 2018-12-17 RX ORDER — INSULIN GLARGINE 100 [IU]/ML
INJECTION, SOLUTION SUBCUTANEOUS
Qty: 45 ML | Refills: 0 | Status: SHIPPED | OUTPATIENT
Start: 2018-12-17 | End: 2019-03-09 | Stop reason: SDUPTHER

## 2018-12-17 RX ORDER — AMLODIPINE BESYLATE 10 MG/1
TABLET ORAL
Qty: 90 TABLET | Refills: 1 | Status: SHIPPED | OUTPATIENT
Start: 2018-12-17 | End: 2019-04-02 | Stop reason: SDUPTHER

## 2018-12-19 DIAGNOSIS — E55.9 VITAMIN D DEFICIENCY: ICD-10-CM

## 2018-12-20 RX ORDER — ERGOCALCIFEROL 1.25 MG/1
50000 CAPSULE ORAL
Qty: 4 CAPSULE | Refills: 6 | Status: SHIPPED | OUTPATIENT
Start: 2018-12-20 | End: 2019-07-01 | Stop reason: SDUPTHER

## 2019-01-22 ENCOUNTER — OFFICE VISIT (OUTPATIENT)
Dept: SPORTS MEDICINE | Facility: CLINIC | Age: 64
End: 2019-01-22
Payer: COMMERCIAL

## 2019-01-22 ENCOUNTER — HOSPITAL ENCOUNTER (OUTPATIENT)
Dept: RADIOLOGY | Facility: HOSPITAL | Age: 64
Discharge: HOME | End: 2019-01-22
Attending: FAMILY MEDICINE
Payer: COMMERCIAL

## 2019-01-22 VITALS — HEIGHT: 67 IN | WEIGHT: 195 LBS | BODY MASS INDEX: 30.61 KG/M2 | TEMPERATURE: 98 F

## 2019-01-22 DIAGNOSIS — M25.522 LEFT ELBOW PAIN: ICD-10-CM

## 2019-01-22 DIAGNOSIS — M77.12 LATERAL EPICONDYLITIS, LEFT ELBOW: Primary | ICD-10-CM

## 2019-01-22 PROCEDURE — 99999 PR PBB SHADOW E&M-EST. PATIENT-LVL III: ICD-10-PCS | Mod: PBBFAC,,, | Performed by: FAMILY MEDICINE

## 2019-01-22 PROCEDURE — 73080 X-RAY EXAM OF ELBOW: CPT | Mod: 26,LT,, | Performed by: RADIOLOGY

## 2019-01-22 PROCEDURE — 20551 TENDON ORIGIN: L ELBOW: ICD-10-PCS | Mod: LT,S$GLB,, | Performed by: FAMILY MEDICINE

## 2019-01-22 PROCEDURE — 99214 PR OFFICE/OUTPT VISIT, EST, LEVL IV, 30-39 MIN: ICD-10-PCS | Mod: 25,S$GLB,, | Performed by: FAMILY MEDICINE

## 2019-01-22 PROCEDURE — 1101F PR PT FALLS ASSESS DOC 0-1 FALLS W/OUT INJ PAST YR: ICD-10-PCS | Mod: CPTII,S$GLB,, | Performed by: FAMILY MEDICINE

## 2019-01-22 PROCEDURE — 3008F PR BODY MASS INDEX (BMI) DOCUMENTED: ICD-10-PCS | Mod: CPTII,S$GLB,, | Performed by: FAMILY MEDICINE

## 2019-01-22 PROCEDURE — 73080 X-RAY EXAM OF ELBOW: CPT | Mod: TC,FY,PO,LT

## 2019-01-22 PROCEDURE — 73080 XR ELBOW COMPLETE 3 VIEW LEFT: ICD-10-PCS | Mod: 26,LT,, | Performed by: RADIOLOGY

## 2019-01-22 PROCEDURE — 99999 PR PBB SHADOW E&M-EST. PATIENT-LVL III: CPT | Mod: PBBFAC,,, | Performed by: FAMILY MEDICINE

## 2019-01-22 PROCEDURE — 99214 OFFICE O/P EST MOD 30 MIN: CPT | Mod: 25,S$GLB,, | Performed by: FAMILY MEDICINE

## 2019-01-22 PROCEDURE — 76942 ECHO GUIDE FOR BIOPSY: CPT | Mod: 26,S$GLB,, | Performed by: FAMILY MEDICINE

## 2019-01-22 PROCEDURE — 76942 TENDON ORIGIN: L ELBOW: ICD-10-PCS | Mod: 26,S$GLB,, | Performed by: FAMILY MEDICINE

## 2019-01-22 PROCEDURE — 1101F PT FALLS ASSESS-DOCD LE1/YR: CPT | Mod: CPTII,S$GLB,, | Performed by: FAMILY MEDICINE

## 2019-01-22 PROCEDURE — 3008F BODY MASS INDEX DOCD: CPT | Mod: CPTII,S$GLB,, | Performed by: FAMILY MEDICINE

## 2019-01-22 PROCEDURE — 20551 NJX 1 TENDON ORIGIN/INSJ: CPT | Mod: LT,S$GLB,, | Performed by: FAMILY MEDICINE

## 2019-01-22 RX ORDER — TRIAMCINOLONE ACETONIDE 40 MG/ML
40 INJECTION, SUSPENSION INTRA-ARTICULAR; INTRAMUSCULAR
Status: DISCONTINUED | OUTPATIENT
Start: 2019-01-22 | End: 2019-01-22 | Stop reason: HOSPADM

## 2019-01-22 RX ADMIN — TRIAMCINOLONE ACETONIDE 40 MG: 40 INJECTION, SUSPENSION INTRA-ARTICULAR; INTRAMUSCULAR at 02:01

## 2019-01-22 NOTE — PROGRESS NOTES
Dallas Garcia, a 63 y.o. male, is here for evaluation of Left elbow pain.     HISTORY OF PRESENT ILLNESS  Hand dominance: left, golf's right handed    Location: lateral elbow, left  Onset: 18.12.25   Palliative:    Relative rest   Oral analgesics     Provocative:   ADLs   Gripping, grasping, wrist extension  Prior: none  Progression: plateaued discomfort  Quality:   no quality - sharp when stressed   Radiation: none  Severity: per nursing documentation  Timing: intermittent w/ use  Trauma: 18.12.25: Patient was playing golf and chunked the ball a couple of times. Patient was not able to finish the round after hurting his elbow    Review of systems (ROS):  A 10+ review of systems was performed with pertinent positives and negatives noted above in the history of present illness. Other systems were negative unless otherwise specified.      PHYSICAL EXAMINATION  General: Patient appears alert and oriented x 3.  Mood is pleasant.  Observation of ears, eyes and nose reveal no gross abnormalities. HEENT: NCAT, sclera nonicteric  Lungs: Respirations are equal and unlabored.  Well nourished, in no acute distress and ambulates with a non-antalgic gait with no assistive devices.    Skin: Skin intact bilaterally. Sensation intact bilaterally. Compartments soft. No evidence of edema, infection, or induration.     LEFT ELBOW EXAMINATION    Observation/Inspection  Swelling  none    Deformity  none  Discoloration  none     Scars   none    Atrophy  none    Tenderness / Crepitus (T/C):          T / C        Medial epicondyle   - / -    Med. (Ulnar) collateral ligament  - / -    Flexor pronator Musculature   - / -   Biceps tendon    - / -   Head of radius    - / -    Lateral epicondyle   - / -    Extensor Musculature   + / -   Brachioradialis   - / -   Triceps tendon   - / -   Triceps muscle   - / -   Olecranon    - / -   Olecranon bursa   - / -   Cubital fossa    - / -   Anterior jointline   - / -   Radial tunnel    -/  -             ROM: ('*' = with pain)    Right Elbow  AROM (PROM)     Extension   0 deg  (5 deg)   Flexion   145 deg (145 deg)   Pronation  90 deg  (90 deg)  Supination   80 deg  (80 deg)     Left Elbow  AROM (PROM)   Extension   0 deg  (5 deg)  Flexion   145 deg (145 deg)   Pronation  90 deg  (90 deg)   Supination   80 deg  (80 deg)      Right Wrist  AROM (PROM)   Extension   80 deg (85 deg)  Flexion   80 deg (85 deg)   Ulnar Deviation   35 deg (40 deg)  Radial Deviation 35 deg (40 deg)     Left Wrist   AROM (PROM)  Extension   80 deg (85 deg)  Flexion   80 deg (85 deg)   Ulnar Deviation   35 deg (40 deg)  Radial Deviation 35 deg (40 deg)     Strength: ('*' = with pain)    Elbow Flexion   5/5  Elbow Extension  5/5  Wrist Flexion   5/5  Wrist Extension  5/5      5/5  Intrinsics   5/5  EPL (Ext. pollicis longus) 5/5  Pinch Mechanism  5/5    Elbow Examination:  See above noted areas of tenderness.   Test for Ligamentous Instability - UCL normal  Test for Ligamentous Instability - LUCL normal  PLRI       -  Tinel's (Percussion) Test - Cubital  -  Tennis Elbow Test    +  Golfer's Elbow Test    -  Radial Capitellar Grind Test   -  Valgus/Extension Overload Test  -  Resisted Long Finger Extension Pain -  Moving Valgus    -  Forearm pain with resisted supination -  Yeargeson' s (elbow pain)   -  Hook test     -    Wrist Examination:  See above noted areas of tenderness.   Finkelstein's Test    -  Tinel's Test - Carpal Tunnel   -  Phalen's Test     -  Median Nerve Compression Test  -  Ulnar-sided Compression Test  -  LT Ballottment Test    -  Snuff box tenderness    -  Maldonado's Test     -  LT Instability     -  Hook of Hamate Tenderness   -     Extremity Neuro-vascular Testing: Sensation grossly intact to light touch all dermatomal regions. DTR 2+ Biceps, Triceps, BR and Negative Anand's sign. Grossly intact motor function at Elbow, Wrist and Hand. Distal pulses radial and ulnar 2+, brisk cap refill,  symmetric.    Other Findings:    ASSESSMENT & PLAN   Assessment:   #1 lateral epicondylitis, left (dom, but golf's right)  No evidence of osseous pathology  No evidence of neurologic pathology  No evidence of vascular pathology    Imaging studies reviewed:   X-ray elbow, left 19.01    Plan:      We discussed options including:  #1 watchful waiting  #2 physical/occupational therapy aimed at:   RoM elbow   Eccentric strengthening common extensor musculature   Stretching common extensor tendon  #3 injection therapy:   CSI, origin of common extensor tendon of forearm     Right,     Left,      Discussed the likely transient therapeutic effects of CSIs in the     treatment of epicondylitis  #4 MRI for further evaluation     The patient chooses #3 csi common extensor tendon of forearm     Pain management required: handout given  Bracing required:    Tennis elbow strap, fit with at today's visit   Wrist immobilization brace, fit with at today's visit   Physical therapy required:   Activity (e.g. sports, work) restrictions: as tolerated   school/vocation: both prior [employer] and [employer] affiliations    2 teenage daughters    Follow up appointment offered, deferred by patient   Ineffective-->offer PRP  Should symptoms worsen or fail to resolve, consider:  Revisiting the above options

## 2019-01-22 NOTE — PROCEDURES
"Tendon Origin: L elbow  Date/Time: 1/22/2019 2:14 PM  Performed by: Erik Cohen MD  Authorized by: Erik Cohen MD     Consent Done?:  Yes (Verbal)  Timeout: prior to procedure the correct patient, procedure, and site was verified    Indications:  Pain  Site marked: the procedure site was marked    Timeout: prior to procedure the correct patient, procedure, and site was verified    Location:  Elbow  Site:  L elbow  Prep: patient was prepped and draped in usual sterile fashion    Ultrasonic Guidance for Needle Placement?: Yes    Needle size:  25 G  Approach:  Anterolateral  Medications:  40 mg triamcinolone acetonide 40 mg/mL  Patient tolerance:  Patient tolerated the procedure well with no immediate complications   Origin of common extensor tendon at the lateral epicondyle  Description of ultrasound utilization for needle guidance:   Ultrasound guidance used for needle localization. Images saved and stored for documentation. The common extensor tendon was visualized at its origin at the lateral epicondyle. Dynamic visualization of the 25g x 1.5" needle was continuous throughout the procedure.      "

## 2019-02-05 RX ORDER — BUSPIRONE HYDROCHLORIDE 5 MG/1
TABLET ORAL
Qty: 60 TABLET | Refills: 3 | Status: SHIPPED | OUTPATIENT
Start: 2019-02-05 | End: 2020-11-09 | Stop reason: SDUPTHER

## 2019-03-04 DIAGNOSIS — E11.40 TYPE 2 DIABETES MELLITUS WITH DIABETIC NEUROPATHY: ICD-10-CM

## 2019-03-11 RX ORDER — INSULIN LISPRO 100 [IU]/ML
INJECTION, SOLUTION INTRAVENOUS; SUBCUTANEOUS
Qty: 75 ML | Refills: 1 | Status: SHIPPED | OUTPATIENT
Start: 2019-03-11 | End: 2019-04-02 | Stop reason: SDUPTHER

## 2019-03-11 RX ORDER — INSULIN GLARGINE 100 [IU]/ML
INJECTION, SOLUTION SUBCUTANEOUS
Qty: 45 ML | Refills: 1 | Status: SHIPPED | OUTPATIENT
Start: 2019-03-11 | End: 2019-03-11 | Stop reason: SDUPTHER

## 2019-03-11 RX ORDER — INSULIN GLARGINE 100 [IU]/ML
INJECTION, SOLUTION SUBCUTANEOUS
Qty: 45 ML | Refills: 1 | Status: SHIPPED | OUTPATIENT
Start: 2019-03-11 | End: 2019-04-02

## 2019-03-11 NOTE — TELEPHONE ENCOUNTER
----- Message from Rocío Livingston sent at 3/11/2019  9:10 AM CDT -----  Contact: Self  Pt is calling to speak with Staff regarding his insulin prescription.  Also, wants to change the prescription from [facility] to [facility].  Pt says it is important.    He can be reached at [phone].    Thank you.

## 2019-03-26 ENCOUNTER — LAB VISIT (OUTPATIENT)
Dept: LAB | Facility: HOSPITAL | Age: 64
End: 2019-03-26
Attending: INTERNAL MEDICINE
Payer: COMMERCIAL

## 2019-03-26 DIAGNOSIS — E78.2 MIXED HYPERLIPIDEMIA: ICD-10-CM

## 2019-03-26 DIAGNOSIS — E11.65 UNCONTROLLED TYPE 2 DIABETES MELLITUS WITH HYPERGLYCEMIA: ICD-10-CM

## 2019-03-26 LAB
ALBUMIN SERPL BCP-MCNC: 3.9 G/DL (ref 3.5–5.2)
ALP SERPL-CCNC: 127 U/L (ref 55–135)
ALT SERPL W/O P-5'-P-CCNC: 56 U/L (ref 10–44)
ANION GAP SERPL CALC-SCNC: 9 MMOL/L (ref 8–16)
AST SERPL-CCNC: 66 U/L (ref 10–40)
BILIRUB SERPL-MCNC: 0.6 MG/DL (ref 0.1–1)
BUN SERPL-MCNC: 12 MG/DL (ref 8–23)
CALCIUM SERPL-MCNC: 9.6 MG/DL (ref 8.7–10.5)
CHLORIDE SERPL-SCNC: 105 MMOL/L (ref 95–110)
CHOLEST SERPL-MCNC: 158 MG/DL (ref 120–199)
CHOLEST/HDLC SERPL: 4.5 {RATIO} (ref 2–5)
CO2 SERPL-SCNC: 27 MMOL/L (ref 23–29)
CREAT SERPL-MCNC: 0.9 MG/DL (ref 0.5–1.4)
EST. GFR  (AFRICAN AMERICAN): >60 ML/MIN/1.73 M^2
EST. GFR  (NON AFRICAN AMERICAN): >60 ML/MIN/1.73 M^2
ESTIMATED AVG GLUCOSE: 214 MG/DL (ref 68–131)
GLUCOSE SERPL-MCNC: 137 MG/DL (ref 70–110)
HBA1C MFR BLD HPLC: 9.1 % (ref 4–5.6)
HDLC SERPL-MCNC: 35 MG/DL (ref 40–75)
HDLC SERPL: 22.2 % (ref 20–50)
LDLC SERPL CALC-MCNC: ABNORMAL MG/DL (ref 63–159)
NONHDLC SERPL-MCNC: 123 MG/DL
POTASSIUM SERPL-SCNC: 4 MMOL/L (ref 3.5–5.1)
PROT SERPL-MCNC: 7.2 G/DL (ref 6–8.4)
SODIUM SERPL-SCNC: 141 MMOL/L (ref 136–145)
TRIGL SERPL-MCNC: 632 MG/DL (ref 30–150)

## 2019-03-26 PROCEDURE — 80053 COMPREHEN METABOLIC PANEL: CPT

## 2019-03-26 PROCEDURE — 36415 COLL VENOUS BLD VENIPUNCTURE: CPT | Mod: PO

## 2019-03-26 PROCEDURE — 80061 LIPID PANEL: CPT

## 2019-03-26 PROCEDURE — 83036 HEMOGLOBIN GLYCOSYLATED A1C: CPT

## 2019-04-02 ENCOUNTER — OFFICE VISIT (OUTPATIENT)
Dept: INTERNAL MEDICINE | Facility: CLINIC | Age: 64
End: 2019-04-02
Payer: COMMERCIAL

## 2019-04-02 DIAGNOSIS — E11.9 DIABETES MELLITUS WITHOUT COMPLICATION: ICD-10-CM

## 2019-04-02 DIAGNOSIS — K21.9 GASTROESOPHAGEAL REFLUX DISEASE, ESOPHAGITIS PRESENCE NOT SPECIFIED: ICD-10-CM

## 2019-04-02 DIAGNOSIS — E78.2 MIXED HYPERLIPIDEMIA: ICD-10-CM

## 2019-04-02 DIAGNOSIS — M79.642 HAND PAIN, LEFT: ICD-10-CM

## 2019-04-02 DIAGNOSIS — Z29.9 PREVENTIVE MEASURE: ICD-10-CM

## 2019-04-02 DIAGNOSIS — R74.8 ELEVATED LIVER ENZYMES: ICD-10-CM

## 2019-04-02 DIAGNOSIS — I10 ESSENTIAL HYPERTENSION: Primary | ICD-10-CM

## 2019-04-02 PROCEDURE — 99214 OFFICE O/P EST MOD 30 MIN: CPT | Mod: 25,S$GLB,, | Performed by: INTERNAL MEDICINE

## 2019-04-02 PROCEDURE — 3079F PR MOST RECENT DIASTOLIC BLOOD PRESSURE 80-89 MM HG: ICD-10-PCS | Mod: CPTII,S$GLB,, | Performed by: INTERNAL MEDICINE

## 2019-04-02 PROCEDURE — 90471 PNEUMOCOCCAL CONJUGATE VACCINE 13-VALENT LESS THAN 5YO & GREATER THAN: ICD-10-PCS | Mod: S$GLB,,, | Performed by: INTERNAL MEDICINE

## 2019-04-02 PROCEDURE — 3288F PR FALLS RISK ASSESSMENT DOCUMENTED: ICD-10-PCS | Mod: CPTII,S$GLB,, | Performed by: INTERNAL MEDICINE

## 2019-04-02 PROCEDURE — 90471 IMMUNIZATION ADMIN: CPT | Mod: S$GLB,,, | Performed by: INTERNAL MEDICINE

## 2019-04-02 PROCEDURE — 99999 PR PBB SHADOW E&M-EST. PATIENT-LVL V: ICD-10-PCS | Mod: PBBFAC,,, | Performed by: INTERNAL MEDICINE

## 2019-04-02 PROCEDURE — 3046F HEMOGLOBIN A1C LEVEL >9.0%: CPT | Mod: CPTII,S$GLB,, | Performed by: INTERNAL MEDICINE

## 2019-04-02 PROCEDURE — 3077F SYST BP >= 140 MM HG: CPT | Mod: CPTII,S$GLB,, | Performed by: INTERNAL MEDICINE

## 2019-04-02 PROCEDURE — 90670 PCV13 VACCINE IM: CPT | Mod: S$GLB,,, | Performed by: INTERNAL MEDICINE

## 2019-04-02 PROCEDURE — 3288F FALL RISK ASSESSMENT DOCD: CPT | Mod: CPTII,S$GLB,, | Performed by: INTERNAL MEDICINE

## 2019-04-02 PROCEDURE — 99999 PR PBB SHADOW E&M-EST. PATIENT-LVL V: CPT | Mod: PBBFAC,,, | Performed by: INTERNAL MEDICINE

## 2019-04-02 PROCEDURE — 99214 PR OFFICE/OUTPT VISIT, EST, LEVL IV, 30-39 MIN: ICD-10-PCS | Mod: 25,S$GLB,, | Performed by: INTERNAL MEDICINE

## 2019-04-02 PROCEDURE — 3077F PR MOST RECENT SYSTOLIC BLOOD PRESSURE >= 140 MM HG: ICD-10-PCS | Mod: CPTII,S$GLB,, | Performed by: INTERNAL MEDICINE

## 2019-04-02 PROCEDURE — 1100F PTFALLS ASSESS-DOCD GE2>/YR: CPT | Mod: CPTII,S$GLB,, | Performed by: INTERNAL MEDICINE

## 2019-04-02 PROCEDURE — 3008F PR BODY MASS INDEX (BMI) DOCUMENTED: ICD-10-PCS | Mod: CPTII,S$GLB,, | Performed by: INTERNAL MEDICINE

## 2019-04-02 PROCEDURE — 90670 PNEUMOCOCCAL CONJUGATE VACCINE 13-VALENT LESS THAN 5YO & GREATER THAN: ICD-10-PCS | Mod: S$GLB,,, | Performed by: INTERNAL MEDICINE

## 2019-04-02 PROCEDURE — 3046F PR MOST RECENT HEMOGLOBIN A1C LEVEL > 9.0%: ICD-10-PCS | Mod: CPTII,S$GLB,, | Performed by: INTERNAL MEDICINE

## 2019-04-02 PROCEDURE — 3008F BODY MASS INDEX DOCD: CPT | Mod: CPTII,S$GLB,, | Performed by: INTERNAL MEDICINE

## 2019-04-02 PROCEDURE — 3079F DIAST BP 80-89 MM HG: CPT | Mod: CPTII,S$GLB,, | Performed by: INTERNAL MEDICINE

## 2019-04-02 PROCEDURE — 1100F PR PT FALLS ASSESS DOC 2+ FALLS/FALL W/INJURY/YR: ICD-10-PCS | Mod: CPTII,S$GLB,, | Performed by: INTERNAL MEDICINE

## 2019-04-02 RX ORDER — OMEPRAZOLE 20 MG/1
20 CAPSULE, DELAYED RELEASE ORAL DAILY
Qty: 90 CAPSULE | Refills: 2 | Status: SHIPPED | OUTPATIENT
Start: 2019-04-02 | End: 2019-12-16 | Stop reason: SDUPTHER

## 2019-04-02 RX ORDER — FENOFIBRATE 160 MG/1
160 TABLET ORAL DAILY
Qty: 90 TABLET | Refills: 2 | Status: SHIPPED | OUTPATIENT
Start: 2019-04-02 | End: 2020-04-24 | Stop reason: SDUPTHER

## 2019-04-02 RX ORDER — INSULIN LISPRO 100 [IU]/ML
INJECTION, SOLUTION INTRAVENOUS; SUBCUTANEOUS
Qty: 75 ML | Refills: 1 | Status: SHIPPED | OUTPATIENT
Start: 2019-04-02 | End: 2019-10-07 | Stop reason: SDUPTHER

## 2019-04-02 RX ORDER — INSULIN GLARGINE 100 [IU]/ML
INJECTION, SOLUTION SUBCUTANEOUS
Qty: 45 ML | Refills: 1 | Status: SHIPPED | OUTPATIENT
Start: 2019-04-02 | End: 2019-07-31 | Stop reason: SDUPTHER

## 2019-04-02 RX ORDER — LISINOPRIL 40 MG/1
40 TABLET ORAL DAILY
Qty: 90 TABLET | Refills: 2 | Status: SHIPPED | OUTPATIENT
Start: 2019-04-02 | End: 2019-12-03

## 2019-04-02 RX ORDER — AMLODIPINE BESYLATE 10 MG/1
10 TABLET ORAL DAILY
Qty: 90 TABLET | Refills: 2 | Status: SHIPPED | OUTPATIENT
Start: 2019-04-02 | End: 2019-10-29 | Stop reason: SDUPTHER

## 2019-04-02 RX ORDER — METOPROLOL SUCCINATE 100 MG/1
100 TABLET, EXTENDED RELEASE ORAL DAILY
Qty: 90 TABLET | Refills: 2 | Status: SHIPPED | OUTPATIENT
Start: 2019-04-02 | End: 2019-12-16 | Stop reason: SDUPTHER

## 2019-04-02 RX ORDER — ATORVASTATIN CALCIUM 80 MG/1
80 TABLET, FILM COATED ORAL DAILY
Qty: 90 TABLET | Refills: 2 | Status: SHIPPED | OUTPATIENT
Start: 2019-04-02 | End: 2020-02-10

## 2019-04-03 VITALS
OXYGEN SATURATION: 98 % | DIASTOLIC BLOOD PRESSURE: 80 MMHG | SYSTOLIC BLOOD PRESSURE: 150 MMHG | BODY MASS INDEX: 31.39 KG/M2 | WEIGHT: 200 LBS | HEART RATE: 96 BPM | TEMPERATURE: 98 F | HEIGHT: 67 IN

## 2019-04-03 NOTE — PROGRESS NOTES
Mr. Garcia is a 63-year-old gentleman, known to myself, who presented to   clinic yesterday on April 2nd, at which time clinic billing was performed.  His   note is being dictated today on April 3rd.    CHIEF COMPLAINT:  Followup diabetes, hyperlipidemia, hypertension.    HISTORY OF PRESENT ILLNESS:  Mr. Garcia presents noting that he has a few   issues of concern.  He notes things are much calmer on the home front.  Mary,   his daughter at [employer], who is soon to be starting at [employer] and Helen,   his daughter who is attending school at [employer], they are doing fairly well with   school and grades.  He notes that unfortunately he slipped and fell on a shoe   while he was carrying laundry, fell onto his left side, hit his left flank   areas, had some discomfort in that area, nothing too significant.  No head   injuries.  No loss of consciousness.  He does have a history of cold symptoms   for the last two days, runny, itchy eyes, no purulent mucus.  No fevers or   chills.  When questioned about his diabetic diet.  He notes that unfortunately   he has been eating out a lot, tries to follow the diet, but with full-time work   and now raising has two daughters it is not easy.  He has been trying to do well   with this.  Review of diet is as such:  Yesterday, the day before this visit   for breakfast:  Half bagel, coffee and water, for lunch:  Half a burger with   sausage, no bun, one ear of corn and water, for dinner:  [employer] fried chicken   two pieces, few French fries order and in the evening:  Chips for snack.  He   notes he is taking all medications as prescribed and has not had problems with   this.  He does continue with drinking alcohol on a fairly regular basis.  He   drank two alcoholic beverages the night prior to this.    PAST MEDICAL, SURGICAL, SOCIAL HISTORY:  Please see as thoroughly stated in EPIC   chart, which has been reviewed.    PHYSICAL EXAMINATION:  VITAL SIGNS:  Weight at  200 pounds, about the same, blood pressure 140/82, pulse   96, recheck blood pressure is 150/80 with recheck per M.D.  HEENT:  Conjunctivitis showing some mild injection.  No drainage.  No swelling   in the periorbital areas.  Sinuses are nontender.  Retropharyngeal shows mild   erythema.  No exudates.  Mild cobblestoning.  TMs bilaterally are normal.  NECK:  Supple.  Negative for masses, thyromegaly, negative for lymphadenopathy.  LUNGS:  Clear to auscultation bilaterally.  HEART:  Regular rate and rhythm without arrhythmias, murmurs or gallops.  ABDOMEN:  Soft, nontender.  EXTREMITIES:  Left flank shows fine area of excoriation.  No ecchymoses.  No   significant tenderness.  Extremities are negative for edema.  Of note, on palpation with abdominal examination, liver noted to be enlarged at   about 8 cm.    WORKUP:  Lab work done on March 26 showing cholesterol 158, triglycerides   increased to 632.  Hemoglobin A1c is increased to 9.1, was last 9.0.    Comprehensive chemistry is with liver functions increased with AST at 66, was 48   and ALT at 56, was 47.    ASSESSMENT AND PLAN:  1.  Essential hypertension, under acceptable control.  A. Continue on therapy, which includes Toprol- mg one a day, lisinopril 40   mg one a day.  2.  Diabetes mellitus type 2, insulin-dependent and uncontrolled, partially   secondary to diet, partially secondary to increased alcohol intake and lack of   exercise.  A. I have recommended the patient will be seen in Endocrinology, which he defers   on, would like to give one more trial to healthy diabetic diet.  Return to   clinic in 3 to 4 months.  B. I have discussed ADA diet, regular exercise, decrease alcohol at length.  C. Continue present therapy, which includes Jentadueto 2.5/1000 mg one p.o.   b.i.d., Basaglar insulin 35 units b.i.d. and insulin lispro, which the patient   is taking at 35 units b.i.d.  D. Return to clinic in 3 to 4 months and if no drastic/marked improvement  in   hemoglobin A1c, once again we will refer to Endocrinology.  3.  Mixed hyperlipidemia with triglycerides elevated, probably secondary to   increased carbohydrate/alcohol intake.  A. I have recommended decrease alcohol intake.  B. Continue present therapy, which includes fenofibrate 160 mg one a day and   Lipitor 80 mg daily.  C. Return to clinic with repeat fasting chemistry and lipid panel in 3 to 4   months.  4.  Common cold with allergy symptoms.  A. Recommended trial of Claritin 10 mg daily and if no improvement, the patient   will let us know.  5.  Elevated liver functions, felt to be secondary to lack of prudent diet and   increased alcohol intake.  A. I have strongly encouraged to decrease alcohol intake along with healthy diet   and exercise.  B. Check abdominal liver ultrasound with phone review.  6.  Health maintenance.  A. Immunizations on the day of visit to include Prevnar 13.  B. We will schedule Optometry appointment with Dr. Todd for diabetic eye   check.  C. We will schedule aortic ultrasound for screening purposes since age 63.  D. I have given refills.  E. Return to clinic in 3 to 4 months with one week prior fasting lab work, go   from there or see sooner if needed.      FMS/HN  dd: 04/03/2019 07:21:15 (CDT)  td: 04/03/2019 13:21:34 (CDT)  Doc ID   #[id number]  Job ID #[id number]    CC:     This office note has been dictated.

## 2019-04-11 ENCOUNTER — HOSPITAL ENCOUNTER (OUTPATIENT)
Dept: RADIOLOGY | Facility: HOSPITAL | Age: 64
Discharge: HOME | End: 2019-04-11
Attending: INTERNAL MEDICINE
Payer: COMMERCIAL

## 2019-04-11 ENCOUNTER — TELEPHONE (OUTPATIENT)
Dept: INTERNAL MEDICINE | Facility: CLINIC | Age: 64
End: 2019-04-11

## 2019-04-11 ENCOUNTER — CLINICAL SUPPORT (OUTPATIENT)
Dept: CARDIOLOGY | Facility: CLINIC | Age: 64
End: 2019-04-11
Attending: INTERNAL MEDICINE
Payer: COMMERCIAL

## 2019-04-11 DIAGNOSIS — R74.8 ELEVATED LIVER ENZYMES: ICD-10-CM

## 2019-04-11 DIAGNOSIS — Z29.9 PREVENTIVE MEASURE: ICD-10-CM

## 2019-04-11 DIAGNOSIS — R74.8 ELEVATED LIVER ENZYMES: Primary | ICD-10-CM

## 2019-04-11 LAB
ABDOMINAL IMA AP: 1.11 CM
ABDOMINAL IMA ED VEL: 0 CM/S
ABDOMINAL IMA PS VEL: 90 CM/S
ABDOMINAL IMA TRANS: 1.25 CM
ABDOMINAL INFRARENAL AORTA AP: 2.25 CM
ABDOMINAL INFRARENAL AORTA ED VEL: 0 CM/S
ABDOMINAL INFRARENAL AORTA PS VEL: 78 CM/S
ABDOMINAL INFRARENAL AORTA TRANS: 2.46 CM
ABDOMINAL JUXTARENAL AORTA AP: 1.39 CM
ABDOMINAL JUXTARENAL AORTA ED VEL: 0 CM/S
ABDOMINAL JUXTARENAL AORTA PS VEL: 89 CM/S
ABDOMINAL JUXTARENAL AORTA TRANS: 2.06 CM
ABDOMINAL LT COM ILIAC AP: 1 CM
ABDOMINAL LT COM ILIAC TRANS: 1.02 CM
ABDOMINAL LT COM ILIAC VEL: 81 CM/S
ABDOMINAL LT COM ILLIAC ED VEL: 0 CM/S
ABDOMINAL RT COM ILIAC AP: 0.86 CM
ABDOMINAL RT COM ILIAC TRANS: 1.06 CM
ABDOMINAL RT COM ILIAC VEL: 94 CM/S
ABDOMINAL RT COM ILLIAC ED VEL: 0 CM/S
ABDOMINAL SUPRARENAL AORTA AP: 1.61 CM
ABDOMINAL SUPRARENAL AORTA ED VEL: 0 CM/S
ABDOMINAL SUPRARENAL AORTA PS VEL: 91 CM/S
ABDOMINAL SUPRARENAL AORTA TRANS: 1.77 CM

## 2019-04-11 PROCEDURE — 76700 US EXAM ABDOM COMPLETE: CPT | Mod: TC

## 2019-04-11 PROCEDURE — 93978 VASCULAR STUDY: CPT | Mod: S$GLB,,, | Performed by: INTERNAL MEDICINE

## 2019-04-11 PROCEDURE — 93978 CV US ABDOMINAL AORTA EVALUATION (CUPID ONLY): ICD-10-PCS | Mod: S$GLB,,, | Performed by: INTERNAL MEDICINE

## 2019-04-11 PROCEDURE — 76700 US EXAM ABDOM COMPLETE: CPT | Mod: 26,,, | Performed by: RADIOLOGY

## 2019-04-11 PROCEDURE — 76700 US ABDOMEN COMPLETE: ICD-10-PCS | Mod: 26,,, | Performed by: RADIOLOGY

## 2019-04-11 NOTE — TELEPHONE ENCOUNTER
Spoke with Kurt ie his Abdomen U/S(4/11)-showed hepatosplenomegaly/steatosis of liver/biliary crystals; Aortic U/S(4/11)-negative for AAA. I have recommended he be seen in Hepatology clinic and recommended decreased intake of alcohol-he agrees.  Silvana, please call Kurt to schedule Hepatology Appt.  Thanks

## 2019-04-12 NOTE — TELEPHONE ENCOUNTER
Silvana, please call Kurt to schedule Hepatology Appt.  Thanks          Documentation      appt scheduled for Hepatology, mailed out.

## 2019-05-22 ENCOUNTER — LAB VISIT (OUTPATIENT)
Dept: LAB | Facility: HOSPITAL | Age: 64
End: 2019-05-22
Attending: PHYSICIAN ASSISTANT
Payer: COMMERCIAL

## 2019-05-22 ENCOUNTER — OFFICE VISIT (OUTPATIENT)
Dept: OPTOMETRY | Facility: CLINIC | Age: 64
End: 2019-05-22
Payer: COMMERCIAL

## 2019-05-22 ENCOUNTER — OFFICE VISIT (OUTPATIENT)
Dept: HEPATOLOGY | Facility: CLINIC | Age: 64
End: 2019-05-22
Payer: COMMERCIAL

## 2019-05-22 ENCOUNTER — PROCEDURE VISIT (OUTPATIENT)
Dept: HEPATOLOGY | Facility: CLINIC | Age: 64
End: 2019-05-22
Attending: PHYSICIAN ASSISTANT
Payer: COMMERCIAL

## 2019-05-22 VITALS
SYSTOLIC BLOOD PRESSURE: 146 MMHG | HEIGHT: 67 IN | DIASTOLIC BLOOD PRESSURE: 72 MMHG | HEART RATE: 81 BPM | OXYGEN SATURATION: 97 % | BODY MASS INDEX: 31.39 KG/M2 | WEIGHT: 200 LBS

## 2019-05-22 DIAGNOSIS — H52.202 MYOPIA OF LEFT EYE WITH ASTIGMATISM: ICD-10-CM

## 2019-05-22 DIAGNOSIS — H52.4 PRESBYOPIA OF BOTH EYES: ICD-10-CM

## 2019-05-22 DIAGNOSIS — E11.9 DIABETIC EYE EXAM: Primary | ICD-10-CM

## 2019-05-22 DIAGNOSIS — I10 HYPERTENSION, UNSPECIFIED TYPE: ICD-10-CM

## 2019-05-22 DIAGNOSIS — H25.13 NUCLEAR SCLEROSIS OF BOTH EYES: ICD-10-CM

## 2019-05-22 DIAGNOSIS — K76.0 HEPATIC STEATOSIS: ICD-10-CM

## 2019-05-22 DIAGNOSIS — H26.9 CORTICAL CATARACT OF RIGHT EYE: ICD-10-CM

## 2019-05-22 DIAGNOSIS — R74.8 ELEVATED LIVER ENZYMES: ICD-10-CM

## 2019-05-22 DIAGNOSIS — H52.12 MYOPIA OF LEFT EYE WITH ASTIGMATISM: ICD-10-CM

## 2019-05-22 DIAGNOSIS — Z01.00 DIABETIC EYE EXAM: Primary | ICD-10-CM

## 2019-05-22 DIAGNOSIS — E11.9 TYPE 2 DIABETES MELLITUS WITHOUT RETINOPATHY: ICD-10-CM

## 2019-05-22 DIAGNOSIS — Z13.5 SCREENING FOR EYE CONDITION: ICD-10-CM

## 2019-05-22 DIAGNOSIS — R74.8 ELEVATED LIVER ENZYMES: Primary | ICD-10-CM

## 2019-05-22 LAB
ALBUMIN SERPL BCP-MCNC: 4.3 G/DL (ref 3.5–5.2)
ALP SERPL-CCNC: 103 U/L (ref 55–135)
ALT SERPL W/O P-5'-P-CCNC: 41 U/L (ref 10–44)
ANION GAP SERPL CALC-SCNC: 11 MMOL/L (ref 8–16)
AST SERPL-CCNC: 56 U/L (ref 10–40)
BASOPHILS # BLD AUTO: 0.03 K/UL (ref 0–0.2)
BASOPHILS NFR BLD: 0.6 % (ref 0–1.9)
BILIRUB DIRECT SERPL-MCNC: 0.3 MG/DL (ref 0.1–0.3)
BILIRUB SERPL-MCNC: 0.6 MG/DL (ref 0.1–1)
BUN SERPL-MCNC: 17 MG/DL (ref 8–23)
CALCIUM SERPL-MCNC: 10.4 MG/DL (ref 8.7–10.5)
CERULOPLASMIN SERPL-MCNC: 31 MG/DL (ref 15–45)
CHLORIDE SERPL-SCNC: 103 MMOL/L (ref 95–110)
CO2 SERPL-SCNC: 26 MMOL/L (ref 23–29)
CREAT SERPL-MCNC: 1.3 MG/DL (ref 0.5–1.4)
DIFFERENTIAL METHOD: ABNORMAL
EOSINOPHIL # BLD AUTO: 0.2 K/UL (ref 0–0.5)
EOSINOPHIL NFR BLD: 3.8 % (ref 0–8)
ERYTHROCYTE [DISTWIDTH] IN BLOOD BY AUTOMATED COUNT: 12.3 % (ref 11.5–14.5)
EST. GFR  (AFRICAN AMERICAN): >60 ML/MIN/1.73 M^2
EST. GFR  (NON AFRICAN AMERICAN): 57.3 ML/MIN/1.73 M^2
FERRITIN SERPL-MCNC: 36 NG/ML (ref 20–300)
GLUCOSE SERPL-MCNC: 133 MG/DL (ref 70–110)
HBV CORE AB SERPL QL IA: NEGATIVE
HBV SURFACE AB SER-ACNC: NEGATIVE M[IU]/ML
HBV SURFACE AG SERPL QL IA: NEGATIVE
HCT VFR BLD AUTO: 40.8 % (ref 40–54)
HCV AB SERPL QL IA: NEGATIVE
HEPATITIS A ANTIBODY, IGG: NEGATIVE
HGB BLD-MCNC: 14 G/DL (ref 14–18)
IMM GRANULOCYTES # BLD AUTO: 0.01 K/UL (ref 0–0.04)
IMM GRANULOCYTES NFR BLD AUTO: 0.2 % (ref 0–0.5)
INR PPP: 1.1 (ref 0.8–1.2)
IRON SERPL-MCNC: 95 UG/DL (ref 45–160)
LYMPHOCYTES # BLD AUTO: 1.6 K/UL (ref 1–4.8)
LYMPHOCYTES NFR BLD: 30.2 % (ref 18–48)
MCH RBC QN AUTO: 30.8 PG (ref 27–31)
MCHC RBC AUTO-ENTMCNC: 34.3 G/DL (ref 32–36)
MCV RBC AUTO: 90 FL (ref 82–98)
MONOCYTES # BLD AUTO: 0.5 K/UL (ref 0.3–1)
MONOCYTES NFR BLD: 9.8 % (ref 4–15)
NEUTROPHILS # BLD AUTO: 2.9 K/UL (ref 1.8–7.7)
NEUTROPHILS NFR BLD: 55.4 % (ref 38–73)
NRBC BLD-RTO: 0 /100 WBC
PLATELET # BLD AUTO: 225 K/UL (ref 150–350)
PMV BLD AUTO: 11.2 FL (ref 9.2–12.9)
POTASSIUM SERPL-SCNC: 3.9 MMOL/L (ref 3.5–5.1)
PROT SERPL-MCNC: 8 G/DL (ref 6–8.4)
PROTHROMBIN TIME: 11 SEC (ref 9–12.5)
RBC # BLD AUTO: 4.54 M/UL (ref 4.6–6.2)
SATURATED IRON: 16 % (ref 20–50)
SODIUM SERPL-SCNC: 140 MMOL/L (ref 136–145)
TOTAL IRON BINDING CAPACITY: 586 UG/DL (ref 250–450)
TRANSFERRIN SERPL-MCNC: 396 MG/DL (ref 200–375)
WBC # BLD AUTO: 5.29 K/UL (ref 3.9–12.7)

## 2019-05-22 PROCEDURE — 86235 NUCLEAR ANTIGEN ANTIBODY: CPT

## 2019-05-22 PROCEDURE — 86803 HEPATITIS C AB TEST: CPT

## 2019-05-22 PROCEDURE — 92014 COMPRE OPH EXAM EST PT 1/>: CPT | Mod: S$GLB,,, | Performed by: OPTOMETRIST

## 2019-05-22 PROCEDURE — 82390 ASSAY OF CERULOPLASMIN: CPT

## 2019-05-22 PROCEDURE — 3008F PR BODY MASS INDEX (BMI) DOCUMENTED: ICD-10-PCS | Mod: CPTII,S$GLB,, | Performed by: PHYSICIAN ASSISTANT

## 2019-05-22 PROCEDURE — 80321 ALCOHOLS BIOMARKERS 1OR 2: CPT

## 2019-05-22 PROCEDURE — 85610 PROTHROMBIN TIME: CPT

## 2019-05-22 PROCEDURE — 1101F PT FALLS ASSESS-DOCD LE1/YR: CPT | Mod: CPTII,S$GLB,, | Performed by: PHYSICIAN ASSISTANT

## 2019-05-22 PROCEDURE — 86038 ANTINUCLEAR ANTIBODIES: CPT

## 2019-05-22 PROCEDURE — 3077F PR MOST RECENT SYSTOLIC BLOOD PRESSURE >= 140 MM HG: ICD-10-PCS | Mod: CPTII,S$GLB,, | Performed by: PHYSICIAN ASSISTANT

## 2019-05-22 PROCEDURE — 82103 ALPHA-1-ANTITRYPSIN TOTAL: CPT

## 2019-05-22 PROCEDURE — 3078F PR MOST RECENT DIASTOLIC BLOOD PRESSURE < 80 MM HG: ICD-10-PCS | Mod: CPTII,S$GLB,, | Performed by: PHYSICIAN ASSISTANT

## 2019-05-22 PROCEDURE — 85025 COMPLETE CBC W/AUTO DIFF WBC: CPT

## 2019-05-22 PROCEDURE — 80053 COMPREHEN METABOLIC PANEL: CPT

## 2019-05-22 PROCEDURE — 91200 PR LIVER ELASTOGRAPHY W/OUT IMAG W/INTERP & REPORT: ICD-10-PCS | Mod: S$GLB,,, | Performed by: PHYSICIAN ASSISTANT

## 2019-05-22 PROCEDURE — 92015 PR REFRACTION: ICD-10-PCS | Mod: S$GLB,,, | Performed by: OPTOMETRIST

## 2019-05-22 PROCEDURE — 92014 PR EYE EXAM, EST PATIENT,COMPREHESV: ICD-10-PCS | Mod: S$GLB,,, | Performed by: OPTOMETRIST

## 2019-05-22 PROCEDURE — 91200 LIVER ELASTOGRAPHY: CPT | Mod: S$GLB,,, | Performed by: PHYSICIAN ASSISTANT

## 2019-05-22 PROCEDURE — 87340 HEPATITIS B SURFACE AG IA: CPT

## 2019-05-22 PROCEDURE — 92015 DETERMINE REFRACTIVE STATE: CPT | Mod: S$GLB,,, | Performed by: OPTOMETRIST

## 2019-05-22 PROCEDURE — 83540 ASSAY OF IRON: CPT

## 2019-05-22 PROCEDURE — 86704 HEP B CORE ANTIBODY TOTAL: CPT

## 2019-05-22 PROCEDURE — 86256 FLUORESCENT ANTIBODY TITER: CPT | Mod: 91

## 2019-05-22 PROCEDURE — 99999 PR PBB SHADOW E&M-EST. PATIENT-LVL III: CPT | Mod: PBBFAC,,, | Performed by: OPTOMETRIST

## 2019-05-22 PROCEDURE — 82248 BILIRUBIN DIRECT: CPT

## 2019-05-22 PROCEDURE — 99204 PR OFFICE/OUTPT VISIT, NEW, LEVL IV, 45-59 MIN: ICD-10-PCS | Mod: 25,S$GLB,, | Performed by: PHYSICIAN ASSISTANT

## 2019-05-22 PROCEDURE — 99999 PR PBB SHADOW E&M-EST. PATIENT-LVL V: ICD-10-PCS | Mod: PBBFAC,,, | Performed by: PHYSICIAN ASSISTANT

## 2019-05-22 PROCEDURE — 86790 VIRUS ANTIBODY NOS: CPT

## 2019-05-22 PROCEDURE — 99999 PR PBB SHADOW E&M-EST. PATIENT-LVL III: ICD-10-PCS | Mod: PBBFAC,,, | Performed by: OPTOMETRIST

## 2019-05-22 PROCEDURE — 99999 PR PBB SHADOW E&M-EST. PATIENT-LVL V: CPT | Mod: PBBFAC,,, | Performed by: PHYSICIAN ASSISTANT

## 2019-05-22 PROCEDURE — 3078F DIAST BP <80 MM HG: CPT | Mod: CPTII,S$GLB,, | Performed by: PHYSICIAN ASSISTANT

## 2019-05-22 PROCEDURE — 3008F BODY MASS INDEX DOCD: CPT | Mod: CPTII,S$GLB,, | Performed by: PHYSICIAN ASSISTANT

## 2019-05-22 PROCEDURE — 99204 OFFICE O/P NEW MOD 45 MIN: CPT | Mod: 25,S$GLB,, | Performed by: PHYSICIAN ASSISTANT

## 2019-05-22 PROCEDURE — 86706 HEP B SURFACE ANTIBODY: CPT

## 2019-05-22 PROCEDURE — 3077F SYST BP >= 140 MM HG: CPT | Mod: CPTII,S$GLB,, | Performed by: PHYSICIAN ASSISTANT

## 2019-05-22 PROCEDURE — 36415 COLL VENOUS BLD VENIPUNCTURE: CPT

## 2019-05-22 PROCEDURE — 1101F PR PT FALLS ASSESS DOC 0-1 FALLS W/OUT INJ PAST YR: ICD-10-PCS | Mod: CPTII,S$GLB,, | Performed by: PHYSICIAN ASSISTANT

## 2019-05-22 PROCEDURE — 82728 ASSAY OF FERRITIN: CPT

## 2019-05-22 NOTE — PROGRESS NOTES
HEPATOLOGY CLINIC VISIT NOTE     REFERRING PROVIDER: Dr. Lisette Corado    REASON FOR VISIT: elevated liver enzymes    HISTORY: This is a 63 y.o. White male here for evaluation of elevated liver enzymes. On most recent labs, AST 66, ALT 56. Tbili WNL, albumin 3.9. PLTs 236. Review of labs shows intermittent enzyme elevation dating back to . He had an U/S 2019 noting hepatosplenomegaly with steatosis. Pt denies signs of hepatic decompensation including: jaundice, dark urine, abdominal distention, hematemesis, melena, slowed mentation.    PMH of HTN, HLD, DMII. A1c not at goal. BMI is 31.     H/o daily ETOH use, reports continuous use for several years with recent cut back. Pt reports heavy ETOH use during marriage with her wife, wife  1 year ago and pt reports now doing well.     Liver staging:  No formal staging  AST 66, ALT 56  Tbili 0.6  Albumin 3.9  PLTs 236    Past Medical History:   Diagnosis Date    Allergy     Anxiety     Arthritis     Cataract     Depression     Diabetes mellitus     Diabetes mellitus type II     GERD (gastroesophageal reflux disease)     History of alcohol abuse     Hyperlipidemia     Hypertension     Neuromuscular disorder      Past Surgical History:   Procedure Laterality Date    ANKLE FUSION      left    ARTHROSCOPY-KNEE Right 2017    Performed by Lucio Sena [facility] at [facility], [place], operated by [facility] OR    CARPAL TUNNEL RELEASE      left    CHONDROPLASTY-KNEE Right 2017    Performed by Lucio Sena MD at [facility], [place], operated by [facility] OR    COLONOSCOPY N/A 2014    Performed by Daljit [facility]arthur [facility] at [facility] ENDO (4TH FLR)    FRACTURE SURGERY      KNEE SURGERY      MENISCECTOMY Right 2017    Performed by Lucio Sena MD at [facility], [place], operated by [facility] OR    Uvalectomy      VASECTOMY       FAMILY HISTORY: Negative for liver disease    SOCIAL HISTORY:   Manages investment [employer]    Social History     Tobacco Use   Smoking Status Never Smoker   Smokeless Tobacco Never Used     Social History      Substance and Sexual Activity   Alcohol Use Yes    Comment: socially   daily 5 years, 6 oz, liquor   On weekends currently     Social History     Substance and Sexual Activity   Drug Use No     ROS:   No fever, chills, weight loss, (+)fatigue  No chest pain, palpitations, dyspnea, cough  No abdominal pain, change in bowel pattern, nausea, (+) vomiting  No skin rashes   No lower extremity edema  No depression or anxiety    PHYSICAL EXAM:  Friendly White male, in no acute distress; alert and oriented to person, place and time  VITALS: reviewed  HEENT: Sclerae anicteric.   NECK: Supple  CVS: Regular rate and rhythm. No murmurs  LUNGS: Normal respiratory effort. Clear bilaterally  ABDOMEN: Flat, soft, nontender. No organomegaly or masses. No ascites  SKIN: Warm and dry. No jaundice, No obvious rashes.   EXTREMITIES: No lower extremity edema  NEURO/PSYCH: Normal gate. Memory intact. Thought and speech pattern appropriate. Behavior normal. No depression or anxiety noted.    RECENT LABS:  Lab Results   Component Value Date    WBC 4.11 08/17/2018    HGB 13.9 (L) 08/17/2018     08/17/2018     Lab Results   Component Value Date    INR 1.0 11/06/2008     Lab Results   Component Value Date    AST 66 (H) 03/26/2019    ALT 56 (H) 03/26/2019    BILITOT 0.6 03/26/2019    ALBUMIN 3.9 03/26/2019    ALKPHOS 127 03/26/2019    CREATININE 0.9 03/26/2019    BUN 12 03/26/2019     03/26/2019    K 4.0 03/26/2019     RECENT IMAGING:  US Abdomen Complete   Order: [id number]   Status:  Final result   Visible to patient:  Yes (Patient Portal)   Next appt:  Today at 03:45 PM in Optometry (Vinh Todd, OD)   Dx:  Elevated liver enzymes   Details     Reading Physician Reading Date Result Priority   Robert Morrow MD 4/11/2019    Gio Freitas MD 4/11/2019       Narrative     EXAMINATION:  US ABDOMEN COMPLETE    CLINICAL HISTORY:  Abnormal levels of other serum enzymes    TECHNIQUE:  Complete abdominal  ultrasound    COMPARISON:  Ultrasound abdomen limited 09/12/2012    FINDINGS:  The liver measures 20.3 cm and demonstrates diffusely echogenic echotexture, suggesting hepatic steatosis.  There are no focal hepatic lesions.  There is no intra or extrahepatic bile duct dilatation.  The common duct measures 3 mm.    The gallbladder demonstrates a biliary crystal with no evidence of gallbladder wall thickening, pericholecystic fluid, sonographic Prakash's sign, or cholelithiasis.    The visualized portions of the pancreas, IVC, and abdominal aorta are unremarkable.  The right kidney measures 12.2 cm and is unremarkable.  The left kidney measures 12.4 cm and is unremarkable.  The spleen measures 12.8 x 4.2 cm and is unremarkable.    There is no free fluid within the visualized abdomen.      Impression       1.  Hepatosplenomegaly with hepatic steatosis.    2.  Biliary crystals with no evidence of shadowing gallbladder foci/cholelithiasis or cholecystitis           ASSESSMENT  63 y.o. White male with:  1. ELEVATED LIVER ENZYMES  -- suspect JENNIFER/VELÁSQUEZ  -- serological work up  -- stage fibrosis with fibroscan  -- discussed diet, exercise, weight loss  -- discussed reduction of ETOH versus     EDUCATION:  We discussed the manifestations of NAFLD. At this time there is no FDA approved therapy for NAFLD.   The patient and I discussed the importance of diet, exercise, and weight loss for management of NAFLD. We discussed a low fat, low carb/low sugar, high fiber diet, and a goal of 30 minutes of exercise 5 times per week.   Pt is aware that fatty liver can progress to steatohepatitis and possibly to cirrhosis, putting one at increased risk for liver cancer, liver failure, and death.      Discussed need to minimize and abstain completely from alcohol as this is causing liver dysfunction. Discussed that alcohol abuse can cause cirrhosis. Pt understands that continued alcohol abuse can be detrimental to his liver. Discussed potential  outcomes of cirrhosis, including liver cancer, liver failure, liver transplant, death.     PLAN:  1. Labs today  2. Fibroscan  3. Discussed diet, exercise and weight loss  4. Decreased alcohol use  5. F/u TBD     Thank you for allowing me to participate in the care of Dallas Rick PA-C

## 2019-05-22 NOTE — PATIENT INSTRUCTIONS
Good ocular health in both eyes.  Early/trace nuclear sclerotic lens changes in both eyes, consistent with age.  Early peripheral cortical cataract in the right eye.  No need for cataract surgery in either eye.    Type 2 diabetes.   No evidence of diabetic retinal changes in either eye.    Emmetropia at distance in the right eye, and myopia with slight astigmatism  in the left eye  Presbyopia consistent with age.   New spectacle lens Rx issued for use as desired.    Okay to use over the counter reading glasses if happy with near vision with those glasses and if happy with unaided distance VA.     Recheck in one year, or prior, if any problems in the interim.

## 2019-05-22 NOTE — PROGRESS NOTES
I have reviewed and concur with the PAUL's history, physical, assessment, and plan.  I have personally interviewed and examined the patient at bedside.  See below addendum for my evaluation and additional findings.    64yo male with elevated liver tests and risk factors for VELÁSQUEZ.  Plan for serologic work-up at this time to exclude other causes of chronic liver disease.  Fibroscan today for fibrosis staging.  Discussed importance of risk factor modification including tight control of diabetes, hypertension and cholesterol.  Also recommend healthy diet and increased physical activity for goal of weight loss.      Patient also with alcohol use above recommended amounts.  Decreasing use and understands the risk for progressive disease      Patient will return to clinic with KEVYN Mcclure

## 2019-05-22 NOTE — LETTER
May 26, 2019      Lisette Corado MD  [address]  [facility] [address]           [facility] - Optometry  [address]  [facility] [address]  Phone: [phone]  Fax: [phone]          Patient: Dallas Garcia   MR Number: [id number]   YOB: 1955   Date of Visit: 5/22/2019       Dear Dr. Lisette Corado:    Thank you for referring Dallas Garcia to me for evaluation. Attached you will find relevant portions of my assessment and plan of care.    If you have questions, please do not hesitate to call me. I look forward to following Dallas Garcia along with you.    Sincerely,    Vinh Todd, OD    Enclosure  CC:  No Recipients    If you would like to receive this communication electronically, please contact [email or web address] or [phone] to request more information on [employer] Link access.    For providers and/or their staff who would like to refer a patient to [facility], please contact us through our one-stop-shop provider referral line, [facility], at [phone].    If you feel you have received this communication in error or would no longer like to receive these types of communications, please e-mail [email or web address]

## 2019-05-22 NOTE — LETTER
May 22, 2019      Lisette Corado MD  [address]  [facility] [address]           [facility]  [address]  [facility] [address]  Phone: [phone]  Fax: [phone]          Patient: Dallas Garcia   MR Number: [id number]   YOB: 1955   Date of Visit: 5/22/2019       Dear Dr. iLsette Corado:    Thank you for referring Dallas Garcia to me for evaluation. Attached you will find relevant portions of my assessment and plan of care.    If you have questions, please do not hesitate to call me. I look forward to following Dallas Garcia along with you.    Sincerely,    Diego Rick PA-C    Enclosure  CC:  No Recipients    If you would like to receive this communication electronically, please contact [email or web address] or [phone] to request more information on [employer] Link access.    For providers and/or their staff who would like to refer a patient to [facility], please contact us through our one-stop-shop provider referral line, [facility] [place], at [phone].    If you feel you have received this communication in error or would no longer like to receive these types of communications, please e-mail [email or web address]

## 2019-05-22 NOTE — PROGRESS NOTES
"HPI     Diabetic Eye Exam      Additional comments: Diabetic eye exam  IDDM - type 2   pt states VA is stable.   Pt is here for overdue check              Comments     61 yr old WM        Approximate date of last eye examination:  03/02/2017  Name of last eye doctor seen:  Dr Todd  Wears glasses? Yes, OTC readers     If yes, wears full-time or part-time?  Part time    Present glasses are bifocal / S V Distance / S V Reading:  Single vision   reading.   Got new glasses following last exam, or subsequently?:  No    Any problem with VA with glasses?  No  Wears CLs?:  no  Headaches? no  Eye pain/discomfort?  no                                                                                       Flashes?  no  Floaters?  no  Diplopia/Double vision?  no  Patient's Ocular History:         Any eye surgeries?  no         Any eye injury?  no         Any treatment for eye disease?  no  Family history of eye disease?  none  Significant patient medical history:         1. Diabetes?  yes       If yes, IDDM or NIDDM? IDDM x 3    2. HBP?  yes              3. Other (describe):  High cholesterol   ! OTC eyedrops currently using:  no   ! Prescription eye meds currently using:  no   ! Any history of allergy/adverse reaction to any eye meds used   previously?  no    ! Any history of allergy/adverse reaction to eyedrops used during prior   eye exam(s)? no    ! Any history of allergy/adverse reaction to Novacaine or similar meds?   no   ! Any history of allergy/adverse reaction to Epinephrine or similar meds?   no    ! Patient okay with use of anesthetic eyedrops to check eye pressure? yes            ! Patient okay with use of eyedrops to dilate pupils today? yes    !  Allergies/Medications/Medical History/Family History reviewed today?    yes    PD =   64/60   Desired reading distance =  17.5"                                                                          Last edited by Vinh Todd, OD on 5/22/2019  4:46 PM. " (History)            Assessment /Plan     For exam results, see Encounter Report.    1. Diabetic eye exam     2. Type 2 diabetes mellitus without retinopathy     3. Nuclear sclerosis of both eyes     4. Cortical cataract of right eye     5. Myopia of left eye with astigmatism     6. Presbyopia of both eyes     7. Hypertension, unspecified type     8. Screening for eye condition                    Good ocular health in both eyes.  Early/trace nuclear sclerotic lens changes in both eyes, consistent with age.  Early peripheral cortical cataract in the right eye.  No need for cataract surgery in either eye.    Type 2 diabetes.   No evidence of diabetic retinal changes in either eye.    Emmetropia at distance in the right eye, and myopia with slight astigmatism  in the left eye  Presbyopia consistent with age.   New spectacle lens Rx issued for use as desired.    Okay to use over the counter reading glasses if happy with near vision with those glasses and if happy with unaided distance VA.     Recheck in one year, or prior, if any problems in the interim.

## 2019-05-23 LAB
ANA SER QL IF: NORMAL
MITOCHONDRIA AB TITR SER IF: NORMAL {TITER}
SMOOTH MUSCLE AB TITR SER IF: NORMAL {TITER}

## 2019-05-24 LAB
A1AT PHENOTYP SERPL-IMP: NORMAL BANDS
A1AT SERPL NEPH-MCNC: 138 MG/DL (ref 100–190)

## 2019-05-31 LAB — PHOSPHATIDYLETHANOL (PETH): 219 NG/ML

## 2019-06-03 ENCOUNTER — TELEPHONE (OUTPATIENT)
Dept: HEPATOLOGY | Facility: CLINIC | Age: 64
End: 2019-06-03

## 2019-06-03 NOTE — TELEPHONE ENCOUNTER
----- Message from Diego Rick PA-C sent at 6/3/2019 11:05 AM CDT -----  Please let him know labs are stable. Work up concerning for fatty liver and alcohol causing elevated liver enzymes. His fibroscan showed a large amount of scar tissue at stage 3 fibrosis out of 4, however, given how high his alcohol intake measured, I am not certain if it's accurate. If possible, I want him to d/c and plan to follow up in 12 weeks and see if we can repeat the fibroscan at that time. Please schedule f/u in 12 weeks  Thanks

## 2019-06-03 NOTE — TELEPHONE ENCOUNTER
Called patient explained message to him twice also sent it to him on [facility]. Scheduled him his 12 week follow up

## 2019-07-01 DIAGNOSIS — E55.9 VITAMIN D DEFICIENCY: ICD-10-CM

## 2019-07-01 RX ORDER — ERGOCALCIFEROL 1.25 MG/1
50000 CAPSULE ORAL
Qty: 4 CAPSULE | Refills: 6 | Status: SHIPPED | OUTPATIENT
Start: 2019-07-01 | End: 2020-02-04

## 2019-07-31 RX ORDER — INSULIN GLARGINE 100 [IU]/ML
INJECTION, SOLUTION SUBCUTANEOUS
Qty: 45 ML | Refills: 1 | Status: SHIPPED | OUTPATIENT
Start: 2019-07-31 | End: 2019-10-23 | Stop reason: SDUPTHER

## 2019-08-05 ENCOUNTER — TELEPHONE (OUTPATIENT)
Dept: INTERNAL MEDICINE | Facility: CLINIC | Age: 64
End: 2019-08-05

## 2019-08-05 DIAGNOSIS — E11.9 DIABETES MELLITUS WITHOUT COMPLICATION: Primary | ICD-10-CM

## 2019-08-05 DIAGNOSIS — I10 ESSENTIAL HYPERTENSION: ICD-10-CM

## 2019-08-05 DIAGNOSIS — E78.2 MIXED HYPERLIPIDEMIA: ICD-10-CM

## 2019-08-05 DIAGNOSIS — Z12.5 PROSTATE CANCER SCREENING: ICD-10-CM

## 2019-08-05 DIAGNOSIS — E55.9 VITAMIN D DEFICIENCY: ICD-10-CM

## 2019-08-05 NOTE — TELEPHONE ENCOUNTER
----- Message from Zita Gordon LPN sent at 8/5/2019  1:34 PM CDT -----  Contact: self [phone]      ----- Message -----  From: Wendy Wayne  Sent: 8/5/2019   1:04 PM  To: [facility]    Type: Orders Request    What orders/ testing are being requested? Glyco A1C    Is there a future appointment scheduled for the patient with PCP? yes    When? 11/20    Comments: couldn't attach the glyco A1C order because expires in Sept and patient is schedule for labs for 11/14, please enter order and link it to his lab apt.    Thank you

## 2019-08-06 DIAGNOSIS — E11.9 DIABETES MELLITUS WITHOUT COMPLICATION: ICD-10-CM

## 2019-08-20 ENCOUNTER — TELEPHONE (OUTPATIENT)
Dept: INTERNAL MEDICINE | Facility: CLINIC | Age: 64
End: 2019-08-20

## 2019-08-20 DIAGNOSIS — E11.65 UNCONTROLLED TYPE 2 DIABETES MELLITUS WITH HYPERGLYCEMIA: Primary | ICD-10-CM

## 2019-08-20 NOTE — TELEPHONE ENCOUNTER
Pt insurance will not cover Basaglar, pt is requesting an rx for Levemier to be sent to [facility], he would like a 90 day supply with refills. please Advise

## 2019-08-20 NOTE — TELEPHONE ENCOUNTER
----- Message from Manasa Hill sent at 8/20/2019  9:07 AM CDT -----  Contact: self/[phone]  Patient is calling for an RX refill or new RX.  Is this a refill or new RX:  New refill  RX name and strength: Levemir  Directions (copy/paste from chart):    Is this a 30 day or 90 day RX:  90 days  Local pharmacy or mail order pharmacy: Local pharmacy   Pharmacy name and phone # (copy/paste from chart):  [facility] - [address] [phone] (Phone)[phone] (Fax)   Comments:  Patient stated that Rx is been change from BASAGLAR  To Levemir. Please call and advise. Thank you!!!

## 2019-10-07 RX ORDER — INSULIN LISPRO 100 [IU]/ML
INJECTION, SOLUTION INTRAVENOUS; SUBCUTANEOUS
Qty: 75 ML | Refills: 1 | Status: SHIPPED | OUTPATIENT
Start: 2019-10-07 | End: 2019-12-04 | Stop reason: SDUPTHER

## 2019-10-23 RX ORDER — INSULIN GLARGINE 100 [IU]/ML
INJECTION, SOLUTION SUBCUTANEOUS
Qty: 45 ML | Refills: 1 | Status: SHIPPED | OUTPATIENT
Start: 2019-10-23 | End: 2020-05-14 | Stop reason: SDUPTHER

## 2019-10-28 ENCOUNTER — PATIENT MESSAGE (OUTPATIENT)
Dept: SPORTS MEDICINE | Facility: CLINIC | Age: 64
End: 2019-10-28

## 2019-10-28 ENCOUNTER — TELEPHONE (OUTPATIENT)
Dept: INTERNAL MEDICINE | Facility: CLINIC | Age: 64
End: 2019-10-28

## 2019-10-28 NOTE — TELEPHONE ENCOUNTER
----- Message from Emelina Rob sent at 10/28/2019  9:18 AM CDT -----  Contact: Patient [phone]  Patient is requesting call back to discuss the followin. Rx with  [facility] is mixed up - levamir should be discontinued and 90 day supply for  Basagular should be continued..  2. Patient has developed something in the rectom and would like to know if to make appointment with Dr. Tafoya or a specialist.    Please call and advise  Thank you

## 2019-10-28 NOTE — TELEPHONE ENCOUNTER
Spoke with pt he states that he has developed a boil in his rectum. He has been putting neosporin on it. Pt states that the pain is minimal, he wants to know if he needs to see someone or can something be called in to the pharmacy. Please Advise.

## 2019-10-29 ENCOUNTER — TELEPHONE (OUTPATIENT)
Dept: INTERNAL MEDICINE | Facility: CLINIC | Age: 64
End: 2019-10-29

## 2019-10-29 DIAGNOSIS — K61.1 RECTAL ABSCESS: Primary | ICD-10-CM

## 2019-10-29 DIAGNOSIS — M79.642 HAND PAIN, LEFT: ICD-10-CM

## 2019-10-29 RX ORDER — AMLODIPINE BESYLATE 10 MG/1
TABLET ORAL
Qty: 90 TABLET | Refills: 0 | Status: SHIPPED | OUTPATIENT
Start: 2019-10-29 | End: 2019-12-24

## 2019-11-02 ENCOUNTER — PATIENT OUTREACH (OUTPATIENT)
Dept: ADMINISTRATIVE | Facility: OTHER | Age: 64
End: 2019-11-02

## 2019-11-03 NOTE — PROGRESS NOTES
CRS Office Visit History and Physical    Referring Md:   Lisette Corado Md  [address] [place] [address]    SUBJECTIVE:     Chief Complaint: Painful anal lump    History of Present Illness:  The patient is new patient to this practice.   Course is as follows:  Patient is a 64 y.o. male presents with painful lump on his anus.  Symptoms have been present for 2 weeks.  States that the area was painful for the 1st week.  Has been using preparation H cream.  Has not had any pain this second week.  Associated bleeding: yes.  Small amount at onset of symptoms.  Previous anorectal procedures: no  denies straining/prolonged time on toilet with bowel movements.  is not currently taking fiber supplement of stool softener  Blood thinners: No    Last Colonoscopy:  Nov 2014, normal.  Recommended 5yr interval exam.    Review of patient's allergies indicates:  No Known Allergies    Past Medical History:   Diagnosis Date    Allergy     Anxiety     Arthritis     Cataract     Diabetes mellitus     Diabetes mellitus type II     GERD (gastroesophageal reflux disease)     History of alcohol abuse     Hyperlipidemia     Hypertension     Neuromuscular disorder      Past Surgical History:   Procedure Laterality Date    ANKLE FUSION      left    CARPAL TUNNEL RELEASE      left    FRACTURE SURGERY      KNEE SURGERY      Uvalectomy      VASECTOMY       Family History   Problem Relation Age of Onset    Hypertension Father     Diabetes Father     Aneurysm Father         brain    Diabetes Mother     Diabetes Brother     Amblyopia Neg Hx     Blindness Neg Hx     Cancer Neg Hx     Cataracts Neg Hx     Glaucoma Neg Hx     Macular degeneration Neg Hx     Retinal detachment Neg Hx     Strabismus Neg Hx     Stroke Neg Hx     Thyroid disease Neg Hx      Social History     Tobacco Use    Smoking status: Former Smoker    Smokeless tobacco: Never Used   Substance Use Topics    Alcohol use: Yes     Comment:  "socially    Drug use: No        Review of Systems:  Review of Systems   Gastrointestinal: Positive for blood in stool. Negative for constipation and diarrhea.   All other systems reviewed and are negative.      OBJECTIVE:     Vital Signs (Most Recent)  BP (!) 188/90 (BP Location: Right arm, Patient Position: Sitting, BP Method: Large (Automatic))   Pulse 80   Ht 5' 7" (1.702 m)   Wt 100 kg (220 lb 7.4 oz)   BMI 34.53 kg/m²     Physical Exam:  General: White male in no distress   Neuro: Alert and oriented x 4.  Moves all extremities.     HEENT: No icterus.  Trachea midline  Respiratory: Respirations are even and unlabored  Cardiac: Regular rate  Extremities: Warm dry and intact  Skin: No rashes    Anorectal:   External exam:  Right anterior thrombosed external hemorrhoid, minimally tender on exam.  Posterior midline with some skin excoriation.  Digital exam revealed normal tone. Palpable thrombosed hemorrhoid.  Otherwise normal anal canal with normal tone.        ASSESSMENT/PLAN:     64-year-old male with thrombosed external hemorrhoid    The patient was instructed to:  Increase water intake to at least 8-10 glasses of water per day.  Take a daily fiber supplement (Konsyl, Benefiber, Metamucil) and increase dietary intake to 20-30 grams/day.  Avoid straining or spending >5min/event with bowel movements.  Follow-up in clinic in 4 weeks.  Schedule colonoscopy after resolution of hemorrhoid.    Karolina Kaur MD  Staff Surgeon, Colon and Rectal Surgery  [facility]  "

## 2019-11-04 ENCOUNTER — OFFICE VISIT (OUTPATIENT)
Dept: SURGERY | Facility: CLINIC | Age: 64
End: 2019-11-04
Attending: COLON & RECTAL SURGERY
Payer: COMMERCIAL

## 2019-11-04 VITALS
HEART RATE: 80 BPM | HEIGHT: 67 IN | SYSTOLIC BLOOD PRESSURE: 188 MMHG | BODY MASS INDEX: 34.6 KG/M2 | DIASTOLIC BLOOD PRESSURE: 90 MMHG | WEIGHT: 220.44 LBS

## 2019-11-04 DIAGNOSIS — K64.5 EXTERNAL HEMORRHOID, THROMBOSED: Primary | ICD-10-CM

## 2019-11-04 PROCEDURE — 99203 PR OFFICE/OUTPT VISIT, NEW, LEVL III, 30-44 MIN: ICD-10-PCS | Mod: S$GLB,,, | Performed by: COLON & RECTAL SURGERY

## 2019-11-04 PROCEDURE — 3077F PR MOST RECENT SYSTOLIC BLOOD PRESSURE >= 140 MM HG: ICD-10-PCS | Mod: CPTII,S$GLB,, | Performed by: COLON & RECTAL SURGERY

## 2019-11-04 PROCEDURE — 1101F PR PT FALLS ASSESS DOC 0-1 FALLS W/OUT INJ PAST YR: ICD-10-PCS | Mod: CPTII,S$GLB,, | Performed by: COLON & RECTAL SURGERY

## 2019-11-04 PROCEDURE — 3077F SYST BP >= 140 MM HG: CPT | Mod: CPTII,S$GLB,, | Performed by: COLON & RECTAL SURGERY

## 2019-11-04 PROCEDURE — 1101F PT FALLS ASSESS-DOCD LE1/YR: CPT | Mod: CPTII,S$GLB,, | Performed by: COLON & RECTAL SURGERY

## 2019-11-04 PROCEDURE — 3080F DIAST BP >= 90 MM HG: CPT | Mod: CPTII,S$GLB,, | Performed by: COLON & RECTAL SURGERY

## 2019-11-04 PROCEDURE — 99203 OFFICE O/P NEW LOW 30 MIN: CPT | Mod: S$GLB,,, | Performed by: COLON & RECTAL SURGERY

## 2019-11-04 PROCEDURE — 3080F PR MOST RECENT DIASTOLIC BLOOD PRESSURE >= 90 MM HG: ICD-10-PCS | Mod: CPTII,S$GLB,, | Performed by: COLON & RECTAL SURGERY

## 2019-11-04 PROCEDURE — 99999 PR PBB SHADOW E&M-EST. PATIENT-LVL III: ICD-10-PCS | Mod: PBBFAC,,, | Performed by: COLON & RECTAL SURGERY

## 2019-11-04 PROCEDURE — 99999 PR PBB SHADOW E&M-EST. PATIENT-LVL III: CPT | Mod: PBBFAC,,, | Performed by: COLON & RECTAL SURGERY

## 2019-11-04 NOTE — LETTER
November 4, 2019      Lisette Corado MD  [address]  [facility] [address]           [facility]  [address]  [facility] [address]  Phone: [phone]          Patient: Dallas Garcia   MR Number: [id number]   YOB: 1955   Date of Visit: 11/4/2019       Dear Dr. Lisette Corado:    Thank you for referring Dallas Garcia to me for evaluation. Attached you will find relevant portions of my assessment and plan of care.    If you have questions, please do not hesitate to call me. I look forward to following Dallas Garcia along with you.    Sincerely,    Karolina Kaur MD    Enclosure  CC:  No Recipients    If you would like to receive this communication electronically, please contact [email or web address] or [phone] to request more information on [employer] Link access.    For providers and/or their staff who would like to refer a patient to [facility], please contact us through our one-stop-shop provider referral line, [facility], at [phone].    If you feel you have received this communication in error or would no longer like to receive these types of communications, please e-mail [email or web address]

## 2019-11-05 ENCOUNTER — IMMUNIZATION (OUTPATIENT)
Dept: PHARMACY | Facility: CLINIC | Age: 64
End: 2019-11-05
Payer: COMMERCIAL

## 2019-11-06 ENCOUNTER — TELEPHONE (OUTPATIENT)
Dept: SURGERY | Facility: CLINIC | Age: 64
End: 2019-11-06

## 2019-11-06 ENCOUNTER — PATIENT OUTREACH (OUTPATIENT)
Dept: ADMINISTRATIVE | Facility: HOSPITAL | Age: 64
End: 2019-11-06

## 2019-11-13 ENCOUNTER — LAB VISIT (OUTPATIENT)
Dept: LAB | Facility: HOSPITAL | Age: 64
End: 2019-11-13
Attending: INTERNAL MEDICINE
Payer: COMMERCIAL

## 2019-11-13 DIAGNOSIS — E55.9 VITAMIN D DEFICIENCY: ICD-10-CM

## 2019-11-13 DIAGNOSIS — I10 ESSENTIAL HYPERTENSION: ICD-10-CM

## 2019-11-13 DIAGNOSIS — E78.2 MIXED HYPERLIPIDEMIA: ICD-10-CM

## 2019-11-13 DIAGNOSIS — Z12.5 PROSTATE CANCER SCREENING: ICD-10-CM

## 2019-11-13 DIAGNOSIS — E11.9 DIABETES MELLITUS WITHOUT COMPLICATION: ICD-10-CM

## 2019-11-13 LAB
25(OH)D3+25(OH)D2 SERPL-MCNC: 27 NG/ML (ref 30–96)
ALBUMIN SERPL BCP-MCNC: 4 G/DL (ref 3.5–5.2)
ALP SERPL-CCNC: 113 U/L (ref 55–135)
ALT SERPL W/O P-5'-P-CCNC: 55 U/L (ref 10–44)
ANION GAP SERPL CALC-SCNC: 10 MMOL/L (ref 8–16)
AST SERPL-CCNC: 102 U/L (ref 10–40)
BASOPHILS # BLD AUTO: 0.03 K/UL (ref 0–0.2)
BASOPHILS NFR BLD: 0.7 % (ref 0–1.9)
BILIRUB SERPL-MCNC: 0.5 MG/DL (ref 0.1–1)
BUN SERPL-MCNC: 15 MG/DL (ref 8–23)
CALCIUM SERPL-MCNC: 9.5 MG/DL (ref 8.7–10.5)
CHLORIDE SERPL-SCNC: 105 MMOL/L (ref 95–110)
CHOLEST SERPL-MCNC: 189 MG/DL (ref 120–199)
CHOLEST/HDLC SERPL: 5.9 {RATIO} (ref 2–5)
CO2 SERPL-SCNC: 26 MMOL/L (ref 23–29)
COMPLEXED PSA SERPL-MCNC: 0.16 NG/ML (ref 0–4)
CREAT SERPL-MCNC: 1.1 MG/DL (ref 0.5–1.4)
DIFFERENTIAL METHOD: ABNORMAL
EOSINOPHIL # BLD AUTO: 0.2 K/UL (ref 0–0.5)
EOSINOPHIL NFR BLD: 5 % (ref 0–8)
ERYTHROCYTE [DISTWIDTH] IN BLOOD BY AUTOMATED COUNT: 12.5 % (ref 11.5–14.5)
EST. GFR  (AFRICAN AMERICAN): >60 ML/MIN/1.73 M^2
EST. GFR  (NON AFRICAN AMERICAN): >60 ML/MIN/1.73 M^2
ESTIMATED AVG GLUCOSE: 229 MG/DL (ref 68–131)
GLUCOSE SERPL-MCNC: 148 MG/DL (ref 70–110)
HBA1C MFR BLD HPLC: 9.6 % (ref 4–5.6)
HCT VFR BLD AUTO: 41.3 % (ref 40–54)
HDLC SERPL-MCNC: 32 MG/DL (ref 40–75)
HDLC SERPL: 16.9 % (ref 20–50)
HGB BLD-MCNC: 13.5 G/DL (ref 14–18)
IMM GRANULOCYTES # BLD AUTO: 0.01 K/UL (ref 0–0.04)
IMM GRANULOCYTES NFR BLD AUTO: 0.2 % (ref 0–0.5)
LDLC SERPL CALC-MCNC: ABNORMAL MG/DL (ref 63–159)
LYMPHOCYTES # BLD AUTO: 1.2 K/UL (ref 1–4.8)
LYMPHOCYTES NFR BLD: 27.4 % (ref 18–48)
MCH RBC QN AUTO: 29.9 PG (ref 27–31)
MCHC RBC AUTO-ENTMCNC: 32.7 G/DL (ref 32–36)
MCV RBC AUTO: 91 FL (ref 82–98)
MONOCYTES # BLD AUTO: 0.4 K/UL (ref 0.3–1)
MONOCYTES NFR BLD: 10 % (ref 4–15)
NEUTROPHILS # BLD AUTO: 2.4 K/UL (ref 1.8–7.7)
NEUTROPHILS NFR BLD: 56.7 % (ref 38–73)
NONHDLC SERPL-MCNC: 157 MG/DL
NRBC BLD-RTO: 0 /100 WBC
PLATELET # BLD AUTO: 211 K/UL (ref 150–350)
PMV BLD AUTO: 11.7 FL (ref 9.2–12.9)
POTASSIUM SERPL-SCNC: 4.1 MMOL/L (ref 3.5–5.1)
PROT SERPL-MCNC: 7.4 G/DL (ref 6–8.4)
RBC # BLD AUTO: 4.52 M/UL (ref 4.6–6.2)
SODIUM SERPL-SCNC: 141 MMOL/L (ref 136–145)
TRIGL SERPL-MCNC: 801 MG/DL (ref 30–150)
TSH SERPL DL<=0.005 MIU/L-ACNC: 0.81 UIU/ML (ref 0.4–4)
WBC # BLD AUTO: 4.19 K/UL (ref 3.9–12.7)

## 2019-11-13 PROCEDURE — 84443 ASSAY THYROID STIM HORMONE: CPT

## 2019-11-13 PROCEDURE — 83036 HEMOGLOBIN GLYCOSYLATED A1C: CPT

## 2019-11-13 PROCEDURE — 80061 LIPID PANEL: CPT

## 2019-11-13 PROCEDURE — 36415 COLL VENOUS BLD VENIPUNCTURE: CPT

## 2019-11-13 PROCEDURE — 82306 VITAMIN D 25 HYDROXY: CPT

## 2019-11-13 PROCEDURE — 85025 COMPLETE CBC W/AUTO DIFF WBC: CPT

## 2019-11-13 PROCEDURE — 84153 ASSAY OF PSA TOTAL: CPT

## 2019-11-13 PROCEDURE — 80053 COMPREHEN METABOLIC PANEL: CPT

## 2019-11-20 ENCOUNTER — OFFICE VISIT (OUTPATIENT)
Dept: INTERNAL MEDICINE | Facility: CLINIC | Age: 64
End: 2019-11-20
Payer: COMMERCIAL

## 2019-11-20 VITALS
HEART RATE: 96 BPM | BODY MASS INDEX: 32.18 KG/M2 | OXYGEN SATURATION: 95 % | WEIGHT: 205 LBS | HEIGHT: 67 IN | DIASTOLIC BLOOD PRESSURE: 60 MMHG | SYSTOLIC BLOOD PRESSURE: 120 MMHG

## 2019-11-20 DIAGNOSIS — M54.16 LUMBAR RADICULOPATHY, RIGHT: ICD-10-CM

## 2019-11-20 DIAGNOSIS — E11.65 UNCONTROLLED TYPE 2 DIABETES MELLITUS WITH HYPERGLYCEMIA: Primary | ICD-10-CM

## 2019-11-20 DIAGNOSIS — R74.8 ELEVATED LIVER ENZYMES: ICD-10-CM

## 2019-11-20 DIAGNOSIS — I10 ESSENTIAL HYPERTENSION: ICD-10-CM

## 2019-11-20 DIAGNOSIS — E78.2 MIXED HYPERLIPIDEMIA: ICD-10-CM

## 2019-11-20 LAB
ALBUMIN/CREAT UR: 46.9 UG/MG (ref 0–30)
CREAT UR-MCNC: 98 MG/DL (ref 23–375)
MICROALBUMIN UR DL<=1MG/L-MCNC: 46 UG/ML

## 2019-11-20 PROCEDURE — 3074F PR MOST RECENT SYSTOLIC BLOOD PRESSURE < 130 MM HG: ICD-10-PCS | Mod: CPTII,S$GLB,, | Performed by: INTERNAL MEDICINE

## 2019-11-20 PROCEDURE — 3046F PR MOST RECENT HEMOGLOBIN A1C LEVEL > 9.0%: ICD-10-PCS | Mod: CPTII,S$GLB,, | Performed by: INTERNAL MEDICINE

## 2019-11-20 PROCEDURE — 99999 PR PBB SHADOW E&M-EST. PATIENT-LVL V: CPT | Mod: PBBFAC,,, | Performed by: INTERNAL MEDICINE

## 2019-11-20 PROCEDURE — 3046F HEMOGLOBIN A1C LEVEL >9.0%: CPT | Mod: CPTII,S$GLB,, | Performed by: INTERNAL MEDICINE

## 2019-11-20 PROCEDURE — 99397 PER PM REEVAL EST PAT 65+ YR: CPT | Mod: S$GLB,,, | Performed by: INTERNAL MEDICINE

## 2019-11-20 PROCEDURE — 82043 UR ALBUMIN QUANTITATIVE: CPT

## 2019-11-20 PROCEDURE — 3078F PR MOST RECENT DIASTOLIC BLOOD PRESSURE < 80 MM HG: ICD-10-PCS | Mod: CPTII,S$GLB,, | Performed by: INTERNAL MEDICINE

## 2019-11-20 PROCEDURE — 99397 PR PREVENTIVE VISIT,EST,65 & OVER: ICD-10-PCS | Mod: S$GLB,,, | Performed by: INTERNAL MEDICINE

## 2019-11-20 PROCEDURE — 3078F DIAST BP <80 MM HG: CPT | Mod: CPTII,S$GLB,, | Performed by: INTERNAL MEDICINE

## 2019-11-20 PROCEDURE — 3074F SYST BP LT 130 MM HG: CPT | Mod: CPTII,S$GLB,, | Performed by: INTERNAL MEDICINE

## 2019-11-20 PROCEDURE — 99999 PR PBB SHADOW E&M-EST. PATIENT-LVL V: ICD-10-PCS | Mod: PBBFAC,,, | Performed by: INTERNAL MEDICINE

## 2019-11-20 RX ORDER — GABAPENTIN 100 MG/1
CAPSULE ORAL
Qty: 90 CAPSULE | Refills: 6 | Status: SHIPPED | OUTPATIENT
Start: 2019-11-20 | End: 2023-07-06

## 2019-11-20 RX ORDER — GABAPENTIN 100 MG/1
100 CAPSULE ORAL 3 TIMES DAILY
Qty: 90 CAPSULE | Refills: 6 | Status: SHIPPED | OUTPATIENT
Start: 2019-11-20 | End: 2019-11-20 | Stop reason: SDUPTHER

## 2019-11-20 RX ORDER — GABAPENTIN 100 MG/1
100 CAPSULE ORAL 3 TIMES DAILY
Qty: 90 CAPSULE | Refills: 11 | Status: SHIPPED | OUTPATIENT
Start: 2019-11-20 | End: 2019-11-20 | Stop reason: SDUPTHER

## 2019-11-20 NOTE — PROGRESS NOTES
Subjective:       Patient ID: Dallas Garcia is a 64 y.o. male.    Chief Complaint:   Annual Exam    HPI: Mr Garcia presents today for annual f/u DM and HLD. He notes that unfortunately he's been drinking more alcohol-about 8 oz/night and not following his ADA diet or exercising. Caring for his 2 high school aged daughters has been time consuming and stressful. 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 He dotson c/o daily LBP with right sciatica like sx  X 2-3 months which radiate to the right mid-thigh and are worsened by sitting.  He has been seeing a local chiropractor and was told he had OA on lumbar X-rays.    Past Medical, Surgical, Social History: Please see as stated in Epic chart which has been reviewed.    Current Outpatient Medications   Medication Sig Dispense Refill    amLODIPine (NORVASC) 10 MG tablet TAKE 1 TABLET BY MOUTH ONCE DAILY 90 tablet 0    atorvastatin (LIPITOR) 80 MG tablet Take 1 tablet (80 mg total) by mouth once daily. 90 tablet 2    busPIRone (BUSPAR) 5 MG Tab TAKE 1 TABLET BY MOUTH TWO  TIMES DAILY 60 tablet 3    ergocalciferol (VITAMIN D2) 50,000 unit Cap Take 1 capsule (50,000 Units total) by mouth every 7 days. 4 capsule 6    fenofibrate 160 MG Tab Take 1 tablet (160 mg total) by mouth once daily. 90 tablet 2    fish oil-omega-3 fatty acids 300-1,000 mg capsule Take 2 g by mouth 2 (two) times daily.       ibuprofen (ADVIL) 200 MG tablet Take 200 mg by mouth as needed for Pain.      insulin (BASAGLAR KWIKPEN U-100 INSULIN) glargine 100 units/mL (3mL) SubQ pen INJECT SUBCUTANEOUSLY 50  UNITS IN THE EVENING 45 mL 1    insulin detemir U-100 (LEVEMIR FLEXTOUCH U-100 INSULN) 100 unit/mL (3 mL) SubQ InPn pen 50 units in PM for Diabetes in place of Basaglar 45 mL 3    insulin lispro (HUMALOG KWIKPEN INSULIN) 100 unit/mL  "pen INJECT SUBCUTANEOUSLY 35 UNITS WITH BREAKFAST AND  LUNCH AND 35 UNITS WITH  DINNER 75 mL 1    linagliptin-metformin (JENTADUETO) 2.5-1,000 mg Tab Take 1 tablet by mouth 2 (two) times daily with meals. 60 tablet 4    lisinopril (PRINIVIL,ZESTRIL) 40 MG tablet Take 1 tablet (40 mg total) by mouth once daily. 90 tablet 2    loratadine (CLARITIN) 10 mg tablet take 1 tablet by mouth once daily 30 tablet 3    metoprolol succinate (TOPROL-XL) 100 MG 24 hr tablet Take 1 tablet (100 mg total) by mouth once daily. 90 tablet 2    pramoxine-hydrocortisone (ANALPRAM HC) cream Apply topically 3 (three) times daily. 28.4 g 10    aspirin (ECOTRIN) 325 MG EC tablet Take 1 tablet (325 mg total) by mouth every 12 (twelve) hours. 84 tablet 0    BD ULTRA-FINE ADRIEN PEN NEEDLES 32 gauge x 5/32" Ndle use four times a day (Patient not taking: Reported on 11/20/2019) 400 each 4    gabapentin (NEURONTIN) 100 MG capsule 1 tab 3x/day x 2 weeks  Then increase to 3x/day thereafter for lumbar radiculopathy 90 capsule 6    omeprazole (PRILOSEC) 20 MG capsule Take 1 capsule (20 mg total) by mouth once daily. For GERD 90 capsule 2    ONETOUCH ULTRA BLUE TEST STRIP Strp TEST FOUR TIMES A DAY (Patient not taking: Reported on 11/20/2019) 400 strip 1    ONETOUCH ULTRASOFT LANCETS lancets TEST four times a day before meals (Patient not taking: Reported on 11/20/2019) 150 each 6    pen needle, diabetic (BD ULTRA-FINE SHORT PEN NEEDLE) 31 gauge x 5/16" Ndle use once daily (Patient not taking: Reported on 11/20/2019) 100 each 6     No current facility-administered medications for this visit.        Review of Systems   HENT: Negative.    Eyes: Negative.    Respiratory: Negative for chest tightness and shortness of breath.    Cardiovascular: Negative for chest pain and leg swelling.   Gastrointestinal: Positive for blood in stool. Negative for abdominal pain, constipation, diarrhea, nausea and vomiting.   Genitourinary: Negative for difficulty " urinating, dysuria, frequency, hematuria and urgency.   Musculoskeletal: Positive for back pain.        +Lumbar radiculopathy sx on the right ;  Seeing Chiropractor x about 3-4 months;no trauma  Pain is chronic at 2/10  Pain increased by sitting   Neurological: Negative for dizziness, seizures, syncope, weakness, numbness and headaches.   Psychiatric/Behavioral: Negative for decreased concentration. The patient is not nervous/anxious.         +Situational stress       Objective:      Lab Results   Component Value Date    WBC 4.19 11/13/2019    HGB 13.5 (L) 11/13/2019    HCT 41.3 11/13/2019     11/13/2019    CHOL 189 11/13/2019    TRIG 801 (H) 11/13/2019    HDL 32 (L) 11/13/2019    ALT 55 (H) 11/13/2019     (H) 11/13/2019     11/13/2019    K 4.1 11/13/2019     11/13/2019    CREATININE 1.1 11/13/2019    BUN 15 11/13/2019    CO2 26 11/13/2019    TSH 0.813 11/13/2019    PSA 0.16 11/13/2019    INR 1.1 05/22/2019    HGBA1C 9.6 (H) 11/13/2019     Physical Exam   Constitutional: He is oriented to person, place, and time. He appears well-developed and well-nourished.   Neck: Normal range of motion. Neck supple. No JVD present. No thyromegaly present.   Cardiovascular: Normal rate, regular rhythm and normal heart sounds.   Pulmonary/Chest: Effort normal and breath sounds normal. He has no wheezes. He exhibits no tenderness.   Abdominal: Soft. Bowel sounds are normal. He exhibits no mass. There is no tenderness.   Musculoskeletal: He exhibits no edema, tenderness or deformity.   Lymphadenopathy:     He has no cervical adenopathy.   Neurological: He is alert and oriented to person, place, and time. He displays abnormal reflex.   Lower Extremities:DTRs at knees absent/DTRs at ankles markedly decreased  Upper Extremities: DTRs present but mildly decreased   Nursing note and vitals reviewed.        Vital Signs  Pulse: 96  SpO2: 95 %  BP: 120/60  Pain Score:   2  Pain Loc: Buttocks  Height and  "Weight  Height: 5' 7" (170.2 cm)  Weight: 93 kg (205 lb)  BSA (Calculated - sq m): 2.1 sq meters  BMI (Calculated): 32.1  Weight in (lb) to have BMI = 25: 159.3]  Protective Sensation (w/ 10 gram monofilament):  Right: Intact  Left: Intact    Visual Inspection:  Normal -  Bilateral    Pedal Pulses:   Right: Present  Left: Present    Posterior tibialis:   Right:Present  Left: Present      Assessment:       1. Uncontrolled type 2 diabetes mellitus with hyperglycemia    2. Lumbar radiculopathy, right    3. Essential hypertension    4. Mixed hyperlipidemia    5. Elevated liver enzymes        Plan:     Health Maintenance   Topic Date Due    Colonoscopy  11/26/2019    Hemoglobin A1c  02/13/2020    Eye Exam  05/22/2020    PROSTATE-SPECIFIC ANTIGEN  11/13/2020    Lipid Panel  11/13/2020    Low Dose Statin  11/20/2020    Foot Exam  11/20/2020    Pneumococcal Vaccine (65+ Low/Medium Risk) (2 of 2 - PPSV23) 01/14/2022    TETANUS VACCINE  10/07/2025    Hepatitis C Screening  Completed    Abdominal Aortic Aneurysm Screening  Completed        Dallas was seen today for annual exam.    Diagnoses and all orders for this visit:    Uncontrolled type 2 diabetes mellitus with hyperglycemia        -     Continue Jentadueto BID, Levemir BID and Humulog BID        -     'Have strongly recommended pt follow ADA and drastically decrease Etoh  -     Microalbumin/creatinine urine ratio  -     Comprehensive metabolic panel; Future  -     Hemoglobin A1c; Future        -     If no marked improvement, refer to Endocrinology, which pt defers on today     Lumbar radiculopathy, right  -     MRI Lumbar Spine Without Contrast; Future  -     Gabapentin (NEURONTIN) 100 MG capsule; 1 tab 2x/day x 2 weeks  Then increase to 3x/day thereafter for lumbar radiculopathy    Essential hypertension/controlled        -     Continue Toprol XL 100mg QD, Norvasc 10mg QD, and Lisinopril 40mg QD    Mixed hyperlipidemia        -     Continue Lipitor 80mg QD " and Fenofibrate 160mg QD  -     Comprehensive metabolic panel; Future  -     Lipid panel; Future    Elevated liver enzymes in pt with hx fatty liver disease        -     'Have encouraged pt to decrease Etoh to 1-2 drinks/week not daily        -      ADA diet and regular exercise encouraged        -      Schedule F/U to Hepatology clinic with Abdomen U/S in 4/2020    Health Maintenance         -     Repeat Colonoscopy due 11/2019; will place referral if not scheduled by next RTC          -    RTC x 3-4 months with 1 week prior fasting lab

## 2019-11-27 ENCOUNTER — HOSPITAL ENCOUNTER (OUTPATIENT)
Dept: RADIOLOGY | Facility: HOSPITAL | Age: 64
Discharge: HOME | End: 2019-11-27
Attending: INTERNAL MEDICINE
Payer: COMMERCIAL

## 2019-11-27 DIAGNOSIS — M54.16 LUMBAR RADICULOPATHY, RIGHT: ICD-10-CM

## 2019-11-27 PROCEDURE — 72148 MRI LUMBAR SPINE WITHOUT CONTRAST: ICD-10-PCS | Mod: 26,,, | Performed by: RADIOLOGY

## 2019-11-27 PROCEDURE — 72148 MRI LUMBAR SPINE W/O DYE: CPT | Mod: 26,,, | Performed by: RADIOLOGY

## 2019-11-27 PROCEDURE — 72148 MRI LUMBAR SPINE W/O DYE: CPT | Mod: TC

## 2019-12-01 RX ORDER — BUSPIRONE HYDROCHLORIDE 5 MG/1
TABLET ORAL
Qty: 60 TABLET | Refills: 3 | Status: SHIPPED | OUTPATIENT
Start: 2019-12-01 | End: 2020-12-14 | Stop reason: SDUPTHER

## 2019-12-02 ENCOUNTER — TELEPHONE (OUTPATIENT)
Dept: INTERNAL MEDICINE | Facility: CLINIC | Age: 64
End: 2019-12-02

## 2019-12-02 DIAGNOSIS — M51.36 DEGENERATION OF INTERVERTEBRAL DISC OF LUMBAR REGION WITH OSTEOPHYTE OF LUMBAR VERTEBRA: Primary | ICD-10-CM

## 2019-12-02 DIAGNOSIS — M25.78 DEGENERATION OF INTERVERTEBRAL DISC OF LUMBAR REGION WITH OSTEOPHYTE OF LUMBAR VERTEBRA: Primary | ICD-10-CM

## 2019-12-02 DIAGNOSIS — I10 ESSENTIAL HYPERTENSION: ICD-10-CM

## 2019-12-02 NOTE — TELEPHONE ENCOUNTER
----- Message from Jennifer Colón sent at 12/2/2019 11:37 AM CST -----  Contact: Pt self Mobile/Home [phone]   Patient would like a call back in regards to him saying that he received his urine and MRI test results over the weekend and he would like to know what is the next step and what should he do now?

## 2019-12-03 RX ORDER — VALSARTAN 320 MG/1
320 TABLET ORAL DAILY
Qty: 30 TABLET | Refills: 4 | Status: SHIPPED | OUTPATIENT
Start: 2019-12-03 | End: 2020-06-08 | Stop reason: SDUPTHER

## 2019-12-04 RX ORDER — INSULIN LISPRO 100 [IU]/ML
INJECTION, SOLUTION INTRAVENOUS; SUBCUTANEOUS
Qty: 75 ML | Refills: 1 | Status: SHIPPED | OUTPATIENT
Start: 2019-12-04 | End: 2021-02-09 | Stop reason: SDUPTHER

## 2019-12-05 NOTE — TELEPHONE ENCOUNTER
Silvana, please call Kurt to schedule a Pain Clinic appt/Dr Contreras.  Thanks              Patient scheduled w/Dr. Mcfarland and notified of appt, mailed out.

## 2019-12-07 ENCOUNTER — PATIENT OUTREACH (OUTPATIENT)
Dept: ADMINISTRATIVE | Facility: OTHER | Age: 64
End: 2019-12-07

## 2019-12-07 NOTE — PROGRESS NOTES
Requested updates from Care everywhere. Patient due for colonoscopy; not eligible for IFOBT due to hx of colon polyps. o

## 2019-12-10 ENCOUNTER — TELEPHONE (OUTPATIENT)
Dept: PAIN MEDICINE | Facility: CLINIC | Age: 64
End: 2019-12-10

## 2019-12-10 NOTE — TELEPHONE ENCOUNTER
My name is Staff, I am contacting you from [facility] regarding your appointment scheduled for 12.11.19, with Provider Dr. Contreras, just confirming you will be able to make it.    If you feel you need to reschedule or canceled please give our office a call so we can better assist you.      Staff requesting patient to arrive 15 mins ahead of schedule appointment time.    Pt verbalized understanding and has confirmed appt

## 2019-12-11 ENCOUNTER — OFFICE VISIT (OUTPATIENT)
Dept: PAIN MEDICINE | Facility: CLINIC | Age: 64
End: 2019-12-11
Attending: ANESTHESIOLOGY
Payer: COMMERCIAL

## 2019-12-11 VITALS
HEART RATE: 90 BPM | SYSTOLIC BLOOD PRESSURE: 132 MMHG | WEIGHT: 205 LBS | BODY MASS INDEX: 32.18 KG/M2 | TEMPERATURE: 98 F | DIASTOLIC BLOOD PRESSURE: 78 MMHG | HEIGHT: 67 IN

## 2019-12-11 DIAGNOSIS — M70.71 ISCHIAL BURSITIS OF RIGHT SIDE: Primary | ICD-10-CM

## 2019-12-11 DIAGNOSIS — M54.41 RIGHT-SIDED LOW BACK PAIN WITH RIGHT-SIDED SCIATICA, UNSPECIFIED CHRONICITY: ICD-10-CM

## 2019-12-11 PROCEDURE — 99204 PR OFFICE/OUTPT VISIT, NEW, LEVL IV, 45-59 MIN: ICD-10-PCS | Mod: 25,S$GLB,, | Performed by: ANESTHESIOLOGY

## 2019-12-11 PROCEDURE — 99204 OFFICE O/P NEW MOD 45 MIN: CPT | Mod: 25,S$GLB,, | Performed by: ANESTHESIOLOGY

## 2019-12-11 PROCEDURE — 99999 PR PBB SHADOW E&M-EST. PATIENT-LVL III: ICD-10-PCS | Mod: PBBFAC,,, | Performed by: ANESTHESIOLOGY

## 2019-12-11 PROCEDURE — 99999 PR PBB SHADOW E&M-EST. PATIENT-LVL III: CPT | Mod: PBBFAC,,, | Performed by: ANESTHESIOLOGY

## 2019-12-11 PROCEDURE — 20611 DRAIN/INJ JOINT/BURSA W/US: CPT | Mod: RT,S$GLB,, | Performed by: ANESTHESIOLOGY

## 2019-12-11 PROCEDURE — 20611 PR DRAIN/ASP/INJECT MAJOR JOINT/BURSA W/US GUIDANCE: ICD-10-PCS | Mod: RT,S$GLB,, | Performed by: ANESTHESIOLOGY

## 2019-12-11 RX ORDER — ETODOLAC 400 MG/1
TABLET, FILM COATED ORAL
Refills: 1 | COMMUNITY
Start: 2019-09-06 | End: 2020-06-10

## 2019-12-11 RX ORDER — BETAMETHASONE SODIUM PHOSPHATE AND BETAMETHASONE ACETATE 3; 3 MG/ML; MG/ML
6 INJECTION, SUSPENSION INTRA-ARTICULAR; INTRALESIONAL; INTRAMUSCULAR; SOFT TISSUE
Status: COMPLETED | OUTPATIENT
Start: 2019-12-11 | End: 2019-12-11

## 2019-12-11 RX ADMIN — BETAMETHASONE SODIUM PHOSPHATE AND BETAMETHASONE ACETATE 6 MG: 3; 3 INJECTION, SUSPENSION INTRA-ARTICULAR; INTRALESIONAL; INTRAMUSCULAR; SOFT TISSUE at 01:12

## 2019-12-11 NOTE — LETTER
December 11, 2019      Lisette Corado MD  [address]  [facility] [address]           [facility] [place] [address] [facility] [address]  [address]  [facility] [address]  Phone: [phone]  Fax: [phone]          Patient: Dallas Garcia   MR Number: [id number]   YOB: 1955   Date of Visit: 12/11/2019       Dear Dr. Lisette Corado:    Thank you for referring Dallas Garcia to me for evaluation. Attached you will find relevant portions of my assessment and plan of care.    If you have questions, please do not hesitate to call me. I look forward to following Dallas Garcia along with you.    Sincerely,    Delmer Contreras MD    Enclosure  CC:  No Recipients    If you would like to receive this communication electronically, please contact [email or web address] or [phone] to request more information on [facility] Link access.    For providers and/or their staff who would like to refer a patient to [facility], please contact us through our one-stop-shop provider referral line, [facility], at [phone].    If you feel you have received this communication in error or would no longer like to receive these types of communications, please e-mail [email or web address]

## 2019-12-11 NOTE — PROGRESS NOTES
"Subjective:      Patient ID: Dallas Garcia is a 64 y.o. male.    Chief Complaint: Low-back Pain    Referred by: Lisette Corado MD     HPI  Mr. Garcia is a 64 year old male who presents for right buttock pain and radiation into the posterior thigh. Pain is worse with sitting only. Relieved with standing. He tried seeing a chiropractor with no relief. He has recently started gabapentin which has not been helpful. He denies any weakness or numbness. No bowel/bladder changes or saddle anesthesia.       Past Medical History:   Diagnosis Date    Allergy     Anxiety     Arthritis     Cataract     Diabetes mellitus     Diabetes mellitus type II     GERD (gastroesophageal reflux disease)     History of alcohol abuse     Hyperlipidemia     Hypertension     Neuromuscular disorder        Past Surgical History:   Procedure Laterality Date    ANKLE FUSION      left    CARPAL TUNNEL RELEASE      left    FRACTURE SURGERY      KNEE SURGERY      Uvalectomy      VASECTOMY         Review of patient's allergies indicates:  No Known Allergies    Current Outpatient Medications   Medication Sig Dispense Refill    amLODIPine (NORVASC) 10 MG tablet TAKE 1 TABLET BY MOUTH ONCE DAILY 90 tablet 0    atorvastatin (LIPITOR) 80 MG tablet Take 1 tablet (80 mg total) by mouth once daily. 90 tablet 2    BD ULTRA-FINE ADRIEN PEN NEEDLES 32 gauge x 5/32" Ndle use four times a day 400 each 4    busPIRone (BUSPAR) 5 MG Tab TAKE 1 TABLET BY MOUTH TWO  TIMES DAILY 60 tablet 3    busPIRone (BUSPAR) 5 MG Tab TAKE 1 TABLET BY MOUTH TWO  TIMES DAILY 60 tablet 3    ergocalciferol (VITAMIN D2) 50,000 unit Cap Take 1 capsule (50,000 Units total) by mouth every 7 days. 4 capsule 6    etodolac (LODINE) 400 MG tablet TK 1 T PO TID  1    fenofibrate 160 MG Tab Take 1 tablet (160 mg total) by mouth once daily. 90 tablet 2    fish oil-omega-3 fatty acids 300-1,000 mg capsule Take 2 g by mouth 2 (two) times daily.       " "gabapentin (NEURONTIN) 100 MG capsule 1 tab 3x/day x 2 weeks  Then increase to 3x/day thereafter for lumbar radiculopathy 90 capsule 6    ibuprofen (ADVIL) 200 MG tablet Take 200 mg by mouth as needed for Pain.      insulin (BASAGLAR KWIKPEN U-100 INSULIN) glargine 100 units/mL (3mL) SubQ pen INJECT SUBCUTANEOUSLY 50  UNITS IN THE EVENING 45 mL 1    insulin detemir U-100 (LEVEMIR FLEXTOUCH U-100 INSULN) 100 unit/mL (3 mL) SubQ InPn pen 50 units in PM for Diabetes in place of Basaglar 45 mL 3    insulin lispro (HUMALOG KWIKPEN INSULIN) 100 unit/mL pen INJECT SUBCUTANEOUSLY 35  UNITS WITH BREAKFAST AND  LUNCH AND 35 UNITS WITH  DINNER 75 mL 1    linagliptin-metformin (JENTADUETO) 2.5-1,000 mg Tab Take 1 tablet by mouth 2 (two) times daily with meals. 60 tablet 4    loratadine (CLARITIN) 10 mg tablet take 1 tablet by mouth once daily 30 tablet 3    metoprolol succinate (TOPROL-XL) 100 MG 24 hr tablet Take 1 tablet (100 mg total) by mouth once daily. 90 tablet 2    ONETOUCH ULTRA BLUE TEST STRIP Strp TEST FOUR TIMES A  strip 1    ONETOUCH ULTRASOFT LANCETS lancets TEST four times a day before meals 150 each 6    pen needle, diabetic (BD ULTRA-FINE SHORT PEN NEEDLE) 31 gauge x 5/16" Ndle use once daily 100 each 6    pramoxine-hydrocortisone (ANALPRAM HC) cream Apply topically 3 (three) times daily. 28.4 g 10    valsartan (DIOVAN) 320 MG tablet Take 1 tablet (320 mg total) by mouth once daily. Take 1 tab daily for Blood Pressure in place of Lisinopril 30 tablet 4    aspirin (ECOTRIN) 325 MG EC tablet Take 1 tablet (325 mg total) by mouth every 12 (twelve) hours. 84 tablet 0    omeprazole (PRILOSEC) 20 MG capsule Take 1 capsule (20 mg total) by mouth once daily. For GERD 90 capsule 2     No current facility-administered medications for this visit.        Family History   Problem Relation Age of Onset    Hypertension Father     Diabetes Father     Aneurysm Father         brain    Diabetes Mother  " "   Diabetes Brother     Amblyopia Neg Hx     Blindness Neg Hx     Cancer Neg Hx     Cataracts Neg Hx     Glaucoma Neg Hx     Macular degeneration Neg Hx     Retinal detachment Neg Hx     Strabismus Neg Hx     Stroke Neg Hx     Thyroid disease Neg Hx        Social History     Socioeconomic History    Marital status:      Spouse name: Not on file    Number of children: 2    Years of education: Not on file    Highest education level: Not on file   Occupational History    Not on file   Social Needs    Financial resource strain: Not on file    Food insecurity:     Worry: Not on file     Inability: Not on file    Transportation needs:     Medical: Not on file     Non-medical: Not on file   Tobacco Use    Smoking status: Former Smoker    Smokeless tobacco: Never Used   Substance and Sexual Activity    Alcohol use: Yes     Comment: socially    Drug use: No    Sexual activity: Not Currently   Lifestyle    Physical activity:     Days per week: Not on file     Minutes per session: Not on file    Stress: Not on file   Relationships    Social connections:     Talks on phone: Not on file     Gets together: Not on file     Attends Baptism service: Not on file     Active member of club or organization: Not on file     Attends meetings of clubs or organizations: Not on file     Relationship status: Not on file   Other Topics Concern    Not on file   Social History Narrative    Pt  with 2 daughters(Helen and Mary); he works as a financial     Consultant; Helen is 18 y/o and attending [employer]/Mary is 12 y/o and attending [employer]           ROS        Objective:   /78   Pulse 90   Temp 98.3 °F (36.8 °C) (Oral)   Ht 5' 7" (1.702 m)   Wt 93 kg (205 lb)   BMI 32.11 kg/m²   Pain Disability Index Review:  No flowsheet data found.  Normocephalic.  Atraumatic.  Affect appropriate.  Breathing unlabored.  Extra ocular muscles intact.           General    Constitutional: He " appears well-developed.   HENT:   Head: Normocephalic and atraumatic.   Eyes: EOM are normal.   Cardiovascular: Normal rate.      General Musculoskeletal Exam   Gait: normal     Back (L-Spine & T-Spine) / Neck (C-Spine) Exam     Back (L-Spine & T-Spine) Range of Motion   Extension: normal   Flexion: normal Back flexion: with pain over right ischial bursa.     Comments:  5/5 strength BLE, SLR with pain over right ischial bursa, TTP over right ischial bursa      Reflexes     Left Side  Ankle Clonus:  absent    Right Side   Ankle Clonus:  absent        Assessment:       Encounter Diagnoses   Name Primary?    Right-sided low back pain with right-sided sciatica, unspecified chronicity     Ischial bursitis of right side Yes         Plan:   We discussed with the patient the assessment and recommendations. The following is the plan we agreed on:  1. Right ischial bursa injection performed today under U/S guidance  2. Encouraged patient to go to PT to work on stretching/strengthening exercises  3. RTC in 6 weeks.  Should his symptomatology persists consider epidural.      Dallas was seen today for low-back pain.    Diagnoses and all orders for this visit:    Ischial bursitis of right side  -     betamethasone acetate-betamethasone sodium phosphate injection 6 mg    Right-sided low back pain with right-sided sciatica, unspecified chronicity       Procedure:  Under clean technique, ultrasound guidance after discussing with the patient 0.4 mL of betamethasone mixed with 10 mL of bupivacaine 0.25% were used.  We injected through a 26 gauge 3.5 in spinal needle in right ischial bursa.  The patient tolerated procedure well.  He had total resolution of his symptomatology.    Scott Cagle MD [facility] pain fellow   I have personally taken the history and examined this patient and agree with the fellow's note as stated above.

## 2019-12-16 DIAGNOSIS — I10 ESSENTIAL HYPERTENSION: ICD-10-CM

## 2019-12-16 DIAGNOSIS — K21.9 GASTROESOPHAGEAL REFLUX DISEASE, ESOPHAGITIS PRESENCE NOT SPECIFIED: ICD-10-CM

## 2019-12-16 RX ORDER — METOPROLOL SUCCINATE 100 MG/1
100 TABLET, EXTENDED RELEASE ORAL DAILY
Qty: 90 TABLET | Refills: 2 | Status: SHIPPED | OUTPATIENT
Start: 2019-12-16 | End: 2020-09-14

## 2019-12-16 RX ORDER — OMEPRAZOLE 20 MG/1
20 CAPSULE, DELAYED RELEASE ORAL DAILY
Qty: 90 CAPSULE | Refills: 2 | Status: SHIPPED | OUTPATIENT
Start: 2019-12-16 | End: 2020-09-14

## 2019-12-20 ENCOUNTER — TELEPHONE (OUTPATIENT)
Dept: INTERNAL MEDICINE | Facility: CLINIC | Age: 64
End: 2019-12-20

## 2019-12-20 RX ORDER — PEN NEEDLE, DIABETIC 30 GX3/16"
NEEDLE, DISPOSABLE MISCELLANEOUS
Qty: 300 EACH | Refills: 3 | Status: SHIPPED | OUTPATIENT
Start: 2019-12-20 | End: 2021-01-20 | Stop reason: SDUPTHER

## 2019-12-20 NOTE — PROGRESS NOTES
Refill Authorization Note     is requesting a refill authorization.    Brief assessment and rationale for refill: APPROVE: prr           Medication Therapy Plan: approve 12 more     Medication reconciliation completed: No                         Comments:   Last A1C: 6 months   Lab Results   Component Value Date    HGBA1C 9.6 (H) 11/13/2019    HGBA1C 9.1 (H) 03/26/2019    HGBA1C 9.0 (H) 08/17/2018    No results found for: LABA1C     Last Creatinine: 12 months   Lab Results   Component Value Date    CREATININE 1.1 11/13/2019    CREATININE 1.3 05/22/2019    CREATININE 0.9 03/26/2019         Digital Medicine Diabetes Data: values will display if enrolled   Last 6 Patient Entered Readings                                          Most Recent A1c:      There is no flowsheet data to display.        - No concurrent serious illness or elevated hypoglycemic risk               Appointments     Date Provider   Last Visit   Visit date not found Chidi Nash MD   Next Visit   Visit date not found Chidi Nash MD

## 2019-12-23 DIAGNOSIS — M79.642 HAND PAIN, LEFT: ICD-10-CM

## 2019-12-24 RX ORDER — AMLODIPINE BESYLATE 10 MG/1
TABLET ORAL
Qty: 90 TABLET | Refills: 1 | Status: SHIPPED | OUTPATIENT
Start: 2019-12-24 | End: 2020-11-09 | Stop reason: SDUPTHER

## 2020-01-15 DIAGNOSIS — E11.9 DIABETES MELLITUS WITHOUT COMPLICATION: ICD-10-CM

## 2020-01-15 RX ORDER — LINAGLIPTIN AND METFORMIN HYDROCHLORIDE 2.5; 1 MG/1; MG/1
TABLET, FILM COATED ORAL
Qty: 60 TABLET | Refills: 4 | Status: SHIPPED | OUTPATIENT
Start: 2020-01-15 | End: 2020-07-22

## 2020-01-22 ENCOUNTER — TELEPHONE (OUTPATIENT)
Dept: INTERNAL MEDICINE | Facility: CLINIC | Age: 65
End: 2020-01-22

## 2020-01-22 NOTE — TELEPHONE ENCOUNTER
----- Message from Shanae Adams sent at 1/22/2020  1:10 PM CST -----  Contact: Patient [phone]  Stated that a PA is needed for Rx insulin lispro (HUMALOG KWIKPEN INSULIN) 100 unit/mL pen    [id number] - [place] - [address] AT [facility] & [facility] phone [phone]    Please call and advise.    Thank You

## 2020-01-27 ENCOUNTER — TELEPHONE (OUTPATIENT)
Dept: INTERNAL MEDICINE | Facility: CLINIC | Age: 65
End: 2020-01-27

## 2020-01-27 NOTE — TELEPHONE ENCOUNTER
----- Message from Merary Garces sent at 1/27/2020 12:39 PM CST -----  Contact: [phone]  Type: Rx Prior Authorization    Medication:insulin lispro (HUMALOG KWIKPEN INSULIN) 100 unit/mL pen    Pharmacy number:[facility] #[id number] - [place] - [address] [facility]   [phone] (Phone)  [phone] (Fax)    Insurance Pharmacy Authorization Phone Number: [facility] [phone]      Comments:please advise, thanks

## 2020-02-04 DIAGNOSIS — E55.9 VITAMIN D DEFICIENCY: ICD-10-CM

## 2020-02-04 RX ORDER — ERGOCALCIFEROL 1.25 MG/1
CAPSULE ORAL
Qty: 4 CAPSULE | Refills: 6 | Status: SHIPPED | OUTPATIENT
Start: 2020-02-04 | End: 2020-08-03

## 2020-02-04 NOTE — TELEPHONE ENCOUNTER
----- Message from Shanae Adams sent at 2/4/2020 10:39 AM CST -----  Contact: Patient [phone]  Calling to check the status of the PA on Rx lispro (HUMALOG KWIKPEN INSULIN) 100 unit/mL pen    Please call and advise.    Thank You

## 2020-02-10 ENCOUNTER — TELEPHONE (OUTPATIENT)
Dept: INTERNAL MEDICINE | Facility: CLINIC | Age: 65
End: 2020-02-10

## 2020-02-10 NOTE — TELEPHONE ENCOUNTER
----- Message from Susan Alves sent at 2/10/2020  8:25 AM CST -----  Prior Authorization Needed    Medication: insulin lispro (HUMALOG KWIKPEN INSULIN) 100 unit/mL pen    Pharmacy Info: [facility] #[id number] - [place], [place] - [address] AT [facility] & [facility]    Plan does not cover this medication. Please call plan at  [phone]     to initiate prior authorization or call/fax pharmacy to change medication. Patient ID# [id number]    Note chart when prior authorization has been submitted.    Please notify pharmacy when prior authorization has been approved.    Thank You

## 2020-02-10 NOTE — TELEPHONE ENCOUNTER
Spoke with pt advised him to call his insurance company to find out which insulin is covered under his plan. Pt will call to find out and call the office back with info.

## 2020-02-10 NOTE — TELEPHONE ENCOUNTER
Norris, please call pt and ask him to call his [employer] insurance and find out what insulin is covered in place of the Humalog Kwikpen insulin 100 units/ml.   We will call that in in it's place when he calls back.  Thanks

## 2020-02-10 NOTE — TELEPHONE ENCOUNTER
----- Message from Susan Alves sent at 2/10/2020  8:25 AM CST -----  Prior Authorization Needed    Medication: insulin lispro (HUMALOG KWIKPEN INSULIN) 100 unit/mL pen    Pharmacy Info: [facility] #[id number] - [place], [place] - [address] [facility] AT [facility] & [facility]    Plan does not cover this medication. Please call plan at  [phone]     to initiate prior authorization or call/fax pharmacy to change medication. Patient ID# [id number]    Note chart when prior authorization has been submitted.    Please notify pharmacy when prior authorization has been approved.    Thank You

## 2020-02-13 ENCOUNTER — TELEPHONE (OUTPATIENT)
Dept: INTERNAL MEDICINE | Facility: CLINIC | Age: 65
End: 2020-02-13

## 2020-02-13 NOTE — TELEPHONE ENCOUNTER
Spoke with patient and PA dept in reference to auth, patient stated medication was approved, as I spoke with insurance company it is not, a prior auth is needed. Started the process via phone case number [id number].

## 2020-02-17 NOTE — TELEPHONE ENCOUNTER
Spoke with pharmacy, informed prior auth was denied for the humalog but ok'd for the Novolog. Will have provider write script.

## 2020-02-17 NOTE — TELEPHONE ENCOUNTER
----- Message from Merary Garces sent at 2/17/2020  9:33 AM CST -----  Contact: [facility] [phone]  Type: Rx Prior Authorization    Medication:insulin lispro (HUMALOG KWIKPEN INSULIN) 100 unit/mL pen    Pharmacy number:[facility] #[id number] - [place] - [address] [facility]  [phone] (Phone)  [phone] (Fax)      Comments:please advise, thanks

## 2020-02-24 ENCOUNTER — TELEPHONE (OUTPATIENT)
Dept: INTERNAL MEDICINE | Facility: CLINIC | Age: 65
End: 2020-02-24

## 2020-02-24 NOTE — TELEPHONE ENCOUNTER
Spoke with pharmacy at [facility], will inform of authorization of Novolog flexpen.

## 2020-02-26 ENCOUNTER — TELEPHONE (OUTPATIENT)
Dept: INTERNAL MEDICINE | Facility: CLINIC | Age: 65
End: 2020-02-26

## 2020-02-26 NOTE — TELEPHONE ENCOUNTER
Spoke with patient, stated pharmacy waiting for dosage, will call to clarify----- Message from Zita Gordon LPN sent at 2/26/2020 12:24 PM CST -----  Contact: Self [phone]      ----- Message -----  From: Nova Burris  Sent: 2/26/2020  12:07 PM CST  To: [facility] Staff    Patient is returning a phone call.  Who left a message for the patient: Ghazala  Does patient know what this is regarding:  Medications  Comments:

## 2020-04-24 DIAGNOSIS — E78.2 MIXED HYPERLIPIDEMIA: ICD-10-CM

## 2020-04-24 RX ORDER — FENOFIBRATE 160 MG/1
160 TABLET ORAL DAILY
Qty: 90 TABLET | Refills: 2 | Status: SHIPPED | OUTPATIENT
Start: 2020-04-24 | End: 2021-01-11 | Stop reason: SDUPTHER

## 2020-04-27 ENCOUNTER — TELEPHONE (OUTPATIENT)
Dept: INTERNAL MEDICINE | Facility: CLINIC | Age: 65
End: 2020-04-27

## 2020-04-27 NOTE — TELEPHONE ENCOUNTER
----- Message from Manasa Hill sent at 4/27/2020  8:34 AM CDT -----  Contact: [facility] pharmacy  valsartan (DIOVAN) 320 MG tablet is on back order, please cahnge the strength to 2 160mg.[facility] #[id number] - [place] - [address] AT [address] & [facility] [address] [facility] [address] [facility] [phone] (Phone) [phone] (Fax).   Thank you

## 2020-05-04 RX ORDER — INSULIN ASPART 100 [IU]/ML
INJECTION, SOLUTION INTRAVENOUS; SUBCUTANEOUS
Qty: 75 ML | Refills: 4 | Status: SHIPPED | OUTPATIENT
Start: 2020-05-04 | End: 2021-02-23

## 2020-05-14 ENCOUNTER — TELEPHONE (OUTPATIENT)
Dept: INTERNAL MEDICINE | Facility: CLINIC | Age: 65
End: 2020-05-14

## 2020-05-14 RX ORDER — INSULIN GLARGINE 100 [IU]/ML
INJECTION, SOLUTION SUBCUTANEOUS
Qty: 45 ML | Refills: 1 | Status: SHIPPED | OUTPATIENT
Start: 2020-05-14 | End: 2020-11-09 | Stop reason: SDUPTHER

## 2020-05-14 NOTE — TELEPHONE ENCOUNTER
----- Message from Brigida Carrillo sent at 5/14/2020 11:30 AM CDT -----  Contact: Pt [phone]  Is this a refill or new RX:  Refill    RX name and strength: insulin (BASAGLAR KWIKPEN U-100 INSULIN) glargine 100 units/mL (3mL) SubQ pen     Pharmacy name and phone # [id number] - [place] - [address] AT [address] & [address] [phone] (Phone) [phone] (Fax)

## 2020-05-28 DIAGNOSIS — E11.9 TYPE 2 DIABETES MELLITUS WITHOUT COMPLICATION: ICD-10-CM

## 2020-05-31 ENCOUNTER — TELEPHONE (OUTPATIENT)
Dept: INTERNAL MEDICINE | Facility: CLINIC | Age: 65
End: 2020-05-31

## 2020-05-31 DIAGNOSIS — E11.65 UNCONTROLLED TYPE 2 DIABETES MELLITUS WITH HYPERGLYCEMIA: Primary | ICD-10-CM

## 2020-05-31 NOTE — TELEPHONE ENCOUNTER
Edgard  Would you please call and schedule pt a next available EP appt with 1 week prior fasting lab.  I've placed new lab orders.  Thanks

## 2020-06-01 RX ORDER — INSULIN ASPART 100 [IU]/ML
INJECTION, SOLUTION INTRAVENOUS; SUBCUTANEOUS
Qty: 75 ML | Refills: 0 | OUTPATIENT
Start: 2020-06-01

## 2020-06-08 DIAGNOSIS — I10 ESSENTIAL HYPERTENSION: ICD-10-CM

## 2020-06-08 RX ORDER — VALSARTAN 320 MG/1
320 TABLET ORAL DAILY
Qty: 30 TABLET | Refills: 4 | Status: SHIPPED | OUTPATIENT
Start: 2020-06-08 | End: 2020-11-09 | Stop reason: SDUPTHER

## 2020-06-08 NOTE — TELEPHONE ENCOUNTER
----- Message from Emelina Rob sent at 6/8/2020  1:41 PM CDT -----  Contact: Patient [phone]   Patient is out of medication    Requesting an RX refill or new RX.  Is this a refill or new RX:  refill  RX name and strength: valsartan (DIOVAN) 320 MG tablet  Directions (copy/paste from chart):  Take 1 tablet (320 mg total) by mouth once daily. Take 1 tab daily for Blood Pressure in place of Lisinopril - OralIs this a 30 day or 90 day RX:  90  Local pharmacy or mail order pharmacy:  local  Pharmacy name and phone #[facility] #[id number] - [place] - [address] AT [address] & [address] [phone] (Phone) [phone] (Fax)

## 2020-06-09 ENCOUNTER — PATIENT OUTREACH (OUTPATIENT)
Dept: ADMINISTRATIVE | Facility: OTHER | Age: 65
End: 2020-06-09

## 2020-06-09 DIAGNOSIS — E11.9 TYPE 2 DIABETES MELLITUS WITHOUT COMPLICATION, UNSPECIFIED WHETHER LONG TERM INSULIN USE: Primary | ICD-10-CM

## 2020-06-09 NOTE — PROGRESS NOTES
Care Everywhere: n/a  Immunization: updated  Health Maintenance: updated  Media Review: reviewed for outside colon cancer and eye exam report  Legacy Review: n/a  Order placed: diabetic eye screening photo  Upcoming appts: hemoglobin a1c 10.27.2020

## 2020-06-10 ENCOUNTER — OFFICE VISIT (OUTPATIENT)
Dept: SPORTS MEDICINE | Facility: CLINIC | Age: 65
End: 2020-06-10
Payer: COMMERCIAL

## 2020-06-10 ENCOUNTER — HOSPITAL ENCOUNTER (OUTPATIENT)
Dept: RADIOLOGY | Facility: HOSPITAL | Age: 65
Discharge: HOME | End: 2020-06-10
Attending: FAMILY MEDICINE
Payer: COMMERCIAL

## 2020-06-10 ENCOUNTER — TELEPHONE (OUTPATIENT)
Dept: INTERNAL MEDICINE | Facility: CLINIC | Age: 65
End: 2020-06-10

## 2020-06-10 VITALS — HEIGHT: 67 IN | BODY MASS INDEX: 32.18 KG/M2 | WEIGHT: 205 LBS

## 2020-06-10 DIAGNOSIS — I10 ESSENTIAL HYPERTENSION: Primary | ICD-10-CM

## 2020-06-10 DIAGNOSIS — M25.551 RIGHT HIP PAIN: ICD-10-CM

## 2020-06-10 DIAGNOSIS — M70.61 GREATER TROCHANTERIC BURSITIS OF RIGHT HIP: ICD-10-CM

## 2020-06-10 DIAGNOSIS — M16.11 PRIMARY OSTEOARTHRITIS OF RIGHT HIP: ICD-10-CM

## 2020-06-10 DIAGNOSIS — M25.551 RIGHT HIP PAIN: Primary | ICD-10-CM

## 2020-06-10 PROCEDURE — 73502 XR HIP 2 VIEW RIGHT: ICD-10-PCS | Mod: 26,RT,, | Performed by: RADIOLOGY

## 2020-06-10 PROCEDURE — 1159F MED LIST DOCD IN RCRD: CPT | Mod: S$GLB,,, | Performed by: FAMILY MEDICINE

## 2020-06-10 PROCEDURE — 73502 X-RAY EXAM HIP UNI 2-3 VIEWS: CPT | Mod: TC,RT

## 2020-06-10 PROCEDURE — 99214 OFFICE O/P EST MOD 30 MIN: CPT | Mod: 25,S$GLB,, | Performed by: FAMILY MEDICINE

## 2020-06-10 PROCEDURE — 99999 PR PBB SHADOW E&M-EST. PATIENT-LVL III: ICD-10-PCS | Mod: PBBFAC,,, | Performed by: FAMILY MEDICINE

## 2020-06-10 PROCEDURE — 1101F PR PT FALLS ASSESS DOC 0-1 FALLS W/OUT INJ PAST YR: ICD-10-PCS | Mod: CPTII,S$GLB,, | Performed by: FAMILY MEDICINE

## 2020-06-10 PROCEDURE — 20611 DRAIN/INJ JOINT/BURSA W/US: CPT | Mod: RT,S$GLB,, | Performed by: FAMILY MEDICINE

## 2020-06-10 PROCEDURE — 20611 LARGE JOINT ASPIRATION/INJECTION: R GREATER TROCHANTERIC BURSA: ICD-10-PCS | Mod: RT,S$GLB,, | Performed by: FAMILY MEDICINE

## 2020-06-10 PROCEDURE — 1125F AMNT PAIN NOTED PAIN PRSNT: CPT | Mod: S$GLB,,, | Performed by: FAMILY MEDICINE

## 2020-06-10 PROCEDURE — 1159F PR MEDICATION LIST DOCUMENTED IN MEDICAL RECORD: ICD-10-PCS | Mod: S$GLB,,, | Performed by: FAMILY MEDICINE

## 2020-06-10 PROCEDURE — 99999 PR PBB SHADOW E&M-EST. PATIENT-LVL III: CPT | Mod: PBBFAC,,, | Performed by: FAMILY MEDICINE

## 2020-06-10 PROCEDURE — 1101F PT FALLS ASSESS-DOCD LE1/YR: CPT | Mod: CPTII,S$GLB,, | Performed by: FAMILY MEDICINE

## 2020-06-10 PROCEDURE — 99214 PR OFFICE/OUTPT VISIT, EST, LEVL IV, 30-39 MIN: ICD-10-PCS | Mod: 25,S$GLB,, | Performed by: FAMILY MEDICINE

## 2020-06-10 PROCEDURE — 73502 X-RAY EXAM HIP UNI 2-3 VIEWS: CPT | Mod: 26,RT,, | Performed by: RADIOLOGY

## 2020-06-10 PROCEDURE — 1125F PR PAIN SEVERITY QUANTIFIED, PAIN PRESENT: ICD-10-PCS | Mod: S$GLB,,, | Performed by: FAMILY MEDICINE

## 2020-06-10 RX ORDER — MELOXICAM 7.5 MG/1
7.5 TABLET ORAL DAILY
Qty: 15 TABLET | Refills: 0 | Status: SHIPPED | OUTPATIENT
Start: 2020-06-10 | End: 2020-06-23

## 2020-06-10 RX ORDER — TRIAMCINOLONE ACETONIDE 40 MG/ML
40 INJECTION, SUSPENSION INTRA-ARTICULAR; INTRAMUSCULAR
Status: DISCONTINUED | OUTPATIENT
Start: 2020-06-10 | End: 2020-06-10 | Stop reason: HOSPADM

## 2020-06-10 RX ORDER — OLMESARTAN MEDOXOMIL 40 MG/1
40 TABLET ORAL DAILY
Qty: 30 TABLET | Refills: 4 | Status: SHIPPED | OUTPATIENT
Start: 2020-06-10 | End: 2021-01-13 | Stop reason: ALTCHOICE

## 2020-06-10 RX ADMIN — TRIAMCINOLONE ACETONIDE 40 MG: 40 INJECTION, SUSPENSION INTRA-ARTICULAR; INTRAMUSCULAR at 04:06

## 2020-06-10 NOTE — TELEPHONE ENCOUNTER
Spoke with pharmacist and will change the Valsartan 320mg to Benicar 40mg daily until the Valsartan is available from back order.

## 2020-06-10 NOTE — PROGRESS NOTES
D[place] Garcia, a 64 y.o. male, is here for evaluation of right hip. A few weeks ago he was riding his bicycle (10 miles); golf next day or two while playing golf he felt a sharp pain on his posterior hip near his buttocks. He has previously seen Dr. Contreras in 2019.12 who performed a R. hip gtb csi and prescribed fPT. Pt. denies any saddle anesthesia / numbness in pelvis, change in bladder habits or change in bowel habits.       HISTORY OF PRESENT ILLNESS   Location: posterior thigh, right  Onset: acute, 05.20.2020  Palliative:    Relative rest   Oral analgesics - gabapentin (rx 11.20.2019)   Chiropractor - minimal improvement   Ambulation assistance - none   CSI, R. GTB, 12.11.2019, per Dr. Contreras, 100% improvement for   Provocative:   ADLs  Prolonged sitting   Prolonged walking   Prior:  has a past surgical history that includes Carpal tunnel release, left; Ankle Fusion and Arthroscopy knee, Right (Dr. Sena, 08.30.2017).  PMHx: DM2  Progression: worsening discomfort   Quality:    sharp  Radiation: none  Severity: per nursing documentation  Timing: intermittent with use  Trauma: chronic overuse starting 05.20.2020 including bicycle riding and golf.     Review of systems (ROS):  A 10+ review of systems was performed with pertinent positives and negatives noted above in the history of present illness. Other systems were negative unless otherwise specified.      PHYSICAL EXAMINATION  General:  The patient is alert and oriented x 3.  Mood is pleasant.  Observation of ears, eyes and nose reveal no gross abnormalities.  HEENT: NCAT, sclera nonicteric  Lungs: Respirations are equal and unlabored.   Gait is coordinated. Patient can toe walk and heel walk without difficulty.    HIP/PELVIS EXAMINATION    Observation/Inspection  Gait:   Nonantalgic   Alignment:  Neutral   Scars:   None   Muscle atrophy: None   Effusion:  None   Warmth:  None   Discoloration:   None   Leg lengths:   Equal   Pelvis:    Level      Tenderness/Crepitus (T/C):      T / C  Trochanteric bursa   +/ -  Piriformis    - / -  SI joint    - / -  Psoas tendon   - / -  Rectus insertion  - / -  Adductor insertion  - / -  Pubic symphysis  - / -    ROM: (* = pain)    Flexion:      120 degrees  External rotation:   40 degrees  Internal rotation with axial load:  30 degrees  Internal rotation without axial load:  40 degrees  Abduction:    45 degrees  Adduction:     20 degrees    Special Tests:  Pain w/ forced internal rotation (FADIR):  -   Pain w/ forced external rotation (IMMANUEL):  +   Circumduction test:     -  Stinchfield test:     -   Log roll:       +  Snapping hip (internal):    -   Sit-up pain:      -   Resisted sit-up pain:     -   Resisted sit-up with adductor contraction pain:  -   Step-down test:     +  Trendelenburg test:     +  Bridge test      +     Extremity Neuro-vascular Examination:   Sensation:  Grossly intact to light touch all dermatomal regions.   Motor Function:  Fully intact motor function at hip, knee, foot and ankle    DTRs;  quadriceps and  achilles 2+.  No clonus and downgoing Babinski.    Vascular status:  DP and PT pulses 2+, brisk capillary refill, symmetric.    Skin:  intact, compartments soft.    Other Findings:    ASSESSMENT & PLAN  Assessment  #1 Tonnis Grade II osteoarthritis of hip, right   W/ greater trochanteric bursitis    No evidence of neurologic pathology  No evidence of vascular pathology    Imaging studies reviewed:   MRI lumbar spine, 19.11  X-ray hip, right 20.06    Plan  We discussed the importance of appropriate diet, weight, and regular exercise    We discussed options including    Watchful waiting / relative rest    Physical therapy x   Injection therapy csi gtb r   Consultation    The patient chooses As above   x = prescribed  CSI = corticosteroid injection  VSI = viscosupplement injection  PRPI = platelet rich plasma injection  ia = intra articular  R = right  L = left  B = bilateral   nfSx = surgical  consultation was recommended, but patient is not interested in consultation at this time    Physical Therapy        Formal (fPT), @ [facility] facility b   Formal (fPT), @ OSH facility        Homegoing (hgPT), per concurrent fPT recommendations    Homegoing (hgPT), per prior fPT recommendations    Homegoing (hgPT), handout provided        w/  (atPT)    [blank] = not prescribed  x = prescribed  b = prescribed, and begin as indicated  t = continue as indicated  r = prescribed, and restart as indicated  p = completed prior as indicated  hs = prescribed, and with high school   col = prescribed, and with college or university   nfPT = physical therapy was recommended, but patient is not interested in PT at this time    Activity (e.g. sports, work) restrictions    [blank] = as tolerated  pt = per physical therapist  at = per     Bracing    [blank] = not prescribed  r = recommended, but not fit with at todays visit  f = prescribed and fit with at todays visit  t = continue as indicated  p = prn use on rare, as-needed basis; advised against chronic use    Pain management m   [blank] = No prescription necessary. A handout detailing dosing of appropriate   over-the-counter musculoskeletal analgesics was made available to the patient.   m = meloxicam x 14 days  mp = 14 day course of meloxicam prescribed prior    Follow up 12   [blank] = as needed  [number] = in [number] weeks  CSI = for corticosteroid injection  VSI = for viscosupplement injection or injection series  PRP = for platelet rich plasma injection or injection series  MRI = after MRI imaging  ns = should surgical options be deferred (no surgery)  o = appointment offered, deferred by patient    Should symptoms worsen or fail to resolve, consider    Revisiting the above options and / or csi other vs  Mri hip       Vocation:   2 teenage daughters  both prior  and [employer] affiliations  also see for elbow,  left

## 2020-06-10 NOTE — PROCEDURES
"Large Joint Aspiration/Injection: R greater trochanteric bursa  Date/Time: 6/10/2020 4:00 PM  Performed by: Erik Cohen MD  Authorized by: Eirk Cohen MD     Consent Done?:  Yes (Verbal)  Indications:  Pain  Site marked: the procedure site was marked    Timeout: prior to procedure the correct patient, procedure, and site was verified    Prep: patient was prepped and draped in usual sterile fashion    Local anesthesia used?: No      Details:  Needle Size:  22 G  Ultrasonic Guidance for needle placement?: Yes    Images are saved and documented.  Approach:  Lateral  Location:  Hip  Site:  R greater trochanteric bursa  Medications:  40 mg triamcinolone acetonide 40 mg/mL  Patient tolerance:  Patient tolerated the procedure well with no immediate complications     Description of ultrasound utilization for needle guidance:   Ultrasound guidance used for needle localization. Images saved and stored for documentation. The greater trochanter and its bursa were visualized. Dynamic visualization of the 22g x 3.5" needle was continuous throughout the procedure.       "

## 2020-06-10 NOTE — TELEPHONE ENCOUNTER
----- Message from Marisa Sandhu sent at 6/10/2020 11:42 AM CDT -----  Contact: [phone] sydnie   Type:RX Refill Request  Who Called:   Best Call Back Number:    Preferred Pharmacy:[facility] #[id number] - [place], [place] - [address] AT [address] & [address]  Ordering Provider:  RX Name and Strength:valsartan (DIOVAN) 320 MG tablet    Additional Information: pharmacy would like to know if dr will call in a alternative, valsartan is on back order for months , pt is out of meds

## 2020-06-12 ENCOUNTER — TELEPHONE (OUTPATIENT)
Dept: SPORTS MEDICINE | Facility: CLINIC | Age: 65
End: 2020-06-12

## 2020-06-12 NOTE — TELEPHONE ENCOUNTER
S:  Since wednesday at 7 am frequent urination, indigestion, chronic diarrhea, lethargic. Explained how these are not typical sx from an injection and that the injection was that evening not the day before. Suggested that he discuss these symptoms with his PCP.     Reports that he is not having any pain in his hip after the injection. Did review that his insulin levels might fluctuate 48 hours after the injection    A: possible gastroenteritis.     P:   #1 pt. needs to follow up with his PCP    Zaid Forte   Orthopaedic Clinical Assistant  [facility]               ----- Message from Jordana Moreno sent at 6/12/2020 12:34 PM CDT -----  Contact: Pt. [phone]  The patient would like to speak to someone regarding his reaction to the injection. Please contact the patient to discuss further.

## 2020-06-26 ENCOUNTER — CLINICAL SUPPORT (OUTPATIENT)
Dept: REHABILITATION | Facility: HOSPITAL | Age: 65
End: 2020-06-26
Attending: FAMILY MEDICINE
Payer: COMMERCIAL

## 2020-06-26 DIAGNOSIS — M25.551 RIGHT HIP PAIN: ICD-10-CM

## 2020-06-26 DIAGNOSIS — M62.81 MUSCLE WEAKNESS: ICD-10-CM

## 2020-06-26 PROCEDURE — 97110 THERAPEUTIC EXERCISES: CPT

## 2020-06-26 PROCEDURE — 97161 PT EVAL LOW COMPLEX 20 MIN: CPT

## 2020-06-26 NOTE — PLAN OF CARE
[facility]  Physical Therapy Initial Evaluation    Name: [place] RUFFIN [facility] Number: [id number]    Therapy Diagnosis:   Encounter Diagnoses   Name Primary?    Right hip pain     Muscle weakness      Physician: Erik Cohen, *    Physician Orders: PT Eval and Treat  Medical Diagnosis from Referral: M25.551 (ICD-10-CM) - Right hip pain  Evaluation Date: 6/26/2020  Authorization Period Expiration: 12/31/2020  Plan of Care Expiration: 12/31/2020  Visit # / Visits authorized: 1/ 20    Time In: 0735  Time Out: 0830  Total Billable Time: 40 minutes    Precautions: Standard    Subjective     Date of onset: Chronic with recent exacerbation a few months ago   History of current condition - Dallas reports: insidious onset R post hip pain with prolonged sitting a year ago but recent exacerbation with bike riding 2 days in a row. Reports that by the end of his work day he has to get up and walk around a lot to improve pain. Denies LBP, denies popping, clicking, snapping, in hip, denies radiating pain, denies N+T.      Imaging none     Prior Therapy: none for hip   Exercise Routine/Sport Participation: generally sedentary due to responsibilities but looking to join gym in august.   Social History: Lives at home wit daughters   Occupation:  manager   Prior Level of Function: Able to sit for long periods of time   Current Level of Function: Unable to sit for long periods of time     Pain:  Current 1/10, worst 3/10, best 1/10   Location: right post hip   Description: Aching, Tight and Deep  Aggravating Factors: Sitting  Easing Factors: movement     Pts goals: decrease pain       Medical History:   Past Medical History:   Diagnosis Date    Allergy     Anxiety     Arthritis     Cataract     Colon polyps 2009    Diabetes mellitus     Diabetes mellitus type II     GERD (gastroesophageal reflux disease)     History of alcohol abuse     Hyperlipidemia      Hypertension     Neuromuscular disorder        Surgical History:   D[place] Garcia  has a past surgical history that includes Carpal tunnel release; Ankle Fusion; Uvalectomy; Vasectomy; Fracture surgery; and Knee surgery.    Medications:   Dallas has a current medication list which includes the following prescription(s): amlodipine, aspirin, atorvastatin, bd ultra-fine cori pen needle, buspirone, buspirone, ergocalciferol, fenofibrate, fish oil-omega-3 fatty acids, gabapentin, ibuprofen, insulin, insulin detemir u-100, insulin lispro, jentadueto, loratadine, meloxicam, metoprolol succinate, novolog flexpen u-100 insulin, olmesartan, omeprazole, onetouch ultra blue test strip, onetouch ultrasoft lancets, pen needle, diabetic, pramoxine-hydrocortisone, and valsartan.    Allergies:   Review of patient's allergies indicates:  No Known Allergies     Objective     Posture: unremarkale     Gait: reverse trendelenburg R       Hip Passive Range of Motion:   Right  Left    Flexion 110 110   Extension 20 20   Ext. Rotation 45 45   Int. Rotation 35 35     Knee Passive Range of Motion: WNL     Ankle Passive Range of Motion:   Right  Left    Dorsiflexion 0 0       Standing Thoracolumbar Range of Motion:    % Observation Pain   Flexion 50 Pulling in R hip  Y    Extension 25 Hinge mid lumbar  N   Right Rotation 50 NA N   Left Rotation 50 NA N   Right Sidebend 100 na N   Left Sidebend 100 na N       Lower Extremity Strength   Right  Left    Quadriceps: 4+/5 5/5   Hamstring at 90 de+/5 4/5   ADD (sitting): 5/5 5/5   Iliopsoas (sitting): 4/5 4+/5   Hip extension:  4/5 4/5   PGM: 3/5 4/5       Special Tests:    Right  Left    ANGELICA - -   IMMANUEL - -   Hip Scour - -   SLS Glute Med Test + -   Prone LE Extension + -     SIJ  Right  Left    Thigh Thrust - -   Compression - -   Distraction - -   Sacral Thrust - -       Neural Tension Testing:   Slump:+ R with knee ext  SLR: + R   Femoral Nerve Glide: NT      Joint Mobility:  normal hip mobility, mild dec n L4-5 mobility     Palpation: no sig ttp at post hip/lumbar spine       Flexibility:   Ely's test: R + ; L +    Hamstrings: R + ; L  -       Treatment     Treatment Time In: 0800  Treatment Time Out: 0815  Total Treatment time separate from Evaluation: 15 minutes    Dallas received therapeutic exercises to develop ROM, flexibility and core stabilization for 15 minutes including:  Seated sciatic nerve gliders 10x   Sidelying Throacic Rotations 15x B   Paloff Press gtb 20x B     To add Next visit:   Seated throacic ext in chair   Bike completed for 10 min to increase ROM, endurance and decrease pain to improve tolerance to ADLs and age related activities.   Bridge B   Hand heel rocks   Planks (modified as needed)     Home Exercises and Patient Education Provided     Education provided:   - Increasing general activity   - Getting up from desk every 15-30min to ensure no pain durng the day.   - Prognosis, activity modification, goals for therapy, role of therapy for care, exercises/HEP    Written Home Exercises Provided: yes.  Exercises were reviewed and Dallas was able to demonstrate them prior to the end of the session.   Pt received a written copy of exercises to perform at home. Dallas demonstrated good  understanding of the education provided.     See EMR under patient instructions for exercises given.     Assessment     Dallas is a 64 y.o. male referred to outpatient Physical Therapy with R sided posterior hip pain due to underlying mobility deficits in lumbar and thoracic spine. The pt with increased stretching to post hip structures leading to pain with prolonged hip flexion such as sitting. The pt therex will focus on core and hip stability based exercises, general cardiovascular fitness, mobility of lumbar and thoracic spine to improve tolerance to his ADLs and IADLs and inc QOL.       Pt will benefit from skilled outpatient Physical Therapy to address the deficits stated  above and in the chart below, provide pt/family education, and to maximize pt's level of independence. Pt prognosis is Good.     Plan of care discussed with patient: Yes  Pt's spiritual, cultural and educational needs considered and patient is agreeable to the plan of care and goals as stated below:       Anticipated Barriers for therapy: work schedule       Medical Necessity is demonstrated by the following  History  Co-morbidities and personal factors that may impact the plan of care Co-morbidities:   anxiety, diabetes, high BMI and HTN    Personal Factors:   age  coping style  lifestyle     moderate   Examination  Body Structures and Functions, activity limitations and participation restrictions that may impact the plan of care Body Regions:   back  lower extremities  trunk    Body Systems:    ROM  strength  balance  gait  transfers  motor control    Participation Restrictions:   none    Activity limitations:   Learning and applying knowledge  No deficit    General Tasks and Commands  No deficit    Communication  No deficit    Mobility  lifting and carrying objects  walking  driving (bike, car, motorcycle)  sitting     Self care  No deficit    Domestic Life  No deficit    Interactions/Relationships  No deficit    Life Areas  Sitting for work     Community and Social Life  No deficit          moderate   Clinical Presentation stable and uncomplicated low   Decision Making/ Complexity Score: low     Goals:  Short Term Goals: 2-4 weeks  1. Pt will be compliant with HEP 50% of prescribed amount.   2. The pt to demo improvement in R Lumbar AROM to improve all directions by at least 25% and be pain free.    3.  The pt to demo pain free slump and SLR testing R compared to L     Long Term Goals: 24 weeks   1. Pt will be compliant with % of prescribed amount.   2. The pt to report tolerance to sitting for >60 min without pain in hip   3. The pt will report full participation in ADLs and IADLs without restrictions  related to R post hip.     Plan   Plan of care Certification: 6/26/2020 to 12/22/2020.    Outpatient Physical Therapy 2 times weekly for 24 weeks to include the following interventions: Gait Training, Manual Therapy, Moist Heat/ Ice, Neuromuscular Re-ed, Patient Education, Therapeutic Activites and Therapeutic Exercise.     Sushila Cuevas, PT , DPT, SCS, FAAMOMPT

## 2020-07-08 ENCOUNTER — CLINICAL SUPPORT (OUTPATIENT)
Dept: REHABILITATION | Facility: HOSPITAL | Age: 65
End: 2020-07-08
Attending: FAMILY MEDICINE
Payer: COMMERCIAL

## 2020-07-08 DIAGNOSIS — M62.81 MUSCLE WEAKNESS: ICD-10-CM

## 2020-07-08 DIAGNOSIS — M25.551 RIGHT HIP PAIN: ICD-10-CM

## 2020-07-08 PROCEDURE — 97112 NEUROMUSCULAR REEDUCATION: CPT

## 2020-07-08 PROCEDURE — 97110 THERAPEUTIC EXERCISES: CPT

## 2020-07-08 NOTE — PROGRESS NOTES
Physical Therapy Daily Treatment Note     Name: [place] RUFFIN [facility] Number: [id number]    Therapy Diagnosis:   Encounter Diagnoses   Name Primary?    Right hip pain     Muscle weakness      Physician: Erik Cohen, *    Visit Date: 7/8/2020  Physician Orders: PT Eval and Treat  Medical Diagnosis from Referral: M25.551 (ICD-10-CM) - Right hip pain  Evaluation Date: 6/26/2020  Authorization Period Expiration: 12/31/2020  Plan of Care Expiration: 12/31/2020  Visit # / Visits authorized: 2/ 20     Time In: 0731  Time Out: 0815  Total Billable Time: 42 minutes     Precautions: Standard    Subjective     Pt reports: he drove to [employer] this back weekend and on the way there his hip was hurting a lot but after spending a long weekend exercising and moving his hip didn't hurt as much when he ws driving back and it still is feeling better.    He was compliant with home exercise program.  Response to previous treatment: improvement in pain   Functional change: able to sit longer     Pain: 2/10  Location: right hip       Objective     Daily Measurements: na       Daily Treatment       Dallas received therapeutic exercises to develop strength, endurance and ROM for 28 minutes including:  Bike completed for 10 min to increase ROM, endurance and decrease pain to improve tolerance to ADLs and age related activities.     sidelying Thoracic Rotations 20x B     WGS modified 3x5 B       Dallas participated in neuromuscular re-education activities to improve: Posture and motor control for 14 minutes. The following activities were included:  Bridges 20x 5s holds   Paloff Press 20x gtb 3 rds B     Home Exercises and Patient Education Provided     Education provided:   - Trying to exercise as much as possible durign the week (30min per day)    Written Home Exercises Provided: Patient instructed to cont prior HEP.  Exercises were reviewed and Dallas was able to demonstrate them prior to the end of the session.   Dallas demonstrated good  understanding of the education provided.     See EMR under patient instructions for exercises given.     Assessment     The pt able to complete all therex as prescribed but required extra time to complete due to deconditioning. The pt with cont improvement in pain following movement.     Dallas is progressing well towards his goals.     Pt will continue to benefit from skilled outpatient physical therapy to address the deficits listed in the problem list box on initial evaluation, provide pt/family education and to maximize pt's level of independence in the home and community environment. Pt prognosis is Good.     Pt's spiritual, cultural and educational needs considered and pt agreeable to plan of care and goals.    Anticipated barriers to physical therapy: None    Goals:  Short Term Goals: 2-4 weeks  1. Pt will be compliant with HEP 50% of prescribed amount.   2. The pt to demo improvement in R Lumbar AROM to improve all directions by at least 25% and be pain free.    3.  The pt to demo pain free slump and SLR testing R compared to L      Long Term Goals: 24 weeks   1. Pt will be compliant with % of prescribed amount.   2. The pt to report tolerance to sitting for >60 min without pain in hip   3. The pt will report full participation in ADLs and IADLs without restrictions related to R post hip.     Plan     Focus on core and hip stability based exercises, general cardiovascular fitness, mobility of lumbar and thoracic spine to improve tolerance to his ADLs and IADLs and inc QOL.      Sushila Cuevas, PT , DPT, SCS, FAAOMPT

## 2020-07-09 DIAGNOSIS — E78.2 MIXED HYPERLIPIDEMIA: ICD-10-CM

## 2020-07-09 RX ORDER — ATORVASTATIN CALCIUM 80 MG/1
80 TABLET, FILM COATED ORAL DAILY
Qty: 30 TABLET | Refills: 4 | Status: SHIPPED | OUTPATIENT
Start: 2020-07-09 | End: 2020-10-15 | Stop reason: SDUPTHER

## 2020-07-16 ENCOUNTER — CLINICAL SUPPORT (OUTPATIENT)
Dept: REHABILITATION | Facility: HOSPITAL | Age: 65
End: 2020-07-16
Attending: FAMILY MEDICINE
Payer: COMMERCIAL

## 2020-07-16 DIAGNOSIS — M25.551 RIGHT HIP PAIN: ICD-10-CM

## 2020-07-16 DIAGNOSIS — M62.81 MUSCLE WEAKNESS: ICD-10-CM

## 2020-07-16 PROCEDURE — 97110 THERAPEUTIC EXERCISES: CPT

## 2020-07-16 PROCEDURE — 97530 THERAPEUTIC ACTIVITIES: CPT

## 2020-07-16 NOTE — PROGRESS NOTES
Physical Therapy Daily Treatment Note     Name: Dallas RUFFIN [facility] Number: [id number]    Therapy Diagnosis:   Encounter Diagnoses   Name Primary?    Right hip pain     Muscle weakness      Physician: Erik Cohen, *    Visit Date: 7/16/2020  Physician Orders: PT Eval and Treat  Medical Diagnosis from Referral: M25.551 (ICD-10-CM) - Right hip pain  Evaluation Date: 6/26/2020  Authorization Period Expiration: 12/31/2020  Plan of Care Expiration: 12/31/2020  Visit # / Visits authorized: 3/ 20     Time In: 0730  Time Out: 0815  Total Billable Time: 45 minutes     Precautions: Standard    Subjective     Pt reports: he has been feeling much better- about 75% improved from last visit and is considering playing golf following the session next week.     He was compliant with home exercise program.  Response to previous treatment: improvement in pain   Functional change: able to sit longer     Pain: 2/10  Location: right hip       Objective     Daily Measurements: pain free AROM lumbar spine     Daily Treatment       Dallas received therapeutic exercises to develop strength, endurance and ROM for 28 minutes including:  Bike completed for 15 min to increase ROM, endurance and decrease pain to improve tolerance to ADLs and age related activities.   Standing thoracic ext (mod dd) 10x10s holds     sidelying Thoracic Rotations 20x B     Dallas participated in dynamic functional therapeutic activities to improve functional performance for 10  minutes, including:  Thrusters sit to stands 5# 3x10    WGS modified 3x5 B       Dallas participated in neuromuscular re-education activities to improve: Posture and motor control for 05 minutes. The following activities were included:  Bridges 20x 5s holds     Home Exercises and Patient Education Provided     Education provided:   - Trying to exercise as much as possible durign the week (30min per day)    Written Home Exercises Provided: Patient instructed to cont  prior HEP.  Exercises were reviewed and Dallas was able to demonstrate them prior to the end of the session.  Dallas demonstrated good  understanding of the education provided.     See EMR under patient instructions for exercises given.     Assessment     The pt with excellent tolerance to new therex, no increase in pain and reported increase in mobility of low back following treatment session. Advised the patient that he should try to golf following next tx session to determine is progress thus far     Dallas is progressing well towards his goals.     Pt will continue to benefit from skilled outpatient physical therapy to address the deficits listed in the problem list box on initial evaluation, provide pt/family education and to maximize pt's level of independence in the home and community environment. Pt prognosis is Good.     Pt's spiritual, cultural and educational needs considered and pt agreeable to plan of care and goals.    Anticipated barriers to physical therapy: None    Goals:  Short Term Goals: 2-4 weeks  1. Pt will be compliant with HEP 50% of prescribed amount.   2. The pt to demo improvement in R Lumbar AROM to improve all directions by at least 25% and be pain free.    3.  The pt to demo pain free slump and SLR testing R compared to L      Long Term Goals: 24 weeks   1. Pt will be compliant with % of prescribed amount.   2. The pt to report tolerance to sitting for >60 min without pain in hip   3. The pt will report full participation in ADLs and IADLs without restrictions related to R post hip.     Plan     Focus on core and hip stability based exercises, general cardiovascular fitness, mobility of lumbar and thoracic spine to improve tolerance to his ADLs and IADLs and inc QOL.      Sushila Cuevas, PT , DPT, SCS, FAAOMPT

## 2020-08-01 ENCOUNTER — TELEPHONE (OUTPATIENT)
Dept: INTERNAL MEDICINE | Facility: CLINIC | Age: 65
End: 2020-08-01

## 2020-08-01 DIAGNOSIS — E55.9 VITAMIN D DEFICIENCY: ICD-10-CM

## 2020-08-03 RX ORDER — ERGOCALCIFEROL 1.25 MG/1
CAPSULE ORAL
Qty: 4 CAPSULE | Refills: 6 | Status: SHIPPED | OUTPATIENT
Start: 2020-08-03 | End: 2021-01-25

## 2020-09-10 ENCOUNTER — PATIENT OUTREACH (OUTPATIENT)
Dept: ADMINISTRATIVE | Facility: HOSPITAL | Age: 65
End: 2020-09-10

## 2020-10-07 ENCOUNTER — PATIENT MESSAGE (OUTPATIENT)
Dept: ADMINISTRATIVE | Facility: HOSPITAL | Age: 65
End: 2020-10-07

## 2020-10-15 DIAGNOSIS — E78.2 MIXED HYPERLIPIDEMIA: ICD-10-CM

## 2020-10-15 RX ORDER — ATORVASTATIN CALCIUM 80 MG/1
80 TABLET, FILM COATED ORAL DAILY
Qty: 30 TABLET | Refills: 0 | Status: SHIPPED | OUTPATIENT
Start: 2020-10-15 | End: 2020-11-10 | Stop reason: SDUPTHER

## 2020-10-26 ENCOUNTER — PATIENT MESSAGE (OUTPATIENT)
Dept: INTERNAL MEDICINE | Facility: CLINIC | Age: 65
End: 2020-10-26

## 2020-10-26 ENCOUNTER — TELEPHONE (OUTPATIENT)
Dept: INTERNAL MEDICINE | Facility: CLINIC | Age: 65
End: 2020-10-26

## 2020-10-26 DIAGNOSIS — I10 ESSENTIAL HYPERTENSION: ICD-10-CM

## 2020-10-26 RX ORDER — OLMESARTAN MEDOXOMIL 40 MG/1
40 TABLET ORAL DAILY
Qty: 30 TABLET | Refills: 4 | OUTPATIENT
Start: 2020-10-26 | End: 2021-10-26

## 2020-10-26 NOTE — TELEPHONE ENCOUNTER
Ghazala, please call Mr Garcia.  He is an uncontrolled diabetic with other chronic issues who is due for his annual in November which he just cancelled.  He has cancelled multiple appointments in the past.  Help/Thanks

## 2020-11-09 DIAGNOSIS — M79.642 HAND PAIN, LEFT: ICD-10-CM

## 2020-11-09 DIAGNOSIS — I10 ESSENTIAL HYPERTENSION: ICD-10-CM

## 2020-11-09 RX ORDER — VALSARTAN 320 MG/1
320 TABLET ORAL DAILY
Qty: 30 TABLET | Refills: 0 | Status: SHIPPED | OUTPATIENT
Start: 2020-11-09 | End: 2020-12-07 | Stop reason: SDUPTHER

## 2020-11-09 RX ORDER — AMLODIPINE BESYLATE 10 MG/1
10 TABLET ORAL DAILY
Qty: 30 TABLET | Refills: 0 | Status: SHIPPED | OUTPATIENT
Start: 2020-11-09 | End: 2020-12-07 | Stop reason: SDUPTHER

## 2020-11-09 RX ORDER — INSULIN GLARGINE 100 [IU]/ML
INJECTION, SOLUTION SUBCUTANEOUS
Qty: 45 ML | Refills: 0 | Status: SHIPPED | OUTPATIENT
Start: 2020-11-09 | End: 2020-12-14

## 2020-11-09 RX ORDER — A/SINGAPORE/GP1908/2015 IVR-180 (AN A/MICHIGAN/45/2015 (H1N1)PDM09-LIKE VIRUS, A/HONG KONG/4801/2014, NYMC X-263B (H3N2) (AN A/HONG KONG/4801/2014-LIKE VIRUS), AND B/BRISBANE/60/2008, WILD TYPE (A B/BRISBANE/60/2008-LIKE VIRUS) 15; 15; 15 UG/.5ML; UG/.5ML; UG/.5ML
INJECTION, SUSPENSION INTRAMUSCULAR
COMMUNITY
Start: 2020-10-24 | End: 2021-03-03 | Stop reason: ALTCHOICE

## 2020-11-09 RX ORDER — BUSPIRONE HYDROCHLORIDE 5 MG/1
5 TABLET ORAL 2 TIMES DAILY
Qty: 60 TABLET | Refills: 0 | Status: SHIPPED | OUTPATIENT
Start: 2020-11-09 | End: 2021-01-20 | Stop reason: SDUPTHER

## 2020-11-09 NOTE — TELEPHONE ENCOUNTER
----- Message from Rosalba Meadows sent at 11/9/2020  1:58 PM CST -----  Contact: [phone]  Requesting an RX refill or new RX.  Is this a refill or new RX: refill  RX name and strength:insulin (BASAGLAR KWIKPEN U-100 INSULIN) glargine 100 units/mL (3mL) SubQ pen 45 mL   Is this a 30 day or 90 day RX:   Pharmacy name and phone # (copy/paste from chart):  [id number] - [place], [place] - [address] AT [facility] [address] -[phone] (Phone)  [phone] (Fax)  Comments:     Requesting an RX refill or new RX.  Is this a refill or new RX: refill  RX name and strength:busPIRone (BUSPAR) 5 MG Tab 60 tablet   Is this a 30 day or 90 day RX:   Pharmacy name and phone # (copy/paste from chart):  [id number] [place] - [address] AT [facility] [address] -[phone] (Phone)  [phone] (Fax)  Comments:          Requesting an RX refill or new RX.  Is this a refill or new RX: refill  RX name and strength:valsartan (DIOVAN) 320 MG tablet 30 tablet   Is this a 30 day or 90 day RX: 30  Pharmacy name and phone # (copy/paste from chart):  [id number] - [address] AT [facility] [address] [place] -[phone] (Phone)  [phone] (Fax)  Comments:          Requesting an RX refill or new RX.  Is this a refill or new RX: refill  RX name and strength:amLODIPine (NORVASC) 10 MG tablet 90 tablet   Is this a 30 day or 90 day RX: 90  Pharmacy name and phone # (copy/paste from chart):  [id number] [place] - [address] AT [facility] [address] [place] -[phone] (Phone) [phone] (Fax)  Comments:

## 2020-11-10 DIAGNOSIS — E78.2 MIXED HYPERLIPIDEMIA: ICD-10-CM

## 2020-11-10 RX ORDER — ATORVASTATIN CALCIUM 80 MG/1
80 TABLET, FILM COATED ORAL DAILY
Qty: 30 TABLET | Refills: 0 | Status: SHIPPED | OUTPATIENT
Start: 2020-11-10 | End: 2021-01-11 | Stop reason: SDUPTHER

## 2020-12-07 DIAGNOSIS — I10 ESSENTIAL HYPERTENSION: ICD-10-CM

## 2020-12-07 DIAGNOSIS — M79.642 HAND PAIN, LEFT: ICD-10-CM

## 2020-12-07 DIAGNOSIS — E11.65 UNCONTROLLED TYPE 2 DIABETES MELLITUS WITH HYPERGLYCEMIA: Primary | ICD-10-CM

## 2020-12-07 DIAGNOSIS — Z12.5 PROSTATE CANCER SCREENING: ICD-10-CM

## 2020-12-07 NOTE — TELEPHONE ENCOUNTER
Ghazala, please call Mr Garcia and schedule him an Appt for Physical with Lizbeth or I cannot refill his medications.  He has cancelled numerous appts and failed to return your call to schedule a RTC.  He needs to be seen.  Thanks

## 2020-12-09 RX ORDER — AMLODIPINE BESYLATE 10 MG/1
10 TABLET ORAL DAILY
Qty: 30 TABLET | Refills: 0 | Status: SHIPPED | OUTPATIENT
Start: 2020-12-09 | End: 2021-01-11 | Stop reason: SDUPTHER

## 2020-12-09 RX ORDER — VALSARTAN 320 MG/1
320 TABLET ORAL DAILY
Qty: 30 TABLET | Refills: 0 | Status: SHIPPED | OUTPATIENT
Start: 2020-12-09 | End: 2021-01-11 | Stop reason: SDUPTHER

## 2020-12-09 NOTE — TELEPHONE ENCOUNTER
Len Viavr needs fasting  lab  2-3 days prior to Lizbeth's appt; can you schedule this; orders are in.  Thanks

## 2020-12-14 ENCOUNTER — OFFICE VISIT (OUTPATIENT)
Dept: INTERNAL MEDICINE | Facility: CLINIC | Age: 65
End: 2020-12-14
Payer: COMMERCIAL

## 2020-12-14 VITALS
HEIGHT: 67 IN | BODY MASS INDEX: 35.36 KG/M2 | DIASTOLIC BLOOD PRESSURE: 74 MMHG | OXYGEN SATURATION: 99 % | SYSTOLIC BLOOD PRESSURE: 158 MMHG | TEMPERATURE: 98 F | WEIGHT: 225.31 LBS | HEART RATE: 77 BPM

## 2020-12-14 DIAGNOSIS — E78.1 HYPERTRIGLYCERIDEMIA: ICD-10-CM

## 2020-12-14 DIAGNOSIS — I10 HTN (HYPERTENSION), BENIGN: ICD-10-CM

## 2020-12-14 DIAGNOSIS — E78.5 DYSLIPIDEMIA: ICD-10-CM

## 2020-12-14 DIAGNOSIS — M54.41 RIGHT-SIDED LOW BACK PAIN WITH RIGHT-SIDED SCIATICA, UNSPECIFIED CHRONICITY: ICD-10-CM

## 2020-12-14 DIAGNOSIS — Z12.11 COLON CANCER SCREENING: ICD-10-CM

## 2020-12-14 DIAGNOSIS — F10.10 ALCOHOL ABUSE: ICD-10-CM

## 2020-12-14 DIAGNOSIS — K76.0 FATTY LIVER: ICD-10-CM

## 2020-12-14 DIAGNOSIS — Z00.00 ANNUAL PHYSICAL EXAM: Primary | ICD-10-CM

## 2020-12-14 DIAGNOSIS — M70.71 ISCHIAL BURSITIS OF RIGHT SIDE: ICD-10-CM

## 2020-12-14 DIAGNOSIS — G47.30 SLEEP APNEA, UNSPECIFIED TYPE: ICD-10-CM

## 2020-12-14 DIAGNOSIS — E11.69 DIABETES MELLITUS TYPE 2 IN OBESE: ICD-10-CM

## 2020-12-14 DIAGNOSIS — K21.9 GASTROESOPHAGEAL REFLUX DISEASE, UNSPECIFIED WHETHER ESOPHAGITIS PRESENT: ICD-10-CM

## 2020-12-14 DIAGNOSIS — E66.09 NON MORBID OBESITY DUE TO EXCESS CALORIES: ICD-10-CM

## 2020-12-14 DIAGNOSIS — E66.9 DIABETES MELLITUS TYPE 2 IN OBESE: ICD-10-CM

## 2020-12-14 PROBLEM — R60.9 EDEMA: Status: RESOLVED | Noted: 2017-08-24 | Resolved: 2020-12-14

## 2020-12-14 PROCEDURE — 3077F PR MOST RECENT SYSTOLIC BLOOD PRESSURE >= 140 MM HG: ICD-10-PCS | Mod: CPTII,S$GLB,, | Performed by: NURSE PRACTITIONER

## 2020-12-14 PROCEDURE — 3078F DIAST BP <80 MM HG: CPT | Mod: CPTII,S$GLB,, | Performed by: NURSE PRACTITIONER

## 2020-12-14 PROCEDURE — 1125F PR PAIN SEVERITY QUANTIFIED, PAIN PRESENT: ICD-10-PCS | Mod: S$GLB,,, | Performed by: NURSE PRACTITIONER

## 2020-12-14 PROCEDURE — 99999 PR PBB SHADOW E&M-EST. PATIENT-LVL V: ICD-10-PCS | Mod: PBBFAC,,, | Performed by: NURSE PRACTITIONER

## 2020-12-14 PROCEDURE — 3288F PR FALLS RISK ASSESSMENT DOCUMENTED: ICD-10-PCS | Mod: CPTII,S$GLB,, | Performed by: NURSE PRACTITIONER

## 2020-12-14 PROCEDURE — 3008F PR BODY MASS INDEX (BMI) DOCUMENTED: ICD-10-PCS | Mod: CPTII,S$GLB,, | Performed by: NURSE PRACTITIONER

## 2020-12-14 PROCEDURE — 3008F BODY MASS INDEX DOCD: CPT | Mod: CPTII,S$GLB,, | Performed by: NURSE PRACTITIONER

## 2020-12-14 PROCEDURE — 3077F SYST BP >= 140 MM HG: CPT | Mod: CPTII,S$GLB,, | Performed by: NURSE PRACTITIONER

## 2020-12-14 PROCEDURE — 3072F PR LOW RISK FOR RETINOPATHY: ICD-10-PCS | Mod: S$GLB,,, | Performed by: NURSE PRACTITIONER

## 2020-12-14 PROCEDURE — 1101F PR PT FALLS ASSESS DOC 0-1 FALLS W/OUT INJ PAST YR: ICD-10-PCS | Mod: CPTII,S$GLB,, | Performed by: NURSE PRACTITIONER

## 2020-12-14 PROCEDURE — 3288F FALL RISK ASSESSMENT DOCD: CPT | Mod: CPTII,S$GLB,, | Performed by: NURSE PRACTITIONER

## 2020-12-14 PROCEDURE — 99397 PR PREVENTIVE VISIT,EST,65 & OVER: ICD-10-PCS | Mod: S$GLB,,, | Performed by: NURSE PRACTITIONER

## 2020-12-14 PROCEDURE — 3078F PR MOST RECENT DIASTOLIC BLOOD PRESSURE < 80 MM HG: ICD-10-PCS | Mod: CPTII,S$GLB,, | Performed by: NURSE PRACTITIONER

## 2020-12-14 PROCEDURE — 3072F LOW RISK FOR RETINOPATHY: CPT | Mod: S$GLB,,, | Performed by: NURSE PRACTITIONER

## 2020-12-14 PROCEDURE — 99397 PER PM REEVAL EST PAT 65+ YR: CPT | Mod: S$GLB,,, | Performed by: NURSE PRACTITIONER

## 2020-12-14 PROCEDURE — 99999 PR PBB SHADOW E&M-EST. PATIENT-LVL V: CPT | Mod: PBBFAC,,, | Performed by: NURSE PRACTITIONER

## 2020-12-14 PROCEDURE — 1101F PT FALLS ASSESS-DOCD LE1/YR: CPT | Mod: CPTII,S$GLB,, | Performed by: NURSE PRACTITIONER

## 2020-12-14 PROCEDURE — 1125F AMNT PAIN NOTED PAIN PRSNT: CPT | Mod: S$GLB,,, | Performed by: NURSE PRACTITIONER

## 2020-12-14 NOTE — Clinical Note
Hi Dr Corado  I saw Mr Haynes for an annual visit on yesterday. He refused lab work and immunizations.   He stated that he wasn't ready to have labs drawn because he gained weight, and is still drinking nightly, and not taking his insulin as it is ordered.  He is taking Levemir and Novolog both twice daily in AM and PM 40 units. He does not check his blood sugars.  He did not decrease alcohol use amount as instructed to 1-2 weekly. He declined AA.  He has gained weight since last seen and is not as active.  He has a plan and believes he will have his weight, drinking and A1C down by March. I have scheduled a lab appointment and follow up with me in February.  He agreed to schedule colonoscopy. Foot exam today.  I am not sure how I can be of better assistance to him due to non compliance.  Lizbeth

## 2020-12-14 NOTE — PROGRESS NOTES
Subjective:      Patient ID: Dallas Garcia is a 65 y.o. male.    Chief Complaint: Annual Exam    Mr Garcia is an established patient of Dr Corado. He is new to me.    Mr Garcia is here for annual exam. He was last seen by PCP 11/2019.  He is due for annual labs, foot exam, colonoscopy, flu shot.  Endocrinology referral deferred previously. He defers again today  Alcohol use had increased at the time of the last visit with PCP, recommended to decrease to 1-2 drinks weekly by PCP. He has not done so. Currently drinking 6-8 drinks nightly.   He does not check is blood sugars. He is not taking insulin as prescribed. He is currently using Levemir 40 units and Humalog 40 units in the AM and PM. This is his regimen.  He has gained 25 lbs since last visit. He continues to walk his dogs for 30 minutes 2-3 times daily.    Daughter will be going to [employer] for school. He is excited about this. His 15 year old will still be at home. They both have done well in school.      His only ailment is today is chronic post surgical left foot pain. This is intermittent and not a concern today.       Review of Systems   Constitutional: Negative for activity change, appetite change, chills, diaphoresis, fatigue and fever.   HENT: Negative for congestion, sneezing and sore throat.    Eyes: Negative for pain, redness, itching and visual disturbance.   Respiratory: Negative for cough, chest tightness, shortness of breath and wheezing.    Cardiovascular: Negative for chest pain, palpitations and leg swelling.   Gastrointestinal: Negative for abdominal distention, abdominal pain, constipation, diarrhea, nausea and vomiting.   Genitourinary: Negative for decreased urine volume, difficulty urinating, dysuria, hematuria, testicular pain and urgency.   Musculoskeletal: Positive for myalgias. Negative for gait problem.   Skin: Negative for color change, pallor, rash and wound.   Allergic/Immunologic: Negative for environmental  "allergies, food allergies and immunocompromised state.   Neurological: Negative for dizziness, tremors, seizures, syncope, facial asymmetry, weakness, light-headedness, numbness and headaches.   Psychiatric/Behavioral: Negative for agitation, decreased concentration and sleep disturbance. The patient is not nervous/anxious.        Review of patient's allergies indicates:  No Known Allergies    Current Outpatient Medications   Medication Sig Dispense Refill    amLODIPine (NORVASC) 10 MG tablet Take 1 tablet (10 mg total) by mouth once daily. 30 tablet 0    atorvastatin (LIPITOR) 80 MG tablet Take 1 tablet (80 mg total) by mouth once daily. 30 tablet 0    BD ULTRA-FINE ADRIEN PEN NEEDLES 32 gauge x 5/32" Ndle use four times a day 400 each 4    busPIRone (BUSPAR) 5 MG Tab Take 1 tablet (5 mg total) by mouth 2 (two) times daily. 60 tablet 0    ergocalciferol (ERGOCALCIFEROL) 50,000 unit Cap TAKE 1 CAPSULE BY MOUTH EVERY 7 DAYS 4 capsule 6    fenofibrate 160 MG Tab Take 1 tablet (160 mg total) by mouth once daily. 90 tablet 2    fish oil-omega-3 fatty acids 300-1,000 mg capsule Take 2 g by mouth 2 (two) times daily.       gabapentin (NEURONTIN) 100 MG capsule 1 tab 3x/day x 2 weeks  Then increase to 3x/day thereafter for lumbar radiculopathy 90 capsule 6    ibuprofen (ADVIL) 200 MG tablet Take 200 mg by mouth as needed for Pain.       insulin detemir U-100 (LEVEMIR FLEXTOUCH U-100 INSULN) 100 unit/mL (3 mL) SubQ InPn pen 50 units in PM for Diabetes in place of Basaglar 45 mL 3    insulin lispro (HUMALOG KWIKPEN INSULIN) 100 unit/mL pen INJECT SUBCUTANEOUSLY 35  UNITS WITH BREAKFAST AND  LUNCH AND 35 UNITS WITH  DINNER 75 mL 1    JENTADUETO 2.5-1,000 mg Tab TAKE 1 TABLET BY MOUTH TWICE DAILY WITH MEALS 60 tablet 4    loratadine (CLARITIN) 10 mg tablet take 1 tablet by mouth once daily 30 tablet 3    metoprolol succinate (TOPROL-XL) 100 MG 24 hr tablet TAKE 1 TABLET(100 MG) BY MOUTH EVERY DAY 90 tablet 2    " "NOVOLOG FLEXPEN U-100 INSULIN 100 unit/mL (3 mL) InPn pen INJECT 35 UNITS UNDER THE SKIN WITH BREAKFAST, LUNCH AND DINNER 75 mL 4    olmesartan (BENICAR) 40 MG tablet Take 1 tablet (40 mg total) by mouth once daily. 30 tablet 4    omeprazole (PRILOSEC) 20 MG capsule TAKE 1 CAPSULE(20 MG) BY MOUTH EVERY DAY FOR GERD 90 capsule 2    ONETOUCH ULTRA BLUE TEST STRIP Strp TEST FOUR TIMES DAILY 400 strip 1    ONETOUCH ULTRASOFT LANCETS lancets TEST four times a day before meals 150 each 6    pen needle, diabetic (BD ULTRA-FINE SHORT PEN NEEDLE) 31 gauge x 5/16" Ndle USE EVERY  each 3    pramoxine-hydrocortisone (ANALPRAM HC) cream Apply topically 3 (three) times daily. 28.4 g 10    valsartan (DIOVAN) 320 MG tablet Take 1 tablet (320 mg total) by mouth once daily. Take 1 tab daily for Blood Pressure in place of Lisinopril 30 tablet 0    FLUAD QUAD 2020-21,65Y UP,,PF, 60 mcg (15 mcg x 4)/0.5 mL Syrg ADM 0.5ML IM UTD       No current facility-administered medications for this visit.        Patient Active Problem List    Diagnosis Date Noted    Right hip pain 06/26/2020    Muscle weakness 06/26/2020    Right-sided low back pain with sciatica 12/11/2019    Ischial bursitis of right side 12/11/2019    S/P meniscectomy 09/01/2017    Internal derangement of right knee 08/30/2017    GERD (gastroesophageal reflux disease) 08/24/2017    Familial hyperlipidemia 04/20/2017    Non morbid obesity due to excess calories 04/20/2016    Dyslipidemia 01/27/2016    Diabetes with proteinuria 12/17/2014    Chronic pain of right knee 04/10/2013    Type 2 diabetes, uncontrolled, with neuropathy 11/19/2012    Diabetes mellitus type 2 in obese 08/17/2012    Fatty liver 08/17/2012    Hypertriglyceridemia 08/17/2012    HTN (hypertension), benign 08/17/2012    Sleep apnea 08/17/2012    Alcohol abuse 08/17/2012    Nuclear sclerosis - Both Eyes 07/19/2012    Anxiety 07/12/2012       Past Medical History:   Diagnosis " "Date    Allergy     Anxiety     Arthritis     Cataract     Colon polyps 2009    Diabetes mellitus     Diabetes mellitus type II     GERD (gastroesophageal reflux disease)     History of alcohol abuse     Hyperlipidemia     Hypertension     Neuromuscular disorder        Past Surgical History:   Procedure Laterality Date    ANKLE FUSION      left    CARPAL TUNNEL RELEASE      left    FRACTURE SURGERY      KNEE SURGERY      Uvalectomy      VASECTOMY         Family History   Problem Relation Age of Onset    Hypertension Father     Diabetes Father     Aneurysm Father         brain    Diabetes Mother     Diabetes Brother     Amblyopia Neg Hx     Blindness Neg Hx     Cancer Neg Hx     Cataracts Neg Hx     Glaucoma Neg Hx     Macular degeneration Neg Hx     Retinal detachment Neg Hx     Strabismus Neg Hx     Stroke Neg Hx     Thyroid disease Neg Hx          Objective:     Lab Results   Component Value Date    WBC 4.19 11/13/2019    HGB 13.5 (L) 11/13/2019    HCT 41.3 11/13/2019     11/13/2019    CHOL 189 11/13/2019    TRIG 801 (H) 11/13/2019    HDL 32 (L) 11/13/2019    ALT 55 (H) 11/13/2019     (H) 11/13/2019     11/13/2019    K 4.1 11/13/2019     11/13/2019    CREATININE 1.1 11/13/2019    BUN 15 11/13/2019    CO2 26 11/13/2019    TSH 0.813 11/13/2019    PSA 0.16 11/13/2019    INR 1.1 05/22/2019    HGBA1C 9.6 (H) 11/13/2019       Vitals:    12/14/20 0843   BP: (!) 158/74   Pulse: 77   Temp: 98 °F (36.7 °C)   SpO2: 99%   Weight: 102.2 kg (225 lb 5 oz)   Height: 5' 7" (1.702 m)   PainSc:   4   PainLoc: Buttocks       Body mass index is 35.29 kg/m².    Physical Exam  Vitals signs and nursing note reviewed.   Constitutional:       General: He is not in acute distress.     Appearance: He is well-developed. He is obese. He is not ill-appearing, toxic-appearing or diaphoretic.   HENT:      Head: Normocephalic and atraumatic.   Eyes:      Conjunctiva/sclera: Conjunctivae " normal.   Neck:      Musculoskeletal: Normal range of motion and neck supple.   Cardiovascular:      Rate and Rhythm: Normal rate and regular rhythm.      Pulses: Normal pulses.      Heart sounds: Normal heart sounds. No murmur.   Pulmonary:      Effort: Pulmonary effort is normal.      Breath sounds: Normal breath sounds.   Abdominal:      General: Bowel sounds are normal. There is distension.      Palpations: Abdomen is soft.      Tenderness: There is no abdominal tenderness. There is no guarding.   Musculoskeletal: Normal range of motion.      Right lower leg: No edema.      Left lower leg: No edema.   Skin:     General: Skin is warm and dry.   Neurological:      Mental Status: He is alert and oriented to person, place, and time.      Gait: Gait normal.   Psychiatric:         Mood and Affect: Mood normal.         Behavior: Behavior normal.         Thought Content: Thought content normal.         Judgment: Judgment normal.       Assessment:     1. Annual physical exam    2. Gastroesophageal reflux disease, unspecified whether esophagitis present    3. Diabetes mellitus type 2 in obese    4. Type 2 diabetes, uncontrolled, with neuropathy    5. Non morbid obesity due to excess calories    6. HTN (hypertension), benign    7. Dyslipidemia    8. Colon cancer screening    9. Alcohol abuse    10. Hypertriglyceridemia    11. Fatty liver    12. Sleep apnea, unspecified type    13. Right-sided low back pain with right-sided sciatica, unspecified chronicity    14. Ischial bursitis of right side      Plan:   Colonoscopy ordered. Patient advised to schedule.  Annual lab work is due, ordered previously by PCP. Patient declined labs today.  Alcohol use continues to be excessive, previously instructed to cut down to 1-2 drinks per week. Has not done so. Declined AA. Plans to cut down without assistance.  Weight increased, continue walking daily. limit carb intake, decrease sodium intake.    Dallas was seen today for annual  exam.    Diagnoses and all orders for this visit:    Annual physical exam  Labs as previously ordered    Gastroesophageal reflux disease, unspecified whether esophagitis present  Chronic. Stable. Followed by PCP.  Reduce alcohol intake.    Diabetes mellitus type 2 in obese  Chronic. Patient refused labs today. Unable to assess AIC.  Not taking insulin as prescribed.  Refused Endocrinology follow up. Advised to take medication as ordered.    Type 2 diabetes, uncontrolled, with neuropathy  Chronic. Patient refused labs today. Unable to assess AIC.  Not taking insulin as prescribed.  Refused Endocrinology follow up. Advised to take medication as ordered.    Non morbid obesity due to excess calories  Chronic. Continue walking. Limit carb intake. Reduce alcohol intake    HTN (hypertension), benign  Chronic. Not at goal. Declines additional medication treatment.  Advised to lose weight, limit sodium    Dyslipidemia  Chronic. Unable to assess current lipid panel    Colon cancer screening  -     Case Request Endoscopy: COLONOSCOPY    Alcohol abuse  Noted. Declined AA. Has a plan to decrease alcohol use.    Hypertriglyceridemia  Chronic. Unable to assess current lipid panel    Fatty liver  Chronic. Abdominal ultrasound 2019. No lifestyle modifications have been done at this time. Still drinking 6-8 oz.  Appears motivated to decrease use.    Sleep apnea, unspecified type  Noted.  Does not use cpap.    Right-sided low back pain with right-sided sciatica, unspecified chronicity  Chronic. Stable. Followed by Sports Medicine.    Ischial bursitis of right side  Chronic. Stable. Followed by Sports Medicine.      Health Maintenance   Topic Date Due    Hemoglobin A1c  02/13/2020    Eye Exam  05/22/2020    PROSTATE-SPECIFIC ANTIGEN  11/13/2020    Lipid Panel  11/13/2020    Foot Exam  11/20/2020    Low Dose Statin  12/14/2021    Pneumococcal Vaccine (65+ Low/Medium Risk) (2 of 2 - PPSV23) 01/14/2022    TETANUS VACCINE   10/07/2025    Hepatitis C Screening  Completed    Abdominal Aortic Aneurysm Screening  Completed       Patient Instructions   Eye exam is due.  Colonoscopy is due. Please call to schedule  Foot exam today.  Monitor carb intake, decrease alcohol use 1-2X weekly  Take medications as prescribed! This includes your insulin.  Lab work strongly recommended although declined today.

## 2020-12-14 NOTE — PATIENT INSTRUCTIONS
Eye exam is due.  Colonoscopy is due. Please call to schedule  Foot exam today.  Monitor carb intake, decrease alcohol use 1-2X weekly  Take medications as prescribed! This includes your insulin.  Lab work strongly recommended although declined today.

## 2021-01-11 DIAGNOSIS — E78.2 MIXED HYPERLIPIDEMIA: ICD-10-CM

## 2021-01-11 DIAGNOSIS — E11.9 DIABETES MELLITUS WITHOUT COMPLICATION: ICD-10-CM

## 2021-01-11 DIAGNOSIS — M79.642 HAND PAIN, LEFT: ICD-10-CM

## 2021-01-11 DIAGNOSIS — I10 ESSENTIAL HYPERTENSION: ICD-10-CM

## 2021-01-13 RX ORDER — FENOFIBRATE 160 MG/1
160 TABLET ORAL DAILY
Qty: 90 TABLET | Refills: 0 | Status: SHIPPED | OUTPATIENT
Start: 2021-01-13 | End: 2021-03-03 | Stop reason: SDUPTHER

## 2021-01-13 RX ORDER — AMLODIPINE BESYLATE 10 MG/1
10 TABLET ORAL DAILY
Qty: 30 TABLET | Refills: 0 | Status: SHIPPED | OUTPATIENT
Start: 2021-01-13 | End: 2021-02-11 | Stop reason: SDUPTHER

## 2021-01-13 RX ORDER — ATORVASTATIN CALCIUM 80 MG/1
80 TABLET, FILM COATED ORAL DAILY
Qty: 30 TABLET | Refills: 0 | Status: SHIPPED | OUTPATIENT
Start: 2021-01-13 | End: 2021-02-11 | Stop reason: SDUPTHER

## 2021-01-13 RX ORDER — VALSARTAN 320 MG/1
320 TABLET ORAL DAILY
Qty: 30 TABLET | Refills: 0 | Status: SHIPPED | OUTPATIENT
Start: 2021-01-13 | End: 2021-02-11 | Stop reason: SDUPTHER

## 2021-01-14 RX ORDER — LINAGLIPTIN AND METFORMIN HYDROCHLORIDE 2.5; 1 MG/1; MG/1
1 TABLET, FILM COATED ORAL 2 TIMES DAILY WITH MEALS
Qty: 60 TABLET | Refills: 0 | Status: SHIPPED | OUTPATIENT
Start: 2021-01-14 | End: 2021-02-11 | Stop reason: SDUPTHER

## 2021-01-20 RX ORDER — PEN NEEDLE, DIABETIC 30 GX3/16"
NEEDLE, DISPOSABLE MISCELLANEOUS
Qty: 300 EACH | Refills: 0 | Status: SHIPPED | OUTPATIENT
Start: 2021-01-20 | End: 2024-03-26 | Stop reason: SDUPTHER

## 2021-01-20 RX ORDER — BUSPIRONE HYDROCHLORIDE 5 MG/1
5 TABLET ORAL 2 TIMES DAILY
Qty: 60 TABLET | Refills: 0 | Status: SHIPPED | OUTPATIENT
Start: 2021-01-20 | End: 2021-03-03 | Stop reason: SDUPTHER

## 2021-01-27 ENCOUNTER — CLINICAL SUPPORT (OUTPATIENT)
Dept: URGENT CARE | Facility: CLINIC | Age: 66
End: 2021-01-27
Payer: MEDICARE

## 2021-01-27 DIAGNOSIS — Z11.59 ENCOUNTER FOR SCREENING FOR OTHER VIRAL DISEASES: Primary | ICD-10-CM

## 2021-01-27 DIAGNOSIS — E11.649 UNCONTROLLED TYPE 2 DIABETES MELLITUS WITH HYPOGLYCEMIA, UNSPECIFIED HYPOGLYCEMIA COMA STATUS: ICD-10-CM

## 2021-01-27 LAB
CTP QC/QA: YES
SARS-COV-2 RDRP RESP QL NAA+PROBE: NEGATIVE

## 2021-01-27 PROCEDURE — U0002 COVID-19 LAB TEST NON-CDC: HCPCS | Mod: QW,S$GLB,, | Performed by: FAMILY MEDICINE

## 2021-01-27 PROCEDURE — U0002: ICD-10-PCS | Mod: QW,S$GLB,, | Performed by: FAMILY MEDICINE

## 2021-01-27 RX ORDER — PEN NEEDLE, DIABETIC 30 GX3/16"
NEEDLE, DISPOSABLE MISCELLANEOUS
Qty: 400 EACH | Refills: 4 | Status: SHIPPED | OUTPATIENT
Start: 2021-01-27 | End: 2024-03-27 | Stop reason: SDUPTHER

## 2021-02-09 RX ORDER — INSULIN LISPRO 100 [IU]/ML
INJECTION, SOLUTION INTRAVENOUS; SUBCUTANEOUS
Qty: 75 ML | Refills: 1 | Status: SHIPPED | OUTPATIENT
Start: 2021-02-09 | End: 2023-10-18

## 2021-02-10 RX ORDER — INSULIN GLARGINE 100 [IU]/ML
50 INJECTION, SOLUTION SUBCUTANEOUS DAILY
COMMUNITY
End: 2021-02-23

## 2021-02-11 DIAGNOSIS — E11.9 DIABETES MELLITUS WITHOUT COMPLICATION: ICD-10-CM

## 2021-02-11 DIAGNOSIS — E78.2 MIXED HYPERLIPIDEMIA: ICD-10-CM

## 2021-02-11 DIAGNOSIS — I10 ESSENTIAL HYPERTENSION: ICD-10-CM

## 2021-02-11 DIAGNOSIS — M79.642 HAND PAIN, LEFT: ICD-10-CM

## 2021-02-11 RX ORDER — ATORVASTATIN CALCIUM 80 MG/1
80 TABLET, FILM COATED ORAL DAILY
Qty: 30 TABLET | Refills: 0 | Status: SHIPPED | OUTPATIENT
Start: 2021-02-11 | End: 2021-03-03 | Stop reason: SDUPTHER

## 2021-02-11 RX ORDER — AMLODIPINE BESYLATE 10 MG/1
10 TABLET ORAL DAILY
Qty: 30 TABLET | Refills: 0 | Status: SHIPPED | OUTPATIENT
Start: 2021-02-11 | End: 2021-03-03 | Stop reason: SDUPTHER

## 2021-02-11 RX ORDER — VALSARTAN 320 MG/1
320 TABLET ORAL DAILY
Qty: 30 TABLET | Refills: 0 | Status: SHIPPED | OUTPATIENT
Start: 2021-02-11 | End: 2021-03-03 | Stop reason: SDUPTHER

## 2021-02-11 RX ORDER — LINAGLIPTIN AND METFORMIN HYDROCHLORIDE 2.5; 1 MG/1; MG/1
1 TABLET, FILM COATED ORAL 2 TIMES DAILY WITH MEALS
Qty: 60 TABLET | Refills: 0 | Status: SHIPPED | OUTPATIENT
Start: 2021-02-11 | End: 2021-03-03 | Stop reason: SDUPTHER

## 2021-02-15 ENCOUNTER — TELEPHONE (OUTPATIENT)
Dept: INTERNAL MEDICINE | Facility: CLINIC | Age: 66
End: 2021-02-15

## 2021-02-22 ENCOUNTER — PATIENT MESSAGE (OUTPATIENT)
Dept: SPORTS MEDICINE | Facility: CLINIC | Age: 66
End: 2021-02-22

## 2021-02-22 ENCOUNTER — TELEPHONE (OUTPATIENT)
Dept: INTERNAL MEDICINE | Facility: CLINIC | Age: 66
End: 2021-02-22

## 2021-02-22 RX ORDER — INSULIN GLARGINE 100 [IU]/ML
50 INJECTION, SOLUTION SUBCUTANEOUS NIGHTLY
COMMUNITY
End: 2021-02-23

## 2021-02-23 ENCOUNTER — TELEPHONE (OUTPATIENT)
Dept: SPORTS MEDICINE | Facility: CLINIC | Age: 66
End: 2021-02-23

## 2021-02-23 DIAGNOSIS — E11.69 DIABETES MELLITUS TYPE 2 IN OBESE: Primary | ICD-10-CM

## 2021-02-23 DIAGNOSIS — E66.9 DIABETES MELLITUS TYPE 2 IN OBESE: Primary | ICD-10-CM

## 2021-02-23 RX ORDER — INSULIN GLARGINE 100 [IU]/ML
50 INJECTION, SOLUTION SUBCUTANEOUS NIGHTLY
Qty: 15 ML | Refills: 2 | Status: SHIPPED | OUTPATIENT
Start: 2021-02-23 | End: 2021-03-03 | Stop reason: ALTCHOICE

## 2021-02-24 ENCOUNTER — LAB VISIT (OUTPATIENT)
Dept: LAB | Facility: HOSPITAL | Age: 66
End: 2021-02-24
Attending: INTERNAL MEDICINE
Payer: MEDICARE

## 2021-02-24 ENCOUNTER — PATIENT OUTREACH (OUTPATIENT)
Dept: ADMINISTRATIVE | Facility: HOSPITAL | Age: 66
End: 2021-02-24

## 2021-02-24 DIAGNOSIS — E11.65 UNCONTROLLED TYPE 2 DIABETES MELLITUS WITH HYPERGLYCEMIA: ICD-10-CM

## 2021-02-24 DIAGNOSIS — Z12.5 PROSTATE CANCER SCREENING: ICD-10-CM

## 2021-02-24 DIAGNOSIS — I10 ESSENTIAL HYPERTENSION: ICD-10-CM

## 2021-02-24 DIAGNOSIS — E55.9 VITAMIN D DEFICIENCY: ICD-10-CM

## 2021-02-24 LAB
BASOPHILS # BLD AUTO: 0.03 K/UL (ref 0–0.2)
BASOPHILS NFR BLD: 0.7 % (ref 0–1.9)
DIFFERENTIAL METHOD: ABNORMAL
EOSINOPHIL # BLD AUTO: 0.2 K/UL (ref 0–0.5)
EOSINOPHIL NFR BLD: 3.5 % (ref 0–8)
ERYTHROCYTE [DISTWIDTH] IN BLOOD BY AUTOMATED COUNT: 12.8 % (ref 11.5–14.5)
ESTIMATED AVG GLUCOSE: 223 MG/DL (ref 68–131)
HBA1C MFR BLD: 9.4 % (ref 4–5.6)
HCT VFR BLD AUTO: 40.3 % (ref 40–54)
HGB BLD-MCNC: 13.1 G/DL (ref 14–18)
IMM GRANULOCYTES # BLD AUTO: 0.02 K/UL (ref 0–0.04)
IMM GRANULOCYTES NFR BLD AUTO: 0.4 % (ref 0–0.5)
LYMPHOCYTES # BLD AUTO: 1.4 K/UL (ref 1–4.8)
LYMPHOCYTES NFR BLD: 31.4 % (ref 18–48)
MCH RBC QN AUTO: 30.2 PG (ref 27–31)
MCHC RBC AUTO-ENTMCNC: 32.5 G/DL (ref 32–36)
MCV RBC AUTO: 93 FL (ref 82–98)
MONOCYTES # BLD AUTO: 0.5 K/UL (ref 0.3–1)
MONOCYTES NFR BLD: 9.9 % (ref 4–15)
NEUTROPHILS # BLD AUTO: 2.5 K/UL (ref 1.8–7.7)
NEUTROPHILS NFR BLD: 54.1 % (ref 38–73)
NRBC BLD-RTO: 0 /100 WBC
PLATELET # BLD AUTO: 193 K/UL (ref 150–350)
PMV BLD AUTO: 12.6 FL (ref 9.2–12.9)
RBC # BLD AUTO: 4.34 M/UL (ref 4.6–6.2)
WBC # BLD AUTO: 4.56 K/UL (ref 3.9–12.7)

## 2021-02-24 PROCEDURE — 85025 COMPLETE CBC W/AUTO DIFF WBC: CPT

## 2021-02-24 PROCEDURE — 82306 VITAMIN D 25 HYDROXY: CPT

## 2021-02-24 PROCEDURE — 84443 ASSAY THYROID STIM HORMONE: CPT

## 2021-02-24 PROCEDURE — 80053 COMPREHEN METABOLIC PANEL: CPT

## 2021-02-24 PROCEDURE — 83036 HEMOGLOBIN GLYCOSYLATED A1C: CPT

## 2021-02-24 PROCEDURE — 36415 COLL VENOUS BLD VENIPUNCTURE: CPT

## 2021-02-24 PROCEDURE — 84153 ASSAY OF PSA TOTAL: CPT

## 2021-02-24 PROCEDURE — 80061 LIPID PANEL: CPT

## 2021-02-25 ENCOUNTER — OFFICE VISIT (OUTPATIENT)
Dept: SPORTS MEDICINE | Facility: CLINIC | Age: 66
End: 2021-02-25
Payer: MEDICARE

## 2021-02-25 ENCOUNTER — HOSPITAL ENCOUNTER (OUTPATIENT)
Dept: RADIOLOGY | Facility: HOSPITAL | Age: 66
Discharge: HOME | End: 2021-02-25
Attending: FAMILY MEDICINE
Payer: MEDICARE

## 2021-02-25 VITALS — BODY MASS INDEX: 32.18 KG/M2 | TEMPERATURE: 97 F | WEIGHT: 205 LBS | HEIGHT: 67 IN

## 2021-02-25 DIAGNOSIS — M79.672 LEFT FOOT PAIN: ICD-10-CM

## 2021-02-25 DIAGNOSIS — M25.512 CHRONIC LEFT SHOULDER PAIN: ICD-10-CM

## 2021-02-25 DIAGNOSIS — M77.42 METATARSALGIA OF LEFT FOOT: Primary | ICD-10-CM

## 2021-02-25 DIAGNOSIS — S90.32XA CONTUSION OF SOLE OF FOOT, LEFT, INITIAL ENCOUNTER: ICD-10-CM

## 2021-02-25 DIAGNOSIS — G89.29 CHRONIC LEFT SHOULDER PAIN: ICD-10-CM

## 2021-02-25 DIAGNOSIS — M19.072 PRIMARY OSTEOARTHRITIS OF LEFT FOOT: ICD-10-CM

## 2021-02-25 DIAGNOSIS — M67.912 DYSFUNCTION OF LEFT ROTATOR CUFF: ICD-10-CM

## 2021-02-25 DIAGNOSIS — Z12.11 SPECIAL SCREENING FOR MALIGNANT NEOPLASM OF COLON: Primary | ICD-10-CM

## 2021-02-25 DIAGNOSIS — S46.812A STRAIN OF LEFT SUPRASPINATUS MUSCLE, INITIAL ENCOUNTER: ICD-10-CM

## 2021-02-25 LAB
25(OH)D3+25(OH)D2 SERPL-MCNC: 28 NG/ML (ref 30–96)
ALBUMIN SERPL BCP-MCNC: 4.4 G/DL (ref 3.5–5.2)
ALP SERPL-CCNC: 108 U/L (ref 55–135)
ALT SERPL W/O P-5'-P-CCNC: 40 U/L (ref 10–44)
ANION GAP SERPL CALC-SCNC: 10 MMOL/L (ref 8–16)
AST SERPL-CCNC: 46 U/L (ref 10–40)
BILIRUB SERPL-MCNC: 0.5 MG/DL (ref 0.1–1)
BUN SERPL-MCNC: 16 MG/DL (ref 8–23)
CALCIUM SERPL-MCNC: 9.6 MG/DL (ref 8.7–10.5)
CHLORIDE SERPL-SCNC: 104 MMOL/L (ref 95–110)
CHOLEST SERPL-MCNC: 184 MG/DL (ref 120–199)
CHOLEST/HDLC SERPL: 5 {RATIO} (ref 2–5)
CO2 SERPL-SCNC: 27 MMOL/L (ref 23–29)
COMPLEXED PSA SERPL-MCNC: 0.2 NG/ML (ref 0–4)
CREAT SERPL-MCNC: 1 MG/DL (ref 0.5–1.4)
EST. GFR  (AFRICAN AMERICAN): >60 ML/MIN/1.73 M^2
EST. GFR  (NON AFRICAN AMERICAN): >60 ML/MIN/1.73 M^2
GLUCOSE SERPL-MCNC: 162 MG/DL (ref 70–110)
HDLC SERPL-MCNC: 37 MG/DL (ref 40–75)
HDLC SERPL: 20.1 % (ref 20–50)
LDLC SERPL CALC-MCNC: ABNORMAL MG/DL (ref 63–159)
NONHDLC SERPL-MCNC: 147 MG/DL
POTASSIUM SERPL-SCNC: 4.3 MMOL/L (ref 3.5–5.1)
PROT SERPL-MCNC: 7.3 G/DL (ref 6–8.4)
SODIUM SERPL-SCNC: 141 MMOL/L (ref 136–145)
TRIGL SERPL-MCNC: 620 MG/DL (ref 30–150)
TSH SERPL DL<=0.005 MIU/L-ACNC: 1.51 UIU/ML (ref 0.4–4)

## 2021-02-25 PROCEDURE — 1126F PR PAIN SEVERITY QUANTIFIED, NO PAIN PRESENT: ICD-10-PCS | Mod: S$GLB,,, | Performed by: FAMILY MEDICINE

## 2021-02-25 PROCEDURE — 1101F PR PT FALLS ASSESS DOC 0-1 FALLS W/OUT INJ PAST YR: ICD-10-PCS | Mod: CPTII,S$GLB,, | Performed by: FAMILY MEDICINE

## 2021-02-25 PROCEDURE — 3288F PR FALLS RISK ASSESSMENT DOCUMENTED: ICD-10-PCS | Mod: CPTII,S$GLB,, | Performed by: FAMILY MEDICINE

## 2021-02-25 PROCEDURE — 1159F MED LIST DOCD IN RCRD: CPT | Mod: S$GLB,,, | Performed by: FAMILY MEDICINE

## 2021-02-25 PROCEDURE — 99214 OFFICE O/P EST MOD 30 MIN: CPT | Mod: 25,S$GLB,, | Performed by: FAMILY MEDICINE

## 2021-02-25 PROCEDURE — 3008F PR BODY MASS INDEX (BMI) DOCUMENTED: ICD-10-PCS | Mod: CPTII,S$GLB,, | Performed by: FAMILY MEDICINE

## 2021-02-25 PROCEDURE — 3008F BODY MASS INDEX DOCD: CPT | Mod: CPTII,S$GLB,, | Performed by: FAMILY MEDICINE

## 2021-02-25 PROCEDURE — 73630 X-RAY EXAM OF FOOT: CPT | Mod: 26,LT,, | Performed by: RADIOLOGY

## 2021-02-25 PROCEDURE — 73630 XR FOOT COMPLETE 3 VIEW LEFT: ICD-10-PCS | Mod: 26,LT,, | Performed by: RADIOLOGY

## 2021-02-25 PROCEDURE — 20611 LARGE JOINT ASPIRATION/INJECTION: L SUBACROMIAL BURSA: ICD-10-PCS | Mod: LT,S$GLB,, | Performed by: FAMILY MEDICINE

## 2021-02-25 PROCEDURE — 1126F AMNT PAIN NOTED NONE PRSNT: CPT | Mod: S$GLB,,, | Performed by: FAMILY MEDICINE

## 2021-02-25 PROCEDURE — 3288F FALL RISK ASSESSMENT DOCD: CPT | Mod: CPTII,S$GLB,, | Performed by: FAMILY MEDICINE

## 2021-02-25 PROCEDURE — 99999 PR PBB SHADOW E&M-EST. PATIENT-LVL IV: CPT | Mod: PBBFAC,,, | Performed by: FAMILY MEDICINE

## 2021-02-25 PROCEDURE — 1159F PR MEDICATION LIST DOCUMENTED IN MEDICAL RECORD: ICD-10-PCS | Mod: S$GLB,,, | Performed by: FAMILY MEDICINE

## 2021-02-25 PROCEDURE — 99999 PR PBB SHADOW E&M-EST. PATIENT-LVL IV: ICD-10-PCS | Mod: PBBFAC,,, | Performed by: FAMILY MEDICINE

## 2021-02-25 PROCEDURE — 99214 PR OFFICE/OUTPT VISIT, EST, LEVL IV, 30-39 MIN: ICD-10-PCS | Mod: 25,S$GLB,, | Performed by: FAMILY MEDICINE

## 2021-02-25 PROCEDURE — 20611 DRAIN/INJ JOINT/BURSA W/US: CPT | Mod: LT,S$GLB,, | Performed by: FAMILY MEDICINE

## 2021-02-25 PROCEDURE — 73630 X-RAY EXAM OF FOOT: CPT | Mod: TC,LT

## 2021-02-25 PROCEDURE — 1101F PT FALLS ASSESS-DOCD LE1/YR: CPT | Mod: CPTII,S$GLB,, | Performed by: FAMILY MEDICINE

## 2021-02-25 RX ORDER — TRIAMCINOLONE ACETONIDE 40 MG/ML
40 INJECTION, SUSPENSION INTRA-ARTICULAR; INTRAMUSCULAR
Status: DISCONTINUED | OUTPATIENT
Start: 2021-02-25 | End: 2021-02-25 | Stop reason: HOSPADM

## 2021-02-25 RX ORDER — MELOXICAM 7.5 MG/1
7.5 TABLET ORAL DAILY
Qty: 15 TABLET | Refills: 0 | Status: SHIPPED | OUTPATIENT
Start: 2021-02-25 | End: 2023-07-06

## 2021-02-25 RX ADMIN — TRIAMCINOLONE ACETONIDE 40 MG: 40 INJECTION, SUSPENSION INTRA-ARTICULAR; INTRAMUSCULAR at 11:02

## 2021-03-01 ENCOUNTER — TELEPHONE (OUTPATIENT)
Dept: INTERNAL MEDICINE | Facility: CLINIC | Age: 66
End: 2021-03-01

## 2021-03-03 ENCOUNTER — OFFICE VISIT (OUTPATIENT)
Dept: INTERNAL MEDICINE | Facility: CLINIC | Age: 66
End: 2021-03-03
Payer: MEDICARE

## 2021-03-03 VITALS
OXYGEN SATURATION: 96 % | HEIGHT: 67 IN | WEIGHT: 210 LBS | BODY MASS INDEX: 32.96 KG/M2 | SYSTOLIC BLOOD PRESSURE: 136 MMHG | TEMPERATURE: 98 F | DIASTOLIC BLOOD PRESSURE: 70 MMHG | HEART RATE: 73 BPM

## 2021-03-03 DIAGNOSIS — I10 ESSENTIAL HYPERTENSION: ICD-10-CM

## 2021-03-03 DIAGNOSIS — E11.69 DIABETES MELLITUS TYPE 2 IN OBESE: ICD-10-CM

## 2021-03-03 DIAGNOSIS — E78.5 DYSLIPIDEMIA: ICD-10-CM

## 2021-03-03 DIAGNOSIS — L40.9 PSORIASIS: ICD-10-CM

## 2021-03-03 DIAGNOSIS — E11.9 DIABETES MELLITUS WITHOUT COMPLICATION: ICD-10-CM

## 2021-03-03 DIAGNOSIS — E66.9 DIABETES MELLITUS TYPE 2 IN OBESE: ICD-10-CM

## 2021-03-03 DIAGNOSIS — E78.2 MIXED HYPERLIPIDEMIA: ICD-10-CM

## 2021-03-03 DIAGNOSIS — L91.8 MULTIPLE ACQUIRED SKIN TAGS: Primary | ICD-10-CM

## 2021-03-03 DIAGNOSIS — I10 HTN (HYPERTENSION), BENIGN: ICD-10-CM

## 2021-03-03 DIAGNOSIS — E78.1 HYPERTRIGLYCERIDEMIA: ICD-10-CM

## 2021-03-03 DIAGNOSIS — Z12.11 COLON CANCER SCREENING: ICD-10-CM

## 2021-03-03 PROCEDURE — 1159F MED LIST DOCD IN RCRD: CPT | Mod: S$GLB,,, | Performed by: NURSE PRACTITIONER

## 2021-03-03 PROCEDURE — 3046F PR MOST RECENT HEMOGLOBIN A1C LEVEL > 9.0%: ICD-10-PCS | Mod: CPTII,S$GLB,, | Performed by: NURSE PRACTITIONER

## 2021-03-03 PROCEDURE — 1126F AMNT PAIN NOTED NONE PRSNT: CPT | Mod: S$GLB,,, | Performed by: NURSE PRACTITIONER

## 2021-03-03 PROCEDURE — 99499 UNLISTED E&M SERVICE: CPT | Mod: S$GLB,,, | Performed by: NURSE PRACTITIONER

## 2021-03-03 PROCEDURE — 3078F DIAST BP <80 MM HG: CPT | Mod: CPTII,S$GLB,, | Performed by: NURSE PRACTITIONER

## 2021-03-03 PROCEDURE — 3075F SYST BP GE 130 - 139MM HG: CPT | Mod: CPTII,S$GLB,, | Performed by: NURSE PRACTITIONER

## 2021-03-03 PROCEDURE — 99214 OFFICE O/P EST MOD 30 MIN: CPT | Mod: S$GLB,,, | Performed by: NURSE PRACTITIONER

## 2021-03-03 PROCEDURE — 3078F PR MOST RECENT DIASTOLIC BLOOD PRESSURE < 80 MM HG: ICD-10-PCS | Mod: CPTII,S$GLB,, | Performed by: NURSE PRACTITIONER

## 2021-03-03 PROCEDURE — 99999 PR PBB SHADOW E&M-EST. PATIENT-LVL V: ICD-10-PCS | Mod: PBBFAC,,, | Performed by: NURSE PRACTITIONER

## 2021-03-03 PROCEDURE — 99999 PR PBB SHADOW E&M-EST. PATIENT-LVL V: CPT | Mod: PBBFAC,,, | Performed by: NURSE PRACTITIONER

## 2021-03-03 PROCEDURE — 3008F PR BODY MASS INDEX (BMI) DOCUMENTED: ICD-10-PCS | Mod: CPTII,S$GLB,, | Performed by: NURSE PRACTITIONER

## 2021-03-03 PROCEDURE — 1101F PT FALLS ASSESS-DOCD LE1/YR: CPT | Mod: CPTII,S$GLB,, | Performed by: NURSE PRACTITIONER

## 2021-03-03 PROCEDURE — 3288F PR FALLS RISK ASSESSMENT DOCUMENTED: ICD-10-PCS | Mod: CPTII,S$GLB,, | Performed by: NURSE PRACTITIONER

## 2021-03-03 PROCEDURE — 3288F FALL RISK ASSESSMENT DOCD: CPT | Mod: CPTII,S$GLB,, | Performed by: NURSE PRACTITIONER

## 2021-03-03 PROCEDURE — 1101F PR PT FALLS ASSESS DOC 0-1 FALLS W/OUT INJ PAST YR: ICD-10-PCS | Mod: CPTII,S$GLB,, | Performed by: NURSE PRACTITIONER

## 2021-03-03 PROCEDURE — 3046F HEMOGLOBIN A1C LEVEL >9.0%: CPT | Mod: CPTII,S$GLB,, | Performed by: NURSE PRACTITIONER

## 2021-03-03 PROCEDURE — 3008F BODY MASS INDEX DOCD: CPT | Mod: CPTII,S$GLB,, | Performed by: NURSE PRACTITIONER

## 2021-03-03 PROCEDURE — 1126F PR PAIN SEVERITY QUANTIFIED, NO PAIN PRESENT: ICD-10-PCS | Mod: S$GLB,,, | Performed by: NURSE PRACTITIONER

## 2021-03-03 PROCEDURE — 99214 PR OFFICE/OUTPT VISIT, EST, LEVL IV, 30-39 MIN: ICD-10-PCS | Mod: S$GLB,,, | Performed by: NURSE PRACTITIONER

## 2021-03-03 PROCEDURE — 1159F PR MEDICATION LIST DOCUMENTED IN MEDICAL RECORD: ICD-10-PCS | Mod: S$GLB,,, | Performed by: NURSE PRACTITIONER

## 2021-03-03 PROCEDURE — 99499 RISK ADDL DX/OHS AUDIT: ICD-10-PCS | Mod: S$GLB,,, | Performed by: NURSE PRACTITIONER

## 2021-03-03 PROCEDURE — 3075F PR MOST RECENT SYSTOLIC BLOOD PRESS GE 130-139MM HG: ICD-10-PCS | Mod: CPTII,S$GLB,, | Performed by: NURSE PRACTITIONER

## 2021-03-03 RX ORDER — BUSPIRONE HYDROCHLORIDE 5 MG/1
5 TABLET ORAL 2 TIMES DAILY
Qty: 60 TABLET | Refills: 2 | Status: SHIPPED | OUTPATIENT
Start: 2021-03-03 | End: 2022-04-05

## 2021-03-03 RX ORDER — LINAGLIPTIN AND METFORMIN HYDROCHLORIDE 2.5; 1 MG/1; MG/1
1 TABLET, FILM COATED ORAL 2 TIMES DAILY WITH MEALS
Qty: 60 TABLET | Refills: 2 | Status: SHIPPED | OUTPATIENT
Start: 2021-03-03 | End: 2021-07-26

## 2021-03-03 RX ORDER — METOPROLOL SUCCINATE 100 MG/1
100 TABLET, EXTENDED RELEASE ORAL DAILY
Qty: 90 TABLET | Refills: 0 | Status: SHIPPED | OUTPATIENT
Start: 2021-03-03 | End: 2021-08-02

## 2021-03-03 RX ORDER — CLOBETASOL PROPIONATE 0.46 MG/ML
SOLUTION TOPICAL
COMMUNITY
Start: 2021-02-05 | End: 2022-06-10

## 2021-03-03 RX ORDER — ATORVASTATIN CALCIUM 80 MG/1
80 TABLET, FILM COATED ORAL DAILY
Qty: 90 TABLET | Refills: 0 | Status: SHIPPED | OUTPATIENT
Start: 2021-03-03 | End: 2021-06-15 | Stop reason: SDUPTHER

## 2021-03-03 RX ORDER — VALSARTAN 320 MG/1
320 TABLET ORAL DAILY
Qty: 90 TABLET | Refills: 0 | Status: SHIPPED | OUTPATIENT
Start: 2021-03-03 | End: 2021-06-15 | Stop reason: SDUPTHER

## 2021-03-03 RX ORDER — AMLODIPINE BESYLATE 10 MG/1
10 TABLET ORAL DAILY
Qty: 90 TABLET | Refills: 0 | Status: SHIPPED | OUTPATIENT
Start: 2021-03-03 | End: 2021-06-15 | Stop reason: SDUPTHER

## 2021-03-03 RX ORDER — INSULIN GLARGINE 100 [IU]/ML
50 INJECTION, SOLUTION SUBCUTANEOUS NIGHTLY
Qty: 15 ML | Refills: 2 | Status: SHIPPED | OUTPATIENT
Start: 2021-03-03 | End: 2021-06-30 | Stop reason: SDUPTHER

## 2021-03-03 RX ORDER — FENOFIBRATE 160 MG/1
160 TABLET ORAL DAILY
Qty: 90 TABLET | Refills: 0 | Status: SHIPPED | OUTPATIENT
Start: 2021-03-03 | End: 2021-08-02 | Stop reason: SDUPTHER

## 2021-03-05 ENCOUNTER — IMMUNIZATION (OUTPATIENT)
Dept: PRIMARY CARE CLINIC | Facility: CLINIC | Age: 66
End: 2021-03-05
Payer: MEDICARE

## 2021-03-05 DIAGNOSIS — Z23 NEED FOR VACCINATION: Primary | ICD-10-CM

## 2021-03-05 PROCEDURE — 91303 PR SARSCOV2 VAC AD26 .5ML IM: ICD-10-PCS | Mod: S$GLB,,, | Performed by: INTERNAL MEDICINE

## 2021-03-05 PROCEDURE — 0031A PR IMMUNIZ ADMIN, SARS-COV-2 COVID-19 VACC, 5X10VP/0.5ML: ICD-10-PCS | Mod: CV19,S$GLB,, | Performed by: INTERNAL MEDICINE

## 2021-03-05 PROCEDURE — 0031A PR IMMUNIZ ADMIN, SARS-COV-2 COVID-19 VACC, 5X10VP/0.5ML: CPT | Mod: CV19,S$GLB,, | Performed by: INTERNAL MEDICINE

## 2021-03-05 PROCEDURE — 91303 PR SARSCOV2 VAC AD26 .5ML IM: CPT | Mod: S$GLB,,, | Performed by: INTERNAL MEDICINE

## 2021-04-22 ENCOUNTER — PATIENT MESSAGE (OUTPATIENT)
Dept: ADMINISTRATIVE | Facility: HOSPITAL | Age: 66
End: 2021-04-22

## 2021-04-22 ENCOUNTER — PATIENT OUTREACH (OUTPATIENT)
Dept: ADMINISTRATIVE | Facility: HOSPITAL | Age: 66
End: 2021-04-22

## 2021-06-11 ENCOUNTER — PATIENT OUTREACH (OUTPATIENT)
Dept: ADMINISTRATIVE | Facility: HOSPITAL | Age: 66
End: 2021-06-11

## 2021-06-15 DIAGNOSIS — E78.2 MIXED HYPERLIPIDEMIA: ICD-10-CM

## 2021-06-15 DIAGNOSIS — I10 ESSENTIAL HYPERTENSION: ICD-10-CM

## 2021-06-15 DIAGNOSIS — I10 HTN (HYPERTENSION), BENIGN: ICD-10-CM

## 2021-06-15 RX ORDER — AMLODIPINE BESYLATE 10 MG/1
10 TABLET ORAL DAILY
Qty: 90 TABLET | Refills: 0 | Status: SHIPPED | OUTPATIENT
Start: 2021-06-15 | End: 2021-09-14

## 2021-06-15 RX ORDER — ATORVASTATIN CALCIUM 80 MG/1
80 TABLET, FILM COATED ORAL DAILY
Qty: 90 TABLET | Refills: 0 | Status: SHIPPED | OUTPATIENT
Start: 2021-06-15 | End: 2021-09-14

## 2021-06-15 RX ORDER — VALSARTAN 320 MG/1
320 TABLET ORAL DAILY
Qty: 90 TABLET | Refills: 0 | Status: SHIPPED | OUTPATIENT
Start: 2021-06-15 | End: 2021-09-14

## 2021-06-30 RX ORDER — INSULIN GLARGINE 100 [IU]/ML
50 INJECTION, SOLUTION SUBCUTANEOUS NIGHTLY
Qty: 15 ML | Refills: 2 | Status: SHIPPED | OUTPATIENT
Start: 2021-06-30 | End: 2021-09-16 | Stop reason: SDUPTHER

## 2021-06-30 RX ORDER — INSULIN LISPRO 100 [IU]/ML
INJECTION, SOLUTION INTRAVENOUS; SUBCUTANEOUS
Qty: 75 ML | Refills: 1 | Status: CANCELLED | OUTPATIENT
Start: 2021-06-30

## 2021-07-06 NOTE — PROCEDURES
Procedures   Vibration-controlled Transient Elastography Procedure (Fibroscan)    Name: Dallas Garcia  Date of Procedure : 2019   :: Diego Rick PA-C  Diagnosis: NAFLD    Probe: XL    Findings  Median liver stiffness score: 13.0 KPa  CAP readin dB/m    IQR/med: 12 %    Interpretation  Fibrosis interpretation is based on medial liver stiffness - Kilopascal (kPa).     Fibrosis stage: F3     Steatosis interpretation is based on controlled attenuation parameter - (dB/m).    Steatosis grade: >S3       Diego Rick PA-C  Hepatology/HCV  [facility]             Pt called to markie appt that was canceled d/t Dr. Theo De Jesus being out of the office next wk & stated her bs are out of control. Pt markie'd appt w/ Dr. Theo De Jesus on 7/19/21 but wants to know if there's any medication changes he wants to make before her appt on the 19th    Please advise.

## 2021-07-30 ENCOUNTER — PATIENT OUTREACH (OUTPATIENT)
Dept: ADMINISTRATIVE | Facility: OTHER | Age: 66
End: 2021-07-30

## 2021-08-02 DIAGNOSIS — E78.2 MIXED HYPERLIPIDEMIA: ICD-10-CM

## 2021-08-02 DIAGNOSIS — I10 ESSENTIAL HYPERTENSION: ICD-10-CM

## 2021-08-02 RX ORDER — METOPROLOL SUCCINATE 100 MG/1
TABLET, EXTENDED RELEASE ORAL
Qty: 90 TABLET | Refills: 0 | Status: SHIPPED | OUTPATIENT
Start: 2021-08-02 | End: 2021-09-14

## 2021-08-03 ENCOUNTER — OFFICE VISIT (OUTPATIENT)
Dept: OPTOMETRY | Facility: CLINIC | Age: 66
End: 2021-08-03
Payer: MEDICARE

## 2021-08-03 DIAGNOSIS — H25.13 NUCLEAR SCLEROSIS OF BOTH EYES: ICD-10-CM

## 2021-08-03 DIAGNOSIS — H26.9 CORTICAL CATARACT OF RIGHT EYE: ICD-10-CM

## 2021-08-03 DIAGNOSIS — H52.202 MYOPIA OF LEFT EYE WITH ASTIGMATISM: ICD-10-CM

## 2021-08-03 DIAGNOSIS — H52.4 PRESBYOPIA OF BOTH EYES: ICD-10-CM

## 2021-08-03 DIAGNOSIS — E11.9 TYPE 2 DIABETES MELLITUS WITHOUT RETINOPATHY: ICD-10-CM

## 2021-08-03 DIAGNOSIS — H57.11 EYE DISCOMFORT, RIGHT: ICD-10-CM

## 2021-08-03 DIAGNOSIS — H52.12 MYOPIA OF LEFT EYE WITH ASTIGMATISM: ICD-10-CM

## 2021-08-03 DIAGNOSIS — H52.11 MYOPIA OF RIGHT EYE: ICD-10-CM

## 2021-08-03 DIAGNOSIS — E11.9 DIABETIC EYE EXAM: Primary | ICD-10-CM

## 2021-08-03 DIAGNOSIS — Z13.5 SCREENING FOR EYE CONDITION: ICD-10-CM

## 2021-08-03 DIAGNOSIS — E11.65 UNCONTROLLED TYPE 2 DIABETES MELLITUS WITH HYPERGLYCEMIA: ICD-10-CM

## 2021-08-03 DIAGNOSIS — Z01.00 DIABETIC EYE EXAM: Primary | ICD-10-CM

## 2021-08-03 LAB
LEFT EYE DM RETINOPATHY: NEGATIVE
RIGHT EYE DM RETINOPATHY: NEGATIVE

## 2021-08-03 PROCEDURE — 1126F PR PAIN SEVERITY QUANTIFIED, NO PAIN PRESENT: ICD-10-PCS | Mod: CPTII,S$GLB,, | Performed by: OPTOMETRIST

## 2021-08-03 PROCEDURE — 92014 COMPRE OPH EXAM EST PT 1/>: CPT | Mod: S$GLB,,, | Performed by: OPTOMETRIST

## 2021-08-03 PROCEDURE — 2023F DILAT RTA XM W/O RTNOPTHY: CPT | Mod: CPTII,S$GLB,, | Performed by: OPTOMETRIST

## 2021-08-03 PROCEDURE — 3288F FALL RISK ASSESSMENT DOCD: CPT | Mod: CPTII,S$GLB,, | Performed by: OPTOMETRIST

## 2021-08-03 PROCEDURE — 99999 PR PBB SHADOW E&M-EST. PATIENT-LVL IV: CPT | Mod: PBBFAC,,, | Performed by: OPTOMETRIST

## 2021-08-03 PROCEDURE — 3288F PR FALLS RISK ASSESSMENT DOCUMENTED: ICD-10-PCS | Mod: CPTII,S$GLB,, | Performed by: OPTOMETRIST

## 2021-08-03 PROCEDURE — 2023F PR DILATED RETINAL EXAM W/O EVID OF RETINOPATHY: ICD-10-PCS | Mod: CPTII,S$GLB,, | Performed by: OPTOMETRIST

## 2021-08-03 PROCEDURE — 3046F PR MOST RECENT HEMOGLOBIN A1C LEVEL > 9.0%: ICD-10-PCS | Mod: CPTII,S$GLB,, | Performed by: OPTOMETRIST

## 2021-08-03 PROCEDURE — 1101F PT FALLS ASSESS-DOCD LE1/YR: CPT | Mod: CPTII,S$GLB,, | Performed by: OPTOMETRIST

## 2021-08-03 PROCEDURE — 1126F AMNT PAIN NOTED NONE PRSNT: CPT | Mod: CPTII,S$GLB,, | Performed by: OPTOMETRIST

## 2021-08-03 PROCEDURE — 99999 PR PBB SHADOW E&M-EST. PATIENT-LVL IV: ICD-10-PCS | Mod: PBBFAC,,, | Performed by: OPTOMETRIST

## 2021-08-03 PROCEDURE — 99499 UNLISTED E&M SERVICE: CPT | Mod: S$GLB,,, | Performed by: OPTOMETRIST

## 2021-08-03 PROCEDURE — 1159F MED LIST DOCD IN RCRD: CPT | Mod: CPTII,S$GLB,, | Performed by: OPTOMETRIST

## 2021-08-03 PROCEDURE — 92015 PR REFRACTION: ICD-10-PCS | Mod: S$GLB,,, | Performed by: OPTOMETRIST

## 2021-08-03 PROCEDURE — 1101F PR PT FALLS ASSESS DOC 0-1 FALLS W/OUT INJ PAST YR: ICD-10-PCS | Mod: CPTII,S$GLB,, | Performed by: OPTOMETRIST

## 2021-08-03 PROCEDURE — 99499 RISK ADDL DX/OHS AUDIT: ICD-10-PCS | Mod: S$GLB,,, | Performed by: OPTOMETRIST

## 2021-08-03 PROCEDURE — 3046F HEMOGLOBIN A1C LEVEL >9.0%: CPT | Mod: CPTII,S$GLB,, | Performed by: OPTOMETRIST

## 2021-08-03 PROCEDURE — 1159F PR MEDICATION LIST DOCUMENTED IN MEDICAL RECORD: ICD-10-PCS | Mod: CPTII,S$GLB,, | Performed by: OPTOMETRIST

## 2021-08-03 PROCEDURE — 92014 PR EYE EXAM, EST PATIENT,COMPREHESV: ICD-10-PCS | Mod: S$GLB,,, | Performed by: OPTOMETRIST

## 2021-08-03 PROCEDURE — 92015 DETERMINE REFRACTIVE STATE: CPT | Mod: S$GLB,,, | Performed by: OPTOMETRIST

## 2021-08-03 RX ORDER — FENOFIBRATE 160 MG/1
160 TABLET ORAL DAILY
Qty: 90 TABLET | Refills: 0 | Status: SHIPPED | OUTPATIENT
Start: 2021-08-03 | End: 2021-11-01

## 2021-08-13 ENCOUNTER — LAB VISIT (OUTPATIENT)
Dept: LAB | Facility: HOSPITAL | Age: 66
End: 2021-08-13
Attending: INTERNAL MEDICINE
Payer: MEDICARE

## 2021-08-13 DIAGNOSIS — I10 HTN (HYPERTENSION), BENIGN: ICD-10-CM

## 2021-08-13 LAB
ALBUMIN SERPL BCP-MCNC: 4.1 G/DL (ref 3.5–5.2)
ALP SERPL-CCNC: 135 U/L (ref 55–135)
ALT SERPL W/O P-5'-P-CCNC: 48 U/L (ref 10–44)
ANION GAP SERPL CALC-SCNC: 12 MMOL/L (ref 8–16)
AST SERPL-CCNC: 68 U/L (ref 10–40)
BILIRUB SERPL-MCNC: 0.5 MG/DL (ref 0.1–1)
BUN SERPL-MCNC: 10 MG/DL (ref 8–23)
CALCIUM SERPL-MCNC: 9.7 MG/DL (ref 8.7–10.5)
CHLORIDE SERPL-SCNC: 102 MMOL/L (ref 95–110)
CHOLEST SERPL-MCNC: 199 MG/DL (ref 120–199)
CHOLEST/HDLC SERPL: 5.9 {RATIO} (ref 2–5)
CO2 SERPL-SCNC: 25 MMOL/L (ref 23–29)
CREAT SERPL-MCNC: 1.2 MG/DL (ref 0.5–1.4)
ERYTHROCYTE [DISTWIDTH] IN BLOOD BY AUTOMATED COUNT: 12.7 % (ref 11.5–14.5)
EST. GFR  (AFRICAN AMERICAN): >60 ML/MIN/1.73 M^2
EST. GFR  (NON AFRICAN AMERICAN): >60 ML/MIN/1.73 M^2
ESTIMATED AVG GLUCOSE: 255 MG/DL (ref 68–131)
GLUCOSE SERPL-MCNC: 214 MG/DL (ref 70–110)
HBA1C MFR BLD: 10.5 % (ref 4–5.6)
HCT VFR BLD AUTO: 39.7 % (ref 40–54)
HDLC SERPL-MCNC: 34 MG/DL (ref 40–75)
HDLC SERPL: 17.1 % (ref 20–50)
HGB BLD-MCNC: 13.3 G/DL (ref 14–18)
LDLC SERPL CALC-MCNC: ABNORMAL MG/DL (ref 63–159)
MCH RBC QN AUTO: 31.5 PG (ref 27–31)
MCHC RBC AUTO-ENTMCNC: 33.5 G/DL (ref 32–36)
MCV RBC AUTO: 94 FL (ref 82–98)
NONHDLC SERPL-MCNC: 165 MG/DL
PLATELET # BLD AUTO: 188 K/UL (ref 150–450)
PMV BLD AUTO: 12.2 FL (ref 9.2–12.9)
POTASSIUM SERPL-SCNC: 4.2 MMOL/L (ref 3.5–5.1)
PROT SERPL-MCNC: 7.9 G/DL (ref 6–8.4)
RBC # BLD AUTO: 4.22 M/UL (ref 4.6–6.2)
SODIUM SERPL-SCNC: 139 MMOL/L (ref 136–145)
TRIGL SERPL-MCNC: 977 MG/DL (ref 30–150)
WBC # BLD AUTO: 4.29 K/UL (ref 3.9–12.7)

## 2021-08-13 PROCEDURE — 36415 COLL VENOUS BLD VENIPUNCTURE: CPT | Performed by: NURSE PRACTITIONER

## 2021-08-13 PROCEDURE — 85027 COMPLETE CBC AUTOMATED: CPT | Performed by: NURSE PRACTITIONER

## 2021-08-13 PROCEDURE — 80053 COMPREHEN METABOLIC PANEL: CPT | Performed by: NURSE PRACTITIONER

## 2021-08-13 PROCEDURE — 80061 LIPID PANEL: CPT | Performed by: NURSE PRACTITIONER

## 2021-08-13 PROCEDURE — 83036 HEMOGLOBIN GLYCOSYLATED A1C: CPT | Performed by: NURSE PRACTITIONER

## 2021-08-20 ENCOUNTER — TELEPHONE (OUTPATIENT)
Dept: INTERNAL MEDICINE | Facility: CLINIC | Age: 66
End: 2021-08-20
Payer: MEDICARE

## 2021-08-23 ENCOUNTER — PATIENT MESSAGE (OUTPATIENT)
Dept: INTERNAL MEDICINE | Facility: CLINIC | Age: 66
End: 2021-08-23

## 2021-08-23 ENCOUNTER — PATIENT OUTREACH (OUTPATIENT)
Dept: ADMINISTRATIVE | Facility: HOSPITAL | Age: 66
End: 2021-08-23

## 2021-08-23 ENCOUNTER — PATIENT MESSAGE (OUTPATIENT)
Dept: ADMINISTRATIVE | Facility: HOSPITAL | Age: 66
End: 2021-08-23

## 2021-09-08 ENCOUNTER — TELEPHONE (OUTPATIENT)
Dept: INTERNAL MEDICINE | Facility: CLINIC | Age: 66
End: 2021-09-08

## 2021-09-08 DIAGNOSIS — I10 ESSENTIAL HYPERTENSION: ICD-10-CM

## 2021-09-08 DIAGNOSIS — F41.8 MIXED ANXIETY AND DEPRESSIVE DISORDER: Primary | ICD-10-CM

## 2021-09-08 DIAGNOSIS — E78.2 MIXED HYPERLIPIDEMIA: ICD-10-CM

## 2021-09-08 DIAGNOSIS — K63.5 POLYP OF COLON, UNSPECIFIED PART OF COLON, UNSPECIFIED TYPE: ICD-10-CM

## 2021-09-08 RX ORDER — ESCITALOPRAM OXALATE 10 MG/1
10 TABLET ORAL DAILY
Qty: 30 TABLET | Refills: 4 | Status: SHIPPED | OUTPATIENT
Start: 2021-09-08 | End: 2023-07-06

## 2021-09-16 ENCOUNTER — TELEPHONE (OUTPATIENT)
Dept: INTERNAL MEDICINE | Facility: CLINIC | Age: 66
End: 2021-09-16
Payer: MEDICARE

## 2021-09-16 DIAGNOSIS — E55.9 VITAMIN D DEFICIENCY: ICD-10-CM

## 2021-09-16 DIAGNOSIS — E11.9 TYPE 2 DIABETES MELLITUS WITHOUT COMPLICATION: ICD-10-CM

## 2021-09-16 RX ORDER — INSULIN GLARGINE 100 [IU]/ML
50 INJECTION, SOLUTION SUBCUTANEOUS NIGHTLY
Qty: 15 ML | Refills: 2 | Status: SHIPPED | OUTPATIENT
Start: 2021-09-16 | End: 2022-01-07 | Stop reason: SDUPTHER

## 2021-09-16 RX ORDER — INSULIN ASPART 100 [IU]/ML
INJECTION, SOLUTION INTRAVENOUS; SUBCUTANEOUS
Qty: 75 ML | Refills: 4 | Status: SHIPPED | OUTPATIENT
Start: 2021-09-16 | End: 2022-07-16

## 2021-09-16 RX ORDER — LANCETS
EACH MISCELLANEOUS
Qty: 150 EACH | Refills: 6 | Status: SHIPPED | OUTPATIENT
Start: 2021-09-16

## 2021-09-16 RX ORDER — ERGOCALCIFEROL 1.25 MG/1
50000 CAPSULE ORAL
Qty: 4 CAPSULE | Refills: 6 | Status: SHIPPED | OUTPATIENT
Start: 2021-09-16 | End: 2022-07-08 | Stop reason: SDUPTHER

## 2021-10-11 ENCOUNTER — TELEPHONE (OUTPATIENT)
Dept: INTERNAL MEDICINE | Facility: CLINIC | Age: 66
End: 2021-10-11

## 2021-10-11 DIAGNOSIS — E11.65 UNCONTROLLED TYPE 2 DIABETES MELLITUS WITH HYPERGLYCEMIA: Primary | ICD-10-CM

## 2021-12-05 ENCOUNTER — HOSPITAL ENCOUNTER (EMERGENCY)
Facility: HOSPITAL | Age: 66
Discharge: HOME | End: 2021-12-05
Attending: EMERGENCY MEDICINE
Payer: MEDICARE

## 2021-12-05 VITALS
TEMPERATURE: 99 F | BODY MASS INDEX: 32.96 KG/M2 | RESPIRATION RATE: 20 BRPM | HEART RATE: 65 BPM | HEIGHT: 67 IN | DIASTOLIC BLOOD PRESSURE: 75 MMHG | OXYGEN SATURATION: 97 % | SYSTOLIC BLOOD PRESSURE: 155 MMHG | WEIGHT: 210 LBS

## 2021-12-05 DIAGNOSIS — R07.89 RIGHT-SIDED CHEST WALL PAIN: ICD-10-CM

## 2021-12-05 DIAGNOSIS — S20.211A RIB CONTUSION, RIGHT, INITIAL ENCOUNTER: Primary | ICD-10-CM

## 2021-12-05 PROCEDURE — 25000003 PHARM REV CODE 250: Performed by: EMERGENCY MEDICINE

## 2021-12-05 PROCEDURE — 99284 EMERGENCY DEPT VISIT MOD MDM: CPT | Mod: 25

## 2021-12-05 PROCEDURE — 63600175 PHARM REV CODE 636 W HCPCS: Performed by: EMERGENCY MEDICINE

## 2021-12-05 PROCEDURE — 96372 THER/PROPH/DIAG INJ SC/IM: CPT

## 2021-12-05 PROCEDURE — 99284 EMERGENCY DEPT VISIT MOD MDM: CPT | Mod: ,,, | Performed by: EMERGENCY MEDICINE

## 2021-12-05 PROCEDURE — 99284 PR EMERGENCY DEPT VISIT,LEVEL IV: ICD-10-PCS | Mod: ,,, | Performed by: EMERGENCY MEDICINE

## 2021-12-05 RX ORDER — OXYCODONE AND ACETAMINOPHEN 5; 325 MG/1; MG/1
1 TABLET ORAL
Status: COMPLETED | OUTPATIENT
Start: 2021-12-05 | End: 2021-12-05

## 2021-12-05 RX ORDER — KETOROLAC TROMETHAMINE 30 MG/ML
10 INJECTION, SOLUTION INTRAMUSCULAR; INTRAVENOUS
Status: COMPLETED | OUTPATIENT
Start: 2021-12-05 | End: 2021-12-05

## 2021-12-05 RX ORDER — LIDOCAINE 50 MG/G
1 PATCH TOPICAL
Status: DISCONTINUED | OUTPATIENT
Start: 2021-12-05 | End: 2021-12-05 | Stop reason: HOSPADM

## 2021-12-05 RX ORDER — LIDOCAINE 50 MG/G
1 PATCH TOPICAL DAILY
Qty: 3 PATCH | Refills: 0 | Status: SHIPPED | OUTPATIENT
Start: 2021-12-05 | End: 2021-12-08

## 2021-12-05 RX ADMIN — KETOROLAC TROMETHAMINE 10 MG: 30 INJECTION, SOLUTION INTRAMUSCULAR at 11:12

## 2021-12-05 RX ADMIN — LIDOCAINE 1 PATCH: 50 PATCH CUTANEOUS at 11:12

## 2021-12-05 RX ADMIN — OXYCODONE HYDROCHLORIDE AND ACETAMINOPHEN 1 TABLET: 5; 325 TABLET ORAL at 11:12

## 2021-12-20 DIAGNOSIS — Z12.11 SPECIAL SCREENING FOR MALIGNANT NEOPLASMS, COLON: Primary | ICD-10-CM

## 2021-12-20 RX ORDER — SODIUM, POTASSIUM,MAG SULFATES 17.5-3.13G
1 SOLUTION, RECONSTITUTED, ORAL ORAL DAILY
Qty: 1 KIT | Refills: 0 | Status: SHIPPED | OUTPATIENT
Start: 2021-12-20 | End: 2021-12-22

## 2022-01-07 RX ORDER — INSULIN GLARGINE 100 [IU]/ML
50 INJECTION, SOLUTION SUBCUTANEOUS NIGHTLY
Qty: 15 ML | Refills: 2 | Status: SHIPPED | OUTPATIENT
Start: 2022-01-07 | End: 2022-03-16 | Stop reason: SDUPTHER

## 2022-01-07 NOTE — TELEPHONE ENCOUNTER
----- Message from Roxana Morales sent at 1/7/2022 11:29 AM CST -----  Contact: [phone] Patient  Requesting an RX refill or new RX.  Is this a refill or new RX: new   RX name and strength (copy/paste from chart): insulin (LANTUS SOLOSTAR U-100 INSULIN) glargine 100 units/mL (3mL) SubQ pen  Is this a 30 day or 90 day RX:   Patient advised that in the future they can use their [facility] account to request a refill?:  yes  Pharmacy name and phone # (copy/paste from chart):    [facility] #[id number] - [place], [place] - [address] AT [address]  [address] [address]  Phone: [phone] Fax: [phone]    Comments: Pt states he is out of this medication. Please call and advise.

## 2022-01-07 NOTE — TELEPHONE ENCOUNTER
No new care gaps identified.  Powered by [employer] by [employer]. Reference number: [id number].   1/07/2022 11:50:49 AM CST

## 2022-02-15 ENCOUNTER — LAB VISIT (OUTPATIENT)
Dept: PRIMARY CARE CLINIC | Facility: CLINIC | Age: 67
End: 2022-02-15
Payer: MEDICARE

## 2022-02-15 DIAGNOSIS — Z01.812 PRE-PROCEDURE LAB EXAM: ICD-10-CM

## 2022-02-15 LAB — SARS-COV-2 RNA RESP QL NAA+PROBE: NOT DETECTED

## 2022-02-15 PROCEDURE — U0003 INFECTIOUS AGENT DETECTION BY NUCLEIC ACID (DNA OR RNA); SEVERE ACUTE RESPIRATORY SYNDROME CORONAVIRUS 2 (SARS-COV-2) (CORONAVIRUS DISEASE [COVID-19]), AMPLIFIED PROBE TECHNIQUE, MAKING USE OF HIGH THROUGHPUT TECHNOLOGIES AS DESCRIBED BY CMS-2020-01-R: HCPCS | Performed by: CLINICAL NURSE SPECIALIST

## 2022-02-15 PROCEDURE — U0005 INFEC AGEN DETEC AMPLI PROBE: HCPCS | Performed by: CLINICAL NURSE SPECIALIST

## 2022-02-18 ENCOUNTER — HOSPITAL ENCOUNTER (OUTPATIENT)
Facility: HOSPITAL | Age: 67
Discharge: HOME | End: 2022-02-18
Attending: COLON & RECTAL SURGERY | Admitting: COLON & RECTAL SURGERY
Payer: MEDICARE

## 2022-02-18 ENCOUNTER — ANESTHESIA EVENT (OUTPATIENT)
Dept: ENDOSCOPY | Facility: HOSPITAL | Age: 67
End: 2022-02-18
Payer: MEDICARE

## 2022-02-18 ENCOUNTER — ANESTHESIA (OUTPATIENT)
Dept: ENDOSCOPY | Facility: HOSPITAL | Age: 67
End: 2022-02-18
Payer: MEDICARE

## 2022-02-18 VITALS
HEIGHT: 67 IN | HEART RATE: 71 BPM | WEIGHT: 200 LBS | OXYGEN SATURATION: 97 % | RESPIRATION RATE: 16 BRPM | SYSTOLIC BLOOD PRESSURE: 150 MMHG | DIASTOLIC BLOOD PRESSURE: 75 MMHG | TEMPERATURE: 98 F | BODY MASS INDEX: 31.39 KG/M2

## 2022-02-18 DIAGNOSIS — Z12.11 COLON CANCER SCREENING: ICD-10-CM

## 2022-02-18 DIAGNOSIS — Z86.010 HISTORY OF COLON POLYPS: Primary | ICD-10-CM

## 2022-02-18 LAB — POCT GLUCOSE: 260 MG/DL (ref 70–110)

## 2022-02-18 PROCEDURE — 37000009 HC ANESTHESIA EA ADD 15 MINS: Performed by: COLON & RECTAL SURGERY

## 2022-02-18 PROCEDURE — E9220 PRA ENDO ANESTHESIA: ICD-10-PCS | Mod: PT,,, | Performed by: STUDENT IN AN ORGANIZED HEALTH CARE EDUCATION/TRAINING PROGRAM

## 2022-02-18 PROCEDURE — 88305 TISSUE EXAM BY PATHOLOGIST: ICD-10-PCS | Mod: 26,,, | Performed by: PATHOLOGY

## 2022-02-18 PROCEDURE — 45385 COLONOSCOPY W/LESION REMOVAL: CPT | Mod: PT | Performed by: COLON & RECTAL SURGERY

## 2022-02-18 PROCEDURE — 88305 TISSUE EXAM BY PATHOLOGIST: CPT | Performed by: PATHOLOGY

## 2022-02-18 PROCEDURE — 45385 COLONOSCOPY W/LESION REMOVAL: CPT | Mod: PT,,, | Performed by: COLON & RECTAL SURGERY

## 2022-02-18 PROCEDURE — 88305 TISSUE EXAM BY PATHOLOGIST: CPT | Mod: 26,,, | Performed by: PATHOLOGY

## 2022-02-18 PROCEDURE — 27201089 HC SNARE, DISP (ANY): Performed by: COLON & RECTAL SURGERY

## 2022-02-18 PROCEDURE — 25000003 PHARM REV CODE 250: Performed by: COLON & RECTAL SURGERY

## 2022-02-18 PROCEDURE — E9220 PRA ENDO ANESTHESIA: HCPCS | Mod: PT,,, | Performed by: STUDENT IN AN ORGANIZED HEALTH CARE EDUCATION/TRAINING PROGRAM

## 2022-02-18 PROCEDURE — 45385 PR COLONOSCOPY,REMV LESN,SNARE: ICD-10-PCS | Mod: PT,,, | Performed by: COLON & RECTAL SURGERY

## 2022-02-18 PROCEDURE — 82962 GLUCOSE BLOOD TEST: CPT | Performed by: COLON & RECTAL SURGERY

## 2022-02-18 PROCEDURE — 37000008 HC ANESTHESIA 1ST 15 MINUTES: Performed by: COLON & RECTAL SURGERY

## 2022-02-18 PROCEDURE — 63600175 PHARM REV CODE 636 W HCPCS: Performed by: STUDENT IN AN ORGANIZED HEALTH CARE EDUCATION/TRAINING PROGRAM

## 2022-02-18 RX ORDER — LIDOCAINE HCL/PF 100 MG/5ML
SYRINGE (ML) INTRAVENOUS
Status: DISCONTINUED | OUTPATIENT
Start: 2022-02-18 | End: 2022-02-18

## 2022-02-18 RX ORDER — SODIUM CHLORIDE 9 MG/ML
INJECTION, SOLUTION INTRAVENOUS CONTINUOUS
Status: DISCONTINUED | OUTPATIENT
Start: 2022-02-18 | End: 2022-02-18 | Stop reason: HOSPADM

## 2022-02-18 RX ORDER — PROPOFOL 10 MG/ML
VIAL (ML) INTRAVENOUS
Status: DISCONTINUED | OUTPATIENT
Start: 2022-02-18 | End: 2022-02-18

## 2022-02-18 RX ORDER — PROPOFOL 10 MG/ML
VIAL (ML) INTRAVENOUS CONTINUOUS PRN
Status: DISCONTINUED | OUTPATIENT
Start: 2022-02-18 | End: 2022-02-18

## 2022-02-18 RX ADMIN — PROPOFOL 40 MG: 10 INJECTION, EMULSION INTRAVENOUS at 09:02

## 2022-02-18 RX ADMIN — SODIUM CHLORIDE: 0.9 INJECTION, SOLUTION INTRAVENOUS at 09:02

## 2022-02-18 RX ADMIN — Medication 50 MG: at 09:02

## 2022-02-18 RX ADMIN — PROPOFOL 20 MG: 10 INJECTION, EMULSION INTRAVENOUS at 09:02

## 2022-02-18 RX ADMIN — PROPOFOL 150 MCG/KG/MIN: 10 INJECTION, EMULSION INTRAVENOUS at 09:02

## 2022-02-18 RX ADMIN — PROPOFOL 30 MG: 10 INJECTION, EMULSION INTRAVENOUS at 09:02

## 2022-02-18 NOTE — TRANSFER OF CARE
"Anesthesia Transfer of Care Note    Patient: Dallas Garcia    Procedure(s) Performed: Procedure(s) (LRB):  COLONOSCOPY (N/A)    Patient location: GI    Anesthesia Type: general    Transport from OR: Transported from OR on room air with adequate spontaneous ventilation    Post pain: adequate analgesia    Post assessment: no apparent anesthetic complications and tolerated procedure well    Post vital signs: stable    Level of consciousness: awake, alert and oriented    Nausea/Vomiting: no nausea/vomiting    Complications: none    Transfer of care protocol was followed      Last vitals:   Visit Vitals  BP (!) 166/81 (BP Location: Left arm, Patient Position: Lying)   Pulse 90   Temp 36.5 °C (97.7 °F) (Temporal)   Resp 18   Ht 5' 7" (1.702 m)   Wt 90.7 kg (200 lb)   SpO2 98%   BMI 31.32 kg/m²     "

## 2022-02-18 NOTE — H&P
ENDOSCOPY H&P    Procedure : screening colonoscopy    asymptomatic screening exam, personal history of colon polyps and most recent endoscopic exam 7 years ago which was normal. He did have benign polyps on the colonoscopy before that.     No FH of colon or rectal cancer.     Past Medical History:   Diagnosis Date    Allergy     Anxiety     Arthritis     Cataract     Colon polyps 2009    Diabetes mellitus     Diabetes mellitus type II     GERD (gastroesophageal reflux disease)     History of alcohol abuse     Hyperlipidemia     Hypertension     Neuromuscular disorder      Sedation Problems: NO  Family History   Problem Relation Age of Onset    Hypertension Father     Diabetes Father     Aneurysm Father         brain    Diabetes Mother     Diabetes Brother     Amblyopia Neg Hx     Blindness Neg Hx     Cancer Neg Hx     Cataracts Neg Hx     Glaucoma Neg Hx     Macular degeneration Neg Hx     Retinal detachment Neg Hx     Strabismus Neg Hx     Stroke Neg Hx     Thyroid disease Neg Hx      Fam Hx of Sedation Problems: NO  Social History     Socioeconomic History    Marital status:     Number of children: 2   Tobacco Use    Smoking status: Former Smoker    Smokeless tobacco: Never Used   Substance and Sexual Activity    Alcohol use: Yes     Comment: socially    Drug use: No    Sexual activity: Not Currently   Social History Narrative    Pt  with 2 daughters(Helen and Mary); he works as a financial     Consultant; Helen is 18 y/o and attending [employer]/Mary is 12 y/o and attending [employer]       Review of Systems - Negative    Respiratory ROS: no cough, shortness of breath, or wheezing  Cardiovascular ROS: no chest pain or dyspnea on exertion  Gastrointestinal ROS: no abdominal pain, change in bowel habits, or black or bloody stools  Musculoskeletal ROS: negative  Neurological ROS: no TIA or stroke symptoms    Physical Exam:  General: no distress  Head:  normocephalic  Airway:  normal oropharynx, airway normal  Neck: supple, symmetrical, trachea midline  Lungs:  clear to auscultation bilaterally and normal respiratory effort  Heart: regular rate and rhythm, S1, S2 normal, no murmur, rub or gallop  Abdomen: soft, non-tender non-distented; bowel sounds normal; no masses,  no organomegaly  Extremities: no cyanosis or edema, or clubbing    Deep Sedation: Mallampati Score per anesthesia     SedationPlan :Choice     ASA : II

## 2022-02-18 NOTE — DISCHARGE INSTRUCTIONS
Patient Education       Colonoscopy   Why is this procedure done?   Colonoscopy is done so your doctor can see the inside of your large intestines, also called your colon, and your rectum. It uses a lighted tube called a scope, which has a tiny camera that can be moved through the large intestine. This may be done to:  · Check for colon cancer or growths called polyps  · Look for the source of rectal bleeding  · Find the cause of changes in your bowel movements  · Find the cause of belly or rectal pain  · Check results from other tests  · Check your response to treatment for other diseases  · Learn about weight loss  What happens before the procedure?   · Your doctor will ask you about your health history and do an exam. The doctor may order tests for your stool. Talk to the doctor about  ? All the drugs you are taking. Be sure to include all prescription and over-the-counter (OTC) drugs, and herbal supplements. Tell the doctor about any drug allergy. Bring a list of drugs you take with you.  ? Any bleeding problems. Be sure to tell your doctor if you are taking any drugs that may cause bleeding. Some of these are warfarin, rivaroxaban, apixaban, ticagrelor, clopidogrel, ketorolac, ibuprofen, naproxen, or aspirin. Certain vitamins and herbs, such as garlic and fish oil, may also add to the risk for bleeding. You may need to stop these drugs as well. Talk to your doctor about them.  · The colon needs to be cleaned out before this test. Your doctor will tell you to take drugs that will cause watery loose stools. These may be liquids, pills, or both. You may need to take these the day before and the day of your test.  · You will be placed on a clear liquid diet the day before the exam and you will need to only have clear liquids until the test is done. Clear liquids include water, sports drinks, broth, soft drinks, and juices, but avoid anything that is red or purple in color. Do not drink alcohol.  · Your doctor may  ask you not to eat or drink any food other than the drugs or liquids that clean out your colon.  · You will not be allowed to drive after the procedure. Ask a family member or a friend to help you get home and stay with you if possible.  What happens during the procedure?   · The staff will put an IV in your arm to give you fluids and drugs. You may be given a drug to make you sleepy.  · You will lie on your side with your knees bent and pulled up toward your chest.  · The doctor will use a small thin tube, called a scope, with a light and a camera on it. The tube is put into your anus and moved through your rectum and into the large intestine or colon.  · Small amounts of air are put into your colon. The camera lets your doctor look at the lining of your colon.  · Your doctor may take small tissue samples and remove small growths.     · The tube is then taken out.  · The procedure may take 30 to 45 minutes.  What happens after the procedure?   · You will go to a recovery area and the staff will watch you closely.  · You will want to rest after your procedure.  · You may feel groggy.  · You should be able to eat your usual diet after the test.  · You will have gas and you may have mild cramping. This is normal.  · A small amount of bleeding may happen during the first few days after your procedure.  · If tissue was removed, it will be sent to a lab to be checked. Your doctor will tell you the results after a week or two.  What problems could happen?   · Tear inside your colon  · Bleeding can happen for a few days afterwards  Where can I learn more?   American Cancer Society  https://www.cancer.org/cancer/colon-rectal-cancer/kfoowdrjw-gdobfofzv-fjsecaj/nnzbdbzxi-cdqrr-ykjy.html   American Society of Clinical Oncology  https://www.cancer.net/navigating-cancer-care/diagnosing-cancer/tests-and-procedures/colonoscopy   Last Reviewed Date   2021-03-10  Consumer Information Use and Disclaimer   This information is not  specific medical advice and does not replace information you receive from your health care provider. This is only a brief summary of general information. It does NOT include all information about conditions, illnesses, injuries, tests, procedures, treatments, therapies, discharge instructions or life-style choices that may apply to you. You must talk with your health care provider for complete information about your health and treatment options. This information should not be used to decide whether or not to accept your health care providers advice, instructions or recommendations. Only your health care provider has the knowledge and training to provide advice that is right for you.  Copyright   Copyright © 2021 UpToDate, Inc. and its affiliates and/or licensors. All rights reserved.

## 2022-02-18 NOTE — ANESTHESIA POSTPROCEDURE EVALUATION
Anesthesia Post Evaluation    Patient: Dallas Garcia    Procedure(s) Performed: Procedure(s) (LRB):  COLONOSCOPY (N/A)    Final Anesthesia Type: general      Patient location during evaluation: GI PACU  Patient participation: Yes- Able to Participate  Level of consciousness: awake and alert and oriented  Post-procedure vital signs: reviewed and stable  Pain management: adequate  Airway patency: patent    PONV status at discharge: No PONV  Anesthetic complications: no      Cardiovascular status: hemodynamically stable  Respiratory status: unassisted, spontaneous ventilation and room air  Hydration status: euvolemic  Follow-up not needed.          Vitals Value Taken Time   /75 02/18/22 1037   Temp 36.8 °C (98.2 °F) 02/18/22 1007   Pulse 71 02/18/22 1037   Resp 16 02/18/22 1037   SpO2 97 % 02/18/22 1037         No case tracking events are documented in the log.      Pain/Paulo Score: Paulo Score: 9 (2/18/2022 10:07 AM)

## 2022-02-18 NOTE — ANESTHESIA PREPROCEDURE EVALUATION
02/18/2022  Dallas Garcia is a 66 y.o., male.    Anesthesia Evaluation    I have reviewed the Patient Summary Reports.    I have reviewed the Nursing Notes.    I have reviewed the Medications.     Review of Systems  Anesthesia Hx:  Denies Family Hx of Anesthesia complications.   Denies Personal Hx of Anesthesia complications.     Patient Active Problem List   Diagnosis    Anxiety    Nuclear sclerosis - Both Eyes    Diabetes mellitus type 2 in obese    Fatty liver    Hypertriglyceridemia    HTN (hypertension), benign    Sleep apnea    Alcohol abuse    Type 2 diabetes, uncontrolled, with neuropathy    Chronic pain of right knee    Diabetes with proteinuria    Dyslipidemia    Non morbid obesity due to excess calories    Familial hyperlipidemia    GERD (gastroesophageal reflux disease)    Internal derangement of right knee    S/P meniscectomy    Right-sided low back pain with sciatica    Ischial bursitis of right side    Right hip pain    Muscle weakness       Past Medical History:   Diagnosis Date    Allergy     Anxiety     Arthritis     Cataract     Colon polyps 2009    Diabetes mellitus     Diabetes mellitus type II     GERD (gastroesophageal reflux disease)     History of alcohol abuse     Hyperlipidemia     Hypertension     Neuromuscular disorder        ECHO: No results found for this or any previous visit.      Body mass index is 31.32 kg/m².    Tobacco Use: Medium Risk    Smoking Tobacco Use: Former Smoker    Smokeless Tobacco Use: Never Used       Social History     Substance and Sexual Activity   Drug Use No        Alcohol Use: Not on file         Airway:  No value filed.    Physical Exam   Airway/Jaw/Neck:  Airway Findings: Mouth Opening: Normal Tongue: Normal  General Airway Assessment: Adult, Good  TM Distance: Normal, at least 6 cm        Chest/Lungs:  Chest/Lungs Findings: Normal Respiratory Rate         Mental Status:  Mental Status Findings:  Cooperative, Alert and Oriented         Anesthesia Plan  Type of Anesthesia, risks & benefits discussed:  Anesthesia Type:  general    Patient's Preference: General  Plan Factors:          Intra-op Monitoring Plan: standard ASA monitors  Intra-op Monitoring Plan Comments:   Post Op Pain Control Plan:   Post Op Pain Control Plan Comments: Per primary service    Induction:   IV  Beta Blocker:  Patient is not currently on a Beta-Blocker (No further documentation required).       Informed Consent: Patient understands risks and agrees with Anesthesia plan.  Questions answered. Anesthesia consent signed with patient.  ASA Score: 3     Day of Surgery Review of History & Physical:    H&P update referred to the surgeon.         Ready For Surgery From Anesthesia Perspective.

## 2022-02-18 NOTE — PLAN OF CARE
Written discharge plan of care reviewed, verbalizes understanding and all questions addressed at this time.

## 2022-02-18 NOTE — PROVATION PATIENT INSTRUCTIONS
Discharge Summary/Instructions after an Endoscopic Procedure  Patient Name: Dallas Garcia  Patient MRN: [id number]  Patient YOB: 1955  Friday, February 18, 2022  Darrius Willett MD  Dear patient,  As a result of recent federal legislation (The Federal Cures Act), you may   receive lab or pathology results from your procedure in your [facility]   account before your physician is able to contact you. Your physician or   their representative will relay the results to you with their   recommendations at their soonest availability.  Thank you,  RESTRICTIONS:  During your procedure today, you received medications for sedation.  These   medications may affect your judgment, balance and coordination.  Therefore,   for 24 hours, you have the following restrictions:   - DO NOT drive a car, operate machinery, make legal/financial decisions,   sign important papers or drink alcohol.    ACTIVITY:  Today: no heavy lifting, straining or running due to procedural   sedation/anesthesia.  The following day: return to full activity including work.  DIET:  Eat and drink normally unless instructed otherwise.     TREATMENT FOR COMMON SIDE EFFECTS:  - Mild abdominal pain, nausea, belching, bloating or excessive gas:  rest,   eat lightly and use a heating pad.  - Sore Throat: treat with throat lozenges and/or gargle with warm salt   water.  - Because air was used during the procedure, expelling large amounts of air   from your rectum or belching is normal.  - If a bowel prep was taken, you may not have a bowel movement for 1-3 days.    This is normal.  SYMPTOMS TO WATCH FOR AND REPORT TO YOUR PHYSICIAN:  1. Abdominal pain or bloating, other than gas cramps.  2. Chest pain.  3. Back pain.  4. Signs of infection such as: chills or fever occurring within 24 hours   after the procedure.  5. Rectal bleeding, which would show as bright red, maroon, or black stools.   (A tablespoon of blood from the rectum is not serious,  especially if   hemorrhoids are present.)  6. Vomiting.  7. Weakness or dizziness.  GO DIRECTLY TO THE NEAREST EMERGENCY ROOM IF YOU HAVE ANY OF THE FOLLOWING:      Difficulty breathing              Chills and/or fever over 101 F   Persistent vomiting and/or vomiting blood   Severe abdominal pain   Severe chest pain   Black, tarry stools   Bleeding- more than one tablespoon   Any other symptom or condition that you feel may need urgent attention  Your doctor recommends these additional instructions:  If any biopsies were taken, your doctors clinic will contact you in 1 to 2   weeks with any results.  - Discharge patient to home (ambulatory).   - Resume previous diet.   - Continue present medications.   - Await pathology results.   - Repeat colonoscopy in 5 years for surveillance.  For questions, problems or results please call your physician - Darrius Willett MD at Work:  [phone].  [facility] EMERGENCY ROOM PHONE NUMBER: [phone]  IF A COMPLICATION OR EMERGENCY SITUATION ARISES AND YOU ARE UNABLE TO REACH   YOUR PHYSICIAN - GO DIRECTLY TO THE EMERGENCY ROOM.  Darrius Willett MD  2/18/2022 10:02:26 AM  This report has been verified and signed electronically.  Dear patient,  As a result of recent federal legislation (The Federal Cures Act), you may   receive lab or pathology results from your procedure in your [facility]   account before your physician is able to contact you. Your physician or   their representative will relay the results to you with their   recommendations at their soonest availability.  Thank you,  PROVATION

## 2022-02-24 LAB
FINAL PATHOLOGIC DIAGNOSIS: NORMAL
Lab: NORMAL

## 2022-02-27 DIAGNOSIS — K21.9 GASTROESOPHAGEAL REFLUX DISEASE: ICD-10-CM

## 2022-02-27 RX ORDER — OMEPRAZOLE 20 MG/1
CAPSULE, DELAYED RELEASE ORAL
Qty: 90 CAPSULE | Refills: 2 | Status: SHIPPED | OUTPATIENT
Start: 2022-02-27 | End: 2022-11-15

## 2022-02-27 NOTE — TELEPHONE ENCOUNTER
Care Due:                  Date            Visit Type   Department     Provider  --------------------------------------------------------------------------------    Last Visit: None Found      None         None Found                               -                              PRIMARY      [facility] PRIMARY  Next Visit: 07-      CARE ([facility])   CARE           Lisette Corado                                                            Last  Test          Frequency    Reason                     Performed    Due Date  --------------------------------------------------------------------------------    Office Visit  12 months..  EScitalopram, JENTADUETO,   Not Found    Overdue                             atorvastatin,                             fenofibrate, insulin,                             omeprazole, valsartan....    HBA1C.......  6 months...  JENTAMASON, insulin......  08-   02-    Powered by [employer]. Reference number: [id number].   2/27/2022 5:31:16 AM CST

## 2022-03-16 NOTE — TELEPHONE ENCOUNTER
No new care gaps identified.  Powered by [employer]. Reference number: [id number].   3/16/2022 5:01:47 PM CDT

## 2022-03-17 RX ORDER — INSULIN GLARGINE 100 [IU]/ML
50 INJECTION, SOLUTION SUBCUTANEOUS NIGHTLY
Qty: 15 ML | Refills: 2 | Status: SHIPPED | OUTPATIENT
Start: 2022-03-17 | End: 2022-05-24 | Stop reason: SDUPTHER

## 2022-03-29 DIAGNOSIS — E11.9 DIABETES MELLITUS WITHOUT COMPLICATION: ICD-10-CM

## 2022-03-29 NOTE — TELEPHONE ENCOUNTER
No new care gaps identified.  Powered by [employer]. Reference number: [id number].   3/29/2022 5:28:58 AM CDT

## 2022-03-29 NOTE — TELEPHONE ENCOUNTER
This Rx Request does not qualify for assessment with the OR   Please review protocol details and the Care Due Message for extra clinical information    Reasons Rx Request may be deferred:  Patient has been seen in the ED/Hospital since the last PCP visit  Pt due for OV with PCP    Note composed:1:55 PM 03/29/2022

## 2022-03-30 DIAGNOSIS — E11.9 DIABETES MELLITUS WITHOUT COMPLICATION: ICD-10-CM

## 2022-03-30 RX ORDER — LINAGLIPTIN AND METFORMIN HYDROCHLORIDE 2.5; 1 MG/1; MG/1
TABLET, FILM COATED ORAL
Qty: 60 TABLET | Refills: 2 | Status: SHIPPED | OUTPATIENT
Start: 2022-03-30 | End: 2022-06-30 | Stop reason: SDUPTHER

## 2022-03-30 RX ORDER — LINAGLIPTIN AND METFORMIN HYDROCHLORIDE 2.5; 1 MG/1; MG/1
TABLET, FILM COATED ORAL
Qty: 60 TABLET | Refills: 2 | OUTPATIENT
Start: 2022-03-30

## 2022-03-30 NOTE — TELEPHONE ENCOUNTER
No new care gaps identified.  Powered by [facility] by [employer]. Reference number: [id number].   3/30/2022 11:18:25 AM CDT

## 2022-04-19 ENCOUNTER — PES CALL (OUTPATIENT)
Dept: ADMINISTRATIVE | Facility: CLINIC | Age: 67
End: 2022-04-19
Payer: MEDICARE

## 2022-05-24 RX ORDER — INSULIN GLARGINE 100 [IU]/ML
50 INJECTION, SOLUTION SUBCUTANEOUS NIGHTLY
Qty: 15 ML | Refills: 2 | Status: SHIPPED | OUTPATIENT
Start: 2022-05-24 | End: 2022-07-15 | Stop reason: SDUPTHER

## 2022-05-24 NOTE — TELEPHONE ENCOUNTER
Care Due:                  Date            Visit Type   Department     Provider  --------------------------------------------------------------------------------    Last Visit: None Found      None         None Found                               -                              PRIMARY      [facility] PRIMARY  Next Visit: 07-      CARE ([facility])   CARE           Lisette Corado                                                            Last  Test          Frequency    Reason                     Performed    Due Date  --------------------------------------------------------------------------------    CBC.........  12 months..  fenofibrate..............  08- 08-    CMP.........  12 months..  JENTADUETO, atorvastatin,   08- 08-                             fenofibrate, insulin,                             valsartan................    HBA1C.......  6 months...  JENTADUETO, insulin......  08-   02-    Lipid Panel.  12 months..  atorvastatin, fenofibrate  08- 08-    [facility] Embedded Care Gaps. Reference number: [id number]. 5/24/2022   12:48:20 PM CDT

## 2022-05-24 NOTE — TELEPHONE ENCOUNTER
----- Message from Isabelle Miguel sent at 5/24/2022 10:49 AM CDT -----  Contact: [phone]  Requesting an RX refill or new RX.  Is this a refill or new RX: refill  RX name and strength (copy/paste from chart): insulin (LANTUS SOLOSTAR U-100 INSULIN) glargine 100 units/mL (3mL) SubQ pen   Is this a 30 day or 90 day RX: 30  Pharmacy name and phone # (copy/paste from chart):    [id number] - [place], [place] - [address] AT [address] [facility] [address]  [address] [address]  Phone: [phone] Fax: [phone]      The doctors have asked that we provide their patients with the following 2 reminders -- prescription refills can take up to 72 hours, and a friendly reminder that in the future you can use your [facility] account to request refills: yes    Pt states he's completely out of med!

## 2022-06-10 ENCOUNTER — TELEPHONE (OUTPATIENT)
Dept: INTERNAL MEDICINE | Facility: CLINIC | Age: 67
End: 2022-06-10
Payer: MEDICARE

## 2022-06-10 ENCOUNTER — OFFICE VISIT (OUTPATIENT)
Dept: INTERNAL MEDICINE | Facility: CLINIC | Age: 67
End: 2022-06-10
Payer: MEDICARE

## 2022-06-10 VITALS
HEIGHT: 67 IN | WEIGHT: 210 LBS | BODY MASS INDEX: 32.96 KG/M2 | OXYGEN SATURATION: 97 % | DIASTOLIC BLOOD PRESSURE: 88 MMHG | SYSTOLIC BLOOD PRESSURE: 134 MMHG | RESPIRATION RATE: 18 BRPM | HEART RATE: 84 BPM

## 2022-06-10 DIAGNOSIS — J32.9 SINUSITIS, UNSPECIFIED CHRONICITY, UNSPECIFIED LOCATION: Primary | ICD-10-CM

## 2022-06-10 DIAGNOSIS — I10 ESSENTIAL HYPERTENSION: ICD-10-CM

## 2022-06-10 DIAGNOSIS — R05.9 COUGH: ICD-10-CM

## 2022-06-10 LAB
INFLUENZA A, MOLECULAR: NEGATIVE
INFLUENZA B, MOLECULAR: NEGATIVE
SPECIMEN SOURCE: NORMAL

## 2022-06-10 PROCEDURE — 3008F BODY MASS INDEX DOCD: CPT | Mod: CPTII,S$GLB,, | Performed by: INTERNAL MEDICINE

## 2022-06-10 PROCEDURE — 4010F ACE/ARB THERAPY RXD/TAKEN: CPT | Mod: CPTII,S$GLB,, | Performed by: INTERNAL MEDICINE

## 2022-06-10 PROCEDURE — 99999 PR PBB SHADOW E&M-EST. PATIENT-LVL V: ICD-10-PCS | Mod: PBBFAC,,, | Performed by: INTERNAL MEDICINE

## 2022-06-10 PROCEDURE — 3079F DIAST BP 80-89 MM HG: CPT | Mod: CPTII,S$GLB,, | Performed by: INTERNAL MEDICINE

## 2022-06-10 PROCEDURE — U0003 INFECTIOUS AGENT DETECTION BY NUCLEIC ACID (DNA OR RNA); SEVERE ACUTE RESPIRATORY SYNDROME CORONAVIRUS 2 (SARS-COV-2) (CORONAVIRUS DISEASE [COVID-19]), AMPLIFIED PROBE TECHNIQUE, MAKING USE OF HIGH THROUGHPUT TECHNOLOGIES AS DESCRIBED BY CMS-2020-01-R: HCPCS | Performed by: INTERNAL MEDICINE

## 2022-06-10 PROCEDURE — 87502 INFLUENZA DNA AMP PROBE: CPT | Performed by: INTERNAL MEDICINE

## 2022-06-10 PROCEDURE — 1159F PR MEDICATION LIST DOCUMENTED IN MEDICAL RECORD: ICD-10-PCS | Mod: CPTII,S$GLB,, | Performed by: INTERNAL MEDICINE

## 2022-06-10 PROCEDURE — 3288F FALL RISK ASSESSMENT DOCD: CPT | Mod: CPTII,S$GLB,, | Performed by: INTERNAL MEDICINE

## 2022-06-10 PROCEDURE — 99213 OFFICE O/P EST LOW 20 MIN: CPT | Mod: 25,S$GLB,, | Performed by: INTERNAL MEDICINE

## 2022-06-10 PROCEDURE — 1101F PT FALLS ASSESS-DOCD LE1/YR: CPT | Mod: CPTII,S$GLB,, | Performed by: INTERNAL MEDICINE

## 2022-06-10 PROCEDURE — 96372 PR INJECTION,THERAP/PROPH/DIAG2ST, IM OR SUBCUT: ICD-10-PCS | Mod: S$GLB,,, | Performed by: INTERNAL MEDICINE

## 2022-06-10 PROCEDURE — 3075F SYST BP GE 130 - 139MM HG: CPT | Mod: CPTII,S$GLB,, | Performed by: INTERNAL MEDICINE

## 2022-06-10 PROCEDURE — 99213 PR OFFICE/OUTPT VISIT, EST, LEVL III, 20-29 MIN: ICD-10-PCS | Mod: 25,S$GLB,, | Performed by: INTERNAL MEDICINE

## 2022-06-10 PROCEDURE — 3008F PR BODY MASS INDEX (BMI) DOCUMENTED: ICD-10-PCS | Mod: CPTII,S$GLB,, | Performed by: INTERNAL MEDICINE

## 2022-06-10 PROCEDURE — 3079F PR MOST RECENT DIASTOLIC BLOOD PRESSURE 80-89 MM HG: ICD-10-PCS | Mod: CPTII,S$GLB,, | Performed by: INTERNAL MEDICINE

## 2022-06-10 PROCEDURE — 96372 THER/PROPH/DIAG INJ SC/IM: CPT | Mod: S$GLB,,, | Performed by: INTERNAL MEDICINE

## 2022-06-10 PROCEDURE — 4010F PR ACE/ARB THEARPY RXD/TAKEN: ICD-10-PCS | Mod: CPTII,S$GLB,, | Performed by: INTERNAL MEDICINE

## 2022-06-10 PROCEDURE — 1126F PR PAIN SEVERITY QUANTIFIED, NO PAIN PRESENT: ICD-10-PCS | Mod: CPTII,S$GLB,, | Performed by: INTERNAL MEDICINE

## 2022-06-10 PROCEDURE — 1101F PR PT FALLS ASSESS DOC 0-1 FALLS W/OUT INJ PAST YR: ICD-10-PCS | Mod: CPTII,S$GLB,, | Performed by: INTERNAL MEDICINE

## 2022-06-10 PROCEDURE — 99999 PR PBB SHADOW E&M-EST. PATIENT-LVL V: CPT | Mod: PBBFAC,,, | Performed by: INTERNAL MEDICINE

## 2022-06-10 PROCEDURE — 3288F PR FALLS RISK ASSESSMENT DOCUMENTED: ICD-10-PCS | Mod: CPTII,S$GLB,, | Performed by: INTERNAL MEDICINE

## 2022-06-10 PROCEDURE — 3075F PR MOST RECENT SYSTOLIC BLOOD PRESS GE 130-139MM HG: ICD-10-PCS | Mod: CPTII,S$GLB,, | Performed by: INTERNAL MEDICINE

## 2022-06-10 PROCEDURE — 1159F MED LIST DOCD IN RCRD: CPT | Mod: CPTII,S$GLB,, | Performed by: INTERNAL MEDICINE

## 2022-06-10 PROCEDURE — 1126F AMNT PAIN NOTED NONE PRSNT: CPT | Mod: CPTII,S$GLB,, | Performed by: INTERNAL MEDICINE

## 2022-06-10 RX ORDER — METOPROLOL SUCCINATE 100 MG/1
TABLET, EXTENDED RELEASE ORAL
Qty: 90 TABLET | Refills: 0 | Status: SHIPPED | OUTPATIENT
Start: 2022-06-10 | End: 2022-07-27

## 2022-06-10 RX ORDER — AZITHROMYCIN 250 MG/1
TABLET, FILM COATED ORAL
Qty: 6 TABLET | Refills: 0 | Status: SHIPPED | OUTPATIENT
Start: 2022-06-10

## 2022-06-10 NOTE — TELEPHONE ENCOUNTER
----- Message from Margot Simon sent at 6/10/2022 10:39 AM CDT -----  Contact: [phone]  Pt is needing a referral for an ENT and he is asking for one today so he can be seen by one today please give return call

## 2022-06-10 NOTE — TELEPHONE ENCOUNTER
Refill Routing Note   Medication(s) are not appropriate for processing by [facility] for the following reason(s):      - Patient has not been seen in over 15 months by PCP  - Patient has been seen in the ED/Hospital since the last PCP visit    [facility] action(s):  Defer          Medication reconciliation completed: No     Appointments  past 12m or future 3m with PCP    Date Provider   Last Visit   11/20/2019 Lisette Corado MD   Next Visit   7/15/2022 Lisette Corado MD   ED visits in past 90 days: 0        Note composed:5:22 PM 06/10/2022

## 2022-06-10 NOTE — TELEPHONE ENCOUNTER
No new care gaps identified.  [facility] Embedded Care Gaps. Reference number: [id number]. 6/10/2022   5:05:18 PM CDT

## 2022-06-10 NOTE — PROGRESS NOTES
Subjective:       Patient ID: Dallas Garcia is a 66 y.o. male.    Chief Complaint: Sinus Problem    HPIPt with over one week of HA, nasal drainage, scratchy throat - ear pressure that was severe when he flew back from his trip to [place].  No significant fever.  No chills or sweats.  Significant fatigue.  He has not been checking his blood sugars.   Review of Systems   Respiratory: Negative for shortness of breath.    Cardiovascular: Negative for chest pain.   Gastrointestinal: Negative for abdominal pain, diarrhea, nausea and vomiting.   Genitourinary: Negative for dysuria.   Neurological: Negative for seizures, syncope and headaches.       Objective:      Physical Exam  Constitutional:       General: He is not in acute distress.     Appearance: He is well-developed.   HENT:      Ears:      Comments: Fluid behind both TMs  Eyes:      Pupils: Pupils are equal, round, and reactive to light.   Neck:      Thyroid: No thyromegaly.      Vascular: No JVD.   Cardiovascular:      Rate and Rhythm: Normal rate and regular rhythm.      Heart sounds: Normal heart sounds. No murmur heard.    No friction rub. No gallop.   Pulmonary:      Effort: Pulmonary effort is normal.      Breath sounds: Normal breath sounds. No wheezing or rales.   Abdominal:      General: Bowel sounds are normal. There is no distension.      Palpations: Abdomen is soft. There is no mass.      Tenderness: There is no abdominal tenderness. There is no guarding or rebound.   Musculoskeletal:      Cervical back: Neck supple.   Lymphadenopathy:      Cervical: No cervical adenopathy.   Skin:     General: Skin is warm and dry.   Neurological:      Mental Status: He is alert and oriented to person, place, and time.   Psychiatric:         Behavior: Behavior normal.         Thought Content: Thought content normal.         Judgment: Judgment normal.         Assessment:       1. Sinusitis, unspecified chronicity, unspecified location        Plan:    Sinusitis, unspecified chronicity, unspecified location  -     Influenza A & B by Molecular    Other orders  -     cefTRIAXone (ROCEPHIN) 1 g in LIDOcaine HCL 10 mg/ml (1%) IM only syringe  -     azithromycin (Z-AUDI) 250 MG tablet; Two today then one daily thereafter  Dispense: 6 tablet; Refill: 0  -     COVID-19 Routine Screening

## 2022-06-12 ENCOUNTER — TELEPHONE (OUTPATIENT)
Dept: INTERNAL MEDICINE | Facility: CLINIC | Age: 67
End: 2022-06-12
Payer: MEDICARE

## 2022-06-12 RX ORDER — AZITHROMYCIN 250 MG/1
TABLET, FILM COATED ORAL
Qty: 6 TABLET | Refills: 0 | Status: SHIPPED | OUTPATIENT
Start: 2022-06-12

## 2022-06-12 NOTE — TELEPHONE ENCOUNTER
Pt is feeling better - COVID swab was cancelled.  Took zithromax 500 daily for three days but finishing today - will send one more naun for a longer course for sinusitis.

## 2022-06-17 LAB
SARS-COV-2 RNA RESP QL NAA+PROBE: NOT DETECTED
SARS-COV-2- CYCLE NUMBER: NORMAL

## 2022-06-30 ENCOUNTER — TELEPHONE (OUTPATIENT)
Dept: INTERNAL MEDICINE | Facility: CLINIC | Age: 67
End: 2022-06-30
Payer: MEDICARE

## 2022-06-30 DIAGNOSIS — I10 ESSENTIAL HYPERTENSION: Primary | ICD-10-CM

## 2022-06-30 DIAGNOSIS — E11.9 DIABETES MELLITUS WITHOUT COMPLICATION: ICD-10-CM

## 2022-06-30 DIAGNOSIS — Z12.5 PROSTATE CANCER SCREENING: ICD-10-CM

## 2022-06-30 NOTE — TELEPHONE ENCOUNTER
No new care gaps identified.  [facility] Embedded Care Gaps. Reference number: [id number]. 6/30/2022   12:51:05 PM CDT

## 2022-07-01 RX ORDER — LINAGLIPTIN AND METFORMIN HYDROCHLORIDE 2.5; 1 MG/1; MG/1
1 TABLET, FILM COATED ORAL 2 TIMES DAILY WITH MEALS
Qty: 60 TABLET | Refills: 2 | Status: SHIPPED | OUTPATIENT
Start: 2022-07-01 | End: 2022-07-15 | Stop reason: SDUPTHER

## 2022-07-08 ENCOUNTER — LAB VISIT (OUTPATIENT)
Dept: LAB | Facility: HOSPITAL | Age: 67
End: 2022-07-08
Attending: INTERNAL MEDICINE
Payer: MEDICARE

## 2022-07-08 DIAGNOSIS — E55.9 VITAMIN D DEFICIENCY: ICD-10-CM

## 2022-07-08 DIAGNOSIS — Z12.5 PROSTATE CANCER SCREENING: ICD-10-CM

## 2022-07-08 DIAGNOSIS — E78.2 MIXED HYPERLIPIDEMIA: ICD-10-CM

## 2022-07-08 DIAGNOSIS — I10 ESSENTIAL HYPERTENSION: ICD-10-CM

## 2022-07-08 LAB
ALBUMIN SERPL BCP-MCNC: 4 G/DL (ref 3.5–5.2)
ALP SERPL-CCNC: 119 U/L (ref 55–135)
ALT SERPL W/O P-5'-P-CCNC: 35 U/L (ref 10–44)
ANION GAP SERPL CALC-SCNC: 15 MMOL/L (ref 8–16)
AST SERPL-CCNC: 51 U/L (ref 10–40)
BASOPHILS # BLD AUTO: 0.02 K/UL (ref 0–0.2)
BASOPHILS NFR BLD: 0.4 % (ref 0–1.9)
BILIRUB SERPL-MCNC: 0.5 MG/DL (ref 0.1–1)
BUN SERPL-MCNC: 15 MG/DL (ref 8–23)
CALCIUM SERPL-MCNC: 10.2 MG/DL (ref 8.7–10.5)
CHLORIDE SERPL-SCNC: 105 MMOL/L (ref 95–110)
CHOLEST SERPL-MCNC: 151 MG/DL (ref 120–199)
CHOLEST/HDLC SERPL: 3.7 {RATIO} (ref 2–5)
CO2 SERPL-SCNC: 21 MMOL/L (ref 23–29)
COMPLEXED PSA SERPL-MCNC: 0.18 NG/ML (ref 0–4)
CREAT SERPL-MCNC: 1.3 MG/DL (ref 0.5–1.4)
DIFFERENTIAL METHOD: ABNORMAL
EOSINOPHIL # BLD AUTO: 0.2 K/UL (ref 0–0.5)
EOSINOPHIL NFR BLD: 3.9 % (ref 0–8)
ERYTHROCYTE [DISTWIDTH] IN BLOOD BY AUTOMATED COUNT: 13.2 % (ref 11.5–14.5)
EST. GFR  (AFRICAN AMERICAN): >60 ML/MIN/1.73 M^2
EST. GFR  (NON AFRICAN AMERICAN): 56.1 ML/MIN/1.73 M^2
ESTIMATED AVG GLUCOSE: 200 MG/DL (ref 68–131)
GLUCOSE SERPL-MCNC: 193 MG/DL (ref 70–110)
HBA1C MFR BLD: 8.6 % (ref 4–5.6)
HCT VFR BLD AUTO: 39.4 % (ref 40–54)
HDLC SERPL-MCNC: 41 MG/DL (ref 40–75)
HDLC SERPL: 27.2 % (ref 20–50)
HGB BLD-MCNC: 12.7 G/DL (ref 14–18)
IMM GRANULOCYTES # BLD AUTO: 0.01 K/UL (ref 0–0.04)
IMM GRANULOCYTES NFR BLD AUTO: 0.2 % (ref 0–0.5)
LDLC SERPL CALC-MCNC: 57.2 MG/DL (ref 63–159)
LYMPHOCYTES # BLD AUTO: 1.5 K/UL (ref 1–4.8)
LYMPHOCYTES NFR BLD: 32.5 % (ref 18–48)
MCH RBC QN AUTO: 30.5 PG (ref 27–31)
MCHC RBC AUTO-ENTMCNC: 32.2 G/DL (ref 32–36)
MCV RBC AUTO: 95 FL (ref 82–98)
MONOCYTES # BLD AUTO: 0.3 K/UL (ref 0.3–1)
MONOCYTES NFR BLD: 5.9 % (ref 4–15)
NEUTROPHILS # BLD AUTO: 2.6 K/UL (ref 1.8–7.7)
NEUTROPHILS NFR BLD: 57.1 % (ref 38–73)
NONHDLC SERPL-MCNC: 110 MG/DL
NRBC BLD-RTO: 0 /100 WBC
PLATELET # BLD AUTO: 204 K/UL (ref 150–450)
PMV BLD AUTO: 12.3 FL (ref 9.2–12.9)
POTASSIUM SERPL-SCNC: 4 MMOL/L (ref 3.5–5.1)
PROT SERPL-MCNC: 7.5 G/DL (ref 6–8.4)
RBC # BLD AUTO: 4.17 M/UL (ref 4.6–6.2)
SODIUM SERPL-SCNC: 141 MMOL/L (ref 136–145)
TRIGL SERPL-MCNC: 264 MG/DL (ref 30–150)
TSH SERPL DL<=0.005 MIU/L-ACNC: 2.04 UIU/ML (ref 0.4–4)
WBC # BLD AUTO: 4.56 K/UL (ref 3.9–12.7)

## 2022-07-08 PROCEDURE — 36415 COLL VENOUS BLD VENIPUNCTURE: CPT | Performed by: INTERNAL MEDICINE

## 2022-07-08 PROCEDURE — 83036 HEMOGLOBIN GLYCOSYLATED A1C: CPT | Performed by: INTERNAL MEDICINE

## 2022-07-08 PROCEDURE — 80061 LIPID PANEL: CPT | Performed by: INTERNAL MEDICINE

## 2022-07-08 PROCEDURE — 85025 COMPLETE CBC W/AUTO DIFF WBC: CPT | Performed by: INTERNAL MEDICINE

## 2022-07-08 PROCEDURE — 84153 ASSAY OF PSA TOTAL: CPT | Performed by: INTERNAL MEDICINE

## 2022-07-08 PROCEDURE — 84443 ASSAY THYROID STIM HORMONE: CPT | Performed by: INTERNAL MEDICINE

## 2022-07-08 PROCEDURE — 80053 COMPREHEN METABOLIC PANEL: CPT | Performed by: INTERNAL MEDICINE

## 2022-07-08 RX ORDER — ERGOCALCIFEROL 1.25 MG/1
50000 CAPSULE ORAL
Qty: 4 CAPSULE | Refills: 6 | Status: SHIPPED | OUTPATIENT
Start: 2022-07-08 | End: 2023-03-03 | Stop reason: SDUPTHER

## 2022-07-15 ENCOUNTER — OFFICE VISIT (OUTPATIENT)
Dept: INTERNAL MEDICINE | Facility: CLINIC | Age: 67
End: 2022-07-15
Payer: MEDICARE

## 2022-07-15 VITALS
DIASTOLIC BLOOD PRESSURE: 66 MMHG | WEIGHT: 208.31 LBS | OXYGEN SATURATION: 97 % | BODY MASS INDEX: 32.7 KG/M2 | HEART RATE: 88 BPM | HEIGHT: 67 IN | SYSTOLIC BLOOD PRESSURE: 128 MMHG

## 2022-07-15 DIAGNOSIS — E55.9 VITAMIN D DEFICIENCY: ICD-10-CM

## 2022-07-15 DIAGNOSIS — I10 ESSENTIAL HYPERTENSION: ICD-10-CM

## 2022-07-15 DIAGNOSIS — R21 RASH: ICD-10-CM

## 2022-07-15 DIAGNOSIS — E78.2 MIXED HYPERLIPIDEMIA: ICD-10-CM

## 2022-07-15 DIAGNOSIS — E11.65 UNCONTROLLED TYPE 2 DIABETES MELLITUS WITH HYPERGLYCEMIA: Primary | ICD-10-CM

## 2022-07-15 DIAGNOSIS — K63.5 POLYP OF COLON, UNSPECIFIED PART OF COLON, UNSPECIFIED TYPE: ICD-10-CM

## 2022-07-15 PROCEDURE — 3288F FALL RISK ASSESSMENT DOCD: CPT | Mod: CPTII,S$GLB,, | Performed by: INTERNAL MEDICINE

## 2022-07-15 PROCEDURE — 4010F PR ACE/ARB THEARPY RXD/TAKEN: ICD-10-PCS | Mod: CPTII,S$GLB,, | Performed by: INTERNAL MEDICINE

## 2022-07-15 PROCEDURE — 3052F PR MOST RECENT HEMOGLOBIN A1C LEVEL 8.0 - < 9.0%: ICD-10-PCS | Mod: CPTII,S$GLB,, | Performed by: INTERNAL MEDICINE

## 2022-07-15 PROCEDURE — 1159F PR MEDICATION LIST DOCUMENTED IN MEDICAL RECORD: ICD-10-PCS | Mod: CPTII,S$GLB,, | Performed by: INTERNAL MEDICINE

## 2022-07-15 PROCEDURE — 1126F AMNT PAIN NOTED NONE PRSNT: CPT | Mod: CPTII,S$GLB,, | Performed by: INTERNAL MEDICINE

## 2022-07-15 PROCEDURE — 99215 OFFICE O/P EST HI 40 MIN: CPT | Mod: S$GLB,,, | Performed by: INTERNAL MEDICINE

## 2022-07-15 PROCEDURE — 3078F PR MOST RECENT DIASTOLIC BLOOD PRESSURE < 80 MM HG: ICD-10-PCS | Mod: CPTII,S$GLB,, | Performed by: INTERNAL MEDICINE

## 2022-07-15 PROCEDURE — 99999 PR PBB SHADOW E&M-EST. PATIENT-LVL V: ICD-10-PCS | Mod: PBBFAC,,, | Performed by: INTERNAL MEDICINE

## 2022-07-15 PROCEDURE — 1101F PT FALLS ASSESS-DOCD LE1/YR: CPT | Mod: CPTII,S$GLB,, | Performed by: INTERNAL MEDICINE

## 2022-07-15 PROCEDURE — 4010F ACE/ARB THERAPY RXD/TAKEN: CPT | Mod: CPTII,S$GLB,, | Performed by: INTERNAL MEDICINE

## 2022-07-15 PROCEDURE — 1159F MED LIST DOCD IN RCRD: CPT | Mod: CPTII,S$GLB,, | Performed by: INTERNAL MEDICINE

## 2022-07-15 PROCEDURE — 3008F PR BODY MASS INDEX (BMI) DOCUMENTED: ICD-10-PCS | Mod: CPTII,S$GLB,, | Performed by: INTERNAL MEDICINE

## 2022-07-15 PROCEDURE — 3288F PR FALLS RISK ASSESSMENT DOCUMENTED: ICD-10-PCS | Mod: CPTII,S$GLB,, | Performed by: INTERNAL MEDICINE

## 2022-07-15 PROCEDURE — 3052F HG A1C>EQUAL 8.0%<EQUAL 9.0%: CPT | Mod: CPTII,S$GLB,, | Performed by: INTERNAL MEDICINE

## 2022-07-15 PROCEDURE — 99999 PR PBB SHADOW E&M-EST. PATIENT-LVL V: CPT | Mod: PBBFAC,,, | Performed by: INTERNAL MEDICINE

## 2022-07-15 PROCEDURE — 1126F PR PAIN SEVERITY QUANTIFIED, NO PAIN PRESENT: ICD-10-PCS | Mod: CPTII,S$GLB,, | Performed by: INTERNAL MEDICINE

## 2022-07-15 PROCEDURE — 3074F SYST BP LT 130 MM HG: CPT | Mod: CPTII,S$GLB,, | Performed by: INTERNAL MEDICINE

## 2022-07-15 PROCEDURE — 3074F PR MOST RECENT SYSTOLIC BLOOD PRESSURE < 130 MM HG: ICD-10-PCS | Mod: CPTII,S$GLB,, | Performed by: INTERNAL MEDICINE

## 2022-07-15 PROCEDURE — 3078F DIAST BP <80 MM HG: CPT | Mod: CPTII,S$GLB,, | Performed by: INTERNAL MEDICINE

## 2022-07-15 PROCEDURE — 3008F BODY MASS INDEX DOCD: CPT | Mod: CPTII,S$GLB,, | Performed by: INTERNAL MEDICINE

## 2022-07-15 PROCEDURE — 1101F PR PT FALLS ASSESS DOC 0-1 FALLS W/OUT INJ PAST YR: ICD-10-PCS | Mod: CPTII,S$GLB,, | Performed by: INTERNAL MEDICINE

## 2022-07-15 PROCEDURE — 99215 PR OFFICE/OUTPT VISIT, EST, LEVL V, 40-54 MIN: ICD-10-PCS | Mod: S$GLB,,, | Performed by: INTERNAL MEDICINE

## 2022-07-15 RX ORDER — INSULIN GLARGINE 100 [IU]/ML
50 INJECTION, SOLUTION SUBCUTANEOUS NIGHTLY
Qty: 18 ML | Refills: 6 | Status: SHIPPED | OUTPATIENT
Start: 2022-07-15 | End: 2022-07-16 | Stop reason: SDUPTHER

## 2022-07-15 RX ORDER — LINAGLIPTIN AND METFORMIN HYDROCHLORIDE 2.5; 1 MG/1; MG/1
1 TABLET, FILM COATED ORAL 2 TIMES DAILY WITH MEALS
Qty: 60 TABLET | Refills: 6 | Status: SHIPPED | OUTPATIENT
Start: 2022-07-15 | End: 2022-07-15 | Stop reason: SDUPTHER

## 2022-07-15 RX ORDER — LINAGLIPTIN AND METFORMIN HYDROCHLORIDE 2.5; 1 MG/1; MG/1
1 TABLET, FILM COATED ORAL 2 TIMES DAILY WITH MEALS
Qty: 60 TABLET | Refills: 6 | Status: SHIPPED | OUTPATIENT
Start: 2022-07-15 | End: 2023-04-04 | Stop reason: SDUPTHER

## 2022-07-15 RX ORDER — VALSARTAN 320 MG/1
320 TABLET ORAL DAILY
Qty: 90 TABLET | Refills: 2 | Status: SHIPPED | OUTPATIENT
Start: 2022-07-15 | End: 2023-07-06 | Stop reason: SDUPTHER

## 2022-07-15 NOTE — PROGRESS NOTES
Subjective:       Patient ID: Dallas Garcia is a 66 y.o. male.    Chief Complaint:   Annual Exam    HPI:  Kurt presents for annual follow up DM/HLD/HTN  He is doing well and has been exercising 4 days/week and working on losing weight  He has decreased his intake of Etoh also. He has been working from home x the last 1.5 years which has allowed him better control of his diet  DM: Med Tx 1-Jentadueto 2.5/1000mg BID, 2- Novolog 45 units in AM/Breakfast and 45 units in PM/Dinner3-Lantus 40 units BID         Accuchecks: Checks occasionally/1x low at 60 after walking; he is not interested in CGM  HLD: Med Tx 1- Fenofibrate 160mg QD, 2-Lipitor 80mg QD   HTN: Med Tx 1- Diovan 320mg QD, 2-Toprol XL 100mg QD, 3-Norvasc 10mg QD    Past Medical, Surgical, Social History: Please see as stated in Epic chart which has been reviewed.    Current Outpatient Medications   Medication Sig Dispense Refill    amLODIPine (NORVASC) 10 MG tablet TAKE 1 TABLET(10 MG) BY MOUTH EVERY DAY 90 tablet 2    atorvastatin (LIPITOR) 80 MG tablet TAKE 1 TABLET(80 MG) BY MOUTH EVERY DAY 90 tablet 2    busPIRone (BUSPAR) 5 MG Tab TAKE 1 TABLET(5 MG) BY MOUTH TWICE DAILY 60 tablet 2    ergocalciferol (ERGOCALCIFEROL) 50,000 unit Cap Take 1 capsule (50,000 Units total) by mouth every 7 days. 4 capsule 6    fenofibrate 160 MG Tab TAKE 1 TABLET(160 MG) BY MOUTH EVERY DAY 90 tablet 2    ibuprofen (ADVIL,MOTRIN) 200 MG tablet Take 200 mg by mouth as needed for Pain.       insulin aspart U-100 (NOVOLOG FLEXPEN U-100 INSULIN) 100 unit/mL (3 mL) InPn pen INJECT 35 UNITS INTO THE SKIN WITH BREAKFAST,LUNCH,AND DINNER 75 mL 4    insulin lispro (HUMALOG KWIKPEN INSULIN) 100 unit/mL pen INJECT SUBCUTANEOUSLY 35  UNITS WITH BREAKFAST AND  LUNCH AND 35 UNITS WITH  DINNER 75 mL 1    lancets (ONETOUCH ULTRASOFT LANCETS) Misc TEST four times a day before meals 150 each 6    metoprolol succinate (TOPROL-XL) 100 MG 24 hr tablet TAKE 1 TABLET(100 MG) BY  "MOUTH EVERY DAY 90 tablet 0    omeprazole (PRILOSEC) 20 MG capsule TAKE 1 CAPSULE(20 MG) BY MOUTH EVERY DAY FOR GERD 90 capsule 2    ONETOUCH ULTRA BLUE TEST STRIP Strp TEST FOUR TIMES DAILY 400 strip 1    pen needle, diabetic (BD ULTRA-FINE ADRIEN PEN NEEDLE) 32 gauge x 5/32" Ndle use four times a day 400 each 4    pen needle, diabetic (BD ULTRA-FINE SHORT PEN NEEDLE) 31 gauge x 5/16" Ndle USE EVERY  each 0    azithromycin (Z-AUDI) 250 MG tablet Two today then one daily thereafter (Patient not taking: Reported on 7/15/2022) 6 tablet 0    azithromycin (Z-AUDI) 250 MG tablet Two today then one daily thereafter (Patient not taking: Reported on 7/15/2022) 6 tablet 0    EScitalopram oxalate (LEXAPRO) 10 MG tablet Take 1 tablet (10 mg total) by mouth once daily. 30 tablet 4    gabapentin (NEURONTIN) 100 MG capsule 1 tab 3x/day x 2 weeks  Then increase to 3x/day thereafter for lumbar radiculopathy (Patient not taking: Reported on 7/15/2022) 90 capsule 6    insulin (LANTUS SOLOSTAR U-100 INSULIN) glargine 100 units/mL SubQ pen Inject 50 Units into the skin every evening. Inject 40 units BID 18 mL 6    linagliptin-metformin (JENTADUETO) 2.5-1,000 mg Tab Take 1 tablet by mouth 2 (two) times daily with meals. 60 tablet 6    meloxicam (MOBIC) 7.5 MG tablet Take 1 tablet (7.5 mg total) by mouth once daily. (Patient not taking: Reported on 7/15/2022) 15 tablet 0    valsartan (DIOVAN) 320 MG tablet Take 1 tablet (320 mg total) by mouth once daily. 90 tablet 2     No current facility-administered medications for this visit.       Review of Systems   Constitutional: Negative for appetite change, fatigue and fever.   HENT: Negative for congestion, postnasal drip, sinus pressure and trouble swallowing.    Eyes: Negative for pain and visual disturbance.   Respiratory: Negative for cough, chest tightness, shortness of breath and wheezing.    Cardiovascular: Negative for chest pain, palpitations and leg swelling. "   Gastrointestinal: Negative for abdominal pain, blood in stool, constipation, diarrhea, nausea and vomiting.   Endocrine: Negative for cold intolerance and heat intolerance.   Genitourinary: Negative for difficulty urinating, dysuria and hematuria.        No BPH symptoms   Musculoskeletal: Negative for arthralgias, back pain and joint swelling.   Skin: Positive for rash. Negative for color change.        + Scalp rash   Neurological: Negative for dizziness, syncope, weakness, numbness and headaches.        No Focal Neurological abnormalities   Hematological: Negative for adenopathy.   Psychiatric/Behavioral: Negative for sleep disturbance.        No Anxiety/Depression symptoms       Objective:      Lab Results   Component Value Date    WBC 4.56 07/08/2022    HGB 12.7 (L) 07/08/2022    HCT 39.4 (L) 07/08/2022     07/08/2022    CHOL 151 07/08/2022    TRIG 264 (H) 07/08/2022    HDL 41 07/08/2022    ALT 35 07/08/2022    AST 51 (H) 07/08/2022     07/08/2022    K 4.0 07/08/2022     07/08/2022    CREATININE 1.3 07/08/2022    BUN 15 07/08/2022    CO2 21 (L) 07/08/2022    TSH 2.044 07/08/2022    PSA 0.18 07/08/2022    INR 1.1 05/22/2019    HGBA1C 8.6 (H) 07/08/2022     Physical Exam  Vitals reviewed.   Constitutional:       Appearance: He is well-developed.   HENT:      Head: Normocephalic and atraumatic.      Mouth/Throat:      Pharynx: No oropharyngeal exudate.   Eyes:      Conjunctiva/sclera: Conjunctivae normal.   Neck:      Thyroid: No thyromegaly.      Vascular: No JVD.      Comments: No masses    Cardiovascular:      Rate and Rhythm: Normal rate and regular rhythm.      Heart sounds: No murmur heard.    No gallop.   Pulmonary:      Effort: Pulmonary effort is normal. No respiratory distress.      Breath sounds: Normal breath sounds. No wheezing.   Chest:      Chest wall: No tenderness.   Abdominal:      General: Bowel sounds are normal. There is no distension.      Palpations: Abdomen is soft. There  "is no mass.      Tenderness: There is no abdominal tenderness.      Comments: No Organomegaly   Musculoskeletal:         General: No tenderness. Normal range of motion.      Cervical back: Normal range of motion and neck supple.   Lymphadenopathy:      Cervical: No cervical adenopathy.   Skin:     General: Skin is warm and dry.      Findings: Rash present.      Comments: + Dry white/silvery rash around base of scalp  +also for similar rash but with pruritic irritation/eschars over right temporal scalp   Neurological:      Mental Status: He is alert and oriented to person, place, and time.      Cranial Nerves: No cranial nerve deficit.   Psychiatric:         Judgment: Judgment normal.           Vital Signs  Pulse: 88  SpO2: 97 %  BP: 128/66  BP Location: Right arm  Patient Position: Sitting  Pain Score: 0-No pain  Height and Weight  Height: 5' 7" (170.2 cm)  Weight: 94.5 kg (208 lb 5.4 oz)  BSA (Calculated - sq m): 2.11 sq meters  BMI (Calculated): 32.6  Weight in (lb) to have BMI = 25: 159.3]    Protective Sensation (w/ 10 gram monofilament):  Right: Intact  Left: Intact    Visual Inspection:  Normal -  Bilateral    Pedal Pulses:   Right: Present  Left: Present    Posterior tibialis:   Right:Diminished  Left: Diminished      Assessment:       1. Uncontrolled type 2 diabetes mellitus with hyperglycemia    2. Essential hypertension    3. Mixed hyperlipidemia    4. Rash    5. Vitamin D deficiency    6. Polyp of colon, unspecified part of colon, unspecified type        Plan:     Health Maintenance   Topic Date Due    Eye Exam  08/03/2022    Hemoglobin A1c  10/08/2022    PROSTATE-SPECIFIC ANTIGEN  07/08/2023    Lipid Panel  07/08/2023    Low Dose Statin  07/15/2023    Foot Exam  07/15/2023    TETANUS VACCINE  10/07/2025    Hepatitis C Screening  Completed    Abdominal Aortic Aneurysm Screening  Completed        Dallas was seen today for annual exam.    Diagnoses and all orders for this visit:    Uncontrolled " type 2 diabetes mellitus with hyperglycemia/Marked improvement  -     insulin (LANTUS SOLOSTAR U-100 INSULIN) glargine 100 units/mL SubQ pen;  Inject 40 units BID  -     linagliptin-metformin (JENTADUETO) 2.5-1,000 mg Tab; Take 1 tablet by mouth 2 (two) times daily with meals.  -     continue NovoLog b.i.d. as recommended (45 units b.i.d. with major meals)  -     Microalbumin/Creatinine Ratio, Urine; Future  -     Comprehensive Metabolic Panel; Future  -     Hemoglobin A1C; Future  -     Lipid Panel; Future      Essential hypertension/controlled  -     continue valsartan (DIOVAN) 320 MG tablet; Take 1 tablet (320 mg total) by mouth once daily.  -     Comprehensive Metabolic Panel; Future    Mixed hyperlipidemia/marked improvement with best control to date        -     Continue: : Med Tx 1- Fenofibrate 160mg QD, 2-Lipitor 80mg QD   -     Comprehensive Metabolic Panel; Future  -     Lipid Panel; Future    Rash/consistent with Psoriasis R/O Other  -     Ambulatory referral/consult to Dermatology; Future    Vitamin D deficiency  -     Vitamin D; Future    Polyp of colon, unspecified part of colon, unspecified type/status post colonoscopy February 2022        -      repeat colonoscopy due February 2027    Health maintenance        -      return to clinic x6 months with 1 week prior fasting lab or see patient sooner if needed

## 2022-07-16 ENCOUNTER — TELEPHONE (OUTPATIENT)
Dept: INTERNAL MEDICINE | Facility: CLINIC | Age: 67
End: 2022-07-16
Payer: MEDICARE

## 2022-07-16 RX ORDER — INSULIN ASPART 100 [IU]/ML
INJECTION, SOLUTION INTRAVENOUS; SUBCUTANEOUS
Qty: 75 ML | Refills: 4 | Status: SHIPPED | OUTPATIENT
Start: 2022-07-16 | End: 2022-10-18 | Stop reason: SDUPTHER

## 2022-07-16 RX ORDER — INSULIN GLARGINE 100 [IU]/ML
INJECTION, SOLUTION SUBCUTANEOUS
Qty: 18 ML | Refills: 6 | Status: SHIPPED | OUTPATIENT
Start: 2022-07-16 | End: 2022-12-29 | Stop reason: SDUPTHER

## 2022-07-16 NOTE — TELEPHONE ENCOUNTER
Bianac, would you please add a Vitamin D lab to pt's lab ordered in 6 months-I just placed the order.  Thanks

## 2022-07-19 ENCOUNTER — LAB VISIT (OUTPATIENT)
Dept: LAB | Facility: HOSPITAL | Age: 67
End: 2022-07-19
Attending: INTERNAL MEDICINE
Payer: MEDICARE

## 2022-07-19 DIAGNOSIS — E11.65 UNCONTROLLED TYPE 2 DIABETES MELLITUS WITH HYPERGLYCEMIA: ICD-10-CM

## 2022-07-19 LAB
ALBUMIN/CREAT UR: 42.5 UG/MG (ref 0–30)
CREAT UR-MCNC: 275 MG/DL (ref 23–375)
MICROALBUMIN UR DL<=1MG/L-MCNC: 117 UG/ML

## 2022-07-19 PROCEDURE — 82043 UR ALBUMIN QUANTITATIVE: CPT | Performed by: INTERNAL MEDICINE

## 2022-07-19 PROCEDURE — 82570 ASSAY OF URINE CREATININE: CPT | Performed by: INTERNAL MEDICINE

## 2022-07-20 ENCOUNTER — PATIENT MESSAGE (OUTPATIENT)
Dept: INTERNAL MEDICINE | Facility: CLINIC | Age: 67
End: 2022-07-20
Payer: MEDICARE

## 2022-07-20 NOTE — TELEPHONE ENCOUNTER
Spoke with Kurt major his Spot Urine for Microalb/Creat- showed +at 40 but improved from last year(46).  He will continue Diovan 320mg QD and continue to work on improving diabetic control. 'Will repeat Spot Urine for Microalb/creat in 1 year for follow up.

## 2022-07-22 ENCOUNTER — PATIENT OUTREACH (OUTPATIENT)
Dept: ADMINISTRATIVE | Facility: HOSPITAL | Age: 67
End: 2022-07-22
Payer: MEDICARE

## 2022-07-22 NOTE — PROGRESS NOTES
Health Maintenance Due   Topic Date Due    Shingles Vaccine (2 of 3) 02/10/2016    Pneumococcal Vaccines (Age 65+) (3 - PPSV23 or PCV20) 01/14/2022    COVID-19 Vaccine (3 - Booster for Anila series) 02/26/2022    Eye Exam  08/03/2022     Chart review done. HM updated. Immunizations reviewed & updated. Care Everywhere updated.  Order for DM Education refreshed in chart.

## 2022-07-27 DIAGNOSIS — E78.2 MIXED HYPERLIPIDEMIA: ICD-10-CM

## 2022-07-27 DIAGNOSIS — I10 ESSENTIAL HYPERTENSION: ICD-10-CM

## 2022-07-27 RX ORDER — METOPROLOL SUCCINATE 100 MG/1
TABLET, EXTENDED RELEASE ORAL
Qty: 90 TABLET | Refills: 3 | Status: SHIPPED | OUTPATIENT
Start: 2022-07-27 | End: 2023-07-06 | Stop reason: SDUPTHER

## 2022-07-27 RX ORDER — FENOFIBRATE 160 MG/1
TABLET ORAL
Qty: 90 TABLET | Refills: 3 | Status: SHIPPED | OUTPATIENT
Start: 2022-07-27 | End: 2023-07-06 | Stop reason: SDUPTHER

## 2022-07-27 NOTE — TELEPHONE ENCOUNTER
No new care gaps identified.  [facility] Embedded Care Gaps. Reference number: [id number]. 7/27/2022   5:32:56 AM POONAMT

## 2022-07-28 NOTE — TELEPHONE ENCOUNTER
Refill Decision Note   Dallas Jose  is requesting a refill authorization.  Brief Assessment and Rationale for Refill:  Approve     Medication Therapy Plan:       Medication Reconciliation Completed: No   Comments:     No Care Gaps recommended.     Note composed:11:45 PM 07/27/2022

## 2022-10-21 ENCOUNTER — TELEPHONE (OUTPATIENT)
Dept: INTERNAL MEDICINE | Facility: CLINIC | Age: 67
End: 2022-10-21
Payer: MEDICARE

## 2022-10-21 DIAGNOSIS — E11.69 TYPE 2 DIABETES MELLITUS WITH OTHER SPECIFIED COMPLICATION, WITHOUT LONG-TERM CURRENT USE OF INSULIN: ICD-10-CM

## 2022-10-21 RX ORDER — INSULIN ASPART 100 [IU]/ML
INJECTION, SOLUTION INTRAVENOUS; SUBCUTANEOUS
Qty: 75 ML | Refills: 4 | Status: SHIPPED | OUTPATIENT
Start: 2022-10-21 | End: 2023-01-10 | Stop reason: SDUPTHER

## 2022-10-21 NOTE — TELEPHONE ENCOUNTER
Please send a new prescription with brand only on it. His insurance only covers the generic brand of insulin aspart.

## 2022-10-21 NOTE — TELEPHONE ENCOUNTER
----- Message from Margot Simon sent at 10/21/2022  2:33 PM CDT -----  Contact: [phone]  [facility] she is calling for pre auth for insulin and questions that needs to be completed for clinicals please advise     Fax [phone]    Ref [id number]

## 2022-10-24 ENCOUNTER — TELEPHONE (OUTPATIENT)
Dept: PODIATRY | Facility: CLINIC | Age: 67
End: 2022-10-24
Payer: MEDICARE

## 2022-10-24 ENCOUNTER — OFFICE VISIT (OUTPATIENT)
Dept: DERMATOLOGY | Facility: CLINIC | Age: 67
End: 2022-10-24
Payer: MEDICARE

## 2022-10-24 VITALS — BODY MASS INDEX: 32.58 KG/M2 | WEIGHT: 208 LBS

## 2022-10-24 DIAGNOSIS — R21 RASH: ICD-10-CM

## 2022-10-24 DIAGNOSIS — L81.4 LENTIGINES: ICD-10-CM

## 2022-10-24 DIAGNOSIS — L85.9 HYPERKERATOSIS: ICD-10-CM

## 2022-10-24 DIAGNOSIS — L40.8 SEBOPSORIASIS: Primary | ICD-10-CM

## 2022-10-24 DIAGNOSIS — L91.8 CUTANEOUS SKIN TAGS: ICD-10-CM

## 2022-10-24 PROCEDURE — 3288F PR FALLS RISK ASSESSMENT DOCUMENTED: ICD-10-PCS | Mod: CPTII,S$GLB,, | Performed by: DERMATOLOGY

## 2022-10-24 PROCEDURE — 99204 OFFICE O/P NEW MOD 45 MIN: CPT | Mod: S$GLB,,, | Performed by: DERMATOLOGY

## 2022-10-24 PROCEDURE — 4010F ACE/ARB THERAPY RXD/TAKEN: CPT | Mod: CPTII,S$GLB,, | Performed by: DERMATOLOGY

## 2022-10-24 PROCEDURE — 1126F AMNT PAIN NOTED NONE PRSNT: CPT | Mod: CPTII,S$GLB,, | Performed by: DERMATOLOGY

## 2022-10-24 PROCEDURE — 3052F HG A1C>EQUAL 8.0%<EQUAL 9.0%: CPT | Mod: CPTII,S$GLB,, | Performed by: DERMATOLOGY

## 2022-10-24 PROCEDURE — 3072F PR LOW RISK FOR RETINOPATHY: ICD-10-PCS | Mod: CPTII,S$GLB,, | Performed by: DERMATOLOGY

## 2022-10-24 PROCEDURE — 1101F PT FALLS ASSESS-DOCD LE1/YR: CPT | Mod: CPTII,S$GLB,, | Performed by: DERMATOLOGY

## 2022-10-24 PROCEDURE — 3060F PR POS MICROALBUMINURIA RESULT DOCUMENTED/REVIEW: ICD-10-PCS | Mod: CPTII,S$GLB,, | Performed by: DERMATOLOGY

## 2022-10-24 PROCEDURE — 3288F FALL RISK ASSESSMENT DOCD: CPT | Mod: CPTII,S$GLB,, | Performed by: DERMATOLOGY

## 2022-10-24 PROCEDURE — 3052F PR MOST RECENT HEMOGLOBIN A1C LEVEL 8.0 - < 9.0%: ICD-10-PCS | Mod: CPTII,S$GLB,, | Performed by: DERMATOLOGY

## 2022-10-24 PROCEDURE — 4010F PR ACE/ARB THEARPY RXD/TAKEN: ICD-10-PCS | Mod: CPTII,S$GLB,, | Performed by: DERMATOLOGY

## 2022-10-24 PROCEDURE — 1126F PR PAIN SEVERITY QUANTIFIED, NO PAIN PRESENT: ICD-10-PCS | Mod: CPTII,S$GLB,, | Performed by: DERMATOLOGY

## 2022-10-24 PROCEDURE — 99999 PR PBB SHADOW E&M-EST. PATIENT-LVL IV: CPT | Mod: PBBFAC,,, | Performed by: DERMATOLOGY

## 2022-10-24 PROCEDURE — 1159F MED LIST DOCD IN RCRD: CPT | Mod: CPTII,S$GLB,, | Performed by: DERMATOLOGY

## 2022-10-24 PROCEDURE — 99999 PR PBB SHADOW E&M-EST. PATIENT-LVL IV: ICD-10-PCS | Mod: PBBFAC,,, | Performed by: DERMATOLOGY

## 2022-10-24 PROCEDURE — 99204 PR OFFICE/OUTPT VISIT, NEW, LEVL IV, 45-59 MIN: ICD-10-PCS | Mod: S$GLB,,, | Performed by: DERMATOLOGY

## 2022-10-24 PROCEDURE — 3066F PR DOCUMENTATION OF TREATMENT FOR NEPHROPATHY: ICD-10-PCS | Mod: CPTII,S$GLB,, | Performed by: DERMATOLOGY

## 2022-10-24 PROCEDURE — 3072F LOW RISK FOR RETINOPATHY: CPT | Mod: CPTII,S$GLB,, | Performed by: DERMATOLOGY

## 2022-10-24 PROCEDURE — 1101F PR PT FALLS ASSESS DOC 0-1 FALLS W/OUT INJ PAST YR: ICD-10-PCS | Mod: CPTII,S$GLB,, | Performed by: DERMATOLOGY

## 2022-10-24 PROCEDURE — 3066F NEPHROPATHY DOC TX: CPT | Mod: CPTII,S$GLB,, | Performed by: DERMATOLOGY

## 2022-10-24 PROCEDURE — 3060F POS MICROALBUMINURIA REV: CPT | Mod: CPTII,S$GLB,, | Performed by: DERMATOLOGY

## 2022-10-24 PROCEDURE — 1159F PR MEDICATION LIST DOCUMENTED IN MEDICAL RECORD: ICD-10-PCS | Mod: CPTII,S$GLB,, | Performed by: DERMATOLOGY

## 2022-10-24 RX ORDER — FLUOCINOLONE ACETONIDE 0.11 MG/ML
OIL TOPICAL
Qty: 118.28 ML | Refills: 3 | Status: SHIPPED | OUTPATIENT
Start: 2022-10-24

## 2022-10-24 RX ORDER — MUPIROCIN 20 MG/G
OINTMENT TOPICAL
Qty: 30 G | Refills: 3 | Status: SHIPPED | OUTPATIENT
Start: 2022-10-24 | End: 2023-10-18 | Stop reason: ALTCHOICE

## 2022-10-24 NOTE — TELEPHONE ENCOUNTER
----- Message from Denise Najera MD sent at 10/24/2022  1:06 PM CDT -----  Regarding: hyperkeratosis, fissuring  Diabetic with hyperkeratosis and fissure of left heel needs eval.  Thank you

## 2022-10-24 NOTE — PROGRESS NOTES
"  Subjective:       Patient ID:  Dallas Garcia is a 67 y.o. male who presents for   Chief Complaint   Patient presents with    Skin Check     UBSE    Lesion     Left upper thigh, raised, several months      Hx 7/15:"Findings: Rash present.      Comments: + Dry white/silvery rash around base of scalp  +also for similar rash but with pruritic irritation/eschars over right temporal scalp   Rash/consistent with Psoriasis R/O Other"    Also has lesion on left upper eyelid, skin tag left groin area, and thick skin with fissure on left heel.     Lesion - Initial  Affected locations: face, left arm, right arm, chest, torso, back, abdomen and left upper leg  Signs / symptoms: asymptomatic  Aggravated by: nothing  Relieving factors/Treatments tried: nothing    Review of Systems   Constitutional:  Negative for fever, chills, weight loss, weight gain, fatigue, night sweats and malaise.   Skin:  Positive for wears hat. Negative for daily sunscreen use and activity-related sunscreen use.   Hematologic/Lymphatic: Does not bruise/bleed easily.      Objective:    Physical Exam   Constitutional: He appears well-developed and well-nourished. No distress.   Neurological: He is alert and oriented to person, place, and time. He is not disoriented.   Psychiatric: He has a normal mood and affect.   Skin:   Areas Examined (abnormalities noted in diagram):   Scalp / Hair Palpated and Inspected  Head / Face Inspection Performed  Neck Inspection Performed  Chest / Axilla Inspection Performed  Abdomen Inspection Performed  Back Inspection Performed  RUE Inspected  LUE Inspection Performed  RLE Inspected  LLE Inspection Performed  Nails and Digits Inspection Performed                 Diagram Legend     Erythematous scaling macule/papule c/w actinic keratosis       Vascular papule c/w angioma      Pigmented verrucoid papule/plaque c/w seborrheic keratosis      Yellow umbilicated papule c/w sebaceous hyperplasia      Irregularly shaped " "tan macule c/w lentigo     1-2 mm smooth white papules consistent with Milia      Movable subcutaneous cyst with punctum c/w epidermal inclusion cyst      Subcutaneous movable cyst c/w pilar cyst      Firm pink to brown papule c/w dermatofibroma      Pedunculated fleshy papule(s) c/w skin tag(s)      Evenly pigmented macule c/w junctional nevus     Mildly variegated pigmented, slightly irregular-bordered macule c/w mildly atypical nevus      Flesh colored to evenly pigmented papule c/w intradermal nevus       Pink pearly papule/plaque c/w basal cell carcinoma      Erythematous hyperkeratotic cursted plaque c/w SCC      Surgical scar with no sign of skin cancer recurrence      Open and closed comedones      Inflammatory papules and pustules      Verrucoid papule consistent consistent with wart     Erythematous eczematous patches and plaques     Dystrophic onycholytic nail with subungual debris c/w onychomycosis     Umbilicated papule    Erythematous-base heme-crusted tan verrucoid plaque consistent with inflamed seborrheic keratosis     Erythematous Silvery Scaling Plaque c/w Psoriasis     See annotation      Assessment / Plan:        Sebopsoriasis  -     fluocinolone and shower cap 0.01 % Oil; Use hs for rash on scalp  Dispense: 118.28 mL; Refill: 3  Cont t gel shampoo    Rash  -     Ambulatory referral/consult to Dermatology    Hyperkeratosis  -     mupirocin (BACTROBAN) 2 % ointment; Use qd  Dispense: 30 g; Refill: 3  Cetaphil rough and bumpy skin lotion  Refer to podiatry for eval, (diabetic)    Lentigines  The "ABCD" rules to observe pigmented lesions were reviewed.  sunscreen    Cutaneous skin tags  Refer to ophthalmology for tag on left upper lid which has grown some            Follow up in about 1 year (around 10/24/2023).  "

## 2022-10-25 ENCOUNTER — TELEPHONE (OUTPATIENT)
Dept: INTERNAL MEDICINE | Facility: CLINIC | Age: 67
End: 2022-10-25
Payer: MEDICARE

## 2022-10-25 DIAGNOSIS — E11.65 UNCONTROLLED TYPE 2 DIABETES MELLITUS WITH HYPERGLYCEMIA: Primary | ICD-10-CM

## 2022-10-25 NOTE — TELEPHONE ENCOUNTER
----- Message from Elisabet Segura sent at 10/25/2022 11:47 AM CDT -----  Contact: Self/[phone]  Pt said that he is calling in regards to needing to speak with the nurse about pt stated that he was seen by Dr Najera on yesterday for a break in the skin on his left heel pt stated that be he is Type 2 diabetic he was referred to Dr Ruma Mueller for an appt on tomorrow pt stated that he needs a referral put in by Dr Corado in order for his insurance to cover his visit pt is asking if that can be done asap. Please advise

## 2022-10-25 NOTE — TELEPHONE ENCOUNTER
Podiatry referral needed asap! Going to see someone tomorrow related to a break in skin to his heel.

## 2022-10-26 ENCOUNTER — OFFICE VISIT (OUTPATIENT)
Dept: PODIATRY | Facility: CLINIC | Age: 67
End: 2022-10-26
Payer: MEDICARE

## 2022-10-26 VITALS
BODY MASS INDEX: 32.65 KG/M2 | WEIGHT: 208 LBS | DIASTOLIC BLOOD PRESSURE: 81 MMHG | HEART RATE: 90 BPM | HEIGHT: 67 IN | SYSTOLIC BLOOD PRESSURE: 157 MMHG

## 2022-10-26 DIAGNOSIS — R23.4 FISSURE IN SKIN OF FOOT: Primary | ICD-10-CM

## 2022-10-26 DIAGNOSIS — E11.65 UNCONTROLLED TYPE 2 DIABETES MELLITUS WITH HYPERGLYCEMIA: ICD-10-CM

## 2022-10-26 DIAGNOSIS — E66.9 DIABETES MELLITUS TYPE 2 IN OBESE: ICD-10-CM

## 2022-10-26 DIAGNOSIS — B35.1 ONYCHOMYCOSIS DUE TO DERMATOPHYTE: ICD-10-CM

## 2022-10-26 DIAGNOSIS — E11.69 DIABETES MELLITUS TYPE 2 IN OBESE: ICD-10-CM

## 2022-10-26 PROCEDURE — 4010F ACE/ARB THERAPY RXD/TAKEN: CPT | Mod: CPTII,S$GLB,, | Performed by: PODIATRIST

## 2022-10-26 PROCEDURE — 3079F DIAST BP 80-89 MM HG: CPT | Mod: CPTII,S$GLB,, | Performed by: PODIATRIST

## 2022-10-26 PROCEDURE — 1159F PR MEDICATION LIST DOCUMENTED IN MEDICAL RECORD: ICD-10-PCS | Mod: CPTII,S$GLB,, | Performed by: PODIATRIST

## 2022-10-26 PROCEDURE — 3077F PR MOST RECENT SYSTOLIC BLOOD PRESSURE >= 140 MM HG: ICD-10-PCS | Mod: CPTII,S$GLB,, | Performed by: PODIATRIST

## 2022-10-26 PROCEDURE — 3052F PR MOST RECENT HEMOGLOBIN A1C LEVEL 8.0 - < 9.0%: ICD-10-PCS | Mod: CPTII,S$GLB,, | Performed by: PODIATRIST

## 2022-10-26 PROCEDURE — 3072F PR LOW RISK FOR RETINOPATHY: ICD-10-PCS | Mod: CPTII,S$GLB,, | Performed by: PODIATRIST

## 2022-10-26 PROCEDURE — 3060F PR POS MICROALBUMINURIA RESULT DOCUMENTED/REVIEW: ICD-10-PCS | Mod: CPTII,S$GLB,, | Performed by: PODIATRIST

## 2022-10-26 PROCEDURE — 99999 PR PBB SHADOW E&M-EST. PATIENT-LVL IV: ICD-10-PCS | Mod: PBBFAC,,, | Performed by: PODIATRIST

## 2022-10-26 PROCEDURE — 99203 OFFICE O/P NEW LOW 30 MIN: CPT | Mod: S$GLB,,, | Performed by: PODIATRIST

## 2022-10-26 PROCEDURE — 3072F LOW RISK FOR RETINOPATHY: CPT | Mod: CPTII,S$GLB,, | Performed by: PODIATRIST

## 2022-10-26 PROCEDURE — 1125F PR PAIN SEVERITY QUANTIFIED, PAIN PRESENT: ICD-10-PCS | Mod: CPTII,S$GLB,, | Performed by: PODIATRIST

## 2022-10-26 PROCEDURE — 1159F MED LIST DOCD IN RCRD: CPT | Mod: CPTII,S$GLB,, | Performed by: PODIATRIST

## 2022-10-26 PROCEDURE — 3066F NEPHROPATHY DOC TX: CPT | Mod: CPTII,S$GLB,, | Performed by: PODIATRIST

## 2022-10-26 PROCEDURE — 3052F HG A1C>EQUAL 8.0%<EQUAL 9.0%: CPT | Mod: CPTII,S$GLB,, | Performed by: PODIATRIST

## 2022-10-26 PROCEDURE — 99499 UNLISTED E&M SERVICE: CPT | Mod: S$GLB,,, | Performed by: PODIATRIST

## 2022-10-26 PROCEDURE — 3079F PR MOST RECENT DIASTOLIC BLOOD PRESSURE 80-89 MM HG: ICD-10-PCS | Mod: CPTII,S$GLB,, | Performed by: PODIATRIST

## 2022-10-26 PROCEDURE — 3077F SYST BP >= 140 MM HG: CPT | Mod: CPTII,S$GLB,, | Performed by: PODIATRIST

## 2022-10-26 PROCEDURE — 3066F PR DOCUMENTATION OF TREATMENT FOR NEPHROPATHY: ICD-10-PCS | Mod: CPTII,S$GLB,, | Performed by: PODIATRIST

## 2022-10-26 PROCEDURE — 99203 PR OFFICE/OUTPT VISIT, NEW, LEVL III, 30-44 MIN: ICD-10-PCS | Mod: S$GLB,,, | Performed by: PODIATRIST

## 2022-10-26 PROCEDURE — 4010F PR ACE/ARB THEARPY RXD/TAKEN: ICD-10-PCS | Mod: CPTII,S$GLB,, | Performed by: PODIATRIST

## 2022-10-26 PROCEDURE — 99999 PR PBB SHADOW E&M-EST. PATIENT-LVL IV: CPT | Mod: PBBFAC,,, | Performed by: PODIATRIST

## 2022-10-26 PROCEDURE — 3060F POS MICROALBUMINURIA REV: CPT | Mod: CPTII,S$GLB,, | Performed by: PODIATRIST

## 2022-10-26 PROCEDURE — 1125F AMNT PAIN NOTED PAIN PRSNT: CPT | Mod: CPTII,S$GLB,, | Performed by: PODIATRIST

## 2022-10-26 RX ORDER — AMMONIUM LACTATE 12 G/100G
1 CREAM TOPICAL DAILY
Qty: 140 G | Refills: 1 | Status: SHIPPED | OUTPATIENT
Start: 2022-10-26 | End: 2022-12-15

## 2022-10-26 RX ORDER — CICLOPIROX 80 MG/ML
SOLUTION TOPICAL NIGHTLY
Qty: 6.6 ML | Refills: 2 | Status: SHIPPED | OUTPATIENT
Start: 2022-10-26 | End: 2023-10-18

## 2022-10-26 NOTE — PROGRESS NOTES
"Subjective:      Patient ID: Dallas Garcia is a 67 y.o. male.    Chief Complaint:   Foot Injury (Left heel fx/pt want jose enrique great toes checked fungus /Pcp-[facility] ) and Nail Problem    Dallas is a 67 y.o. male who presents to the clinic upon referral from Dr. Najera  for evaluation and treatment of diabetic feet. Dallas has a past medical history of Adenomatous colon polyp (02/2022), Allergy, Anxiety, Arthritis, Cataract, Colon polyps (2009), Diabetes mellitus, Diabetes mellitus type II, GERD (gastroesophageal reflux disease), History of alcohol abuse, Hyperlipidemia, Hypertension, and Neuromuscular disorder.     Patient relates he is not seen Podiatry.  He is really has his primary check his diabetic feet.  He mainly has an issue with his left heel relates he gets pedicures every few weeks and it is been sore lately.  He is not sure if it got scraped or not.  His derm gave him some medicine he has been applying which helped.  Also put some three-in-one ointment and covered it with a Band-Aid over the weekend and it resolved the pain.  Patient relates is no bleeding or drainage.    No smoking  Denies diabetic shoes    He also relates since he has been sitting in the exam chair he noticed his right big toenail might have some fungus    He has a history of triple arthrodesis on the left lower extremity.  He relates 5 years later they did remove all the hardware and denies any current hardware      Sees derm:  Sebopsoriasis  -     fluocinolone and shower cap 0.01 % Oil; Use hs for rash on scalp  Dispense: 118.28 mL; Refill: 3  Cont t gel shampoo  Rash  -     Ambulatory referral/consult to Dermatology  Hyperkeratosis  -     mupirocin (BACTROBAN) 2 % ointment; Use qd  Dispense: 30 g; Refill: 3  Cetaphil rough and bumpy skin lotion  Refer to podiatry for eval, (diabetic)  Lentigines  The "ABCD" rules to observe pigmented lesions were reviewed.  sunscreen  Cutaneous skin tags  Refer to ophthalmology for tag on left " The patient is a 89y Female complaining of shortness of breath. upper lid which has grown some       PCP: Lisette Corado MD    Date Last Seen by PCP: 7/15/22    Current shoe gear: Tennis shoes    Hemoglobin A1C   Date Value Ref Range Status   07/08/2022 8.6 (H) 4.0 - 5.6 % Final     Comment:     ADA Screening Guidelines:  5.7-6.4%  Consistent with prediabetes  >or=6.5%  Consistent with diabetes    High levels of fetal hemoglobin interfere with the HbA1C  assay. Heterozygous hemoglobin variants (HbS, HgC, etc)do  not significantly interfere with this assay.   However, presence of multiple variants may affect accuracy.     08/13/2021 10.5 (H) 4.0 - 5.6 % Final     Comment:     ADA Screening Guidelines:  5.7-6.4%  Consistent with prediabetes  >or=6.5%  Consistent with diabetes    High levels of fetal hemoglobin interfere with the HbA1C  assay. Heterozygous hemoglobin variants (HbS, HgC, etc)do  not significantly interfere with this assay.   However, presence of multiple variants may affect accuracy.     02/24/2021 9.4 (H) 4.0 - 5.6 % Final     Comment:     ADA Screening Guidelines:  5.7-6.4%  Consistent with prediabetes  >or=6.5%  Consistent with diabetes    High levels of fetal hemoglobin interfere with the HbA1C  assay. Heterozygous hemoglobin variants (HbS, HgC, etc)do  not significantly interfere with this assay.   However, presence of multiple variants may affect accuracy.            Past Medical History:   Diagnosis Date    Adenomatous colon polyp 02/2022    Repeat due 2/2027 per Dr KRIS Willett    Allergy     Anxiety     Arthritis     Cataract     Colon polyps 2009    Diabetes mellitus     Diabetes mellitus type II     GERD (gastroesophageal reflux disease)     History of alcohol abuse     Hyperlipidemia     Hypertension     Neuromuscular disorder      Past Surgical History:   Procedure Laterality Date    ANKLE FUSION      left    CARPAL TUNNEL RELEASE      left    COLONOSCOPY      COLONOSCOPY N/A 2/18/2022    Procedure: COLONOSCOPY;  Surgeon: NADER Willett MD;   Location: [facility] ([address] [place] [address]);  Service: Endoscopy;  Laterality: N/A;  fully vacc-inst mail-tb.Instructions emailed per [facility] Covid test on 2/15 at [email or web address]    FRACTURE SURGERY      KNEE SURGERY      Uvalectomy      VASECTOMY       Current Outpatient Medications on File Prior to Visit   Medication Sig Dispense Refill    amLODIPine (NORVASC) 10 MG tablet TAKE 1 TABLET(10 MG) BY MOUTH EVERY DAY 90 tablet 2    atorvastatin (LIPITOR) 80 MG tablet TAKE 1 TABLET(80 MG) BY MOUTH EVERY DAY 90 tablet 2    azithromycin (Z-AUDI) 250 MG tablet Two today then one daily thereafter 6 tablet 0    azithromycin (Z-AUDI) 250 MG tablet Two today then one daily thereafter 6 tablet 0    busPIRone (BUSPAR) 5 MG Tab TAKE 1 TABLET(5 MG) BY MOUTH TWICE DAILY 60 tablet 2    ergocalciferol (ERGOCALCIFEROL) 50,000 unit Cap Take 1 capsule (50,000 Units total) by mouth every 7 days. 4 capsule 6    fenofibrate 160 MG Tab TAKE 1 TABLET(160 MG) BY MOUTH EVERY DAY 90 tablet 3    fluocinolone and shower cap 0.01 % Oil Use hs for rash on scalp 118.28 mL 3    gabapentin (NEURONTIN) 100 MG capsule 1 tab 3x/day x 2 weeks  Then increase to 3x/day thereafter for lumbar radiculopathy 90 capsule 6    ibuprofen (ADVIL,MOTRIN) 200 MG tablet Take 200 mg by mouth as needed for Pain.       insulin (LANTUS SOLOSTAR U-100 INSULIN) glargine 100 units/mL SubQ pen Inject 40 units BID 18 mL 6    insulin lispro (HUMALOG KWIKPEN INSULIN) 100 unit/mL pen INJECT SUBCUTANEOUSLY 35  UNITS WITH BREAKFAST AND  LUNCH AND 35 UNITS WITH  DINNER 75 mL 1    lancets (ONETOUCH ULTRASOFT LANCETS) Misc TEST four times a day before meals 150 each 6    linagliptin-metformin (JENTADUETO) 2.5-1,000 mg Tab Take 1 tablet by mouth 2 (two) times daily with meals. 60 tablet 6    meloxicam (MOBIC) 7.5 MG tablet Take 1 tablet (7.5 mg total) by mouth once daily. 15 tablet 0    metoprolol succinate (TOPROL-XL) 100 MG 24 hr tablet TAKE 1 TABLET(100 MG) BY MOUTH EVERY DAY 90 tablet 3    mupirocin  "(BACTROBAN) 2 % ointment Use qd 30 g 3    NOVOLOG FLEXPEN U-100 INSULIN 100 unit/mL (3 mL) InPn pen INJECT 45 UNITS INTO THE SKIN BID with major meals 75 mL 4    omeprazole (PRILOSEC) 20 MG capsule TAKE 1 CAPSULE(20 MG) BY MOUTH EVERY DAY FOR GERD 90 capsule 2    ONETOUCH ULTRA BLUE TEST STRIP Strp TEST FOUR TIMES DAILY 400 strip 1    pen needle, diabetic (BD ULTRA-FINE ADRIEN PEN NEEDLE) 32 gauge x 5/32" Ndle use four times a day 400 each 4    pen needle, diabetic (BD ULTRA-FINE SHORT PEN NEEDLE) 31 gauge x 5/16" Ndle USE EVERY  each 0    valsartan (DIOVAN) 320 MG tablet Take 1 tablet (320 mg total) by mouth once daily. 90 tablet 2    EScitalopram oxalate (LEXAPRO) 10 MG tablet Take 1 tablet (10 mg total) by mouth once daily. 30 tablet 4     No current facility-administered medications on file prior to visit.     Review of patient's allergies indicates:  No Known Allergies    Review of Systems   Constitutional: Negative for chills, decreased appetite, fever, malaise/fatigue, night sweats, weight gain and weight loss.   Cardiovascular:  Negative for chest pain, claudication, dyspnea on exertion, leg swelling, palpitations and syncope.   Respiratory:  Negative for cough and shortness of breath.    Endocrine: Negative for cold intolerance and heat intolerance.   Hematologic/Lymphatic: Negative for bleeding problem. Does not bruise/bleed easily.   Skin:  Positive for dry skin and nail changes. Negative for color change, flushing, itching, poor wound healing, rash, skin cancer, suspicious lesions and unusual hair distribution.   Musculoskeletal:  Negative for arthritis, back pain, falls, gout, joint pain, joint swelling, muscle cramps, muscle weakness, myalgias, neck pain and stiffness.         heel pain = resolving   Gastrointestinal:  Negative for diarrhea, nausea and vomiting.   Neurological:  Positive for paresthesias (Left more than right lower extremity). Negative for dizziness, focal weakness, " "light-headedness, numbness, tremors, vertigo and weakness.   Psychiatric/Behavioral:  Negative for altered mental status and depression. The patient does not have insomnia.    Allergic/Immunologic: Negative.          Objective:       Vitals:    10/26/22 1120   BP: (!) 157/81   Pulse: 90   Weight: 94.3 kg (208 lb)   Height: 5' 7" (1.702 m)   PainSc:   2   PainLoc: Foot   94.3 kg (208 lb)     Physical Exam  Vitals reviewed.   Constitutional:       General: He is not in acute distress.     Appearance: He is well-developed. He is not ill-appearing, toxic-appearing or diaphoretic.      Comments: Proper supportive shoegear      Cardiovascular:      Pulses:           Dorsalis pedis pulses are 2+ on the right side and 2+ on the left side.        Posterior tibial pulses are 1+ on the right side and 1+ on the left side.   Musculoskeletal:         General: No swelling, tenderness or deformity.      Right lower leg: No edema.      Left lower leg: No edema.      Right ankle: Normal.      Right Achilles Tendon: Normal.      Left ankle: Normal.      Left Achilles Tendon: Normal.      Right foot: Decreased range of motion. No deformity, tenderness or bony tenderness.      Left foot: Decreased range of motion. No deformity, tenderness or bony tenderness.      Comments: Decreased range of motion left foot and ankle    No pain on palpation to medial aspect heel   Feet:      Right foot:      Protective Sensation: 10 sites tested.  10 sites sensed.      Skin integrity: Dry skin present. No ulcer, blister, skin breakdown, erythema, warmth or callus.      Toenail Condition: Fungal disease present.     Left foot:      Protective Sensation: 10 sites tested.  10 sites sensed.      Skin integrity: Dry skin and fissure (Medial aspect of left heel no signs of infection or excessive depth) present. No ulcer, blister, skin breakdown, erythema, warmth or callus.      Toenail Condition: Left toenails are normal.      Comments: [place] " monofilament intact    Right hallux fungal no signs of infection    Diffuse xerosis    Skin:     General: Skin is warm.      Capillary Refill: Capillary refill takes 2 to 3 seconds.      Coloration: Skin is not pale.      Findings: No erythema or rash.      Nails: There is no clubbing.   Neurological:      Mental Status: He is alert and oriented to person, place, and time.      Sensory: No sensory deficit.      Gait: Gait abnormal.   Psychiatric:         Attention and Perception: Attention normal.         Mood and Affect: Mood normal.         Speech: Speech normal.         Behavior: Behavior normal.         Thought Content: Thought content normal.         Cognition and Memory: Cognition normal.         Judgment: Judgment normal.             Assessment:       Encounter Diagnoses   Name Primary?    Fissure in skin of foot Yes    Diabetes mellitus type 2 in obese     Onychomycosis due to dermatophyte          Plan:       Dallas was seen today for foot injury and nail problem.    Diagnoses and all orders for this visit:    Fissure in skin of foot    Diabetes mellitus type 2 in obese    Onychomycosis due to dermatophyte    Other orders  -     ammonium lactate 12 % Crea; Apply 1 g topically Daily.  -     ciclopirox (PENLAC) 8 % Soln; Apply topically nightly. Remove with nail polish remover once a week    I counseled the patient on his conditions, their implications and medical management.    - Shoe inspection. Diabetic Foot Education. Patient reminded of the importance of good nutrition and blood sugar control to help prevent podiatric complications of diabetes. Patient instructed on proper foot hygeine. We discussed wearing proper shoe gear, daily foot inspections, never walking without protective shoe gear, never putting sharp instruments to feet      Consider diabetic shoes     Discussed with patient to avoid pedicures/nail salons    - - With patient's permission, Utilizing a #15 scalpel, I trimmed the corns and  calluses at the above mentioned location.      The patient will continue to monitor the areas daily, inspect the feet, wear protective shoe gear when ambulatory, and moisturizer to maintain skin integrity.   Applied moisturizer with Xeroform cover with a Band-Aid leave 2 days    No indication for further mupirocin recommend starting ammonium lactate    No indication for x-ray; patient relates there is no current hardware in his foot    Discuss Penlac for nail right  Discuss treatment options for nail fungus.  I explained that fungus lives in a warm dark moist environment and therefore patient should make every attempt to keep feet clean and dry.  We discussed drying feet thoroughly after shower particularly between the toes and then applying powder between the toes and in the shoes.   For fungal toenails I prescribed Penlac to be used daily for up to a year.  We discussed oral Lamisil but I did not recommend it as a first line of treatment since it is an internal medicine that may potentially have side effects, including liver problems. Patient elects for topical treatment. Patient instructed on proper use of Penlac.     Recommend having the left heel checked 2-3 weeks    Patient request [facility] as this location [facility] is too far away from home patient requests something closer to home near [place].  Will set up with [facility] in the next few weeks to double check that heel fissure is resolving and that there are no signs of infection        No follow-ups on file.

## 2022-12-29 DIAGNOSIS — E11.65 UNCONTROLLED TYPE 2 DIABETES MELLITUS WITH HYPERGLYCEMIA: ICD-10-CM

## 2022-12-29 RX ORDER — INSULIN GLARGINE 100 [IU]/ML
INJECTION, SOLUTION SUBCUTANEOUS
Qty: 18 ML | Refills: 6 | Status: SHIPPED | OUTPATIENT
Start: 2022-12-29 | End: 2023-05-30 | Stop reason: SDUPTHER

## 2022-12-29 NOTE — TELEPHONE ENCOUNTER
Care Due:                  Date            Visit Type   Department     Provider  --------------------------------------------------------------------------------                                [facility] PRIMARY  Last Visit: 07-      CARE ([facility])   CARE           Lisette Corado                              [facility] PRIMARY  Next Visit: 01-      CARE ([facility])   [facility]           Lisette [facility]  Test          Frequency    Reason                     Performed    Due Date  --------------------------------------------------------------------------------    HBA1C.......  6 months...  NOVOLOG, insulin,          07- 01-                             linagliptin-metformin....    [facility] Embedded Care Gaps. Reference number: [id number]. 12/29/2022   1:21:02 PM CST

## 2023-01-06 ENCOUNTER — PATIENT MESSAGE (OUTPATIENT)
Dept: ADMINISTRATIVE | Facility: HOSPITAL | Age: 68
End: 2023-01-06
Payer: MEDICARE

## 2023-01-10 DIAGNOSIS — E11.69 TYPE 2 DIABETES MELLITUS WITH OTHER SPECIFIED COMPLICATION, WITHOUT LONG-TERM CURRENT USE OF INSULIN: ICD-10-CM

## 2023-01-10 RX ORDER — INSULIN ASPART 100 [IU]/ML
INJECTION, SOLUTION INTRAVENOUS; SUBCUTANEOUS
Qty: 75 ML | Refills: 4 | Status: SHIPPED | OUTPATIENT
Start: 2023-01-10 | End: 2023-01-10 | Stop reason: SDUPTHER

## 2023-01-10 RX ORDER — INSULIN ASPART 100 [IU]/ML
INJECTION, SOLUTION INTRAVENOUS; SUBCUTANEOUS
Qty: 75 ML | Refills: 4 | Status: SHIPPED | OUTPATIENT
Start: 2023-01-10 | End: 2024-03-01 | Stop reason: SDUPTHER

## 2023-01-10 NOTE — TELEPHONE ENCOUNTER
----- Message from Abhaybeto Frank sent at 1/10/2023 10:43 AM CST -----  Contact: self [phone]  Requesting an RX refill or new RX.  Is this a refill or new RX: refill  RX name and strength (copy/paste from chart):  NOVOLOG FLEXPEN U-100 INSULIN 100 unit/mL (3 mL) InPn pen  Is this a 30 day or 90 day RX:   Pharmacy name and phone # (copy/paste from chart):    [id number] - [place] - [address]  [address] [address]  Phone: [phone] Fax: [phone]      The doctors have asked that we provide their patients with the following 2 reminders -- prescription refills can take up to 72 hours, and a friendly reminder that in the future you can use your [facility] account to request refills: yes    Please call and advise

## 2023-01-10 NOTE — TELEPHONE ENCOUNTER
No new care gaps identified.  [facility] Embedded Care Gaps. Reference number: [id number]. 1/10/2023   11:01:56 AM CST

## 2023-01-24 ENCOUNTER — TELEPHONE (OUTPATIENT)
Dept: INTERNAL MEDICINE | Facility: CLINIC | Age: 68
End: 2023-01-24
Payer: MEDICARE

## 2023-01-24 DIAGNOSIS — J32.9 SINUSITIS, UNSPECIFIED CHRONICITY, UNSPECIFIED LOCATION: Primary | ICD-10-CM

## 2023-01-24 NOTE — TELEPHONE ENCOUNTER
----- Message from Johana Larson sent at 1/23/2023  4:55 PM CST -----  Contact: [phone] Patient  Patient would like to get a referral.  Referral to what specialty:  ENT  Does the patient want the referral with a specific physician:  Dr. Mitchell [facility] [place] [address]  Is the specialist an [facility] or [facility]:    Reason (be specific):  Bronchitis  Does the patient already have the specialty clinic appointment scheduled:  yes  If yes, what date is the appointment scheduled:   01/10/2023  Is the insurance listed in Epic correct? (this is important for a referral):  yes  Advised patient that once provider approves this either a nurse or  will return their call?: yes  Would the patient like a call back, or a response through their [facility] portal?:   Call Back.  Comments:  Can you please send a referral to [facility] so they will pay the doctor please. Thank you

## 2023-01-24 NOTE — TELEPHONE ENCOUNTER
Bianca/Inna, please call pt-why does he want to see an ENT for a chest infection/bronchitis? Could he see Rosy Thursday?

## 2023-01-24 NOTE — TELEPHONE ENCOUNTER
Pt requesting ENT referral.   Dr. Mitchell Dennis [facility] [place]      [address] [place] [address] kristin [phone]   Thanks

## 2023-01-25 NOTE — TELEPHONE ENCOUNTER
Pt states he went to see the ENT for what he thought was a sinus infection and the ENT dx pt with Bronchitis. He's already been treated. He's requesting a referral to be put in the system because of something to do with insurance and needing a referral before seeing a specialist and he didn't do that. He stated he'll  the printed referral if it gets put in. Please advise

## 2023-01-25 NOTE — TELEPHONE ENCOUNTER
Bianca, I placed/printed the referral for Mr Garcia but it cannot be retroactively dated so Insurance may not cover his prior ENT Appt.  Thanks

## 2023-02-02 ENCOUNTER — TELEPHONE (OUTPATIENT)
Dept: INTERNAL MEDICINE | Facility: CLINIC | Age: 68
End: 2023-02-02
Payer: MEDICARE

## 2023-02-02 DIAGNOSIS — E11.65 UNCONTROLLED TYPE 2 DIABETES MELLITUS WITH HYPERGLYCEMIA: Primary | ICD-10-CM

## 2023-02-02 NOTE — TELEPHONE ENCOUNTER
----- Message from Roxana Morales sent at 2/2/2023  3:53 PM CST -----  Contact: [phone] Patient  Diabetic or Medical Supplies.  What supplies are needed: test strips  What is the brand name of the supplies: ONETOUCH ULTRA BLUE TEST STRIP Strp  Is this a refill or new prescription:  new  Who prescribed the supplies:  Dr Corado  Pharmacy or company name, phone # and location:   [id number] - [address] AT [facility] & [address] [address]  Phone: [phone] Fax: [phone]      Would the patient like a call back, or a response through their [facility] portal?:   call back  Comments:

## 2023-02-07 DIAGNOSIS — Z00.00 ENCOUNTER FOR MEDICARE ANNUAL WELLNESS EXAM: ICD-10-CM

## 2023-02-09 DIAGNOSIS — Z00.00 ENCOUNTER FOR MEDICARE ANNUAL WELLNESS EXAM: ICD-10-CM

## 2023-02-23 DIAGNOSIS — E11.9 TYPE 2 DIABETES MELLITUS WITHOUT COMPLICATION: ICD-10-CM

## 2023-02-27 ENCOUNTER — PATIENT MESSAGE (OUTPATIENT)
Dept: ADMINISTRATIVE | Facility: HOSPITAL | Age: 68
End: 2023-02-27
Payer: MEDICARE

## 2023-03-03 DIAGNOSIS — E78.2 MIXED HYPERLIPIDEMIA: ICD-10-CM

## 2023-03-03 DIAGNOSIS — I10 ESSENTIAL HYPERTENSION: ICD-10-CM

## 2023-03-03 DIAGNOSIS — E78.5 DYSLIPIDEMIA: ICD-10-CM

## 2023-03-03 DIAGNOSIS — E55.9 VITAMIN D DEFICIENCY: ICD-10-CM

## 2023-03-03 DIAGNOSIS — Z12.5 PROSTATE CANCER SCREENING: ICD-10-CM

## 2023-03-03 DIAGNOSIS — E11.65 UNCONTROLLED TYPE 2 DIABETES MELLITUS WITH HYPERGLYCEMIA: Primary | ICD-10-CM

## 2023-03-03 RX ORDER — ATORVASTATIN CALCIUM 80 MG/1
80 TABLET, FILM COATED ORAL DAILY
Qty: 90 TABLET | Refills: 2 | Status: SHIPPED | OUTPATIENT
Start: 2023-03-03 | End: 2023-11-25

## 2023-03-03 RX ORDER — ERGOCALCIFEROL 1.25 MG/1
50000 CAPSULE ORAL
Qty: 4 CAPSULE | Refills: 6 | Status: SHIPPED | OUTPATIENT
Start: 2023-03-03 | End: 2023-09-27

## 2023-03-03 NOTE — TELEPHONE ENCOUNTER
Care Due:                  Date            Visit Type   Department     Provider  --------------------------------------------------------------------------------                                EP -                              PRIMARY      [facility] PRIMARY  Last Visit: 07-      CARE ([facility])   CARE           Lisette Corado  Next Visit: None Scheduled  None         None Found                                                            Last  Test          Frequency    Reason                     Performed    Due Date  --------------------------------------------------------------------------------    HBA1C.......  6 months...  NOVOLOG, insulin,          07- 01-                             linagliptin-metformin....    [facility] Embedded Care Gaps. Reference number: [id number]. 3/03/2023   3:46:44 PM CST

## 2023-03-06 NOTE — TELEPHONE ENCOUNTER
Bianca, please call Kurt and schedule him a physical appt with me in July as he cancelled his appt in January and didn't reschedule. He can be my one pt on July 13th(Admin Day) He needs lab prior -he needs to do lab 7/8 or after due to his PSA and Medicare coverage; I've place new orders.  Thanks so much

## 2023-03-20 ENCOUNTER — TELEPHONE (OUTPATIENT)
Dept: INTERNAL MEDICINE | Facility: CLINIC | Age: 68
End: 2023-03-20
Payer: MEDICARE

## 2023-03-20 DIAGNOSIS — E11.69 TYPE 2 DIABETES MELLITUS WITH OTHER SPECIFIED COMPLICATION, WITHOUT LONG-TERM CURRENT USE OF INSULIN: Primary | ICD-10-CM

## 2023-03-20 RX ORDER — INSULIN PUMP SYRINGE, 3 ML
EACH MISCELLANEOUS
Qty: 1 EACH | Refills: 0 | Status: SHIPPED | OUTPATIENT
Start: 2023-03-20 | End: 2024-03-19

## 2023-03-20 RX ORDER — LANCETS
1 EACH MISCELLANEOUS
Qty: 100 EACH | Refills: 6 | Status: SHIPPED | OUTPATIENT
Start: 2023-03-20

## 2023-03-20 NOTE — TELEPHONE ENCOUNTER
Opal have ordered and sent into pt's [facility]: orders for a Generic glucometer,strips, and lancets.  NEGRO

## 2023-03-20 NOTE — TELEPHONE ENCOUNTER
Per [facility] pharmacy, pt's plan will not cover the ONE TOUCH ULTRA BLUE test strips. This is the machine pt has at home. It is only compatible with the same brand of strips. [facility] recommends that a new script be written for the ACCU-CHEK-BOTH THE MACHINE AND TEST STRIPS, please.

## 2023-04-03 ENCOUNTER — PATIENT MESSAGE (OUTPATIENT)
Dept: ADMINISTRATIVE | Facility: HOSPITAL | Age: 68
End: 2023-04-03
Payer: MEDICARE

## 2023-04-04 ENCOUNTER — TELEPHONE (OUTPATIENT)
Dept: INTERNAL MEDICINE | Facility: CLINIC | Age: 68
End: 2023-04-04
Payer: MEDICARE

## 2023-04-04 DIAGNOSIS — I10 ESSENTIAL HYPERTENSION: ICD-10-CM

## 2023-04-04 DIAGNOSIS — E11.65 UNCONTROLLED TYPE 2 DIABETES MELLITUS WITH HYPERGLYCEMIA: ICD-10-CM

## 2023-04-04 DIAGNOSIS — Z12.5 PROSTATE CANCER SCREENING: ICD-10-CM

## 2023-04-04 DIAGNOSIS — E78.2 MIXED HYPERLIPIDEMIA: Primary | ICD-10-CM

## 2023-04-04 RX ORDER — LINAGLIPTIN AND METFORMIN HYDROCHLORIDE 2.5; 1 MG/1; MG/1
1 TABLET, FILM COATED ORAL 2 TIMES DAILY WITH MEALS
Qty: 60 TABLET | Refills: 4 | Status: SHIPPED | OUTPATIENT
Start: 2023-04-04 | End: 2023-12-28

## 2023-04-04 NOTE — TELEPHONE ENCOUNTER
Bianca, can you call and schedule pt for an annual appt with 1 week prior fasting lab ASAP(?August)  He's not very good about scheduling follow up appointments-he is a single parent and works hard.  Let me know what you're able to get scheduled/Thanks

## 2023-04-04 NOTE — TELEPHONE ENCOUNTER
No new care gaps identified.  [facility] Embedded Care Gaps. Reference number: [id number]. 4/04/2023   2:41:30 PM CDT

## 2023-04-05 ENCOUNTER — TELEPHONE (OUTPATIENT)
Dept: INTERNAL MEDICINE | Facility: CLINIC | Age: 68
End: 2023-04-05
Payer: MEDICARE

## 2023-04-05 DIAGNOSIS — Z12.5 PROSTATE CANCER SCREENING: ICD-10-CM

## 2023-04-05 DIAGNOSIS — E78.2 MIXED HYPERLIPIDEMIA: ICD-10-CM

## 2023-04-05 DIAGNOSIS — E11.65 UNCONTROLLED TYPE 2 DIABETES MELLITUS WITH HYPERGLYCEMIA: Primary | ICD-10-CM

## 2023-04-05 DIAGNOSIS — I10 ESSENTIAL HYPERTENSION: ICD-10-CM

## 2023-04-05 NOTE — TELEPHONE ENCOUNTER
Bianca, If Kurt wants to be seen sooner, he will have to schedule with Rosy, but he eventually should see me.  I signed the orders as they were perfect.  Thanks

## 2023-04-05 NOTE — TELEPHONE ENCOUNTER
----- Message from Manasalinda Hill sent at 4/5/2023  1:02 PM CDT -----  Contact: [phone]  Caller is requesting an earlier appointment then we can schedule.  Caller is requesting a message be sent to the provider.  If this is for urgent care symptoms, did you offer other providers at this location, providers at other locations, or [facility]? (yes, no, n/a):  n/a  If this is for the patients physical, did you offer to schedule next available and put on wait list, or to see NP or PA for their physical?  (yes, no, n/a):  n/a  When is the next available appointment with their provider:  09/06  Reason for the appointment:  physical with labs  Patient preference of timeframe to be scheduled:  this or next month  Would the patient like a call back, or a response through their [facility] portal?:   phone  Comments:

## 2023-04-07 ENCOUNTER — PATIENT MESSAGE (OUTPATIENT)
Dept: ADMINISTRATIVE | Facility: HOSPITAL | Age: 68
End: 2023-04-07
Payer: MEDICARE

## 2023-04-28 DIAGNOSIS — I10 HTN (HYPERTENSION), BENIGN: ICD-10-CM

## 2023-04-28 RX ORDER — AMLODIPINE BESYLATE 10 MG/1
10 TABLET ORAL DAILY
Qty: 90 TABLET | Refills: 0 | Status: SHIPPED | OUTPATIENT
Start: 2023-04-28 | End: 2023-07-06 | Stop reason: SDUPTHER

## 2023-04-28 NOTE — TELEPHONE ENCOUNTER
Care Due:                  Date            Visit Type   Department     Provider  --------------------------------------------------------------------------------                                EP -                              PRIMARY      [facility] PRIMARY  Last Visit: 07-      CARE ([facility])   CARE           Lisette Corado                              [facility] PRIMARY  Next Visit: 08-      CARE ([facility])   [facility]           Lisette [facility]                                                            Last  Test          Frequency    Reason                     Performed    Due Date  --------------------------------------------------------------------------------    Office Visit  12 months..  EScitalopram, NOVOLOG,     07-   07-                             atorvastatin,                             fenofibrate, insulin,                             linagliptin-metformin,                             omeprazole, valsartan....    CBC.........  12 months..  fenofibrate..............  07- 07-    CMP.........  12 months..  NOVOLOG, atorvastatin,     07- 07-                             fenofibrate, insulin,                             linagliptin-metformin,                             valsartan................    Lipid Panel.  12 months..  atorvastatin, fenofibrate  07- 07-    [facility] Due Messages. Reference number: [id number].   4/28/2023 3:52:12 PM CDT

## 2023-05-30 ENCOUNTER — TELEPHONE (OUTPATIENT)
Dept: INTERNAL MEDICINE | Facility: CLINIC | Age: 68
End: 2023-05-30
Payer: MEDICARE

## 2023-05-30 DIAGNOSIS — E11.65 UNCONTROLLED TYPE 2 DIABETES MELLITUS WITH HYPERGLYCEMIA: ICD-10-CM

## 2023-05-30 RX ORDER — INSULIN GLARGINE 100 [IU]/ML
INJECTION, SOLUTION SUBCUTANEOUS
Qty: 18 ML | Refills: 6 | Status: SHIPPED | OUTPATIENT
Start: 2023-05-30 | End: 2023-10-30 | Stop reason: SDUPTHER

## 2023-05-30 NOTE — TELEPHONE ENCOUNTER
Care Due:                  Date            Visit Type   Department     Provider  --------------------------------------------------------------------------------                                [facility] PRIMARY  Last Visit: 07-      CARE ([facility])   CARE           Lisette Corado                              [facility] PRIMARY  Next Visit: 08-      CARE ([facility])   [facility]           [facility]  Test          Frequency    Reason                     Performed    Due Date  --------------------------------------------------------------------------------    HBA1C.......  6 months...  NOVOLOG, insulin,          07- 01-                             linagliptin-metformin....    [facility] Embedded Care Due Messages. Reference number: [id number].   5/30/2023 9:30:51 AM CDT

## 2023-05-30 NOTE — TELEPHONE ENCOUNTER
Pt requesting Optometry referral.    FYI - Pt changed lab appt to 7/14 due to vacation. RTC on 8/17. Labs will be over one month old. Pt aware. Pt did not want to do them after vacation, right before appt date.

## 2023-05-30 NOTE — TELEPHONE ENCOUNTER
----- Message from Susan Robles sent at 5/30/2023 11:20 AM CDT -----  Contact: [phone]  Pt called to get a referral for an optometrist. He would also like to discuss having his labs done in July instead of August, but wants to keep August appointment. Please Advise

## 2023-07-03 ENCOUNTER — PATIENT OUTREACH (OUTPATIENT)
Dept: ADMINISTRATIVE | Facility: HOSPITAL | Age: 68
End: 2023-07-03
Payer: MEDICARE

## 2023-07-03 NOTE — PROGRESS NOTES
Health Maintenance Due   Topic Date Due    Shingles Vaccine (2 of 3) 02/10/2016    Pneumococcal Vaccines (Age 65+) (3 - PPSV23 if available, else PCV20) 01/14/2022    Eye Exam  08/03/2022    Hemoglobin A1c  10/08/2022    COVID-19 Vaccine (4 - Additional dose for Anila series) 03/16/2023    Diabetes Urine Screening  07/19/2023     Chart review done. HM updated. Immunizations reviewed & updated. Care Everywhere updated.  Labs noted as scheduled 7/14/23 uploaded to chart.

## 2023-07-06 DIAGNOSIS — I10 ESSENTIAL HYPERTENSION: ICD-10-CM

## 2023-07-06 DIAGNOSIS — E78.2 MIXED HYPERLIPIDEMIA: ICD-10-CM

## 2023-07-06 DIAGNOSIS — K21.9 GASTROESOPHAGEAL REFLUX DISEASE: ICD-10-CM

## 2023-07-06 DIAGNOSIS — I10 HTN (HYPERTENSION), BENIGN: ICD-10-CM

## 2023-07-06 RX ORDER — OMEPRAZOLE 20 MG/1
CAPSULE, DELAYED RELEASE ORAL
Qty: 90 CAPSULE | Refills: 0 | Status: SHIPPED | OUTPATIENT
Start: 2023-07-06 | End: 2023-10-17 | Stop reason: SDUPTHER

## 2023-07-06 RX ORDER — METOPROLOL SUCCINATE 100 MG/1
100 TABLET, EXTENDED RELEASE ORAL DAILY
Qty: 90 TABLET | Refills: 0 | Status: SHIPPED | OUTPATIENT
Start: 2023-07-06 | End: 2023-09-12 | Stop reason: SDUPTHER

## 2023-07-06 RX ORDER — FENOFIBRATE 160 MG/1
160 TABLET ORAL DAILY
Qty: 90 TABLET | Refills: 0 | Status: SHIPPED | OUTPATIENT
Start: 2023-07-06 | End: 2023-11-27 | Stop reason: SDUPTHER

## 2023-07-06 RX ORDER — AMLODIPINE BESYLATE 10 MG/1
10 TABLET ORAL DAILY
Qty: 90 TABLET | Refills: 0 | Status: SHIPPED | OUTPATIENT
Start: 2023-07-06 | End: 2023-10-19 | Stop reason: SDUPTHER

## 2023-07-06 RX ORDER — VALSARTAN 320 MG/1
320 TABLET ORAL DAILY
Qty: 90 TABLET | Refills: 0 | Status: SHIPPED | OUTPATIENT
Start: 2023-07-06 | End: 2023-10-19 | Stop reason: SDUPTHER

## 2023-07-06 RX ORDER — BUSPIRONE HYDROCHLORIDE 5 MG/1
5 TABLET ORAL 2 TIMES DAILY
Qty: 60 TABLET | Refills: 0 | Status: SHIPPED | OUTPATIENT
Start: 2023-07-06 | End: 2023-10-04 | Stop reason: SDUPTHER

## 2023-07-06 NOTE — TELEPHONE ENCOUNTER
Care Due:                  Date            Visit Type   Department     Provider  --------------------------------------------------------------------------------                                EP -                              PRIMARY      [facility] PRIMARY  Last Visit: 07-      CARE ([facility])   CARE           Lisette Corado                              [facility] PRIMARY  Next Visit: 08-      CARE ([facility])   [facility]           Lisette [facility]  Test          Frequency    Reason                     Performed    Due Date  --------------------------------------------------------------------------------    CBC.........  12 months..  fenofibrate..............  07- 07-    CMP.........  12 months..  NOVOLOG, atorvastatin,     07- 07-                             fenofibrate, insulin,                             linagliptin-metformin,                             valsartan................    Lipid Panel.  12 months..  atorvastatin, fenofibrate  07- 07-    [facility] Embedded Care Due Messages. Reference number: [id number].   7/06/2023 1:32:08 PM CDT

## 2023-07-06 NOTE — TELEPHONE ENCOUNTER
----- Message from Fan Marie sent at 7/6/2023  1:03 PM CDT -----  Contact: [phone]  Pt called in for a prescription refill of 3 different medications but cant remember the name. Pt is still waiting on refill and states its been over the 72 hour limit. Only medication he can remember is below.        ////Requesting an RX refill or new RX.  Is this a refill or new RX: refill   RX name and strength (copy/paste from chart):  amLODIPine (NORVASC) 10 MG tablet  Is this a 30 day or 90 day RX: 90  Pharmacy name and phone # (copy/paste from chart):      [id number] [address] [facility] [address]  [facility] [address]  Phone: [phone] Fax: [phone]

## 2023-07-14 ENCOUNTER — LAB VISIT (OUTPATIENT)
Dept: LAB | Facility: HOSPITAL | Age: 68
End: 2023-07-14
Attending: INTERNAL MEDICINE
Payer: MEDICARE

## 2023-07-14 DIAGNOSIS — E78.2 MIXED HYPERLIPIDEMIA: ICD-10-CM

## 2023-07-14 DIAGNOSIS — Z12.5 PROSTATE CANCER SCREENING: ICD-10-CM

## 2023-07-14 DIAGNOSIS — I10 ESSENTIAL HYPERTENSION: ICD-10-CM

## 2023-07-14 DIAGNOSIS — E11.65 UNCONTROLLED TYPE 2 DIABETES MELLITUS WITH HYPERGLYCEMIA: ICD-10-CM

## 2023-07-14 LAB
ALBUMIN SERPL BCP-MCNC: 4.3 G/DL (ref 3.5–5.2)
ALP SERPL-CCNC: 151 U/L (ref 55–135)
ALT SERPL W/O P-5'-P-CCNC: 39 U/L (ref 10–44)
ANION GAP SERPL CALC-SCNC: 10 MMOL/L (ref 8–16)
AST SERPL-CCNC: 87 U/L (ref 10–40)
BASOPHILS # BLD AUTO: 0.05 K/UL (ref 0–0.2)
BASOPHILS NFR BLD: 0.8 % (ref 0–1.9)
BILIRUB SERPL-MCNC: 0.7 MG/DL (ref 0.1–1)
BUN SERPL-MCNC: 23 MG/DL (ref 8–23)
CALCIUM SERPL-MCNC: 10.6 MG/DL (ref 8.7–10.5)
CHLORIDE SERPL-SCNC: 108 MMOL/L (ref 95–110)
CHOLEST SERPL-MCNC: 169 MG/DL (ref 120–199)
CHOLEST/HDLC SERPL: 5 {RATIO} (ref 2–5)
CO2 SERPL-SCNC: 25 MMOL/L (ref 23–29)
COMPLEXED PSA SERPL-MCNC: 0.22 NG/ML (ref 0–4)
CREAT SERPL-MCNC: 1.5 MG/DL (ref 0.5–1.4)
DIFFERENTIAL METHOD: ABNORMAL
EOSINOPHIL # BLD AUTO: 0.2 K/UL (ref 0–0.5)
EOSINOPHIL NFR BLD: 3 % (ref 0–8)
ERYTHROCYTE [DISTWIDTH] IN BLOOD BY AUTOMATED COUNT: 12.5 % (ref 11.5–14.5)
EST. GFR  (NO RACE VARIABLE): 50.1 ML/MIN/1.73 M^2
ESTIMATED AVG GLUCOSE: 252 MG/DL (ref 68–131)
GLUCOSE SERPL-MCNC: 209 MG/DL (ref 70–110)
HBA1C MFR BLD: 10.4 % (ref 4–5.6)
HCT VFR BLD AUTO: 42.2 % (ref 40–54)
HDLC SERPL-MCNC: 34 MG/DL (ref 40–75)
HDLC SERPL: 20.1 % (ref 20–50)
HGB BLD-MCNC: 13.7 G/DL (ref 14–18)
IMM GRANULOCYTES # BLD AUTO: 0.02 K/UL (ref 0–0.04)
IMM GRANULOCYTES NFR BLD AUTO: 0.3 % (ref 0–0.5)
LDLC SERPL CALC-MCNC: ABNORMAL MG/DL (ref 63–159)
LYMPHOCYTES # BLD AUTO: 1.8 K/UL (ref 1–4.8)
LYMPHOCYTES NFR BLD: 27 % (ref 18–48)
MCH RBC QN AUTO: 31.6 PG (ref 27–31)
MCHC RBC AUTO-ENTMCNC: 32.5 G/DL (ref 32–36)
MCV RBC AUTO: 97 FL (ref 82–98)
MONOCYTES # BLD AUTO: 0.6 K/UL (ref 0.3–1)
MONOCYTES NFR BLD: 8.6 % (ref 4–15)
NEUTROPHILS # BLD AUTO: 4 K/UL (ref 1.8–7.7)
NEUTROPHILS NFR BLD: 60.3 % (ref 38–73)
NONHDLC SERPL-MCNC: 135 MG/DL
NRBC BLD-RTO: 0 /100 WBC
PLATELET # BLD AUTO: 225 K/UL (ref 150–450)
PMV BLD AUTO: 12.7 FL (ref 9.2–12.9)
POTASSIUM SERPL-SCNC: 5.5 MMOL/L (ref 3.5–5.1)
PROT SERPL-MCNC: 8.4 G/DL (ref 6–8.4)
RBC # BLD AUTO: 4.34 M/UL (ref 4.6–6.2)
SODIUM SERPL-SCNC: 143 MMOL/L (ref 136–145)
TRIGL SERPL-MCNC: 453 MG/DL (ref 30–150)
TSH SERPL DL<=0.005 MIU/L-ACNC: 2.09 UIU/ML (ref 0.4–4)
WBC # BLD AUTO: 6.6 K/UL (ref 3.9–12.7)

## 2023-07-14 PROCEDURE — 80061 LIPID PANEL: CPT | Performed by: INTERNAL MEDICINE

## 2023-07-14 PROCEDURE — 85025 COMPLETE CBC W/AUTO DIFF WBC: CPT | Performed by: INTERNAL MEDICINE

## 2023-07-14 PROCEDURE — 80053 COMPREHEN METABOLIC PANEL: CPT | Performed by: INTERNAL MEDICINE

## 2023-07-14 PROCEDURE — 36415 COLL VENOUS BLD VENIPUNCTURE: CPT | Performed by: INTERNAL MEDICINE

## 2023-07-14 PROCEDURE — 83036 HEMOGLOBIN GLYCOSYLATED A1C: CPT | Performed by: INTERNAL MEDICINE

## 2023-07-14 PROCEDURE — 84443 ASSAY THYROID STIM HORMONE: CPT | Performed by: INTERNAL MEDICINE

## 2023-07-14 PROCEDURE — 84153 ASSAY OF PSA TOTAL: CPT | Performed by: INTERNAL MEDICINE

## 2023-07-24 ENCOUNTER — TELEPHONE (OUTPATIENT)
Dept: INTERNAL MEDICINE | Facility: CLINIC | Age: 68
End: 2023-07-24
Payer: MEDICARE

## 2023-07-24 NOTE — TELEPHONE ENCOUNTER
----- Message from Paty Issa sent at 7/24/2023  9:55 AM CDT -----  Contact: Kurt king [phone]  1MEDICALADVICE     Patient is calling for Medical Advice regarding:    How long has patient had these symptoms:    Pharmacy name and phone#:    Would like response via [email or web address]:  call back    Comments: Pt is calling to get a referral for ENT because he can't hear that well since riding the airplane last week, and pt wants to ask a question regarding a rx that he was told in the portal that he should be taking. Pt has questions about that medication and if it was sent to the pharmacy

## 2023-07-24 NOTE — TELEPHONE ENCOUNTER
Rachelle, please recommend an UC Appt in Internal Medicine with Rosy before an ENT Appt as that is much quicker.  What medication is he referring to as I couldn't find it in the chart.  Thanks

## 2023-07-25 NOTE — TELEPHONE ENCOUNTER
----- Message from Juan Louise sent at 7/25/2023  3:10 PM CDT -----  Contact: [phone] @ patient  Good afternoon patient would like to request a referral to see a ENT.Please give patient a call back [phone]

## 2023-07-27 ENCOUNTER — OFFICE VISIT (OUTPATIENT)
Dept: INTERNAL MEDICINE | Facility: CLINIC | Age: 68
End: 2023-07-27
Payer: MEDICARE

## 2023-07-27 VITALS
BODY MASS INDEX: 34.57 KG/M2 | HEART RATE: 95 BPM | SYSTOLIC BLOOD PRESSURE: 149 MMHG | OXYGEN SATURATION: 97 % | WEIGHT: 220.25 LBS | DIASTOLIC BLOOD PRESSURE: 72 MMHG | HEIGHT: 67 IN

## 2023-07-27 DIAGNOSIS — H65.192 ACUTE MUCOID OTITIS MEDIA OF LEFT EAR: Primary | ICD-10-CM

## 2023-07-27 PROCEDURE — 1126F PR PAIN SEVERITY QUANTIFIED, NO PAIN PRESENT: ICD-10-PCS | Mod: CPTII,S$GLB,,

## 2023-07-27 PROCEDURE — 1101F PR PT FALLS ASSESS DOC 0-1 FALLS W/OUT INJ PAST YR: ICD-10-PCS | Mod: CPTII,S$GLB,,

## 2023-07-27 PROCEDURE — 99999 PR PBB SHADOW E&M-EST. PATIENT-LVL V: ICD-10-PCS | Mod: PBBFAC,,,

## 2023-07-27 PROCEDURE — 3077F PR MOST RECENT SYSTOLIC BLOOD PRESSURE >= 140 MM HG: ICD-10-PCS | Mod: CPTII,S$GLB,,

## 2023-07-27 PROCEDURE — 3060F POS MICROALBUMINURIA REV: CPT | Mod: CPTII,S$GLB,,

## 2023-07-27 PROCEDURE — 99214 OFFICE O/P EST MOD 30 MIN: CPT | Mod: S$GLB,,,

## 2023-07-27 PROCEDURE — 3078F DIAST BP <80 MM HG: CPT | Mod: CPTII,S$GLB,,

## 2023-07-27 PROCEDURE — 3077F SYST BP >= 140 MM HG: CPT | Mod: CPTII,S$GLB,,

## 2023-07-27 PROCEDURE — 3046F PR MOST RECENT HEMOGLOBIN A1C LEVEL > 9.0%: ICD-10-PCS | Mod: CPTII,S$GLB,,

## 2023-07-27 PROCEDURE — 1101F PT FALLS ASSESS-DOCD LE1/YR: CPT | Mod: CPTII,S$GLB,,

## 2023-07-27 PROCEDURE — 3288F FALL RISK ASSESSMENT DOCD: CPT | Mod: CPTII,S$GLB,,

## 2023-07-27 PROCEDURE — 1126F AMNT PAIN NOTED NONE PRSNT: CPT | Mod: CPTII,S$GLB,,

## 2023-07-27 PROCEDURE — 4010F ACE/ARB THERAPY RXD/TAKEN: CPT | Mod: CPTII,S$GLB,,

## 2023-07-27 PROCEDURE — 99999 PR PBB SHADOW E&M-EST. PATIENT-LVL V: CPT | Mod: PBBFAC,,,

## 2023-07-27 PROCEDURE — 3046F HEMOGLOBIN A1C LEVEL >9.0%: CPT | Mod: CPTII,S$GLB,,

## 2023-07-27 PROCEDURE — 3008F PR BODY MASS INDEX (BMI) DOCUMENTED: ICD-10-PCS | Mod: CPTII,S$GLB,,

## 2023-07-27 PROCEDURE — 4010F PR ACE/ARB THEARPY RXD/TAKEN: ICD-10-PCS | Mod: CPTII,S$GLB,,

## 2023-07-27 PROCEDURE — 3066F NEPHROPATHY DOC TX: CPT | Mod: CPTII,S$GLB,,

## 2023-07-27 PROCEDURE — 3060F PR POS MICROALBUMINURIA RESULT DOCUMENTED/REVIEW: ICD-10-PCS | Mod: CPTII,S$GLB,,

## 2023-07-27 PROCEDURE — 3008F BODY MASS INDEX DOCD: CPT | Mod: CPTII,S$GLB,,

## 2023-07-27 PROCEDURE — 3288F PR FALLS RISK ASSESSMENT DOCUMENTED: ICD-10-PCS | Mod: CPTII,S$GLB,,

## 2023-07-27 PROCEDURE — 99214 PR OFFICE/OUTPT VISIT, EST, LEVL IV, 30-39 MIN: ICD-10-PCS | Mod: S$GLB,,,

## 2023-07-27 PROCEDURE — 3066F PR DOCUMENTATION OF TREATMENT FOR NEPHROPATHY: ICD-10-PCS | Mod: CPTII,S$GLB,,

## 2023-07-27 PROCEDURE — 3078F PR MOST RECENT DIASTOLIC BLOOD PRESSURE < 80 MM HG: ICD-10-PCS | Mod: CPTII,S$GLB,,

## 2023-07-27 RX ORDER — AMOXICILLIN AND CLAVULANATE POTASSIUM 875; 125 MG/1; MG/1
1 TABLET, FILM COATED ORAL 2 TIMES DAILY
Qty: 20 TABLET | Refills: 0 | Status: SHIPPED | OUTPATIENT
Start: 2023-07-27 | End: 2023-08-06

## 2023-07-27 NOTE — PATIENT INSTRUCTIONS
Symptomatic treatment:    PLAIN mucinex 1200 mg twice a day  Tylenol/Motrin for pain/fever  Flonase for nasal passages  Zyrtec or Xyzal daily  Drink plenty fluids

## 2023-07-27 NOTE — PROGRESS NOTES
"[facility] Note    Chief Complaint      Chief Complaint   Patient presents with    Ear Fullness    Hearing Problem     History of Present Illness      Dallas Garcia is a 67 y.o. male patient of Dr. Tafoya who presents today for ear fullness x 11 days.  He is new to me.  Pt was traveling last weekend and when his flight began to descend, he attempted to "pop" his ears and was unable to.  Since then, he has experienced muffled hearing accompanied by nasal congestion, sinus pain, and watery eyes.  Pt attempted to treat himself with ear drops, castor oil and also took claritin, but none of these helped.  Pt denies any fever, chills, SOB, chest pain, palpitations, wheezing, ringing in the ears, pain in ears.  Current pain level 0/10.      Health Maintenance   Topic Date Due    Shingles Vaccine (2 of 3) 02/10/2016    Hemoglobin A1c  10/14/2023    Eye Exam  10/06/2023 (Originally 8/3/2022)    PROSTATE-SPECIFIC ANTIGEN  07/14/2024    Lipid Panel  07/14/2024    Low Dose Statin  07/27/2024    Foot Exam  08/17/2024    TETANUS VACCINE  10/07/2025    Colorectal Cancer Screening  02/18/2027    Hepatitis C Screening  Completed    Abdominal Aortic Aneurysm Screening  Completed       Past Medical History:   Diagnosis Date    Adenomatous colon polyp 02/2022    Repeat due 2/2027 per Dr KRIS Willett    Allergy     Anxiety     Arthritis     Cataract     Colon polyps 2009    Diabetes mellitus     Diabetes mellitus type II     Fatty liver, alcoholic     s/p Hepatology Eval/2019    GERD (gastroesophageal reflux disease)     History of alcohol abuse     Hyperlipidemia     Hypertension     Neuromuscular disorder     Psoriasis     Base of scalp/behind ears       Past Surgical History:   Procedure Laterality Date    ANKLE FUSION      left    CARPAL TUNNEL RELEASE      left    COLONOSCOPY      COLONOSCOPY N/A 2/18/2022    Procedure: COLONOSCOPY;  Surgeon: NADER Willett MD;  [facility]  " Service: Endoscopy;  Laterality: N/A;  fully vacc-inst mail-tb.Instructions emailed per [facility] Covid test on 2/15 at [email or web address]    FRACTURE SURGERY      KNEE SURGERY      Uvalectomy      VASECTOMY         family history includes Aneurysm in his father; Diabetes in his brother, father, and mother; Hypertension in his father.     Social History     Tobacco Use    Smoking status: Former    Smokeless tobacco: Never   Substance Use Topics    Alcohol use: Yes     Comment: socially    Drug use: No       Review of Systems   Constitutional:  Negative for chills and fever.   HENT:  Positive for congestion and sinus pain. Negative for ear discharge, ear pain and tinnitus.         Muffled hearing and ear discomfort     Respiratory: Negative.     Cardiovascular: Negative.    Musculoskeletal: Negative.    Skin:  Negative for itching and rash.   Neurological: Negative.         Outpatient Encounter Medications as of 7/27/2023   Medication Sig Note Dispense Refill    amLODIPine (NORVASC) 10 MG tablet Take 1 tablet (10 mg total) by mouth once daily.  90 tablet 0    ammonium lactate 12 % Crea APPLY 1 GRAM TOPICALLY DAILY  280 g 1    atorvastatin (LIPITOR) 80 MG tablet Take 1 tablet (80 mg total) by mouth once daily.  90 tablet 2    azithromycin (Z-AUDI) 250 MG tablet Two today then one daily thereafter  6 tablet 0    azithromycin (Z-AUDI) 250 MG tablet Two today then one daily thereafter  6 tablet 0    blood sugar diagnostic Strp 1 strip by Misc.(Non-Drug; Combo Route) route 2 (two) times daily before meals.  200 strip 3    blood sugar diagnostic Strp 1 strip by Misc.(Non-Drug; Combo Route) route 2 (two) times daily before meals. Check glucose 2-3 days/week 2x/day before before meals  100 strip 6    blood-glucose meter kit Check glucose 2-3 days/week 2x/day before before meals  1 each 0    busPIRone (BUSPAR) 5 MG Tab Take 1 tablet (5 mg total) by mouth 2 (two) times daily.  60 tablet 0    ciclopirox (PENLAC) 8 % Soln Apply topically  "nightly. Remove with nail polish remover once a week  6.6 mL 2    ergocalciferol (ERGOCALCIFEROL) 50,000 unit Cap Take 1 capsule (50,000 Units total) by mouth every 7 days.  4 capsule 6    fenofibrate 160 MG Tab Take 1 tablet (160 mg total) by mouth once daily.  90 tablet 0    fluocinolone and shower cap 0.01 % Oil Use hs for rash on scalp  118.28 mL 3    ibuprofen (ADVIL,MOTRIN) 200 MG tablet Take 200 mg by mouth as needed for Pain.  8/24/2017: Hold 1 week pre op      insulin (LANTUS SOLOSTAR U-100 INSULIN) glargine 100 units/mL SubQ pen Inject 40 units BID  18 mL 6    insulin lispro (HUMALOG KWIKPEN INSULIN) 100 unit/mL pen INJECT SUBCUTANEOUSLY 35  UNITS WITH BREAKFAST AND  LUNCH AND 35 UNITS WITH  DINNER 3/3/2021: Taking 40 units in 40 units PM 75 mL 1    lancets (ONETOUCH ULTRASOFT LANCETS) Misc TEST four times a day before meals  150 each 6    lancets Misc 1 lancet by Misc.(Non-Drug; Combo Route) route 2 (two) times daily before meals. Check glucose 2-3 days/week 2x/day before before meals  100 each 6    linagliptin-metformin (JENTADUETO) 2.5-1,000 mg Tab Take 1 tablet by mouth 2 (two) times daily with meals.  60 tablet 4    mupirocin (BACTROBAN) 2 % ointment Use qd  30 g 3    NOVOLOG FLEXPEN U-100 INSULIN 100 unit/mL (3 mL) InPn pen INJECT 45 UNITS INTO THE SKIN BID with major meals  75 mL 4    omeprazole (PRILOSEC) 20 MG capsule TAKE 1 CAPSULE(20 MG) BY MOUTH EVERY DAY FOR GERD  90 capsule 0    ONETOUCH ULTRA BLUE TEST STRIP Strp TEST FOUR TIMES DAILY  400 strip 1    pen needle, diabetic (BD ULTRA-FINE ADRIEN PEN NEEDLE) 32 gauge x 5/32" Ndle use four times a day  400 each 4    pen needle, diabetic (BD ULTRA-FINE SHORT PEN NEEDLE) 31 gauge x 5/16" Ndle USE EVERY DAY  300 each 0    valsartan (DIOVAN) 320 MG tablet Take 1 tablet (320 mg total) by mouth once daily.  90 tablet 0    [DISCONTINUED] metoprolol succinate (TOPROL-XL) 100 MG 24 hr tablet Take 1 tablet (100 mg total) by mouth once daily.  90 tablet 0    " "[] amoxicillin-clavulanate 875-125mg (AUGMENTIN) 875-125 mg per tablet Take 1 tablet by mouth 2 (two) times daily. for 10 days  20 tablet 0     No facility-administered encounter medications on file as of 2023.       Review of patient's allergies indicates:  No Known Allergies    Physical Exam      Vital Signs  Pulse: 95  SpO2: 97 %  BP: (!) 149/72  BP Location: Right arm  Patient Position: Sitting  Pain Score: 0-No pain  Height and Weight  Height: 5' 7" (170.2 cm)  Weight: 99.9 kg (220 lb 3.8 oz)  BSA (Calculated - sq m): 2.17 sq meters  BMI (Calculated): 34.5  Weight in (lb) to have BMI = 25: 159.3    Physical Exam  Constitutional:       Appearance: Normal appearance.   HENT:      Head: Normocephalic and atraumatic.      Right Ear: Hearing, tympanic membrane, ear canal and external ear normal.      Left Ear: Ear canal and external ear normal. Decreased hearing noted. Tympanic membrane has decreased mobility.      Nose: Nose normal.      Mouth/Throat:      Lips: Pink.      Mouth: Mucous membranes are moist.   Neurological:      Mental Status: He is alert.          Laboratory:  CBC:  Lab Results   Component Value Date    WBC 6.60 2023    RBC 4.34 (L) 2023    HGB 13.7 (L) 2023    HCT 42.2 2023     2023    MCV 97 2023    MCH 31.6 (H) 2023    MCHC 32.5 2023    MCHC 32.2 2022    MCHC 33.5 2021     CMP:  Lab Results   Component Value Date     (H) 2023    CALCIUM 10.6 (H) 2023    ALBUMIN 4.3 2023    PROT 8.4 2023     2023    K 5.5 (H) 2023    CO2 25 2023     2023    BUN 23 2023    ALKPHOS 151 (H) 2023    ALT 39 2023    AST 87 (H) 2023    BILITOT 0.7 2023    BILITOT 0.5 2022    BILITOT 0.5 2021     URINALYSIS:  No results found for: "COLORU", "CLARITYU", "SPECGRAV", "PHUR", "PROTEINUA", "GLUCOSEU", "BILIRUBINCON", "BLOODU", "WBCU", " ""RBCU", "BACTERIA", "MUCUS", "NITRITE", "LEUKOCYTESUR", "UROBILINOGEN", "HYALINECASTS"   LIPIDS:  Lab Results   Component Value Date    TSH 2.094 07/14/2023    TSH 2.044 07/08/2022    TSH 1.510 02/24/2021    HDL 34 (L) 07/14/2023    HDL 41 07/08/2022    HDL 34 (L) 08/13/2021    CHOL 169 07/14/2023    CHOL 151 07/08/2022    CHOL 199 08/13/2021    TRIG 453 (H) 07/14/2023    TRIG 264 (H) 07/08/2022    TRIG 977 (H) 08/13/2021    LDLCALC Invalid, Trig>400.0 07/14/2023    LDLCALC 57.2 (L) 07/08/2022    LDLCALC Invalid, Trig>400.0 08/13/2021    CHOLHDL 20.1 07/14/2023    CHOLHDL 27.2 07/08/2022    CHOLHDL 17.1 (L) 08/13/2021    NONHDLCHOL 135 07/14/2023    NONHDLCHOL 110 07/08/2022    NONHDLCHOL 165 08/13/2021    TOTALCHOLEST 5.0 07/14/2023    TOTALCHOLEST 3.7 07/08/2022    TOTALCHOLEST 5.9 (H) 08/13/2021     TSH:  Lab Results   Component Value Date    TSH 2.094 07/14/2023    TSH 2.044 07/08/2022    TSH 1.510 02/24/2021     A1C:  Lab Results   Component Value Date    HGBA1C 10.4 (H) 07/14/2023    HGBA1C 8.6 (H) 07/08/2022    HGBA1C 10.5 (H) 08/13/2021    HGBA1C 9.4 (H) 02/24/2021    HGBA1C 9.6 (H) 11/13/2019    HGBA1C 9.1 (H) 03/26/2019    HGBA1C 9.0 (H) 08/17/2018    HGBA1C 9.6 (H) 08/24/2017    HGBA1C 9.0 (H) 04/12/2017    HGBA1C 8.4 (H) 04/13/2016    HGBA1C 9.4 (H) 01/20/2016    HGBA1C 9.4 (H) 01/20/2016         Assessment/Plan     Dallas Garcia is a 67 y.o.male with:    Acute mucoid otitis media of left ear  -     amoxicillin-clavulanate 875-125mg (AUGMENTIN) 875-125 mg per tablet; Take 1 tablet by mouth 2 (two) times daily. for 10 days  Dispense: 20 tablet; Refill: 0     Symptomatic treatment:  PLAIN mucinex 1200 mg twice a day  Tylenol/Motrin for pain/fever  Flonase for nasal passages  Zyrtec or Xyzal daily  Drink plenty fluids   I spent 30 minutes on the day of this encounter for preparing for, evaluating, treating, and managing this patient.      -Continue current medications and maintain follow up with " specialists.  Return to clinic in Follow up in about 3 days (around 7/30/2023).        [facility] [place]

## 2023-08-03 ENCOUNTER — PATIENT OUTREACH (OUTPATIENT)
Dept: ADMINISTRATIVE | Facility: HOSPITAL | Age: 68
End: 2023-08-03
Payer: MEDICARE

## 2023-08-03 NOTE — PROGRESS NOTES
Health Maintenance Due   Topic Date Due    Shingles Vaccine (2 of 3) 02/10/2016    Pneumococcal Vaccines (Age 65+) (3 - PPSV23 or PCV20) 01/14/2022    COVID-19 Vaccine (4 - Additional dose for Anila series) 03/16/2023     Chart reviewed.   Immunizations: Reconciled  Orders placed: N/A  Upcoming appts to satisfy JOHNY topics: Optometry 9/28/2023

## 2023-08-09 ENCOUNTER — PATIENT MESSAGE (OUTPATIENT)
Dept: PRIMARY CARE CLINIC | Facility: CLINIC | Age: 68
End: 2023-08-09
Payer: MEDICARE

## 2023-08-17 ENCOUNTER — OFFICE VISIT (OUTPATIENT)
Dept: INTERNAL MEDICINE | Facility: CLINIC | Age: 68
End: 2023-08-17
Payer: MEDICARE

## 2023-08-17 VITALS
SYSTOLIC BLOOD PRESSURE: 130 MMHG | BODY MASS INDEX: 33.74 KG/M2 | HEIGHT: 67 IN | OXYGEN SATURATION: 99 % | HEART RATE: 90 BPM | DIASTOLIC BLOOD PRESSURE: 72 MMHG | WEIGHT: 215 LBS

## 2023-08-17 DIAGNOSIS — I10 ESSENTIAL HYPERTENSION: ICD-10-CM

## 2023-08-17 DIAGNOSIS — F41.8 MIXED ANXIETY AND DEPRESSIVE DISORDER: ICD-10-CM

## 2023-08-17 DIAGNOSIS — F10.10 ALCOHOL ABUSE: ICD-10-CM

## 2023-08-17 DIAGNOSIS — N18.31 STAGE 3A CHRONIC KIDNEY DISEASE: ICD-10-CM

## 2023-08-17 DIAGNOSIS — R80.9 TYPE 2 DIABETES MELLITUS WITH MICROALBUMINURIA, WITH LONG-TERM CURRENT USE OF INSULIN: Primary | ICD-10-CM

## 2023-08-17 DIAGNOSIS — R80.9 MICROALBUMINURIA DUE TO TYPE 2 DIABETES MELLITUS: ICD-10-CM

## 2023-08-17 DIAGNOSIS — H91.90 HEARING LOSS, UNSPECIFIED HEARING LOSS TYPE, UNSPECIFIED LATERALITY: ICD-10-CM

## 2023-08-17 DIAGNOSIS — D12.6 ADENOMATOUS POLYP OF COLON, UNSPECIFIED PART OF COLON: ICD-10-CM

## 2023-08-17 DIAGNOSIS — E11.29 TYPE 2 DIABETES MELLITUS WITH MICROALBUMINURIA, WITH LONG-TERM CURRENT USE OF INSULIN: Primary | ICD-10-CM

## 2023-08-17 DIAGNOSIS — Z79.4 TYPE 2 DIABETES MELLITUS WITH MICROALBUMINURIA, WITH LONG-TERM CURRENT USE OF INSULIN: Primary | ICD-10-CM

## 2023-08-17 DIAGNOSIS — E11.29 MICROALBUMINURIA DUE TO TYPE 2 DIABETES MELLITUS: ICD-10-CM

## 2023-08-17 DIAGNOSIS — K70.0 FATTY LIVER, ALCOHOLIC: ICD-10-CM

## 2023-08-17 DIAGNOSIS — H93.8X2 EAR CONGESTION, LEFT: ICD-10-CM

## 2023-08-17 DIAGNOSIS — E78.2 MIXED HYPERLIPIDEMIA: ICD-10-CM

## 2023-08-17 PROCEDURE — 1159F PR MEDICATION LIST DOCUMENTED IN MEDICAL RECORD: ICD-10-PCS | Mod: CPTII,S$GLB,, | Performed by: INTERNAL MEDICINE

## 2023-08-17 PROCEDURE — 3060F POS MICROALBUMINURIA REV: CPT | Mod: CPTII,S$GLB,, | Performed by: INTERNAL MEDICINE

## 2023-08-17 PROCEDURE — 1159F MED LIST DOCD IN RCRD: CPT | Mod: CPTII,S$GLB,, | Performed by: INTERNAL MEDICINE

## 2023-08-17 PROCEDURE — 4010F ACE/ARB THERAPY RXD/TAKEN: CPT | Mod: CPTII,S$GLB,, | Performed by: INTERNAL MEDICINE

## 2023-08-17 PROCEDURE — 99999 PR PBB SHADOW E&M-EST. PATIENT-LVL V: CPT | Mod: PBBFAC,,, | Performed by: INTERNAL MEDICINE

## 2023-08-17 PROCEDURE — 1125F AMNT PAIN NOTED PAIN PRSNT: CPT | Mod: CPTII,S$GLB,, | Performed by: INTERNAL MEDICINE

## 2023-08-17 PROCEDURE — 3008F BODY MASS INDEX DOCD: CPT | Mod: CPTII,S$GLB,, | Performed by: INTERNAL MEDICINE

## 2023-08-17 PROCEDURE — 3075F SYST BP GE 130 - 139MM HG: CPT | Mod: CPTII,S$GLB,, | Performed by: INTERNAL MEDICINE

## 2023-08-17 PROCEDURE — 1101F PR PT FALLS ASSESS DOC 0-1 FALLS W/OUT INJ PAST YR: ICD-10-PCS | Mod: CPTII,S$GLB,, | Performed by: INTERNAL MEDICINE

## 2023-08-17 PROCEDURE — 3075F PR MOST RECENT SYSTOLIC BLOOD PRESS GE 130-139MM HG: ICD-10-PCS | Mod: CPTII,S$GLB,, | Performed by: INTERNAL MEDICINE

## 2023-08-17 PROCEDURE — 99215 PR OFFICE/OUTPT VISIT, EST, LEVL V, 40-54 MIN: ICD-10-PCS | Mod: S$GLB,,, | Performed by: INTERNAL MEDICINE

## 2023-08-17 PROCEDURE — 1101F PT FALLS ASSESS-DOCD LE1/YR: CPT | Mod: CPTII,S$GLB,, | Performed by: INTERNAL MEDICINE

## 2023-08-17 PROCEDURE — 99215 OFFICE O/P EST HI 40 MIN: CPT | Mod: S$GLB,,, | Performed by: INTERNAL MEDICINE

## 2023-08-17 PROCEDURE — 3060F PR POS MICROALBUMINURIA RESULT DOCUMENTED/REVIEW: ICD-10-PCS | Mod: CPTII,S$GLB,, | Performed by: INTERNAL MEDICINE

## 2023-08-17 PROCEDURE — 3078F PR MOST RECENT DIASTOLIC BLOOD PRESSURE < 80 MM HG: ICD-10-PCS | Mod: CPTII,S$GLB,, | Performed by: INTERNAL MEDICINE

## 2023-08-17 PROCEDURE — 3078F DIAST BP <80 MM HG: CPT | Mod: CPTII,S$GLB,, | Performed by: INTERNAL MEDICINE

## 2023-08-17 PROCEDURE — 1125F PR PAIN SEVERITY QUANTIFIED, PAIN PRESENT: ICD-10-PCS | Mod: CPTII,S$GLB,, | Performed by: INTERNAL MEDICINE

## 2023-08-17 PROCEDURE — 3046F PR MOST RECENT HEMOGLOBIN A1C LEVEL > 9.0%: ICD-10-PCS | Mod: CPTII,S$GLB,, | Performed by: INTERNAL MEDICINE

## 2023-08-17 PROCEDURE — 3046F HEMOGLOBIN A1C LEVEL >9.0%: CPT | Mod: CPTII,S$GLB,, | Performed by: INTERNAL MEDICINE

## 2023-08-17 PROCEDURE — 99999 PR PBB SHADOW E&M-EST. PATIENT-LVL V: ICD-10-PCS | Mod: PBBFAC,,, | Performed by: INTERNAL MEDICINE

## 2023-08-17 PROCEDURE — 3288F PR FALLS RISK ASSESSMENT DOCUMENTED: ICD-10-PCS | Mod: CPTII,S$GLB,, | Performed by: INTERNAL MEDICINE

## 2023-08-17 PROCEDURE — 3288F FALL RISK ASSESSMENT DOCD: CPT | Mod: CPTII,S$GLB,, | Performed by: INTERNAL MEDICINE

## 2023-08-17 PROCEDURE — 3008F PR BODY MASS INDEX (BMI) DOCUMENTED: ICD-10-PCS | Mod: CPTII,S$GLB,, | Performed by: INTERNAL MEDICINE

## 2023-08-17 PROCEDURE — 4010F PR ACE/ARB THEARPY RXD/TAKEN: ICD-10-PCS | Mod: CPTII,S$GLB,, | Performed by: INTERNAL MEDICINE

## 2023-08-17 PROCEDURE — 3066F PR DOCUMENTATION OF TREATMENT FOR NEPHROPATHY: ICD-10-PCS | Mod: CPTII,S$GLB,, | Performed by: INTERNAL MEDICINE

## 2023-08-17 PROCEDURE — 3066F NEPHROPATHY DOC TX: CPT | Mod: CPTII,S$GLB,, | Performed by: INTERNAL MEDICINE

## 2023-08-17 RX ORDER — SEMAGLUTIDE 0.68 MG/ML
0.5 INJECTION, SOLUTION SUBCUTANEOUS
Qty: 1 EACH | Refills: 6 | Status: SHIPPED | OUTPATIENT
Start: 2023-08-17 | End: 2024-03-19 | Stop reason: SDUPTHER

## 2023-08-17 NOTE — PATIENT INSTRUCTIONS
Immunizations:  RSV(Respiratory S. Virus)-Get now at [facility]    For Kidneys:  No NSAIDS(Advil, Motrin, Ibuprofen, Aleve...)  Tylenol is effective and safe

## 2023-08-17 NOTE — PROGRESS NOTES
"Subjective:       Patient ID: Dallas Garcia is a 67 y.o. male.    Chief Complaint:   Diabetes and Hyperlipidemia    HPI: Mr Garcia presents for past due follow up the above  He is s/p a recent bout of depression: lost a couple close friends to illness( MI and Prostate Ca), and he is having "empty nest" syndrome: Helen,his eldest child, is away at college and Mary is starting her Senior year at [employer]  He is trying to get back to activity-he has rejoined [employer]  He c/o left ear congestion/pressure, which started mid July after taking a flight. He doesn't feel his hearing is normal.  He has no pain, no cough or URI symptoms  DM: Med Tx 1-Jentadueto 2.5/1000mg BID, 2- Novolog 45 units in AM/Breakfast and 45 units in PM/Dinner3-Lantus 45 units BID         Accuchecks: 150-250         Diet Diary/Notes: He has been drinking more Etoh recently with the depression which he feels is finally getting better  HLD: Med Tx 1- Fenofibrate 160mg QD, 2-Lipitor 80mg QD   HTN: Med Tx 1- Diovan 320mg QD, 2-Toprol XL 100mg QD, 3-Norvasc 10mg QD    Past Medical, Surgical, Social History: Please see as stated in Epic chart which has been reviewed.    Current Outpatient Medications   Medication Sig Dispense Refill    amLODIPine (NORVASC) 10 MG tablet Take 1 tablet (10 mg total) by mouth once daily. 90 tablet 0    ammonium lactate 12 % Crea APPLY 1 GRAM TOPICALLY DAILY 280 g 1    atorvastatin (LIPITOR) 80 MG tablet Take 1 tablet (80 mg total) by mouth once daily. 90 tablet 2    azithromycin (Z-AUDI) 250 MG tablet Two today then one daily thereafter 6 tablet 0    azithromycin (Z-AUDI) 250 MG tablet Two today then one daily thereafter 6 tablet 0    blood sugar diagnostic Strp 1 strip by Misc.(Non-Drug; Combo Route) route 2 (two) times daily before meals. 200 strip 3    blood sugar diagnostic Strp 1 strip by Misc.(Non-Drug; Combo Route) route 2 (two) times daily before meals. Check glucose 2-3 days/week " "2x/day before before meals 100 strip 6    blood-glucose meter kit Check glucose 2-3 days/week 2x/day before before meals 1 each 0    busPIRone (BUSPAR) 5 MG Tab Take 1 tablet (5 mg total) by mouth 2 (two) times daily. 60 tablet 0    ciclopirox (PENLAC) 8 % Soln Apply topically nightly. Remove with nail polish remover once a week 6.6 mL 2    ergocalciferol (ERGOCALCIFEROL) 50,000 unit Cap Take 1 capsule (50,000 Units total) by mouth every 7 days. 4 capsule 6    fenofibrate 160 MG Tab Take 1 tablet (160 mg total) by mouth once daily. 90 tablet 0    fluocinolone and shower cap 0.01 % Oil Use hs for rash on scalp 118.28 mL 3    ibuprofen (ADVIL,MOTRIN) 200 MG tablet Take 200 mg by mouth as needed for Pain.       insulin (LANTUS SOLOSTAR U-100 INSULIN) glargine 100 units/mL SubQ pen Inject 40 units BID 18 mL 6    insulin lispro (HUMALOG KWIKPEN INSULIN) 100 unit/mL pen INJECT SUBCUTANEOUSLY 35  UNITS WITH BREAKFAST AND  LUNCH AND 35 UNITS WITH  DINNER 75 mL 1    lancets (ONETOUCH ULTRASOFT LANCETS) Misc TEST four times a day before meals 150 each 6    lancets Misc 1 lancet by Misc.(Non-Drug; Combo Route) route 2 (two) times daily before meals. Check glucose 2-3 days/week 2x/day before before meals 100 each 6    linagliptin-metformin (JENTADUETO) 2.5-1,000 mg Tab Take 1 tablet by mouth 2 (two) times daily with meals. 60 tablet 4    metoprolol succinate (TOPROL-XL) 100 MG 24 hr tablet Take 1 tablet (100 mg total) by mouth once daily. 90 tablet 0    mupirocin (BACTROBAN) 2 % ointment Use qd 30 g 3    NOVOLOG FLEXPEN U-100 INSULIN 100 unit/mL (3 mL) InPn pen INJECT 45 UNITS INTO THE SKIN BID with major meals 75 mL 4    omeprazole (PRILOSEC) 20 MG capsule TAKE 1 CAPSULE(20 MG) BY MOUTH EVERY DAY FOR GERD 90 capsule 0    ONETOUCH ULTRA BLUE TEST STRIP Strp TEST FOUR TIMES DAILY 400 strip 1    pen needle, diabetic (BD ULTRA-FINE ADRIEN PEN NEEDLE) 32 gauge x 5/32" Ndle use four times a day 400 each 4    pen needle, diabetic (BD " "ULTRA-FINE SHORT PEN NEEDLE) 31 gauge x 5/16" Ndle USE EVERY  each 0    semaglutide (OZEMPIC) 0.25 mg or 0.5 mg (2 mg/3 mL) pen injector Inject 0.5 mg into the skin every 7 days. 1 each 6    valsartan (DIOVAN) 320 MG tablet Take 1 tablet (320 mg total) by mouth once daily. 90 tablet 0     No current facility-administered medications for this visit.       Review of Systems   Respiratory: Negative.     Cardiovascular: Negative.    Genitourinary: Negative.    Psychiatric/Behavioral:  Positive for dysphoric mood.        Objective:      Lab Results   Component Value Date    WBC 6.60 07/14/2023    HGB 13.7 (L) 07/14/2023    HCT 42.2 07/14/2023     07/14/2023    CHOL 169 07/14/2023    TRIG 453 (H) 07/14/2023    HDL 34 (L) 07/14/2023    ALT 39 07/14/2023    AST 87 (H) 07/14/2023     07/14/2023    K 5.5 (H) 07/14/2023     07/14/2023    CREATININE 1.5 (H) 07/14/2023    BUN 23 07/14/2023    CO2 25 07/14/2023    TSH 2.094 07/14/2023    PSA 0.22 07/14/2023    INR 1.1 05/22/2019    HGBA1C 10.4 (H) 07/14/2023     Physical Exam  Vitals reviewed.   Constitutional:       Appearance: Normal appearance. He is obese.   HENT:      Head: Normocephalic and atraumatic.      Right Ear: Ear canal and external ear normal.      Left Ear: Ear canal and external ear normal.      Ears:      Comments: +Bilateral TMs show scarring/otosclerosis  Cardiovascular:      Rate and Rhythm: Normal rate and regular rhythm.      Heart sounds: Normal heart sounds.   Pulmonary:      Breath sounds: Normal breath sounds.   Abdominal:      General: Bowel sounds are normal.      Palpations: Abdomen is soft. There is no mass.      Tenderness: There is no abdominal tenderness.   Musculoskeletal:      Cervical back: Normal range of motion and neck supple.      Right lower leg: Edema present.      Left lower leg: Edema present.      Comments: 1+ Pre-pedal edema   Lymphadenopathy:      Cervical: No cervical adenopathy.   Skin:     Findings: Rash " "present.      Comments: +erythematous patch of scaly discoloration over right forehead area   Neurological:      Mental Status: He is alert.   Psychiatric:         Mood and Affect: Mood normal.           Vital Signs  Pulse: 90  SpO2: 99 %  BP: 130/72  Pain Score:   4  Pain Loc: Ear  Height and Weight  Height: 5' 7" (170.2 cm)  Weight: 97.5 kg (215 lb)  BSA (Calculated - sq m): 2.15 sq meters  BMI (Calculated): 33.7  Weight in (lb) to have BMI = 25: 159.3    Protective Sensation (w/ 10 gram monofilament):  Right: Decreased  Left: Decreased    Visual Inspection:  Normal -  Bilateral    Pedal Pulses:   Right: Present  Left: Present    Posterior Tibialis Pulses:   Right:Present  Left: Present     Assessment:       1. Type 2 diabetes mellitus with microalbuminuria, with long-term current use of insulin    2. Essential hypertension    3. Stage 3a chronic kidney disease    4. Microalbuminuria due to type 2 diabetes mellitus    5. Mixed hyperlipidemia    6. Mixed anxiety and depressive disorder    7. Alcohol abuse    8. Ear congestion, left    9. Hearing loss, unspecified hearing loss type, unspecified laterality    10. Fatty liver, alcoholic    11. Adenomatous polyp of colon, unspecified part of colon        Plan:     Health Maintenance   Topic Date Due    Shingles Vaccine (2 of 3) 02/10/2016    Eye Exam  10/06/2023 (Originally 8/3/2022)    Hemoglobin A1c  10/14/2023    PROSTATE-SPECIFIC ANTIGEN  07/14/2024    Lipid Panel  07/14/2024    Low Dose Statin  07/27/2024    Foot Exam  08/17/2024    TETANUS VACCINE  10/07/2025    Colorectal Cancer Screening  02/18/2027    Hepatitis C Screening  Completed    Abdominal Aortic Aneurysm Screening  Completed        Dallas was seen today for diabetes and hyperlipidemia.    Diagnoses and all orders for this visit:    Type 2 diabetes mellitus with microalbuminuria, with long-term current use of insulin/uncontrolled  -     Start semaglutide (OZEMPIC) 0.25 mg or 0.5 mg (2 mg/3 mL) pen " injector; Inject 0.5 mg into the skin every 7 days.  NOTE: Ozempic and side effect profile as well as contraindications discussed,none of which pt has  -     Continue:  Med Tx 1-Jentadueto 2.5/1000mg BID, 2- Novolog 45 units in AM/Breakfast and 45 units in PM/Dinner3-Lantus 45 units BID  -     Comprehensive Metabolic Panel; Future  -     Hemoglobin A1C; Future    Essential hypertension/controlled         -     Continue: : Med Tx 1- Diovan 320mg QD, 2-Toprol XL 100mg QD, 3-Norvasc 10mg QD    Stage 3a chronic kidney disease        -     No NSAIDS/Increase intake of water  -     Comprehensive Metabolic Panel; Future  -     Lipid Panel; Future    Microalbuminuria due to Type 2DM/Pt on Maximum does ARB        -     Pt to do better with Diabetic control/Recheck Spot Urine x 6-12 months    Mixed hyperlipidemia/controlled        -     Continue: : Med Tx 1- Fenofibrate 160mg QD, 2-Lipitor 80mg QD     Mixed anxiety and depressive disorder        -     Continue Buspar 5mg BID prn     Alcohol abuse        -     'Have encouraged pt to  decrease his intake of Etoh    Ear congestion, left  -     Ambulatory referral/consult to ENT; Future    Hearing loss, unspecified hearing loss type, unspecified laterality  -     Ambulatory referral/consult to ENT; Future  -     Audiogram (air & bone); Future    Fatty liver, alcoholic  -     US Abdomen Complete; Future  -     Decrease intake of Etoh    Adenomatous polyp of colon, unspecified part of colon/s/p Colonoscopy 2/22         -    Repeat Colonoscopy 2/2027    Health Maintenance         -    RTC x 5 months with 1 week  prior fasting lab

## 2023-08-22 ENCOUNTER — PATIENT MESSAGE (OUTPATIENT)
Dept: INTERNAL MEDICINE | Facility: CLINIC | Age: 68
End: 2023-08-22
Payer: MEDICARE

## 2023-09-12 DIAGNOSIS — I10 ESSENTIAL HYPERTENSION: ICD-10-CM

## 2023-09-12 RX ORDER — METOPROLOL SUCCINATE 100 MG/1
100 TABLET, EXTENDED RELEASE ORAL DAILY
Qty: 90 TABLET | Refills: 2 | Status: SHIPPED | OUTPATIENT
Start: 2023-09-12

## 2023-09-12 NOTE — TELEPHONE ENCOUNTER
----- Message from Juan Louise sent at 9/12/2023 11:34 AM CDT -----  Contact: [phone] @ patient  Requesting an RX refill or new RX.metoprolol succinate (TOPROL-XL) 100 MG 24 hr tablet  Is this a refill or new RX: refill  RX name and strength (copy/paste from chart):    Is this a 30 day or 90 day RX:   Pharm[facility] and phone #   [facility] [address] [place], [place] [id number] [facility] AT [facility]  The doctors have asked that we provide their patients with the following 2 reminders -- prescription refills can take up to 72 hours, and a friendly reminder that in the future you can use your [facility] account to request refills:     Requesting an RX refill or new RX.busPIRone (BUSPAR) 5 MG Tab  Is this a refill or new RX: refill  RX name and strength (copy/paste from chart):    Is this a 30 day or 90 day RX:   Pharmacy name and phone #  [facility] [address] [place] [address] [facility] AT [facility]  The doctors have asked that we provide their patients with the following 2 reminders -- prescription refills can take up to 72 hours, and a friendly reminder that in the future you can use your [facility] account to request refills:

## 2023-09-12 NOTE — TELEPHONE ENCOUNTER
No care due was identified.  [facility] Embedded Care Due Messages. Reference number: [id number].   9/12/2023 11:56:26 AM CDT

## 2023-09-24 DIAGNOSIS — E55.9 VITAMIN D DEFICIENCY: ICD-10-CM

## 2023-09-27 RX ORDER — ERGOCALCIFEROL 1.25 MG/1
50000 CAPSULE ORAL
Qty: 4 CAPSULE | Refills: 6 | Status: SHIPPED | OUTPATIENT
Start: 2023-09-27

## 2023-09-28 ENCOUNTER — OFFICE VISIT (OUTPATIENT)
Dept: OPTOMETRY | Facility: CLINIC | Age: 68
End: 2023-09-28
Payer: COMMERCIAL

## 2023-09-28 DIAGNOSIS — I10 HTN (HYPERTENSION), BENIGN: ICD-10-CM

## 2023-09-28 DIAGNOSIS — H52.4 PRESBYOPIA: Primary | ICD-10-CM

## 2023-09-28 DIAGNOSIS — H25.13 NUCLEAR SCLEROSIS OF BOTH EYES: ICD-10-CM

## 2023-09-28 DIAGNOSIS — E11.65 UNCONTROLLED TYPE 2 DIABETES MELLITUS WITH HYPERGLYCEMIA: ICD-10-CM

## 2023-09-28 DIAGNOSIS — E11.9 TYPE 2 DIABETES MELLITUS WITHOUT OPHTHALMIC MANIFESTATIONS: ICD-10-CM

## 2023-09-28 DIAGNOSIS — G47.30 SLEEP APNEA, UNSPECIFIED TYPE: ICD-10-CM

## 2023-09-28 PROCEDURE — 92014 PR EYE EXAM, EST PATIENT,COMPREHESV: ICD-10-PCS | Mod: S$GLB,,, | Performed by: OPTOMETRIST

## 2023-09-28 PROCEDURE — 92015 PR REFRACTION: ICD-10-PCS | Mod: S$GLB,,, | Performed by: OPTOMETRIST

## 2023-09-28 PROCEDURE — 92014 COMPRE OPH EXAM EST PT 1/>: CPT | Mod: S$GLB,,, | Performed by: OPTOMETRIST

## 2023-09-28 PROCEDURE — 99999 PR PBB SHADOW E&M-EST. PATIENT-LVL III: CPT | Mod: PBBFAC,,, | Performed by: OPTOMETRIST

## 2023-09-28 PROCEDURE — 99999 PR PBB SHADOW E&M-EST. PATIENT-LVL III: ICD-10-PCS | Mod: PBBFAC,,, | Performed by: OPTOMETRIST

## 2023-09-28 PROCEDURE — 92015 DETERMINE REFRACTIVE STATE: CPT | Mod: S$GLB,,, | Performed by: OPTOMETRIST

## 2023-09-28 NOTE — PROGRESS NOTES
HPI    DLS: 8/3/21    No eyedrops  No eye surgery     Pt here for diabetic eye exam. Pt states occasional itching, tearing or   burning OU. Pt denies flashes, floaters, headaches or eye pain OU.      Hemoglobin A1C (%)       Date                     Value                 07/14/2023               10.4 (H)              07/08/2022               8.6 (H)               08/13/2021               10.5 (H)         ----------      Last edited by Heidy Lino MA on 9/28/2023  2:26 PM.            Assessment /Plan     For exam results, see Encounter Report.    Presbyopia   OK to continue with OTC readers    HTN (hypertension), benign  Uncontrolled type 2 diabetes mellitus with hyperglycemia  Sleep apnea, unspecified type  Type 2 diabetes mellitus without ophthalmic manifestations   No retinopathy, monitor yearly    Nuclear sclerosis of both eyes   Mild, not visually significant    RTC 1 year, sooner PRN

## 2023-10-04 NOTE — TELEPHONE ENCOUNTER
No care due was identified.  Health [facility] Embedded Care Due Messages. Reference number: [id number].   10/04/2023 11:54:41 AM CDT

## 2023-10-05 RX ORDER — BUSPIRONE HYDROCHLORIDE 5 MG/1
TABLET ORAL
Qty: 60 TABLET | Refills: 0 | Status: SHIPPED | OUTPATIENT
Start: 2023-10-05 | End: 2023-11-12 | Stop reason: SDUPTHER

## 2023-10-17 DIAGNOSIS — K21.9 GASTROESOPHAGEAL REFLUX DISEASE: ICD-10-CM

## 2023-10-17 NOTE — TELEPHONE ENCOUNTER
Care Due:                  Date            Visit Type   Department     Provider  --------------------------------------------------------------------------------                                EP -                              PRIMARY      [facility] PRIMARY  Last Visit: 08-      CARE ([facility])   CARE           Lisette Corado                               -                              PRIMARY      [facility] INTERNAL  Next Visit: 01-      CARE ([facility])   MEDICINE       Lisette Corado                                                            Last  Test          Frequency    Reason                     Performed    Due Date  --------------------------------------------------------------------------------    HBA1C.......  6 months...  NOVOLOG, insulin,          07-   01-                             linagliptin-metformin,                             semaglutide..............    Health [facility] Embedded Care Due Messages. Reference number: [id number].   10/17/2023 12:27:41 PM CDT

## 2023-10-18 ENCOUNTER — CLINICAL SUPPORT (OUTPATIENT)
Dept: AUDIOLOGY | Facility: CLINIC | Age: 68
End: 2023-10-18
Payer: MEDICARE

## 2023-10-18 ENCOUNTER — OFFICE VISIT (OUTPATIENT)
Dept: OTOLARYNGOLOGY | Facility: CLINIC | Age: 68
End: 2023-10-18
Payer: MEDICARE

## 2023-10-18 VITALS — HEART RATE: 88 BPM | DIASTOLIC BLOOD PRESSURE: 79 MMHG | SYSTOLIC BLOOD PRESSURE: 136 MMHG

## 2023-10-18 DIAGNOSIS — H93.8X1 EAR CONGESTION, RIGHT: Primary | ICD-10-CM

## 2023-10-18 DIAGNOSIS — H91.21 SUDDEN IDIOPATHIC HEARING LOSS OF RIGHT EAR WITH RESTRICTED HEARING OF LEFT EAR: ICD-10-CM

## 2023-10-18 DIAGNOSIS — H91.8X3 ASYMMETRICAL HEARING LOSS: ICD-10-CM

## 2023-10-18 DIAGNOSIS — H90.A21 SENSORINEURAL HEARING LOSS (SNHL) OF RIGHT EAR WITH RESTRICTED HEARING OF LEFT EAR: ICD-10-CM

## 2023-10-18 DIAGNOSIS — H69.91 DYSFUNCTION OF RIGHT EUSTACHIAN TUBE: ICD-10-CM

## 2023-10-18 DIAGNOSIS — J30.2 SEASONAL ALLERGIES: ICD-10-CM

## 2023-10-18 PROCEDURE — 3078F DIAST BP <80 MM HG: CPT | Mod: CPTII,S$GLB,, | Performed by: OTOLARYNGOLOGY

## 2023-10-18 PROCEDURE — 92557 COMPREHENSIVE HEARING TEST: CPT | Mod: S$GLB,,, | Performed by: AUDIOLOGIST

## 2023-10-18 PROCEDURE — 3046F PR MOST RECENT HEMOGLOBIN A1C LEVEL > 9.0%: ICD-10-PCS | Mod: CPTII,S$GLB,, | Performed by: OTOLARYNGOLOGY

## 2023-10-18 PROCEDURE — 3066F NEPHROPATHY DOC TX: CPT | Mod: CPTII,S$GLB,, | Performed by: OTOLARYNGOLOGY

## 2023-10-18 PROCEDURE — 99999 PR PBB SHADOW E&M-EST. PATIENT-LVL I: ICD-10-PCS | Mod: PBBFAC,,, | Performed by: AUDIOLOGIST

## 2023-10-18 PROCEDURE — 1126F PR PAIN SEVERITY QUANTIFIED, NO PAIN PRESENT: ICD-10-PCS | Mod: CPTII,S$GLB,, | Performed by: OTOLARYNGOLOGY

## 2023-10-18 PROCEDURE — 1126F AMNT PAIN NOTED NONE PRSNT: CPT | Mod: CPTII,S$GLB,, | Performed by: OTOLARYNGOLOGY

## 2023-10-18 PROCEDURE — 4010F ACE/ARB THERAPY RXD/TAKEN: CPT | Mod: CPTII,S$GLB,, | Performed by: OTOLARYNGOLOGY

## 2023-10-18 PROCEDURE — 92567 TYMPANOMETRY: CPT | Mod: S$GLB,,, | Performed by: AUDIOLOGIST

## 2023-10-18 PROCEDURE — 3075F PR MOST RECENT SYSTOLIC BLOOD PRESS GE 130-139MM HG: ICD-10-PCS | Mod: CPTII,S$GLB,, | Performed by: OTOLARYNGOLOGY

## 2023-10-18 PROCEDURE — 99999 PR PBB SHADOW E&M-EST. PATIENT-LVL I: CPT | Mod: PBBFAC,,, | Performed by: AUDIOLOGIST

## 2023-10-18 PROCEDURE — 3075F SYST BP GE 130 - 139MM HG: CPT | Mod: CPTII,S$GLB,, | Performed by: OTOLARYNGOLOGY

## 2023-10-18 PROCEDURE — 1160F PR REVIEW ALL MEDS BY PRESCRIBER/CLIN PHARMACIST DOCUMENTED: ICD-10-PCS | Mod: CPTII,S$GLB,, | Performed by: OTOLARYNGOLOGY

## 2023-10-18 PROCEDURE — 3066F PR DOCUMENTATION OF TREATMENT FOR NEPHROPATHY: ICD-10-PCS | Mod: CPTII,S$GLB,, | Performed by: OTOLARYNGOLOGY

## 2023-10-18 PROCEDURE — 1159F PR MEDICATION LIST DOCUMENTED IN MEDICAL RECORD: ICD-10-PCS | Mod: CPTII,S$GLB,, | Performed by: OTOLARYNGOLOGY

## 2023-10-18 PROCEDURE — 3288F FALL RISK ASSESSMENT DOCD: CPT | Mod: CPTII,S$GLB,, | Performed by: OTOLARYNGOLOGY

## 2023-10-18 PROCEDURE — 1160F RVW MEDS BY RX/DR IN RCRD: CPT | Mod: CPTII,S$GLB,, | Performed by: OTOLARYNGOLOGY

## 2023-10-18 PROCEDURE — 1159F MED LIST DOCD IN RCRD: CPT | Mod: CPTII,S$GLB,, | Performed by: OTOLARYNGOLOGY

## 2023-10-18 PROCEDURE — 3288F PR FALLS RISK ASSESSMENT DOCUMENTED: ICD-10-PCS | Mod: CPTII,S$GLB,, | Performed by: OTOLARYNGOLOGY

## 2023-10-18 PROCEDURE — 99999 PR PBB SHADOW E&M-EST. PATIENT-LVL V: ICD-10-PCS | Mod: PBBFAC,,, | Performed by: OTOLARYNGOLOGY

## 2023-10-18 PROCEDURE — 3060F POS MICROALBUMINURIA REV: CPT | Mod: CPTII,S$GLB,, | Performed by: OTOLARYNGOLOGY

## 2023-10-18 PROCEDURE — 1101F PR PT FALLS ASSESS DOC 0-1 FALLS W/OUT INJ PAST YR: ICD-10-PCS | Mod: CPTII,S$GLB,, | Performed by: OTOLARYNGOLOGY

## 2023-10-18 PROCEDURE — 99204 OFFICE O/P NEW MOD 45 MIN: CPT | Mod: S$GLB,,, | Performed by: OTOLARYNGOLOGY

## 2023-10-18 PROCEDURE — 99999 PR PBB SHADOW E&M-EST. PATIENT-LVL V: CPT | Mod: PBBFAC,,, | Performed by: OTOLARYNGOLOGY

## 2023-10-18 PROCEDURE — 1101F PT FALLS ASSESS-DOCD LE1/YR: CPT | Mod: CPTII,S$GLB,, | Performed by: OTOLARYNGOLOGY

## 2023-10-18 PROCEDURE — 4010F PR ACE/ARB THEARPY RXD/TAKEN: ICD-10-PCS | Mod: CPTII,S$GLB,, | Performed by: OTOLARYNGOLOGY

## 2023-10-18 PROCEDURE — 3046F HEMOGLOBIN A1C LEVEL >9.0%: CPT | Mod: CPTII,S$GLB,, | Performed by: OTOLARYNGOLOGY

## 2023-10-18 PROCEDURE — 99204 PR OFFICE/OUTPT VISIT, NEW, LEVL IV, 45-59 MIN: ICD-10-PCS | Mod: S$GLB,,, | Performed by: OTOLARYNGOLOGY

## 2023-10-18 PROCEDURE — 3078F PR MOST RECENT DIASTOLIC BLOOD PRESSURE < 80 MM HG: ICD-10-PCS | Mod: CPTII,S$GLB,, | Performed by: OTOLARYNGOLOGY

## 2023-10-18 PROCEDURE — 92567 PR TYMPA2METRY: ICD-10-PCS | Mod: S$GLB,,, | Performed by: AUDIOLOGIST

## 2023-10-18 PROCEDURE — 92557 PR COMPREHENSIVE HEARING TEST: ICD-10-PCS | Mod: S$GLB,,, | Performed by: AUDIOLOGIST

## 2023-10-18 PROCEDURE — 3060F PR POS MICROALBUMINURIA RESULT DOCUMENTED/REVIEW: ICD-10-PCS | Mod: CPTII,S$GLB,, | Performed by: OTOLARYNGOLOGY

## 2023-10-18 RX ORDER — LORATADINE 10 MG/1
10 TABLET ORAL DAILY
Qty: 90 TABLET | Refills: 3 | Status: SHIPPED | OUTPATIENT
Start: 2023-10-18 | End: 2024-10-17

## 2023-10-18 RX ORDER — OMEPRAZOLE 20 MG/1
CAPSULE, DELAYED RELEASE ORAL
Qty: 90 CAPSULE | Refills: 3 | Status: SHIPPED | OUTPATIENT
Start: 2023-10-18 | End: 2024-03-18 | Stop reason: SDUPTHER

## 2023-10-18 NOTE — TELEPHONE ENCOUNTER
Refill Decision Note      Refill Decision Note   Dallas Garcia  is requesting a refill authorization.  Brief Assessment and Rationale for Refill:  Approve     Medication Therapy Plan:  FLOS      Comments:     Note composed:4:53 AM 10/18/2023             Appointments     Last Visit   8/17/2023 Lisette Corado MD   Next Visit   1/24/2024 Lisette Corado MD

## 2023-10-18 NOTE — PATIENT INSTRUCTIONS
Reviewed history and findings. Reviewed audiogram.    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  Discussed potential causes.    Recommend MRI IAC w/ & w/o contrast to evaluate.    Given length of time since occurred - unlikely that steroid options would benefit.    Request for Claritin prescription - sent to pharmacy of choice. Take one daily for allergy symptoms as needed.    Once MRI report received will decide on follow up / guidance. May want to consider hearing aid for right side.

## 2023-10-18 NOTE — PROGRESS NOTES
67 y/o male referred by Dr. Tafoya for ear fullness, change in hearing of right ear. In July patient was flying back from [place] and experienced clogged ears on both side. After a couple days some improvement. However, right side did not clear; left side improved. No real pain. No ear drainage. No other otologic symptoms except some ringing.  Noticed different in treble and bass when he was recently at a concert. Right ear cannot hear as well. Hardly can hear if his left ear is down on the pillow at night. Recalls swimamirah's ear infection as a 8 y/o. Had been on vacation. Returned home to [place] when he had that infection. Otherwise no sig ear history. Denies persistent noise exposure.     PMHx, PSHx, Meds, Allergies, SocHx, FamHx reviewed in EPIC    Review of Systems     Constitutional: Negative for appetite change, chills, fatigue, fever and unexpected weight loss.      HENT: Positive for hearing loss.  Negative for ear discharge, ear pain, postnasal drip, runny nose, sinus infection, sinus pressure, stuffy nose, trouble swallowing and voice change.      Eyes:  Positive for eye itching. Negative for eye drainage.     Respiratory:  Positive for snoring. Negative for cough, shortness of breath and wheezing.      Cardiovascular:  Negative for chest pain, foot swelling, irregular heartbeat and swollen veins.     Gastrointestinal:  Positive for acid reflux. Negative for abdominal pain and heartburn.     Genitourinary: Negative for difficulty urinating, sexual problems and frequent urination.     Musc: Negative for aching joints, aching muscles, back pain and neck pain.     Skin: Negative for rash.     Allergy: Positive for seasonal allergies.     Neurological: Negative for dizziness, headaches, light-headedness, seizures and tremors.      Hematologic: Negative for bruises/bleeds easily and swollen glands.      Psychiatric: Negative for decreased concentration, depression, nervous/anxious and sleep disturbance.           PE: /79   Pulse 88    Gen: male, well nourished, well developed, NAD, cooperative, good historian  Ears:  EAC patent & TM translucent with normal bony landmarks bilaterally  Nose: external nose wnl, nasal septum near midline, inferior turbinates mild edema, no visible purulence or polyps  OC/OP:  MMM, thick tongue protrudes midline, palate raises symmetrically with surgical changes - no uvula, tonsils small symmetric, no erythema or exudate  Neck: supple, no TTP, no LAD or masses  Face:  no TTP, no erythema or flushing  Respiratory: Breathing comfortably without retractions  Skin: facial skin intact without visible lesions or flushing  Lymph: no neck lympadenopathy  Neuro:  facial movement symmetric, speech fluid, gait stable, tongue protrudes midline  Psych: alert & oriented x 3, reasonable, normal affect      Audio reviewed with patient    Impression:   1. Ear congestion, right  Ambulatory referral/consult to ENT      2. Sensorineural hearing loss (SNHL) of right ear with restricted hearing of left ear  Ambulatory referral/consult to ENT      3. Asymmetrical hearing loss        4. Sudden idiopathic hearing loss of right ear with restricted hearing of left ear  MRI IAC/Temporal Bones W W/O Contrast      5. Seasonal allergies  loratadine (CLARITIN) 10 mg tablet          Discussion and Plan:    Reviewed history and findings. Reviewed audiogram.    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  Discussed potential causes.    Recommend MRI IAC w/ & w/o contrast to evaluate.    Given length of time since occurred - unlikely that steroid options would benefit.    Request for Claritin prescription - sent to pharmacy of choice. Take one daily for allergy symptoms as needed.    Once MRI report received will decide on follow up / guidance. May want to consider hearing aid for right ear.     Parts or all of this note were created by voice recognition software; typographical errors in  translating may be present.

## 2023-10-19 DIAGNOSIS — I10 HTN (HYPERTENSION), BENIGN: ICD-10-CM

## 2023-10-19 DIAGNOSIS — I10 ESSENTIAL HYPERTENSION: ICD-10-CM

## 2023-10-19 RX ORDER — VALSARTAN 320 MG/1
320 TABLET ORAL DAILY
Qty: 90 TABLET | Refills: 2 | Status: SHIPPED | OUTPATIENT
Start: 2023-10-19

## 2023-10-19 RX ORDER — AMLODIPINE BESYLATE 10 MG/1
10 TABLET ORAL DAILY
Qty: 90 TABLET | Refills: 3 | Status: SHIPPED | OUTPATIENT
Start: 2023-10-19

## 2023-10-19 NOTE — TELEPHONE ENCOUNTER
Refill Routing Note   Medication(s) are not appropriate for processing by [facility] for the following reason(s):      Required labs abnormal    [facility] action(s):  Defer  Approve Care Due:  None identified     Medication Therapy Plan: amLODIPine and Fatty liver acceptable use per [facility] protocol    Alert overridden per protocol: Yes     Appointments  past 12m or future 3m with PCP    Date Provider   Last Visit   8/17/2023 Lisette Corado MD   Next Visit   1/24/2024 Lisetet Corado MD   ED visits in past 90 days: 0        Note composed:11:44 AM 10/19/2023

## 2023-10-19 NOTE — TELEPHONE ENCOUNTER
No care due was identified.  [facility] Embedded Care Due Messages. Reference number: [id number].   10/19/2023 10:05:08 AM CDT

## 2023-10-30 DIAGNOSIS — E11.65 UNCONTROLLED TYPE 2 DIABETES MELLITUS WITH HYPERGLYCEMIA: ICD-10-CM

## 2023-10-30 RX ORDER — INSULIN GLARGINE 100 [IU]/ML
INJECTION, SOLUTION SUBCUTANEOUS
Qty: 18 ML | Refills: 6 | Status: SHIPPED | OUTPATIENT
Start: 2023-10-30 | End: 2024-03-26 | Stop reason: SDUPTHER

## 2023-10-30 NOTE — TELEPHONE ENCOUNTER
----- Message from Joann Meadows sent at 10/28/2023 10:03 AM CDT -----  Contact: [phone]  Pt is requesting a new prescription for insulin (LANTUS SOLOSTAR U-100 INSULIN) glargine 100 units/mL SubQ pen 18 mL be called in. Per pt, the pharmacy says that the prescription was canceled.    Pt is using   [id number] [place] [address] [address] AT [facility] [address]  [facility] [address]  [facility] [address]  Phone: [phone] Fax: [phone]          Thank you

## 2023-10-30 NOTE — TELEPHONE ENCOUNTER
No care due was identified.  Health [facility] Embedded Care Due Messages. Reference number: [id number].   10/30/2023 9:29:45 AM CDT

## 2023-11-02 ENCOUNTER — TELEPHONE (OUTPATIENT)
Dept: OTOLARYNGOLOGY | Facility: CLINIC | Age: 68
End: 2023-11-02
Payer: MEDICARE

## 2023-11-02 NOTE — TELEPHONE ENCOUNTER
----- Message from Chiqui Bhartijonathan Celaya sent at 11/2/2023  1:59 PM CDT -----  Regarding: Resubmit MRI request  Contact: Pt @[phone]  Pt states his insurance [employer] is requesting an MRI request to find out if the insurance covered the appt.Please c/b to advise..Thanks

## 2023-11-07 ENCOUNTER — TELEPHONE (OUTPATIENT)
Dept: OTOLARYNGOLOGY | Facility: CLINIC | Age: 68
End: 2023-11-07
Payer: MEDICARE

## 2023-11-13 RX ORDER — BUSPIRONE HYDROCHLORIDE 5 MG/1
TABLET ORAL
Qty: 60 TABLET | Refills: 0 | Status: SHIPPED | OUTPATIENT
Start: 2023-11-13 | End: 2023-12-12

## 2023-11-14 ENCOUNTER — HOSPITAL ENCOUNTER (OUTPATIENT)
Dept: RADIOLOGY | Facility: HOSPITAL | Age: 68
Discharge: HOME | End: 2023-11-14
Attending: OTOLARYNGOLOGY
Payer: MEDICARE

## 2023-11-14 DIAGNOSIS — H91.21 SUDDEN IDIOPATHIC HEARING LOSS OF RIGHT EAR WITH RESTRICTED HEARING OF LEFT EAR: ICD-10-CM

## 2023-11-14 PROCEDURE — 70553 MRI BRAIN STEM W/O & W/DYE: CPT | Mod: TC

## 2023-11-14 PROCEDURE — 25500020 PHARM REV CODE 255: Performed by: OTOLARYNGOLOGY

## 2023-11-14 PROCEDURE — A9585 GADOBUTROL INJECTION: HCPCS | Performed by: OTOLARYNGOLOGY

## 2023-11-14 PROCEDURE — 70553 MRI IAC/TEMPORAL BONES W W/O CONTRAST: ICD-10-PCS | Mod: 26,,, | Performed by: RADIOLOGY

## 2023-11-14 PROCEDURE — 70553 MRI BRAIN STEM W/O & W/DYE: CPT | Mod: 26,,, | Performed by: RADIOLOGY

## 2023-11-14 RX ORDER — GADOBUTROL 604.72 MG/ML
10 INJECTION INTRAVENOUS
Status: COMPLETED | OUTPATIENT
Start: 2023-11-14 | End: 2023-11-14

## 2023-11-14 RX ADMIN — GADOBUTROL 10 ML: 604.72 INJECTION INTRAVENOUS at 04:11

## 2023-11-25 DIAGNOSIS — E78.2 MIXED HYPERLIPIDEMIA: ICD-10-CM

## 2023-11-25 RX ORDER — ATORVASTATIN CALCIUM 80 MG/1
80 TABLET, FILM COATED ORAL
Qty: 90 TABLET | Refills: 2 | Status: SHIPPED | OUTPATIENT
Start: 2023-11-25

## 2023-11-25 NOTE — TELEPHONE ENCOUNTER
No care due was identified.  [facility] Embedded Care Due Messages. Reference number: [id number].   11/25/2023 4:30:56 AM CST

## 2023-11-26 NOTE — TELEPHONE ENCOUNTER
Refill Decision Note   Dallas Jose  is requesting a refill authorization.  Brief Assessment and Rationale for Refill:  Approve     Medication Therapy Plan:         Comments:     Note composed:10:51 PM 11/25/2023

## 2023-11-27 DIAGNOSIS — E78.2 MIXED HYPERLIPIDEMIA: ICD-10-CM

## 2023-11-27 RX ORDER — BUSPIRONE HYDROCHLORIDE 5 MG/1
TABLET ORAL
Qty: 60 TABLET | Refills: 0 | Status: CANCELLED | OUTPATIENT
Start: 2023-11-27

## 2023-11-27 NOTE — TELEPHONE ENCOUNTER
No care due was identified.  [facility] Embedded Care Due Messages. Reference number: [id number].   11/27/2023 1:05:32 PM CST

## 2023-11-28 RX ORDER — FENOFIBRATE 160 MG/1
160 TABLET ORAL DAILY
Qty: 90 TABLET | Refills: 2 | Status: SHIPPED | OUTPATIENT
Start: 2023-11-28

## 2023-11-28 NOTE — TELEPHONE ENCOUNTER
Refill Routing Note   Medication(s) are not appropriate for processing by [facility] for the following reason(s):        Required labs abnormal    [facility] action(s):  Defer               Appointments  past 12m or future 3m with PCP    Date Provider   Last Visit   8/17/2023 Lisette Corado MD   Next Visit   1/24/2024 Lisette Corado MD   ED visits in past 90 days: 0        Note composed:6:39 PM 11/27/2023

## 2023-12-12 RX ORDER — BUSPIRONE HYDROCHLORIDE 5 MG/1
TABLET ORAL
Qty: 60 TABLET | Refills: 3 | Status: SHIPPED | OUTPATIENT
Start: 2023-12-12

## 2023-12-12 NOTE — TELEPHONE ENCOUNTER
No care due was identified.  Health [facility] Embedded Care Due Messages. Reference number: [id number].   12/12/2023 10:29:25 AM CST

## 2023-12-28 DIAGNOSIS — E11.65 UNCONTROLLED TYPE 2 DIABETES MELLITUS WITH HYPERGLYCEMIA: ICD-10-CM

## 2023-12-28 RX ORDER — LINAGLIPTIN AND METFORMIN HYDROCHLORIDE 2.5; 1 MG/1; MG/1
1 TABLET, FILM COATED ORAL 2 TIMES DAILY WITH MEALS
Qty: 180 TABLET | Refills: 0 | Status: SHIPPED | OUTPATIENT
Start: 2023-12-28 | End: 2024-01-24 | Stop reason: SDUPTHER

## 2023-12-28 NOTE — TELEPHONE ENCOUNTER
Care Due:                  Date            Visit Type   Department     Provider  --------------------------------------------------------------------------------                                EP -                              PRIMARY      [facility] PRIMARY  Last Visit: 08-      CARE ([facility])   CARE           Lisette Corado  Next Visit: None Scheduled  None         None Found                                                            Last  Test          Frequency    Reason                     Performed    Due Date  --------------------------------------------------------------------------------    HBA1C.......  6 months...  NOVOLOG, insulin,          07-   01-                             linagliptin-metformin,                             semaglutide..............    Health [facility] Embedded Care Due Messages. Reference number: [id number].   12/28/2023 4:41:54 AM CST

## 2023-12-28 NOTE — TELEPHONE ENCOUNTER
Refill Routing Note   Medication(s) are not appropriate for processing by [facility] for the following reason(s):        Required labs abnormal    [facility] action(s):  Defer        [facility]    Pharmacist review requested: Yes     Appointments  past 12m or future 3m with PCP    Date Provider   Last Visit   8/17/2023 Lisette Corado MD   Next Visit   1/24/2024 Lisette Corado MD   ED visits in past 90 days: 0        Note composed:10:10 AM 12/28/2023

## 2023-12-29 NOTE — TELEPHONE ENCOUNTER
Refill Decision Note   Dallas Jose  is requesting a refill authorization.  Brief Assessment and Rationale for Refill:  Approve     Medication Therapy Plan:  FLOS; No dose adjustment recommended per renal fxn.        Pharmacist review requested: Yes   Extended chart review required: Yes   Comments:     Note composed:7:01 PM 12/28/2023

## 2024-01-12 ENCOUNTER — PATIENT MESSAGE (OUTPATIENT)
Dept: ADMINISTRATIVE | Facility: HOSPITAL | Age: 69
End: 2024-01-12
Payer: MEDICARE

## 2024-01-24 DIAGNOSIS — E11.65 UNCONTROLLED TYPE 2 DIABETES MELLITUS WITH HYPERGLYCEMIA: ICD-10-CM

## 2024-01-24 RX ORDER — LINAGLIPTIN AND METFORMIN HYDROCHLORIDE 2.5; 1 MG/1; MG/1
1 TABLET, FILM COATED ORAL 2 TIMES DAILY WITH MEALS
Qty: 180 TABLET | Refills: 0 | Status: SHIPPED | OUTPATIENT
Start: 2024-01-24 | End: 2024-03-25

## 2024-01-24 NOTE — TELEPHONE ENCOUNTER
No care due was identified.  [facility] Embedded Care Due Messages. Reference number: [id number].   1/24/2024 10:35:18 AM CST

## 2024-01-24 NOTE — TELEPHONE ENCOUNTER
Refill Routing Note   Medication(s) are not appropriate for processing by [facility] for the following reason(s):        Required labs outdated: a1c    [facility] action(s):  Defer     Requires labs : Yes    Medication Therapy Plan:         Appointments  past 12m or future 3m with PCP    Date Provider   Last Visit   8/17/2023 Lisette Corado MD   Next Visit   6/7/2024 Lisette Corado MD   ED visits in past 90 days: 0        Note composed:11:51 AM 01/24/2024

## 2024-02-10 ENCOUNTER — TELEPHONE (OUTPATIENT)
Dept: INTERNAL MEDICINE | Facility: CLINIC | Age: 69
End: 2024-02-10
Payer: MEDICARE

## 2024-02-10 DIAGNOSIS — E11.65 TYPE 2 DIABETES MELLITUS WITH HYPERGLYCEMIA, UNSPECIFIED WHETHER LONG TERM INSULIN USE: Primary | ICD-10-CM

## 2024-02-11 NOTE — TELEPHONE ENCOUNTER
----- Message from Rachelle Montesinos MA sent at 2/9/2024 12:01 PM CST -----  Contact: [phone]    ----- Message -----  From: Katelin Lua  Sent: 2/9/2024  11:26 AM CST  To: [facility] Staff    1MEDICALADVICE     Patient is calling for Medical Advice regarding:Patient is calling to report that [employer] will not cover the linagliptin-metformin (JENTADUETO) 2.5-1,000 mg Tab with out PA or Is there another Rx to SUB this Rx. Patient has not taken it within a week     How long has patient had these symptoms:    Pharmacy name and phone#:    Would like response via [facility]:  call     Comments:

## 2024-02-11 NOTE — TELEPHONE ENCOUNTER
Rachelle, please call pt and ask him to call his insurance to see if they will cover Janumet in place of Jentadueto. I sent in Janumet but if they don't cover this, he needs to let me know what is covered.  Thanks

## 2024-03-01 DIAGNOSIS — E11.69 TYPE 2 DIABETES MELLITUS WITH OTHER SPECIFIED COMPLICATION, WITHOUT LONG-TERM CURRENT USE OF INSULIN: ICD-10-CM

## 2024-03-01 RX ORDER — INSULIN ASPART 100 [IU]/ML
INJECTION, SOLUTION INTRAVENOUS; SUBCUTANEOUS
Qty: 75 ML | Refills: 4 | Status: SHIPPED | OUTPATIENT
Start: 2024-03-01

## 2024-03-01 NOTE — TELEPHONE ENCOUNTER
Care Due:                  Date            Visit Type   Department     Provider  --------------------------------------------------------------------------------                                EP -                              PRIMARY      [facility] PRIMARY  Last Visit: 08-      CARE ([facility])   CARE           Lisette Corado                               -                              PRIMARY      [facility] INTERNAL  Next Visit: 06-      CARE ([facility])   MEDICINE       L[facility]ette [facility]                                                            Last  Test          Frequency    Reason                     Performed    Due Date  --------------------------------------------------------------------------------    HBA1C.......  6 months...  NOVOLOG,                   07-   01-                             SITagliptan-metformin,                             insulin,                             linagliptin-metformin,                             semaglutide..............    Vitamin D...  12 months..  ergocalciferol...........  Not Found    Overdue    [facility] Embedded Care Due Messages. Reference number: [id number].   3/01/2024 2:47:54 PM CST

## 2024-03-08 ENCOUNTER — TELEPHONE (OUTPATIENT)
Dept: OTOLARYNGOLOGY | Facility: CLINIC | Age: 69
End: 2024-03-08
Payer: MEDICARE

## 2024-03-08 NOTE — TELEPHONE ENCOUNTER
----- Message from Margot Simon sent at 3/8/2024 10:38 AM CST -----  Contact: [phone]  Previous message this is in regards to hearing aides and he has an appt with them and he would like to take the hearing test results over to them as well

## 2024-03-18 DIAGNOSIS — K21.9 GASTROESOPHAGEAL REFLUX DISEASE: ICD-10-CM

## 2024-03-18 NOTE — TELEPHONE ENCOUNTER
No care due was identified.  Health [facility] Embedded Care Due Messages. Reference number: [id number].   3/18/2024 12:26:07 PM CDT

## 2024-03-19 ENCOUNTER — TELEPHONE (OUTPATIENT)
Dept: INTERNAL MEDICINE | Facility: CLINIC | Age: 69
End: 2024-03-19
Payer: MEDICARE

## 2024-03-19 DIAGNOSIS — Z79.4 TYPE 2 DIABETES MELLITUS WITH MICROALBUMINURIA, WITH LONG-TERM CURRENT USE OF INSULIN: ICD-10-CM

## 2024-03-19 DIAGNOSIS — M25.519 SHOULDER PAIN, UNSPECIFIED CHRONICITY, UNSPECIFIED LATERALITY: Primary | ICD-10-CM

## 2024-03-19 DIAGNOSIS — E11.29 TYPE 2 DIABETES MELLITUS WITH MICROALBUMINURIA, WITH LONG-TERM CURRENT USE OF INSULIN: ICD-10-CM

## 2024-03-19 DIAGNOSIS — R80.9 TYPE 2 DIABETES MELLITUS WITH MICROALBUMINURIA, WITH LONG-TERM CURRENT USE OF INSULIN: ICD-10-CM

## 2024-03-19 RX ORDER — OMEPRAZOLE 20 MG/1
CAPSULE, DELAYED RELEASE ORAL
Qty: 90 CAPSULE | Refills: 1 | Status: SHIPPED | OUTPATIENT
Start: 2024-03-19

## 2024-03-19 RX ORDER — SEMAGLUTIDE 0.68 MG/ML
0.5 INJECTION, SOLUTION SUBCUTANEOUS
Qty: 1 EACH | Refills: 6 | Status: SHIPPED | OUTPATIENT
Start: 2024-03-19 | End: 2024-03-22 | Stop reason: SDUPTHER

## 2024-03-19 NOTE — TELEPHONE ENCOUNTER
Mary Grace, please let pt know that I placed his Orthopedics referral.  Thanks   Preparation Of Recipient Site - Graft: The eschar was removed surgically with sharp dissection to facilitate appropriate survival of the following graft.

## 2024-03-19 NOTE — TELEPHONE ENCOUNTER
----- Message from Bettina Gardner sent at 3/19/2024 11:24 AM CDT -----  Contact: [phone]  Would like to receive medical advice.  Pt called and stated that he need approval to see Orthoptics doctor for insurance purposes. Pt stated that he have an appt on 03.28.2024 with doctor Erik Yun. Please call to advise.     Would they like a call back or a response via [facility]:  call    Additional information:  n/a

## 2024-03-19 NOTE — TELEPHONE ENCOUNTER
Spoke to pt and he wants an orthopedic referral to see Dr Erik Cohen    He c/o left shoulder pain for the last month. He thinks he may have pulled a muscle but wants to make sure it is not a tear    Pt also needs a refill for Ozempic. Once you send the refill I can request the PA

## 2024-03-19 NOTE — TELEPHONE ENCOUNTER
Spoke to pt and he c/o left shoulder pain and would like a referral to see Dr Erik Cohen at [place]    He has an apt already scheduled

## 2024-03-19 NOTE — TELEPHONE ENCOUNTER
----- Message from Bettina Jj sent at 3/19/2024 11:22 AM CDT -----  Contact: [phone]  Prescription refill request.  RX name and strength (copy/paste from chart):   semaglutide (OZEMPIC) 0.25 mg or 0.5 mg (2 mg/3 mL) pen injector    Is this a 30 day or 90 day RX:  30 days     Pharmacy name and phone # (copy/paste from chart):     [id number] - [place] [address] [address] AT [facility] [address]  [facility] [address]  [facility] [address]  Phone: [phone] Fax: [phone]      Additional information:   n/a     29-Apr-2019 11:33

## 2024-03-19 NOTE — TELEPHONE ENCOUNTER
----- Message from Bettina Gardner sent at 3/19/2024 11:24 AM CDT -----  Contact: [phone]  Would like to receive medical advice.  Pt called and stated that he need approval to see Orthoptics doctor for insurance purposes. Pt stated that he have an appt on 03.28.2024 with doctor Erik Yun. Please call to advise.     Would they like a call back or a response via [facility]:  call    Additional information:  n/a

## 2024-03-19 NOTE — TELEPHONE ENCOUNTER
No care due was identified.  [facility] Embedded Care Due Messages. Reference number: [id number].   3/19/2024 2:39:01 PM CDT

## 2024-03-19 NOTE — TELEPHONE ENCOUNTER
Refill Decision Note   Dallas Jose  is requesting a refill authorization.  Brief Assessment and Rationale for Refill:  Approve     Medication Therapy Plan:         Comments:     Note composed:9:57 AM 03/19/2024

## 2024-03-22 ENCOUNTER — TELEPHONE (OUTPATIENT)
Dept: INTERNAL MEDICINE | Facility: CLINIC | Age: 69
End: 2024-03-22
Payer: MEDICARE

## 2024-03-22 DIAGNOSIS — E11.29 TYPE 2 DIABETES MELLITUS WITH MICROALBUMINURIA, WITH LONG-TERM CURRENT USE OF INSULIN: ICD-10-CM

## 2024-03-22 DIAGNOSIS — R80.9 TYPE 2 DIABETES MELLITUS WITH MICROALBUMINURIA, WITH LONG-TERM CURRENT USE OF INSULIN: ICD-10-CM

## 2024-03-22 DIAGNOSIS — Z79.4 TYPE 2 DIABETES MELLITUS WITH MICROALBUMINURIA, WITH LONG-TERM CURRENT USE OF INSULIN: ICD-10-CM

## 2024-03-22 RX ORDER — SEMAGLUTIDE 0.68 MG/ML
0.5 INJECTION, SOLUTION SUBCUTANEOUS
Qty: 1 EACH | Refills: 6 | Status: SHIPPED | OUTPATIENT
Start: 2024-03-22

## 2024-03-22 NOTE — TELEPHONE ENCOUNTER
Mary Grace, please call pt and let him know that I sent in his Ozempic refill for the 2nd time today. Also he should be taking EITHER Janumet or Jentadueto.  Would you please find out which one he is taking daily.  Thanks so much.

## 2024-03-22 NOTE — TELEPHONE ENCOUNTER
----- Message from Rachelle Montesinos MA sent at 3/22/2024  9:17 AM CDT -----  Contact: [phone]    ----- Message -----  From: Margot Simon  Sent: 3/22/2024   8:58 AM CDT  To: [facility] Staff    Requesting an RX refill or new RX.  Is this a refill or new RX: refill  RX name and strength (copy/paste from chart):  semaglutide (OZEMPIC) 0.25 mg or 0.5 mg (2 mg/3 mL) pen injector  Is this a 30 day or 90 day RX: 30  Pharmacy name and phone # (copy/paste from chart):        [id number] [address] AT [facility] [address]  [facility] [address] [facility] [address]  Phone: [phone] Fax: [phone]     The doctors have asked that we provide their patients with the following 2 reminders -- prescription refills can take up to 72 hours, and a friendly reminder that in the future you can use your [facility] account to request refills: yes pt states this needed a prior auth and he states he still has not got this and he is waiting please give return call

## 2024-03-25 DIAGNOSIS — E11.65 UNCONTROLLED TYPE 2 DIABETES MELLITUS WITH HYPERGLYCEMIA: ICD-10-CM

## 2024-03-25 NOTE — TELEPHONE ENCOUNTER
NEGRO    Spoke to pt and he is taking the Janumet    He said he hasn't picked up the Ozempic yet bc he hasn't heard from the pharm.  I told him to call [facility] to check on the Ozempic Rx    He VU.

## 2024-03-25 NOTE — TELEPHONE ENCOUNTER
No care due was identified.  Health [facility] Embedded Care Due Messages. Reference number: [id number].   3/25/2024 4:41:16 PM CDT

## 2024-03-26 DIAGNOSIS — E11.65 UNCONTROLLED TYPE 2 DIABETES MELLITUS WITH HYPERGLYCEMIA: ICD-10-CM

## 2024-03-26 NOTE — TELEPHONE ENCOUNTER
No care due was identified.  [facility] Embedded Care Due Messages. Reference number: [id number].   3/26/2024 11:38:05 AM CDT

## 2024-03-26 NOTE — TELEPHONE ENCOUNTER
Message sent to referral coordinator to schedule ortho apt with dr gildardo rae at [place]    Pt notified via portal

## 2024-03-26 NOTE — TELEPHONE ENCOUNTER
No care due was identified.  [facility] Embedded Care Due Messages. Reference number: [id number].   3/26/2024 11:36:24 AM CDT

## 2024-03-27 RX ORDER — INSULIN GLARGINE 100 [IU]/ML
INJECTION, SOLUTION SUBCUTANEOUS
Qty: 18 ML | Refills: 6 | OUTPATIENT
Start: 2024-03-27

## 2024-03-27 RX ORDER — PEN NEEDLE, DIABETIC 30 GX3/16"
NEEDLE, DISPOSABLE MISCELLANEOUS
Qty: 400 EACH | Refills: 3 | Status: SHIPPED | OUTPATIENT
Start: 2024-03-27

## 2024-03-27 RX ORDER — INSULIN GLARGINE 100 [IU]/ML
40 INJECTION, SOLUTION SUBCUTANEOUS 2 TIMES DAILY
Qty: 75 ML | Refills: 0 | Status: SHIPPED | OUTPATIENT
Start: 2024-03-27 | End: 2024-04-12

## 2024-03-27 NOTE — TELEPHONE ENCOUNTER
Refill Routing Note   Medication(s) are not appropriate for processing by [facility] for the following reason(s):        Required labs outdated: A1c  2nd request received    [facility] action(s):  Defer     Requires labs : Yes    Medication Therapy Plan:         Appointments  past 12m or future 3m with PCP    Date Provider   Last Visit   8/17/2023 Lisette Corado MD   Next Visit   6/7/2024 Lisette Corado MD   ED visits in past 90 days: 0        Note composed:11:33 AM 03/27/2024

## 2024-03-27 NOTE — TELEPHONE ENCOUNTER
Refill Routing Note   Medication(s) are not appropriate for processing by [facility] for the following reason(s):        Required labs abnormal  Required labs outdated    [facility] action(s):  Defer               Appointments  past 12m or future 3m with PCP    Date Provider   Last Visit   8/17/2023 Lisette Corado MD   Next Visit   3/26/2024 Lisette Corado MD   ED visits in past 90 days: 0        Note composed:7:12 PM 03/26/2024

## 2024-03-27 NOTE — TELEPHONE ENCOUNTER
Refill Decision Note   Dallas Garcia  is requesting a refill authorization.  Brief Assessment and Rationale for Refill:  Approve     Medication Therapy Plan:         Comments:     Note composed:11:09 AM 03/27/2024

## 2024-04-01 ENCOUNTER — TELEPHONE (OUTPATIENT)
Dept: INTERNAL MEDICINE | Facility: CLINIC | Age: 69
End: 2024-04-01
Payer: MEDICARE

## 2024-04-11 ENCOUNTER — TELEPHONE (OUTPATIENT)
Dept: INTERNAL MEDICINE | Facility: CLINIC | Age: 69
End: 2024-04-11
Payer: MEDICARE

## 2024-04-11 DIAGNOSIS — E11.65 UNCONTROLLED TYPE 2 DIABETES MELLITUS WITH HYPERGLYCEMIA: ICD-10-CM

## 2024-04-11 NOTE — TELEPHONE ENCOUNTER
Care Due:                  Date            Visit Type   Department     Provider  --------------------------------------------------------------------------------                                EP -                              PRIMARY      [facility] PRIMARY  Last Visit: 08-      CARE ([facility])   CARE           Lisette Corado                              EP -                              PRIMARY      [facility] INTERNAL  Next Visit: 06-      CARE ([facility])   MEDICINE       Lisette   Mickie                                                            Last  Test          Frequency    Reason                     Performed    Due Date  --------------------------------------------------------------------------------    CBC.........  12 months..  fenofibrate..............  07- 07-    CMP.........  12 months..  NOVOLOG,                   07- 07-                             SITagliptan-metformin,                             atorvastatin,                             ergocalciferol,                             fenofibrate, insulin,                             valsartan................    Lipid Panel.  12 months..  atorvastatin, fenofibrate  07- 07-    [facility] Embedded Care Due Messages. Reference number: [id number].   4/11/2024 4:58:06 AM CDT

## 2024-04-12 RX ORDER — INSULIN GLARGINE 100 [IU]/ML
INJECTION, SOLUTION SUBCUTANEOUS
Qty: 18 ML | Refills: 1 | Status: SHIPPED | OUTPATIENT
Start: 2024-04-12

## 2024-04-12 RX ORDER — BUSPIRONE HYDROCHLORIDE 5 MG/1
TABLET ORAL
Qty: 60 TABLET | Refills: 3 | Status: SHIPPED | OUTPATIENT
Start: 2024-04-12

## 2024-04-12 NOTE — TELEPHONE ENCOUNTER
No care due was identified.  [facility] Embedded Care Due Messages. Reference number: [id number].   4/12/2024 12:02:33 PM CDT

## 2024-04-12 NOTE — TELEPHONE ENCOUNTER
Devin Brody  Pt needs a HgbA1C added to the lab on 5/31, which I ordered-can you link it?  Can you make sure he gets all 3 labs: CMP,Lipid, and HgbA1C if possible. Thanks a bunch

## 2024-04-12 NOTE — TELEPHONE ENCOUNTER
Refill Routing Note   Medication(s) are not appropriate for processing by [facility] for the following reason(s):        Outside of protocol    [facility] action(s):  Route               Appointments  past 12m or future 3m with PCP    Date Provider   Last Visit   8/17/2023 Lisette Corado MD   Next Visit   6/7/2024 Lisette Corado MD   ED visits in past 90 days: 0        Note composed:3:03 PM 04/12/2024

## 2024-04-12 NOTE — TELEPHONE ENCOUNTER
Refill Routing Note   Medication(s) are not appropriate for processing by [facility] for the following reason(s):        Required labs outdated    [facility] action(s):  Defer     Requires labs : Yes             Appointments  past 12m or future 3m with PCP    Date Provider   Last Visit   8/17/2023 Lisette Corado MD   Next Visit   6/7/2024 Lisette Corado MD   ED visits in past 90 days: 0        Note composed:7:17 AM 04/12/2024

## 2024-04-23 DIAGNOSIS — I10 ESSENTIAL HYPERTENSION: ICD-10-CM

## 2024-04-23 NOTE — TELEPHONE ENCOUNTER
No care due was identified.  [facility] Embedded Care Due Messages. Reference number: [id number].   4/23/2024 3:45:49 PM CDT

## 2024-04-24 RX ORDER — VALSARTAN 320 MG/1
320 TABLET ORAL
Qty: 90 TABLET | Refills: 0 | Status: SHIPPED | OUTPATIENT
Start: 2024-04-24

## 2024-04-24 NOTE — TELEPHONE ENCOUNTER
Refill Routing Note   Medication(s) are not appropriate for processing by [facility] for the following reason(s):        Required labs abnormal    [facility] action(s):  Defer               Appointments  past 12m or future 3m with PCP    Date Provider   Last Visit   8/17/2023 Lisette Corado MD   Next Visit   6/7/2024 Lisette Corado MD   ED visits in past 90 days: 0        Note composed:9:58 AM 04/24/2024

## 2024-05-08 DIAGNOSIS — E55.9 VITAMIN D DEFICIENCY: ICD-10-CM

## 2024-05-08 RX ORDER — ERGOCALCIFEROL 1.25 MG/1
50000 CAPSULE ORAL
Qty: 4 CAPSULE | Refills: 6 | Status: SHIPPED | OUTPATIENT
Start: 2024-05-08

## 2024-05-08 NOTE — TELEPHONE ENCOUNTER
Refill Routing Note   Medication(s) are not appropriate for processing by [facility] for the following reason(s):        Outside of protocol    [facility] action(s):  Route   Requires labs : Yes      Medication Therapy Plan: VITAMIN D      Appointments  past 12m or future 3m with PCP    Date Provider   Last Visit   8/17/2023 Lisette Corado MD   Next Visit   6/7/2024 Lisette Corado MD   ED visits in past 90 days: 0        Note composed:8:52 AM 05/08/2024

## 2024-05-08 NOTE — TELEPHONE ENCOUNTER
Care Due:                  Date            Visit Type   Department     Provider  --------------------------------------------------------------------------------                                EP -                              PRIMARY      [facility] PRIMARY  Last Visit: 08-      CARE ([facility])   CARE           Lisette Corado                               -                              PRIMARY      [facility] INTERNAL  Next Visit: 06-      CARE ([facility])   MEDICINE       Lisette   [facility]                                                            Last  Test          Frequency    Reason                     Performed    Due Date  --------------------------------------------------------------------------------    HBA1C.......  6 months...  NOVOLOG,                   07-   01-                             SITagliptan-metformin,                             insulin, semaglutide.....    Vitamin D...  12 months..  ergocalciferol...........  Not Found    Overdue    [employer] Embedded Care Due Messages. Reference number: [id number].   5/08/2024 5:00:14 AM CDT

## 2024-05-31 ENCOUNTER — LAB VISIT (OUTPATIENT)
Dept: LAB | Facility: HOSPITAL | Age: 69
End: 2024-05-31
Attending: INTERNAL MEDICINE
Payer: MEDICARE

## 2024-05-31 DIAGNOSIS — N18.31 STAGE 3A CHRONIC KIDNEY DISEASE: ICD-10-CM

## 2024-05-31 DIAGNOSIS — E11.65 UNCONTROLLED TYPE 2 DIABETES MELLITUS WITH HYPERGLYCEMIA: ICD-10-CM

## 2024-05-31 DIAGNOSIS — R80.9 TYPE 2 DIABETES MELLITUS WITH MICROALBUMINURIA, WITH LONG-TERM CURRENT USE OF INSULIN: ICD-10-CM

## 2024-05-31 DIAGNOSIS — E11.29 TYPE 2 DIABETES MELLITUS WITH MICROALBUMINURIA, WITH LONG-TERM CURRENT USE OF INSULIN: ICD-10-CM

## 2024-05-31 DIAGNOSIS — Z79.4 TYPE 2 DIABETES MELLITUS WITH MICROALBUMINURIA, WITH LONG-TERM CURRENT USE OF INSULIN: ICD-10-CM

## 2024-05-31 LAB
ALBUMIN SERPL BCP-MCNC: 3.9 G/DL (ref 3.5–5.2)
ALP SERPL-CCNC: 99 U/L (ref 55–135)
ALT SERPL W/O P-5'-P-CCNC: 43 U/L (ref 10–44)
ANION GAP SERPL CALC-SCNC: 8 MMOL/L (ref 8–16)
AST SERPL-CCNC: 42 U/L (ref 10–40)
BILIRUB SERPL-MCNC: 0.5 MG/DL (ref 0.1–1)
BUN SERPL-MCNC: 11 MG/DL (ref 8–23)
CALCIUM SERPL-MCNC: 9.7 MG/DL (ref 8.7–10.5)
CHLORIDE SERPL-SCNC: 108 MMOL/L (ref 95–110)
CHOLEST SERPL-MCNC: 164 MG/DL (ref 120–199)
CHOLEST/HDLC SERPL: 3.6 {RATIO} (ref 2–5)
CO2 SERPL-SCNC: 25 MMOL/L (ref 23–29)
CREAT SERPL-MCNC: 1.2 MG/DL (ref 0.5–1.4)
EST. GFR  (NO RACE VARIABLE): >60 ML/MIN/1.73 M^2
ESTIMATED AVG GLUCOSE: 140 MG/DL (ref 68–131)
GLUCOSE SERPL-MCNC: 160 MG/DL (ref 70–110)
HBA1C MFR BLD: 6.5 % (ref 4–5.6)
HDLC SERPL-MCNC: 45 MG/DL (ref 40–75)
HDLC SERPL: 27.4 % (ref 20–50)
LDLC SERPL CALC-MCNC: 74.2 MG/DL (ref 63–159)
NONHDLC SERPL-MCNC: 119 MG/DL
POTASSIUM SERPL-SCNC: 4.3 MMOL/L (ref 3.5–5.1)
PROT SERPL-MCNC: 7.6 G/DL (ref 6–8.4)
SODIUM SERPL-SCNC: 141 MMOL/L (ref 136–145)
TRIGL SERPL-MCNC: 224 MG/DL (ref 30–150)

## 2024-05-31 PROCEDURE — 36415 COLL VENOUS BLD VENIPUNCTURE: CPT | Performed by: INTERNAL MEDICINE

## 2024-05-31 PROCEDURE — 80053 COMPREHEN METABOLIC PANEL: CPT | Mod: HCNC | Performed by: INTERNAL MEDICINE

## 2024-05-31 PROCEDURE — 80061 LIPID PANEL: CPT | Mod: HCNC | Performed by: INTERNAL MEDICINE

## 2024-05-31 PROCEDURE — 83036 HEMOGLOBIN GLYCOSYLATED A1C: CPT | Mod: HCNC | Performed by: INTERNAL MEDICINE

## 2024-06-07 ENCOUNTER — OFFICE VISIT (OUTPATIENT)
Dept: INTERNAL MEDICINE | Facility: CLINIC | Age: 69
End: 2024-06-07
Payer: MEDICARE

## 2024-06-07 VITALS
SYSTOLIC BLOOD PRESSURE: 118 MMHG | OXYGEN SATURATION: 97 % | HEART RATE: 77 BPM | WEIGHT: 205 LBS | RESPIRATION RATE: 18 BRPM | TEMPERATURE: 98 F | HEIGHT: 67 IN | BODY MASS INDEX: 32.18 KG/M2 | DIASTOLIC BLOOD PRESSURE: 60 MMHG

## 2024-06-07 DIAGNOSIS — D12.6 ADENOMATOUS POLYP OF COLON, UNSPECIFIED PART OF COLON: ICD-10-CM

## 2024-06-07 DIAGNOSIS — E11.29 TYPE 2 DIABETES MELLITUS WITH DIABETIC MICROALBUMINURIA, WITH LONG-TERM CURRENT USE OF INSULIN: Primary | ICD-10-CM

## 2024-06-07 DIAGNOSIS — E11.29 MICROALBUMINURIA DUE TO TYPE 2 DIABETES MELLITUS: ICD-10-CM

## 2024-06-07 DIAGNOSIS — Z79.4 TYPE 2 DIABETES MELLITUS WITH DIABETIC MICROALBUMINURIA, WITH LONG-TERM CURRENT USE OF INSULIN: Primary | ICD-10-CM

## 2024-06-07 DIAGNOSIS — R80.9 TYPE 2 DIABETES MELLITUS WITH DIABETIC MICROALBUMINURIA, WITH LONG-TERM CURRENT USE OF INSULIN: Primary | ICD-10-CM

## 2024-06-07 DIAGNOSIS — F41.8 MIXED ANXIETY AND DEPRESSIVE DISORDER: ICD-10-CM

## 2024-06-07 DIAGNOSIS — E78.2 MIXED HYPERLIPIDEMIA: ICD-10-CM

## 2024-06-07 DIAGNOSIS — Z12.5 PROSTATE CANCER SCREENING: ICD-10-CM

## 2024-06-07 DIAGNOSIS — R80.9 MICROALBUMINURIA DUE TO TYPE 2 DIABETES MELLITUS: ICD-10-CM

## 2024-06-07 DIAGNOSIS — I10 ESSENTIAL HYPERTENSION: ICD-10-CM

## 2024-06-07 PROCEDURE — 3044F HG A1C LEVEL LT 7.0%: CPT | Mod: HCNC,CPTII,S$GLB, | Performed by: INTERNAL MEDICINE

## 2024-06-07 PROCEDURE — 3288F FALL RISK ASSESSMENT DOCD: CPT | Mod: HCNC,CPTII,S$GLB, | Performed by: INTERNAL MEDICINE

## 2024-06-07 PROCEDURE — 3072F LOW RISK FOR RETINOPATHY: CPT | Mod: HCNC,CPTII,S$GLB, | Performed by: INTERNAL MEDICINE

## 2024-06-07 PROCEDURE — 99999 PR PBB SHADOW E&M-EST. PATIENT-LVL V: CPT | Mod: PBBFAC,HCNC,, | Performed by: INTERNAL MEDICINE

## 2024-06-07 PROCEDURE — 1126F AMNT PAIN NOTED NONE PRSNT: CPT | Mod: HCNC,CPTII,S$GLB, | Performed by: INTERNAL MEDICINE

## 2024-06-07 PROCEDURE — 3074F SYST BP LT 130 MM HG: CPT | Mod: HCNC,CPTII,S$GLB, | Performed by: INTERNAL MEDICINE

## 2024-06-07 PROCEDURE — 3008F BODY MASS INDEX DOCD: CPT | Mod: HCNC,CPTII,S$GLB, | Performed by: INTERNAL MEDICINE

## 2024-06-07 PROCEDURE — 3078F DIAST BP <80 MM HG: CPT | Mod: HCNC,CPTII,S$GLB, | Performed by: INTERNAL MEDICINE

## 2024-06-07 PROCEDURE — 4010F ACE/ARB THERAPY RXD/TAKEN: CPT | Mod: HCNC,CPTII,S$GLB, | Performed by: INTERNAL MEDICINE

## 2024-06-07 PROCEDURE — 1101F PT FALLS ASSESS-DOCD LE1/YR: CPT | Mod: HCNC,CPTII,S$GLB, | Performed by: INTERNAL MEDICINE

## 2024-06-07 PROCEDURE — 1159F MED LIST DOCD IN RCRD: CPT | Mod: HCNC,CPTII,S$GLB, | Performed by: INTERNAL MEDICINE

## 2024-06-07 PROCEDURE — 99215 OFFICE O/P EST HI 40 MIN: CPT | Mod: HCNC,S$GLB,, | Performed by: INTERNAL MEDICINE

## 2024-06-07 RX ORDER — LANOLIN ALCOHOL/MO/W.PET/CERES
100 CREAM (GRAM) TOPICAL DAILY
COMMUNITY

## 2024-06-07 NOTE — PROGRESS NOTES
Subjective:       Patient ID: Dallas Garcia is a 68 y.o. male.    Chief Complaint:   Follow-up, Diabetes, Hypertension, Gastroesophageal Reflux, Hyperlipidemia, Anxiety, and Shoulder Pain    HPI: Kurt presents for Annual follow up DM/HLD and Health maintenance Issues  He has been doing great since starting the Ozempic as he's watching his diet and eating healthier.  He's also not been as hungry so is eating smaller portions  DM: Med Tx 1-Jentadueto 2.5/1000mg BID, 2- Novolog 45 units in AM/Breakfast and 45 units in PM/Dinner3-Lantus 45 units BID, 4-Ozempic 0.5mg  QWeek/Doing well and causes early satiety         Accuchecks:Lowest at 70/Highest at 250 Average=Mid 100s(150)         Diet Diary/Notes: Eats 2 meals/day: Breakfast: None/coffee, Lunch: 1/2 BLT sandwich/1/2     hamburger/no bun,water  Dinner: 3 small nu potatoes, 1/2 hamburger         Weight( 7/23)= 220 lbs,,Weight(Today)=205 lbs  HLD: Med Tx 1- Fenofibrate 160mg QD, 2-Lipitor 80mg QD     HTN: Med Tx 1- Diovan 320mg QD, 2-Toprol XL 100mg QD, 3-Norvasc 10mg QD    Anxiety: Med Tx 1- Buspar 5mg BID  Past Medical, Surgical, Social History: Please see as stated in Epic chart which has been reviewed.    Current Outpatient Medications   Medication Sig Dispense Refill    amLODIPine (NORVASC) 10 MG tablet Take 1 tablet (10 mg total) by mouth once daily. 90 tablet 3    atorvastatin (LIPITOR) 80 MG tablet TAKE 1 TABLET(80 MG) BY MOUTH EVERY DAY 90 tablet 2    blood sugar diagnostic Strp 1 strip by Misc.(Non-Drug; Combo Route) route 2 (two) times daily before meals. 200 strip 3    blood sugar diagnostic Strp 1 strip by Misc.(Non-Drug; Combo Route) route 2 (two) times daily before meals. Check glucose 2-3 days/week 2x/day before before meals 100 strip 6    busPIRone (BUSPAR) 5 MG Tab TAKE 1 TABLET BY MOUTH 1 TO 2 TIMES PER DAY AS NEEDED FOR ANXIETY 60 tablet 3    cyanocobalamin (VITAMIN B-12) 1000 MCG tablet Take 100 mcg by mouth once daily.       "ergocalciferol (ERGOCALCIFEROL) 50,000 unit Cap TAKE 1 CAPSULE BY MOUTH EVERY 7 DAYS 4 capsule 6    fenofibrate 160 MG Tab Take 1 tablet (160 mg total) by mouth once daily. 90 tablet 2    fluocinolone and shower cap 0.01 % Oil Use hs for rash on scalp 118.28 mL 3    insulin (LANTUS SOLOSTAR U-100 INSULIN) glargine 100 units/mL SubQ pen ADMINISTER 40 UNITS UNDER THE SKIN TWICE DAILY 18 mL 1    lancets (ONETOUCH ULTRASOFT LANCETS) Misc TEST four times a day before meals 150 each 6    lancets Misc 1 lancet by Misc.(Non-Drug; Combo Route) route 2 (two) times daily before meals. Check glucose 2-3 days/week 2x/day before before meals 100 each 6    loratadine (CLARITIN) 10 mg tablet Take 1 tablet (10 mg total) by mouth once daily. 90 tablet 3    metoprolol succinate (TOPROL-XL) 100 MG 24 hr tablet Take 1 tablet (100 mg total) by mouth once daily. 90 tablet 2    NOVOLOG FLEXPEN U-100 INSULIN 100 unit/mL (3 mL) InPn pen INJECT 45 UNITS INTO THE SKIN BID with major meals 75 mL 4    omeprazole (PRILOSEC) 20 MG capsule TAKE 1 CAPSULE(20 MG) BY MOUTH EVERY DAY FOR GERD 90 capsule 1    ONETOUCH ULTRA BLUE TEST STRIP Strp TEST FOUR TIMES DAILY 400 strip 1    pen needle, diabetic (BD ULTRA-FINE SHORT PEN NEEDLE) 31 gauge x 5/16" Ndle Use to administer insulin under the skin four (4) times a day; discard pen needle after each use. 400 each 3    semaglutide (OZEMPIC) 0.25 mg or 0.5 mg (2 mg/3 mL) pen injector Inject 0.5 mg into the skin every 7 days. 1 each 6    SITagliptan-metformin (JANUMET) 50-1,000 mg per tablet Take 1 tablet by mouth 2 (two) times daily with meals. Take in place of Jentadueto for Diabetes 180 tablet 1    valsartan (DIOVAN) 320 MG tablet TAKE 1 TABLET(320 MG) BY MOUTH EVERY DAY 90 tablet 0    blood-glucose meter kit Check glucose 2-3 days/week 2x/day before before meals 1 each 0     No current facility-administered medications for this visit.       Review of Systems   Musculoskeletal:  Positive for myalgias.      "   Left Shoulder Tendonitis  Left Hand finger triggering   All other systems reviewed and are negative.      Objective:      Lab Results   Component Value Date    WBC 6.60 07/14/2023    HGB 13.7 (L) 07/14/2023    HCT 42.2 07/14/2023     07/14/2023    CHOL 164 05/31/2024    TRIG 224 (H) 05/31/2024    HDL 45 05/31/2024    ALT 43 05/31/2024    AST 42 (H) 05/31/2024     05/31/2024    K 4.3 05/31/2024     05/31/2024    CREATININE 1.2 05/31/2024    BUN 11 05/31/2024    CO2 25 05/31/2024    TSH 2.094 07/14/2023    PSA 0.22 07/14/2023    INR 1.1 05/22/2019    HGBA1C 6.5 (H) 05/31/2024     Physical Exam  Vitals reviewed.   Constitutional:       General: He is not in acute distress.     Appearance: He is well-developed.   HENT:      Head: Normocephalic and atraumatic.      Mouth/Throat:      Pharynx: No oropharyngeal exudate.   Eyes:      Conjunctiva/sclera: Conjunctivae normal.   Neck:      Thyroid: No thyromegaly.      Vascular: No JVD.      Comments: No masses    Cardiovascular:      Rate and Rhythm: Normal rate and regular rhythm.      Heart sounds: No murmur heard.     No gallop.   Pulmonary:      Effort: Pulmonary effort is normal. No respiratory distress.      Breath sounds: Normal breath sounds. No wheezing.   Chest:      Chest wall: No tenderness.   Abdominal:      General: Bowel sounds are normal. There is no distension.      Palpations: Abdomen is soft. There is no mass.      Tenderness: There is no abdominal tenderness.      Comments: No Organomegaly   Musculoskeletal:         General: Normal range of motion.      Cervical back: Normal range of motion and neck supple.   Lymphadenopathy:      Cervical: No cervical adenopathy.   Skin:     General: Skin is warm and dry.   Neurological:      Mental Status: He is alert and oriented to person, place, and time.      Cranial Nerves: No cranial nerve deficit.   Psychiatric:         Judgment: Judgment normal.           Vital Signs  Temp: 97.5 °F (36.4  "°C)  Temp Source: Other (see comments)  Pulse: 77  Resp: 18  SpO2: 97 %  BP: 118/60  BP Location: Right arm  Patient Position: Sitting  Pain Score: 0-No pain  Height and Weight  Height: 5' 7" (170.2 cm)  Weight: 93 kg (205 lb)  BSA (Calculated - sq m): 2.1 sq meters  BMI (Calculated): 32.1  Weight in (lb) to have BMI = 25: 159.3    Assessment:       1. Type 2 diabetes mellitus with diabetic microalbuminuria, with long-term current use of insulin    2. Microalbuminuria due to type 2 diabetes mellitus    3. Essential hypertension    4. Mixed hyperlipidemia    5. Mixed anxiety and depressive disorder    6. Adenomatous polyp of colon, unspecified part of colon    7. Prostate cancer screening        Plan:     Health Maintenance   Topic Date Due    Shingles Vaccine (3 of 3) 06/08/2024    PROSTATE-SPECIFIC ANTIGEN  07/14/2024    Foot Exam  08/17/2024    Eye Exam  09/28/2024    Hemoglobin A1c  11/30/2024    Lipid Panel  05/31/2025    TETANUS VACCINE  10/07/2025    Colorectal Cancer Screening  02/18/2027    Hepatitis C Screening  Completed    Abdominal Aortic Aneurysm Screening  Completed        Dallas "Kurt" was seen today for follow-up, diabetes, hypertension, gastroesophageal reflux, hyperlipidemia, anxiety and shoulder pain.    Diagnoses and all orders for this visit:    Type 2 diabetes mellitus with diabetic microalbuminuria, with long-term current use of insulin/much improved and controlled with HgBA1C down to 6.5%        -     Continue: Med Tx 1-Jentadueto 2.5/1000mg BID, 2- Novolog 45 units in AM/Breakfast and 45 units in PM/Dinner3-Lantus 45 units BID, 4-Ozempic 0.5mg  QWeek  -     Comprehensive Metabolic Panel; Future  -     Hemoglobin A1C; Future    Microalbuminuria due to type 2 diabetes mellitus        -     Continue: Valsartan 320mg QD    Essential hypertension/controlled        -     Continue:  Med Tx 1- Diovan 320mg QD, 2-Toprol XL 100mg QD, 3-Norvasc 10mg QD    Mixed hyperlipidemia/controlled        -     " Continue:  Med Tx 1- Fenofibrate 160mg QD, 2-Lipitor 80mg QD   -     Comprehensive Metabolic Panel; Future  -     Lipid Panel; Future    Mixed anxiety and depressive disorder/controlled on Buspar 5mg prn    Adenomatous polyp of colon, unspecified part of colon        -     Repeat Colonoscopy 2/2027    Prostate cancer screening  -     PSA, Screening; Future    Health Maintenance        -    RTC x 6 months with 1 week prior fasting lab

## 2024-07-06 DIAGNOSIS — E11.65 UNCONTROLLED TYPE 2 DIABETES MELLITUS WITH HYPERGLYCEMIA: ICD-10-CM

## 2024-07-06 NOTE — TELEPHONE ENCOUNTER
No care due was identified.  [facility] Embedded Care Due Messages. Reference number: [id number].   7/06/2024 2:48:32 PM CDT

## 2024-07-06 NOTE — TELEPHONE ENCOUNTER
No care due was identified.  [facility] Embedded Care Due Messages. Reference number: [id number].   7/06/2024 2:55:24 PM CDT

## 2024-07-08 RX ORDER — INSULIN GLARGINE 100 [IU]/ML
40 INJECTION, SOLUTION SUBCUTANEOUS 2 TIMES DAILY
Qty: 75 ML | Refills: 1 | Status: SHIPPED | OUTPATIENT
Start: 2024-07-08

## 2024-07-08 RX ORDER — LANCETS
EACH MISCELLANEOUS
Qty: 400 EACH | Refills: 3 | Status: SHIPPED | OUTPATIENT
Start: 2024-07-08

## 2024-07-08 NOTE — TELEPHONE ENCOUNTER
Refill Routing Note   Medication(s) are not appropriate for processing by [facility] for the following reason(s):        No active prescription written by provider  Clarification of medication (Rx) details  This med order was 2 years ago. May need to update     [facility] action(s):  Defer        Medication Therapy Plan: Patient may have requested the older order; More current order notes test 2-3x's a week but must be this brand of lancets for insurance      Appointments  past 12m or future 3m with PCP    Date Provider   Last Visit   6/7/2024 Lisette Corado MD   Next Visit   12/10/2024 Lisette Corado MD   ED visits in past 90 days: 0        Note composed:11:00 AM 07/08/2024

## 2024-07-08 NOTE — TELEPHONE ENCOUNTER
Refill Routing Note   Medication(s) are not appropriate for processing by [facility] for the following reason(s):        Clarification of medication (Rx) details    [facility] action(s):  Defer        Medication Therapy Plan: Came over with no sig; pending for previous directions      Appointments  past 12m or future 3m with PCP    Date Provider   Last Visit   6/7/2024 Lisette Corado MD   Next Visit   7/6/2024 Lisette Corado MD   ED visits in past 90 days: 0        Note composed:10:53 AM 07/08/2024

## 2024-07-12 ENCOUNTER — PATIENT MESSAGE (OUTPATIENT)
Dept: INTERNAL MEDICINE | Facility: CLINIC | Age: 69
End: 2024-07-12
Payer: MEDICARE

## 2024-07-12 DIAGNOSIS — E11.69 TYPE 2 DIABETES MELLITUS WITH OTHER SPECIFIED COMPLICATION, WITHOUT LONG-TERM CURRENT USE OF INSULIN: Primary | ICD-10-CM

## 2024-07-17 DIAGNOSIS — E11.9 TYPE 2 DIABETES MELLITUS WITHOUT COMPLICATION: ICD-10-CM

## 2024-08-22 RX ORDER — BUSPIRONE HYDROCHLORIDE 5 MG/1
TABLET ORAL
Qty: 60 TABLET | Refills: 3 | Status: SHIPPED | OUTPATIENT
Start: 2024-08-22

## 2024-08-22 NOTE — TELEPHONE ENCOUNTER
Care Due:                  Date            Visit Type   Department     Provider  --------------------------------------------------------------------------------                                EP -                              PRIMARY      [facility] INTERNAL  Last Visit: 06-      CARE ([facility])   [facility]       Lis[facility]                              PRIMARY      [facility] INTERNAL  Next Visit: 12-      CARE ([facility])   [facility]  Test          Frequency    Reason                     Performed    Due Date  --------------------------------------------------------------------------------    Vitamin D...  12 months..  ergocalciferol...........  Not Found    Overdue    [employer] Embedded Care Due Messages. Reference number: [id number].   8/22/2024 3:01:43 PM CDT

## 2024-08-23 DIAGNOSIS — E78.2 MIXED HYPERLIPIDEMIA: ICD-10-CM

## 2024-08-23 DIAGNOSIS — E11.65 TYPE 2 DIABETES MELLITUS WITH HYPERGLYCEMIA, UNSPECIFIED WHETHER LONG TERM INSULIN USE: ICD-10-CM

## 2024-08-23 RX ORDER — ATORVASTATIN CALCIUM 80 MG/1
80 TABLET, FILM COATED ORAL DAILY
Qty: 90 TABLET | Refills: 3 | Status: SHIPPED | OUTPATIENT
Start: 2024-08-23

## 2024-08-23 RX ORDER — FENOFIBRATE 160 MG/1
160 TABLET ORAL DAILY
Qty: 90 TABLET | Refills: 2 | Status: SHIPPED | OUTPATIENT
Start: 2024-08-23

## 2024-08-23 NOTE — TELEPHONE ENCOUNTER
No care due was identified.  [facility] Embedded Care Due Messages. Reference number: [id number].   8/23/2024 10:56:07 AM CDT

## 2024-08-23 NOTE — TELEPHONE ENCOUNTER
Refill Decision Note   Dallas Jose  is requesting a refill authorization.  Brief Assessment and Rationale for Refill:  Approve     Medication Therapy Plan:       Medication Reconciliation Completed: No   Comments:     No Care Gaps recommended.     Note composed:4:18 PM 08/23/2024

## 2024-08-23 NOTE — TELEPHONE ENCOUNTER
No care due was identified.  [facility] Embedded Care Due Messages. Reference number: [id number].   8/23/2024 10:56:50 AM CDT

## 2024-08-23 NOTE — TELEPHONE ENCOUNTER
No care due was identified.  [facility] Embedded Care Due Messages. Reference number: [id number].   8/23/2024 10:58:47 AM CDT

## 2024-08-23 NOTE — TELEPHONE ENCOUNTER
Refill Authorization Note     Refill Decision Note   Dallas Garcia  is requesting a refill authorization.  Brief Assessment and Rationale for Refill:  Approve     Medication Therapy Plan:       Medication Reconciliation Completed: No   Comments:     No Care Gaps recommended.     Note composed:4:18 PM 08/23/2024           No.

## 2024-10-02 NOTE — TELEPHONE ENCOUNTER
Bianca, can you add a PSA and TSH to pt's lab scheduled in [place]; I've placed the orders.  Thanks so much   What Type Of Note Output Would You Prefer (Optional)?: Bullet Format How Severe Is Your Rash?: mild Is This A New Presentation, Or A Follow-Up?: Rash

## 2024-10-07 DIAGNOSIS — R80.9 TYPE 2 DIABETES MELLITUS WITH MICROALBUMINURIA, WITH LONG-TERM CURRENT USE OF INSULIN: ICD-10-CM

## 2024-10-07 DIAGNOSIS — Z79.4 TYPE 2 DIABETES MELLITUS WITH MICROALBUMINURIA, WITH LONG-TERM CURRENT USE OF INSULIN: ICD-10-CM

## 2024-10-07 DIAGNOSIS — E11.29 TYPE 2 DIABETES MELLITUS WITH MICROALBUMINURIA, WITH LONG-TERM CURRENT USE OF INSULIN: ICD-10-CM

## 2024-10-07 RX ORDER — SEMAGLUTIDE 0.68 MG/ML
0.5 INJECTION, SOLUTION SUBCUTANEOUS
Qty: 1 EACH | Refills: 6 | Status: SHIPPED | OUTPATIENT
Start: 2024-10-07

## 2024-10-09 ENCOUNTER — OFFICE VISIT (OUTPATIENT)
Dept: SPORTS MEDICINE | Facility: CLINIC | Age: 69
End: 2024-10-09
Payer: MEDICARE

## 2024-10-09 ENCOUNTER — HOSPITAL ENCOUNTER (OUTPATIENT)
Dept: RADIOLOGY | Facility: HOSPITAL | Age: 69
Discharge: HOME | End: 2024-10-09
Attending: PHYSICIAN ASSISTANT
Payer: MEDICARE

## 2024-10-09 VITALS
HEART RATE: 71 BPM | WEIGHT: 205 LBS | HEIGHT: 67 IN | DIASTOLIC BLOOD PRESSURE: 81 MMHG | BODY MASS INDEX: 32.18 KG/M2 | SYSTOLIC BLOOD PRESSURE: 159 MMHG

## 2024-10-09 DIAGNOSIS — M25.511 RIGHT SHOULDER PAIN, UNSPECIFIED CHRONICITY: ICD-10-CM

## 2024-10-09 DIAGNOSIS — M25.511 CHRONIC RIGHT SHOULDER PAIN: Primary | ICD-10-CM

## 2024-10-09 DIAGNOSIS — G89.29 CHRONIC RIGHT SHOULDER PAIN: Primary | ICD-10-CM

## 2024-10-09 DIAGNOSIS — M75.41 SUBACROMIAL IMPINGEMENT OF RIGHT SHOULDER: ICD-10-CM

## 2024-10-09 DIAGNOSIS — M75.51 SUBACROMIAL BURSITIS OF RIGHT SHOULDER JOINT: ICD-10-CM

## 2024-10-09 PROCEDURE — 73030 X-RAY EXAM OF SHOULDER: CPT | Mod: TC,HCNC,RT

## 2024-10-09 PROCEDURE — 3044F HG A1C LEVEL LT 7.0%: CPT | Mod: HCNC,CPTII,S$GLB, | Performed by: PHYSICIAN ASSISTANT

## 2024-10-09 PROCEDURE — 73030 X-RAY EXAM OF SHOULDER: CPT | Mod: 26,HCNC,RT, | Performed by: RADIOLOGY

## 2024-10-09 PROCEDURE — 3288F FALL RISK ASSESSMENT DOCD: CPT | Mod: HCNC,CPTII,S$GLB, | Performed by: PHYSICIAN ASSISTANT

## 2024-10-09 PROCEDURE — 1160F RVW MEDS BY RX/DR IN RCRD: CPT | Mod: HCNC,CPTII,S$GLB, | Performed by: PHYSICIAN ASSISTANT

## 2024-10-09 PROCEDURE — 4010F ACE/ARB THERAPY RXD/TAKEN: CPT | Mod: HCNC,CPTII,S$GLB, | Performed by: PHYSICIAN ASSISTANT

## 2024-10-09 PROCEDURE — 3072F LOW RISK FOR RETINOPATHY: CPT | Mod: HCNC,CPTII,S$GLB, | Performed by: PHYSICIAN ASSISTANT

## 2024-10-09 PROCEDURE — 1101F PT FALLS ASSESS-DOCD LE1/YR: CPT | Mod: HCNC,CPTII,S$GLB, | Performed by: PHYSICIAN ASSISTANT

## 2024-10-09 PROCEDURE — 1125F AMNT PAIN NOTED PAIN PRSNT: CPT | Mod: HCNC,CPTII,S$GLB, | Performed by: PHYSICIAN ASSISTANT

## 2024-10-09 PROCEDURE — 20611 DRAIN/INJ JOINT/BURSA W/US: CPT | Mod: HCNC,RT,S$GLB, | Performed by: PHYSICIAN ASSISTANT

## 2024-10-09 PROCEDURE — 3077F SYST BP >= 140 MM HG: CPT | Mod: HCNC,CPTII,S$GLB, | Performed by: PHYSICIAN ASSISTANT

## 2024-10-09 PROCEDURE — 1159F MED LIST DOCD IN RCRD: CPT | Mod: HCNC,CPTII,S$GLB, | Performed by: PHYSICIAN ASSISTANT

## 2024-10-09 PROCEDURE — 99999 PR PBB SHADOW E&M-EST. PATIENT-LVL IV: CPT | Mod: PBBFAC,HCNC,, | Performed by: PHYSICIAN ASSISTANT

## 2024-10-09 PROCEDURE — 99213 OFFICE O/P EST LOW 20 MIN: CPT | Mod: 25,HCNC,S$GLB, | Performed by: PHYSICIAN ASSISTANT

## 2024-10-09 PROCEDURE — 3079F DIAST BP 80-89 MM HG: CPT | Mod: HCNC,CPTII,S$GLB, | Performed by: PHYSICIAN ASSISTANT

## 2024-10-09 PROCEDURE — 3008F BODY MASS INDEX DOCD: CPT | Mod: HCNC,CPTII,S$GLB, | Performed by: PHYSICIAN ASSISTANT

## 2024-10-09 RX ORDER — IBUPROFEN 200 MG
200 TABLET ORAL EVERY 6 HOURS PRN
COMMUNITY

## 2024-10-09 RX ORDER — BUPIVACAINE HYDROCHLORIDE 2.5 MG/ML
2 INJECTION, SOLUTION INFILTRATION; PERINEURAL
Status: DISCONTINUED | OUTPATIENT
Start: 2024-10-09 | End: 2024-10-09 | Stop reason: HOSPADM

## 2024-10-09 RX ORDER — METHYLPREDNISOLONE ACETATE 40 MG/ML
40 INJECTION, SUSPENSION INTRA-ARTICULAR; INTRALESIONAL; INTRAMUSCULAR; SOFT TISSUE
Status: DISCONTINUED | OUTPATIENT
Start: 2024-10-09 | End: 2024-10-09 | Stop reason: HOSPADM

## 2024-10-09 RX ADMIN — BUPIVACAINE HYDROCHLORIDE 2 ML: 2.5 INJECTION, SOLUTION INFILTRATION; PERINEURAL at 02:10

## 2024-10-09 RX ADMIN — METHYLPREDNISOLONE ACETATE 40 MG: 40 INJECTION, SUSPENSION INTRA-ARTICULAR; INTRALESIONAL; INTRAMUSCULAR; SOFT TISSUE at 02:10

## 2024-10-09 NOTE — PROGRESS NOTES
NEW PATIENT    HISTORY OF PRESENT ILLNESS   68 y.o. Male with a history of right shoulder pain who began having pain after a canoeing trip in [place] back in July.  He denies having one specific injury but attributes his symptoms to flipping in the canoe and having to swim against the current then canoeing for 4 hours.  He has had no limitations to his range of motion or strength but continues to have pain depending on his physical activity.  His pain is most pronounced when his arm is raised overhead and then trying to lower it.  He has discomfort at night while sleeping and when getting dressed.        - mechanical symptoms, - instability    Is affecting ADLs.  Pain is 6/10 at it's worst.        PAST MEDICAL HISTORY    Past Medical History:   Diagnosis Date    Adenomatous colon polyp 02/2022    Repeat due 2/2027 per Dr KRIS Willett    Allergy     Anxiety     Arthritis     Cataract     Colon polyps 2009    Diabetes mellitus     Diabetes mellitus type II     Fatty liver, alcoholic     s/p Hepatology Eval/2019    GERD (gastroesophageal reflux disease)     History of alcohol abuse     Hyperlipidemia     Hypertension     Neuromuscular disorder     Psoriasis     Base of scalp/behind ears       PAST SURGICAL HISTORY     Past Surgical History:   Procedure Laterality Date    ANKLE FUSION      left    CARPAL TUNNEL RELEASE      left    COLONOSCOPY      COLONOSCOPY N/A 2/18/2022    Procedure: COLONOSCOPY;  Surgeon: NADER Willett MD;  Location: [facility] ([address] [place] [address]);  Service: Endoscopy;  Laterality: N/A;  fully vacc-inst mail-tb.Instructions emailed per [facility] Covid test on 2/15 at [email or web address]    FRACTURE SURGERY      KNEE SURGERY      Uvalectomy      VASECTOMY         FAMILY HISTORY    Family History   Problem Relation Name Age of Onset    Hypertension Father      Diabetes Father      Aneurysm Father          brain    Diabetes Mother      Diabetes Brother      Amblyopia Neg Hx      Blindness Neg Hx      Cancer Neg Hx       "Cataracts Neg Hx      Glaucoma Neg Hx      Macular degeneration Neg Hx      Retinal detachment Neg Hx      Strabismus Neg Hx      Stroke Neg Hx      Thyroid disease Neg Hx         SOCIAL HISTORY    Social History     Socioeconomic History    Marital status: Single    Number of children: 2   Tobacco Use    Smoking status: Former    Smokeless tobacco: Never   Substance and Sexual Activity    Alcohol use: Yes     Comment: socially    Drug use: No    Sexual activity: Not Currently   Social History Narrative    7/2022: Pt  with 2 daughters(Helen and Mary); 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;     Helen is 21 y/o and attending [employer], majoring in Psychology; she has defined herself as "Transsexual"    And is transitioning to "Jorge"    Mary is 15 y/o and attending [employer], where she has been very happy    6/2024    Mary has graduated form [employer] and is starting [employer] planning to study Engineering       MEDICATIONS      Current Outpatient Medications:     amLODIPine (NORVASC) 10 MG tablet, Take 1 tablet (10 mg total) by mouth once daily., Disp: 90 tablet, Rfl: 3    atorvastatin (LIPITOR) 80 MG tablet, Take 1 tablet (80 mg total) by mouth once daily., Disp: 90 tablet, Rfl: 3    blood sugar diagnostic Strp, 1 strip by Misc.(Non-Drug; Combo Route) route 2 (two) times daily before meals. Check glucose 2-3 days/week 2x/day before before meals, Disp: 200 strip, Rfl: 3    blood sugar diagnostic Strp, Check Glucose 2-3x/day before meals, Disp: 200 strip, Rfl: 6    blood-glucose meter kit, Check glucose 2-3 days/week 2x/day before before meals, Disp: 1 each, Rfl: 0    busPIRone (BUSPAR) 5 MG Tab, TAKE 1 TABLET BY MOUTH 1 TO 2 TIMES PER DAY AS NEEDED FOR ANXIETY, Disp: 60 tablet, Rfl: 3    cyanocobalamin (VITAMIN B-12) 1000 MCG tablet, Take 100 mcg by mouth once daily., Disp: , Rfl:     ergocalciferol (ERGOCALCIFEROL) 50,000 unit Cap, TAKE 1 CAPSULE BY MOUTH EVERY 7 DAYS, Disp: 4 capsule, Rfl: 6    " "fenofibrate 160 MG Tab, Take 1 tablet (160 mg total) by mouth once daily., Disp: 90 tablet, Rfl: 2    fluocinolone and shower cap 0.01 % Oil, Use hs for rash on scalp, Disp: 118.28 mL, Rfl: 3    ibuprofen (ADVIL,MOTRIN) 200 MG tablet, Take 200 mg by mouth every 6 (six) hours as needed for Pain., Disp: , Rfl:     insulin glargine U-100, Lantus, (LANTUS SOLOSTAR U-100 INSULIN) 100 unit/mL (3 mL) InPn pen, Inject 40 Units into the skin 2 (two) times a day., Disp: 75 mL, Rfl: 1    lancets (ONETOUCH ULTRASOFT LANCETS) Misc, TEST four times a day before meals, Disp: 400 each, Rfl: 3    lancets Misc, 1 lancet by Misc.(Non-Drug; Combo Route) route 2 (two) times daily before meals. Check glucose 2-3 days/week 2x/day before before meals, Disp: 100 each, Rfl: 6    loratadine (CLARITIN) 10 mg tablet, Take 1 tablet (10 mg total) by mouth once daily., Disp: 90 tablet, Rfl: 3    metoprolol succinate (TOPROL-XL) 100 MG 24 hr tablet, Take 1 tablet (100 mg total) by mouth once daily., Disp: 90 tablet, Rfl: 2    NOVOLOG FLEXPEN U-100 INSULIN 100 unit/mL (3 mL) InPn pen, INJECT 45 UNITS INTO THE SKIN BID with major meals, Disp: 75 mL, Rfl: 4    omeprazole (PRILOSEC) 20 MG capsule, TAKE 1 CAPSULE(20 MG) BY MOUTH EVERY DAY FOR GERD, Disp: 90 capsule, Rfl: 1    ONETOUCH ULTRA BLUE TEST STRIP Strp, TEST FOUR TIMES DAILY, Disp: 400 strip, Rfl: 1    pen needle, diabetic (BD ULTRA-FINE SHORT PEN NEEDLE) 31 gauge x 5/16" Ndle, Use to administer insulin under the skin four (4) times a day; discard pen needle after each use., Disp: 400 each, Rfl: 3    semaglutide (OZEMPIC) 0.25 mg or 0.5 mg (2 mg/3 mL) pen injector, Inject 0.5 mg into the skin every 7 days., Disp: 1 each, Rfl: 6    SITagliptan-metformin (JANUMET) 50-1,000 mg per tablet, Take 1 tablet by mouth 2 (two) times daily with meals. Take in place of Jentadueto for Diabetes, Disp: 180 tablet, Rfl: 1    valsartan (DIOVAN) 320 MG tablet, Take 1 tablet (320 mg total) by mouth once daily., " "Disp: 90 tablet, Rfl: 3    ALLERGIES     Review of patient's allergies indicates:  No Known Allergies      REVIEW OF SYSTEMS   Constitution: Negative. Negative for chills, fever and night sweats.   HENT: Negative for congestion and headaches.    Eyes: Negative for blurred vision, left vision loss and right vision loss.   Cardiovascular: Negative for chest pain and syncope.   Respiratory: Negative for cough and shortness of breath.    Endocrine: Negative for polydipsia, polyphagia and polyuria.   Hematologic/Lymphatic: Negative for bleeding problem. Does not bruise/bleed easily.   Skin: Negative for dry skin, itching and rash.   Musculoskeletal: Negative for falls. Positive for right shoulder pain.  Gastrointestinal: Negative for abdominal pain and bowel incontinence.   Genitourinary: Negative for bladder incontinence and nocturia.   Neurological: Negative for disturbances in coordination, loss of balance and seizures.   Psychiatric/Behavioral: Negative for depression. The patient does not have insomnia.    Allergic/Immunologic: Negative for hives and persistent infections.     PHYSICAL EXAMINATION    Vitals: BP (!) 159/81 (BP Location: Right arm, Patient Position: Sitting)   Pulse 71   Ht 5' 7" (1.702 m)   Wt 93 kg (205 lb)   BMI 32.11 kg/m²     General: The patient appears active and healthy with no apparent physical problems.  No disturbance of mood or affect is demonstrated. Alert and Oriented.    HEENT: Eyes normal, pupils equally round, nose normal.    Resp: Equal and symmetrical chest rises. No wheezing    CV: Regular rate    Neck: Supple; nonpainful range of motion.    Extremities: no cyanosis, clubbing, edema, or diffuse swelling.  Palpable pulses, good capillary refill of the digits.  No coolness, discoloration, edema or obvious varicosities.    Skin: no lesions noted.    Lymphatic: no detected adenopathy in the upper or lower extremities.    Neurologic: normal mental status, normal reflexes, normal " gait and balance.  Patient is alert and oriented to person, place and time.  No flaccidity or spasticity is noted.  No motor or sensory deficits are noted.  Light touch is intact    Orthopaedic: SHOULDER EXAM - RIGHT    Inspection:   Normal skin color and appearance with no scars.  No muscle atrophy noted.  No scapular winging.      Palpation: No tenderness of the acromioclavicular joint, lateral edge of the acromion, biceps tendon, trapezius muscle or scapulothoracic bursa.      ROM:      PROM:     FE - 180°    Abd/ER -  90°  Abd/IR -  45°   Add/ER -  60°     AROM:    FE - 180°    Abd/ER -  90°  Abd/IR -  45°   Add/ER -  60°         Tests:     + Holguin, + Neer's, - Cross Arm Adduction, - [place], - [place], - Speed. - Belly Press,  + Jobes, - Lift Off    Stability: - sulcus, - apprehension, - relocation, - load and shift, - DLS      Motor:  Rotator cuff strength is 5/5 supraspinatus, 5/5 infraspinatus, 5/5 subscapularis. Biceps, triceps and deltoid strength is 5/5.      Neuro     Distally there are no paresthesias, and sensation is intact to light touch in the median, ulnar, and radial distributions.  Reflexes are 2/2 biceps, triceps and brachioradialis.        IMAGING    X-ray Shoulder 2 or More Views Right  Narrative: EXAMINATION:  XR SHOULDER COMPLETE 2 OR MORE VIEWS RIGHT    CLINICAL HISTORY:  Pain in right shoulder    TECHNIQUE:  Two or three views of the right shoulder were performed.    COMPARISON:  None    FINDINGS:  The glenohumeral joint appears normal.    There is normal humeral head contour.    There is no significant degenerative change.    The acromioclavicular joint appears normal.  Mild osteophyte at the right acromioclavicular joint.    No fracture, no osseous lesions.    The right upper thoracic region and remainder of the visualized soft tissues and osseous structures appear normal.  Impression: No abnormality seen.    Electronically signed by: Elisabet Rick  MD  Date:    10/09/2024  Time:    14:28        IMPRESSION       ICD-10-CM ICD-9-CM   1. Chronic right shoulder pain  M25.511 719.41    G89.29 338.29   2. Subacromial impingement of right shoulder  M75.41 726.19   3. Subacromial bursitis of right shoulder joint  M75.51 726.19       MEDICATIONS PRESCRIBED      None    RECOMMENDATIONS     History and physical examination are consistent with subacromial impingement/bursitis  I recommended and performed a subacromial corticosteroid injection done under ultrasound guidance  Continue activity modifications with frequent icing as needed for pain  Return to clinic in 1 month for repeat evaluation; we will consider getting an MRI if symptoms persist    Sports Medicine US - Guidance for Needle Placement    Date/Time: 10/9/2024 2:00 PM    Performed by: Paco Vasquez PA-C  Authorized by: Paco Vasquez PA-C  Preparation: Patient was prepped and draped in the usual sterile fashion.  Local anesthesia used: yes    Anesthesia:  Local anesthesia used: yes  Local Anesthetic: co-phenylcaine spray    Sedation:  Patient sedated: no    Patient tolerance: patient tolerated the procedure well with no immediate complications      Large Joint Aspiration/Injection: R subacromial bursa    Date/Time: 10/9/2024 2:00 PM    Performed by: Paco Vasquez PA-C  Authorized by: Paco Vasquez PA-C    Consent Done?:  Yes (Verbal)  Indications:  Pain  Site marked: the procedure site was marked    Timeout: prior to procedure the correct patient, procedure, and site was verified    Prep: patient was prepped and draped in usual sterile fashion      Local anesthesia used?: Yes    Local anesthetic:  Co-phenylcaine spray    Details:  Needle Size:  22 G  Ultrasonic Guidance for needle placement?: Yes (Ultrasound guidance was used for needle localization.  Images were saved and stored for documentation.  Dynamic visualization of the needle was continuous throughout the procedure and maintained  accurate placement)    Images are saved and documented.  Approach:  Lateral  Location:  Shoulder  Site:  R subacromial bursa  Medications:  40 mg methylPREDNISolone acetate 40 mg/mL; 2 mL BUPivacaine 0.25% (2.5 mg/ml) 0.25 % (2.5 mg/mL)  Patient tolerance:  Patient tolerated the procedure well with no immediate complications          All of their questions were answered.  They will call the clinic with any questions or concerns in the interim.     Should the patient's symptoms worsen, persist, or fail to improve they should return for reevaluation and I would be happy to see them back anytime.                  Paco Vasquez PA-C       Please be aware that this note has been generated with the assistance of [employer] voice-to-text.  Please excuse any spelling or grammatical errors.     Thank you for choosing Paco Vasquez PA-C for your sports medicine care. It is our goal to provide you with exceptional care that will help keep you healthy, active, and get you back in the game.     If you felt that you received exemplary care today, please consider leaving feedback for Mr. Pedro PA-C on [employer] at [email or web address]       Please do not hesitate to reach out to us via email, phone, or MyChart with any questions, concerns, or feedback.

## 2024-10-28 ENCOUNTER — TELEPHONE (OUTPATIENT)
Dept: INTERNAL MEDICINE | Facility: CLINIC | Age: 69
End: 2024-10-28
Payer: MEDICARE

## 2024-10-28 DIAGNOSIS — M51.379 DEGENERATION OF INTERVERTEBRAL DISC OF LUMBOSACRAL REGION, UNSPECIFIED WHETHER PAIN PRESENT: Primary | ICD-10-CM

## 2024-10-29 DIAGNOSIS — J30.2 SEASONAL ALLERGIES: ICD-10-CM

## 2024-10-29 RX ORDER — LORATADINE 10 MG/1
10 TABLET ORAL
Qty: 90 TABLET | Refills: 3 | Status: SHIPPED | OUTPATIENT
Start: 2024-10-29

## 2024-10-30 ENCOUNTER — PATIENT OUTREACH (OUTPATIENT)
Dept: ADMINISTRATIVE | Facility: HOSPITAL | Age: 69
End: 2024-10-30
Payer: MEDICARE

## 2024-10-30 DIAGNOSIS — I10 HTN (HYPERTENSION), BENIGN: ICD-10-CM

## 2024-10-31 RX ORDER — AMLODIPINE BESYLATE 10 MG/1
10 TABLET ORAL DAILY
Qty: 90 TABLET | Refills: 3 | Status: SHIPPED | OUTPATIENT
Start: 2024-10-31

## 2024-11-11 DIAGNOSIS — K21.9 GASTROESOPHAGEAL REFLUX DISEASE: ICD-10-CM

## 2024-11-11 RX ORDER — OMEPRAZOLE 20 MG/1
CAPSULE, DELAYED RELEASE ORAL
Qty: 90 CAPSULE | Refills: 1 | Status: SHIPPED | OUTPATIENT
Start: 2024-11-11

## 2024-11-11 NOTE — TELEPHONE ENCOUNTER
Refill Decision Note   D[place] Jose  is requesting a refill authorization.  Brief Assessment and Rationale for Refill:  Approve     Medication Therapy Plan:         Comments:     Note composed:3:29 PM 11/11/2024

## 2024-11-11 NOTE — TELEPHONE ENCOUNTER
No care due was identified.  [facility] Embedded Care Due Messages. Reference number: [id number].   11/11/2024 11:00:27 AM CST

## 2024-12-03 ENCOUNTER — LAB VISIT (OUTPATIENT)
Dept: LAB | Facility: HOSPITAL | Age: 69
End: 2024-12-03
Attending: INTERNAL MEDICINE
Payer: MEDICARE

## 2024-12-03 DIAGNOSIS — E11.29 TYPE 2 DIABETES MELLITUS WITH DIABETIC MICROALBUMINURIA, WITH LONG-TERM CURRENT USE OF INSULIN: ICD-10-CM

## 2024-12-03 DIAGNOSIS — E78.2 MIXED HYPERLIPIDEMIA: ICD-10-CM

## 2024-12-03 DIAGNOSIS — Z79.4 TYPE 2 DIABETES MELLITUS WITH DIABETIC MICROALBUMINURIA, WITH LONG-TERM CURRENT USE OF INSULIN: ICD-10-CM

## 2024-12-03 DIAGNOSIS — Z12.5 PROSTATE CANCER SCREENING: ICD-10-CM

## 2024-12-03 DIAGNOSIS — R80.9 TYPE 2 DIABETES MELLITUS WITH DIABETIC MICROALBUMINURIA, WITH LONG-TERM CURRENT USE OF INSULIN: ICD-10-CM

## 2024-12-03 LAB
ALBUMIN SERPL BCP-MCNC: 4.1 G/DL (ref 3.5–5.2)
ALP SERPL-CCNC: 81 U/L (ref 40–150)
ALT SERPL W/O P-5'-P-CCNC: 35 U/L (ref 10–44)
ANION GAP SERPL CALC-SCNC: 6 MMOL/L (ref 8–16)
AST SERPL-CCNC: 38 U/L (ref 10–40)
BILIRUB SERPL-MCNC: 0.5 MG/DL (ref 0.1–1)
BUN SERPL-MCNC: 16 MG/DL (ref 8–23)
CALCIUM SERPL-MCNC: 9.5 MG/DL (ref 8.7–10.5)
CHLORIDE SERPL-SCNC: 109 MMOL/L (ref 95–110)
CHOLEST SERPL-MCNC: 164 MG/DL (ref 120–199)
CHOLEST/HDLC SERPL: 3.3 {RATIO} (ref 2–5)
CO2 SERPL-SCNC: 26 MMOL/L (ref 23–29)
COMPLEXED PSA SERPL-MCNC: 0.18 NG/ML (ref 0–4)
CREAT SERPL-MCNC: 1.2 MG/DL (ref 0.5–1.4)
EST. GFR  (NO RACE VARIABLE): >60 ML/MIN/1.73 M^2
ESTIMATED AVG GLUCOSE: 126 MG/DL (ref 68–131)
GLUCOSE SERPL-MCNC: 107 MG/DL (ref 70–110)
HBA1C MFR BLD: 6 % (ref 4–5.6)
HDLC SERPL-MCNC: 50 MG/DL (ref 40–75)
HDLC SERPL: 30.5 % (ref 20–50)
LDLC SERPL CALC-MCNC: 66.6 MG/DL (ref 63–159)
NONHDLC SERPL-MCNC: 114 MG/DL
POTASSIUM SERPL-SCNC: 5 MMOL/L (ref 3.5–5.1)
PROT SERPL-MCNC: 7.7 G/DL (ref 6–8.4)
SODIUM SERPL-SCNC: 141 MMOL/L (ref 136–145)
TRIGL SERPL-MCNC: 237 MG/DL (ref 30–150)

## 2024-12-03 PROCEDURE — 84153 ASSAY OF PSA TOTAL: CPT | Mod: HCNC | Performed by: INTERNAL MEDICINE

## 2024-12-03 PROCEDURE — 36415 COLL VENOUS BLD VENIPUNCTURE: CPT | Mod: HCNC | Performed by: INTERNAL MEDICINE

## 2024-12-03 PROCEDURE — 80061 LIPID PANEL: CPT | Mod: HCNC | Performed by: INTERNAL MEDICINE

## 2024-12-03 PROCEDURE — 83036 HEMOGLOBIN GLYCOSYLATED A1C: CPT | Mod: HCNC | Performed by: INTERNAL MEDICINE

## 2024-12-03 PROCEDURE — 80053 COMPREHEN METABOLIC PANEL: CPT | Mod: HCNC | Performed by: INTERNAL MEDICINE

## 2024-12-09 NOTE — PROGRESS NOTES
Subjective:       Patient ID: Dallas Garcia is a 69 y.o. male.    Chief Complaint:   Follow-up, Diabetes, Hyperlipidemia, Hypertension, Sleep Apnea, Gastroesophageal Reflux, and Anxiety    HPI: Mr Garcia presents for follow up DM,HTN,HLD and Chronic Issues    DM: Med Tx 1-Janumet 50/1000 mg BID, 2- Novolog 45 units in AM/Breakfast and 45 units in PM/Dinner3-Lantus 45 units BID, 4-Ozempic 0.5mg  QWeek         Accuchecks:+ Occasional lows: 2x/month         Diet Diary/Notes:          Weight( 6/2024)= 205 lbs,,Weight(Today)=205 lbs  HLD: Med Tx 1- Fenofibrate 160mg QD, 2-Lipitor 80mg QD      HTN: Med Tx 1- Diovan 320mg QD, 2-Toprol XL 100mg QD, 3-Norvasc 10mg QD     Anxiety: Med Tx 1- Buspar 5mg BID    History of Present Illness    CHIEF COMPLAINT:  Mr. Garcia presents today for follow-up on diabetes management and medication review.    DIABETES MANAGEMENT:  His A1C has improved to 6.0. He denies daily hypoglycemic episodes or severely elevated glucose levels, stating his blood sugar has never exceeded 250 mg/dL. He experiences occasional low blood sugar a couple times per month, recognizing symptoms such as hand shaking around noon if he has not eaten, especially on days with physical activity. He manages these episodes by consuming food and checks his blood sugar when feeling symptomatic.    MEDICATIONS:  He is currently taking Ozempic (semaglutide) 0.5 mg weekly and expresses interest in increasing the dose. His insulin regimen consists of Lantus 45 units twice daily and Novolog 40-45 units with breakfast. Other medications include Valsartan 320 mg daily, Vitamin D 50,000 units,  omeprazole for acid reflux, Claritin, and Buspar as needed for anxiety. He reports using buspar sporadically, particularly during periods of alcohol abstinence.    WEIGHT MANAGEMENT:  He reports a current weight of 205 lbs with a goal weight of 185-190 lbs.    EXERCISE AND LIFESTYLE:  He plans to increase physical  activity by purchasing a condo on the [facility], staying there from Thursday to Sunday each week. His planned activities include joining a golf league on Thursdays, working out in the pool or exercise room on Fridays and Saturdays, and potentially visiting a casino on Saturday nights. He believes this new routine will provide more exercise for three days a week, as opposed to being sedentary at home.    SKIN CONCERNS:  He reports developing skin tags on both sides of his chest, noting they are likely benign and more of a cosmetic concern. He also mentions a persistent skin issue that he continues to pick at, despite it not causing discomfort. He denies having any previous skin problems, stating he has always had good skin and has never needed to use sunscreen.    NEUROPATHY:  He reports mild burning pain and numbness in his feet, which he attributes to diabetes. He experiences a slight decrease in foot sensation. The symptoms are described as mild and do not appear to significantly impact his daily activities.    PREVENTIVE CARE:  He received a flu vaccine last month but declines the COVID-19 vaccine, citing concerns about potential side effects from multiple vaccinations. He has scheduled an eye exam with Dr. Peralta for February.      ROS:  Integumentary: +skin lesion  Neurological: +numbness, +tremors           Past Medical, Surgical, Social History: Please see as stated in Epic chart which has been reviewed.    Current Outpatient Medications   Medication Sig Dispense Refill    amLODIPine (NORVASC) 10 MG tablet Take 1 tablet (10 mg total) by mouth once daily. 90 tablet 3    atorvastatin (LIPITOR) 80 MG tablet Take 1 tablet (80 mg total) by mouth once daily. 90 tablet 3    blood sugar diagnostic Strp 1 strip by Misc.(Non-Drug; Combo Route) route 2 (two) times daily before meals. Check glucose 2-3 days/week 2x/day before before meals 200 strip 3    blood sugar diagnostic Strp Check Glucose 2-3x/day  "before meals 200 strip 6    blood-glucose meter kit Check glucose 2-3 days/week 2x/day before before meals 1 each 0    busPIRone (BUSPAR) 5 MG Tab TAKE 1 TABLET BY MOUTH 1 TO 2 TIMES PER DAY AS NEEDED FOR ANXIETY 60 tablet 3    cyanocobalamin (VITAMIN B-12) 1000 MCG tablet Take 100 mcg by mouth once daily.      fenofibrate 160 MG Tab Take 1 tablet (160 mg total) by mouth once daily. 90 tablet 2    fluocinolone and shower cap 0.01 % Oil Use hs for rash on scalp 118.28 mL 3    ibuprofen (ADVIL,MOTRIN) 200 MG tablet Take 200 mg by mouth every 6 (six) hours as needed for Pain.      insulin glargine U-100, Lantus, (LANTUS SOLOSTAR U-100 INSULIN) 100 unit/mL (3 mL) InPn pen Inject 40 Units into the skin 2 (two) times a day. 75 mL 1    lancets (ONETOUCH ULTRASOFT LANCETS) Misc TEST four times a day before meals 400 each 3    lancets Misc 1 lancet by Misc.(Non-Drug; Combo Route) route 2 (two) times daily before meals. Check glucose 2-3 days/week 2x/day before before meals 100 each 6    loratadine (CLARITIN) 10 mg tablet TAKE 1 TABLET BY MOUTH EVERY DAY 90 tablet 3    metoprolol succinate (TOPROL-XL) 100 MG 24 hr tablet Take 1 tablet (100 mg total) by mouth once daily. 90 tablet 2    NOVOLOG FLEXPEN U-100 INSULIN 100 unit/mL (3 mL) InPn pen INJECT 45 UNITS INTO THE SKIN BID with major meals 75 mL 4    omeprazole (PRILOSEC) 20 MG capsule TAKE 1 CAPSULE(20 MG) BY MOUTH EVERY DAY FOR GERD 90 capsule 1    ONETOUCH ULTRA BLUE TEST STRIP Strp TEST FOUR TIMES DAILY 400 strip 1    pen needle, diabetic (BD ULTRA-FINE SHORT PEN NEEDLE) 31 gauge x 5/16" Ndle Use to administer insulin under the skin four (4) times a day; discard pen needle after each use. 400 each 3    SITagliptan-metformin (JANUMET) 50-1,000 mg per tablet Take 1 tablet by mouth 2 (two) times daily with meals. Take in place of Jentadueto for Diabetes 180 tablet 1    valsartan (DIOVAN) 320 MG tablet Take 1 tablet (320 mg total) by mouth once daily. 90 tablet 3    " ergocalciferol (ERGOCALCIFEROL) 50,000 unit Cap Take 1 capsule (50,000 Units total) by mouth every 7 days. 4 capsule 6    semaglutide (OZEMPIC) 1 mg/dose (4 mg/3 mL) Inject 1 mg into the skin every 7 days. 3 mL 6     No current facility-administered medications for this visit.       Review of Systems   Skin:         + Growths/skin   Neurological:  Positive for numbness.        + Mild burning pain in feet   All other systems reviewed and are negative.      Objective:      Lab Results   Component Value Date    WBC 6.60 07/14/2023    HGB 13.7 (L) 07/14/2023    HCT 42.2 07/14/2023     07/14/2023    CHOL 164 12/03/2024    TRIG 237 (H) 12/03/2024    HDL 50 12/03/2024    ALT 35 12/03/2024    AST 38 12/03/2024     12/03/2024    K 5.0 12/03/2024     12/03/2024    CREATININE 1.2 12/03/2024    BUN 16 12/03/2024    CO2 26 12/03/2024    TSH 2.094 07/14/2023    PSA 0.18 12/03/2024    INR 1.1 05/22/2019    HGBA1C 6.0 (H) 12/03/2024     Physical Exam  Vitals reviewed.   Constitutional:       Appearance: Normal appearance. He is obese.   HENT:      Head: Normocephalic and atraumatic.   Cardiovascular:      Rate and Rhythm: Normal rate and regular rhythm.      Heart sounds: Normal heart sounds.   Pulmonary:      Breath sounds: Normal breath sounds.   Abdominal:      General: Bowel sounds are normal.      Palpations: Abdomen is soft. There is no mass.      Tenderness: There is no abdominal tenderness.   Musculoskeletal:      Cervical back: Normal range of motion and neck supple.      Right lower leg: No edema.      Left lower leg: No edema.   Skin:     Findings: Rash present.      Comments: + Skin tags under bilateral axillas  + Erythematous excoriated rash of right lateral forehead/scalp   Neurological:      Mental Status: He is alert.   Psychiatric:         Mood and Affect: Mood normal.           Protective Sensation (w/ 10 gram monofilament):  Right: Decreased  Left: Decreased    Visual Inspection:  Normal -   "Bilateral    Pedal Pulses:   Right: Present  Left: Present    Posterior Tibialis Pulses:   Right:Diminished  Left: Diminished     Health Maintenance:  Last Colonoscopy: Last Colonoscopy completed on 2/18/2022    Last PSA: 12/3/24- Normal            Vital Signs  Temp: 97.7 °F (36.5 °C)  Temp Source: Other (see comments)  Pulse: 70  Resp: 18  SpO2: 97 %  BP: (!) 148/70  BP Location: Right arm  Patient Position: Sitting  Pain Score: 0-No pain  Height and Weight  Height: 5' 7" (170.2 cm)  Weight: 93 kg (205 lb)  BSA (Calculated - sq m): 2.1 sq meters  BMI (Calculated): 32.1  Weight in (lb) to have BMI = 25: 159.3    Assessment:       1. Type 2 diabetes mellitus with microalbuminuria, with long-term current use of insulin    2. Essential hypertension    3. Mixed hyperlipidemia    4. Vitamin D deficiency    5. Rash    6. Cutaneous skin tags    7. Anxiety disorder, unspecified type    8. Adenomatous polyp of colon, unspecified part of colon    9. BMI 32.0-32.9,adult        Plan:     Health Maintenance   Topic Date Due    Diabetes Urine Screening  07/14/2024    COVID-19 Vaccine (4 - 2024-25 season) 09/01/2024    Eye Exam  09/28/2024    Hemoglobin A1c  06/03/2025    TETANUS VACCINE  10/07/2025    Low Dose Statin  10/09/2025    PROSTATE-SPECIFIC ANTIGEN  12/03/2025    Lipid Panel  12/03/2025    Foot Exam  12/10/2025    Colorectal Cancer Screening  02/18/2027    Hepatitis C Screening  Completed    Shingles Vaccine  Completed    Influenza Vaccine  Completed    RSV Vaccine (Age 60+ and Pregnant patients)  Completed    Pneumococcal Vaccines (Age 65+)  Completed    Abdominal Aortic Aneurysm Screening  Yaya Haynes "Kurt" was seen today for follow-up, diabetes, hyperlipidemia, hypertension, sleep apnea, gastroesophageal reflux and anxiety.    Diagnoses and all orders for this visit:    Type 2 diabetes mellitus with microalbuminuria, with long-term current use of insulin/controlled  -     Hemoglobin A1C; Future  -     " semaglutide (OZEMPIC) 1 mg/dose (4 mg/3 mL); Inject 1 mg into the skin every 7 days.  -     Continue: Med Tx 1-Janumet 50/1000 mg BID, 2- Novolog 45 units in AM/Breakfast and 45 units in PM/Dinner3-Lantus 45 units BID  -     'Will work on decreasing dose of Insulin based on home accuchecks/pt has deferred on CGM on multiple occasions    Essential hypertension/acceptable control        -     Continue Med Tx 1- Diovan 320mg QD, 2-Toprol XL 100mg QD, 3-Norvasc 10mg QD  -     Comprehensive Metabolic Panel; Future  -     CBC Auto Differential; Future    Mixed hyperlipidemia/controlled        -     Continue: Med Tx 1- Fenofibrate 160mg QD, 2-Lipitor 80mg QD   -     Lipid Panel; Future  -     Comprehensive Metabolic Panel; Future    Vitamin D deficiency  -     Vitamin D; Future  -     ergocalciferol (ERGOCALCIFEROL) 50,000 unit Cap; Take 1 capsule (50,000 Units total) by mouth every 7 days.    Rash/Right forehead?Etiology  -     Ambulatory referral/consult to Dermatology; Future    Cutaneous skin tags  -     Ambulatory referral/consult to Dermatology; Future    Anxiety disorder, unspecified type/controlled on Buspar 5mg prn    Adenomatous polyp of colon, unspecified part of colon        -      Repeat C-scope due 2/2027    BMI 32.0-32.9,adult        -      Ozempic increased to 1mg         -      Healthy diet and Exercise program as discussed        -      Goal Foofoi=896-063 lbs      Assessment & Plan    TYPE 2 DIABETES MELLITUS:   Explained that Wegovy and Ozempic contain the same active ingredient (semaglutide).   Mr. Garcia to monitor for hypoglycemic episodes, especially with planned increase in physical activity.   Recommend checking glucose before breakfast and before supper 2 days per week to guide insulin adjustments.   Increased Ozempic from 0.5mg to next higher dose once weekly.   Continued Lantus 45 units 2 times daily.   Continued Novolog 40-45 units with breakfast.   Contact the office in 2-3 months with  glucose readings to assess need for insulin dose adjustments.    OBESITY:   Explained that Wegovy and Ozempic contain the same active ingredient (semaglutide).    VITAMIN D DEFICIENCY:   Refilled vitamin D 50,000 units.   Vitamin D level ordered to be checked at next visit.    HYPERTROPHIC DISORDERS OF THE SKIN:   Referred to Dermatology for evaluation and possible treatment of skin tags on chest and persistent skin lesion.    GENERAL HEALTH MAINTENANCE:   Mr. Garcia to increase water intake.   Discussed the multifactorial nature of anion gap and its clinical insignificance when close to normal range.    FOLLOW-UP:   Follow up with ophthalmologist Dr. Peralta in February as scheduled.   - RTC x 6 months with 1 week prior fasting lab          This note was generated with the assistance of ambient listening technology. Verbal consent was obtained by the patient and accompanying visitor(s) for the recording of patient appointment to facilitate this note. I attest to having reviewed and edited the generated note for accuracy, though some syntax or spelling errors may persist. Please contact the author of this note for any clarification.

## 2024-12-10 ENCOUNTER — OFFICE VISIT (OUTPATIENT)
Dept: INTERNAL MEDICINE | Facility: CLINIC | Age: 69
End: 2024-12-10
Payer: MEDICARE

## 2024-12-10 VITALS
RESPIRATION RATE: 18 BRPM | TEMPERATURE: 98 F | BODY MASS INDEX: 32.18 KG/M2 | SYSTOLIC BLOOD PRESSURE: 148 MMHG | DIASTOLIC BLOOD PRESSURE: 70 MMHG | HEART RATE: 70 BPM | HEIGHT: 67 IN | OXYGEN SATURATION: 97 % | WEIGHT: 205 LBS

## 2024-12-10 DIAGNOSIS — I10 ESSENTIAL HYPERTENSION: ICD-10-CM

## 2024-12-10 DIAGNOSIS — R80.9 TYPE 2 DIABETES MELLITUS WITH MICROALBUMINURIA, WITH LONG-TERM CURRENT USE OF INSULIN: Primary | ICD-10-CM

## 2024-12-10 DIAGNOSIS — D12.6 ADENOMATOUS POLYP OF COLON, UNSPECIFIED PART OF COLON: ICD-10-CM

## 2024-12-10 DIAGNOSIS — F41.9 ANXIETY DISORDER, UNSPECIFIED TYPE: ICD-10-CM

## 2024-12-10 DIAGNOSIS — L91.8 CUTANEOUS SKIN TAGS: ICD-10-CM

## 2024-12-10 DIAGNOSIS — E55.9 VITAMIN D DEFICIENCY: ICD-10-CM

## 2024-12-10 DIAGNOSIS — E11.29 TYPE 2 DIABETES MELLITUS WITH MICROALBUMINURIA, WITH LONG-TERM CURRENT USE OF INSULIN: Primary | ICD-10-CM

## 2024-12-10 DIAGNOSIS — E78.2 MIXED HYPERLIPIDEMIA: ICD-10-CM

## 2024-12-10 DIAGNOSIS — Z79.4 TYPE 2 DIABETES MELLITUS WITH MICROALBUMINURIA, WITH LONG-TERM CURRENT USE OF INSULIN: Primary | ICD-10-CM

## 2024-12-10 DIAGNOSIS — R21 RASH: ICD-10-CM

## 2024-12-10 PROCEDURE — 1101F PT FALLS ASSESS-DOCD LE1/YR: CPT | Mod: HCNC,CPTII,S$GLB, | Performed by: INTERNAL MEDICINE

## 2024-12-10 PROCEDURE — 99999 PR PBB SHADOW E&M-EST. PATIENT-LVL V: CPT | Mod: PBBFAC,HCNC,, | Performed by: INTERNAL MEDICINE

## 2024-12-10 PROCEDURE — 3078F DIAST BP <80 MM HG: CPT | Mod: HCNC,CPTII,S$GLB, | Performed by: INTERNAL MEDICINE

## 2024-12-10 PROCEDURE — 3077F SYST BP >= 140 MM HG: CPT | Mod: HCNC,CPTII,S$GLB, | Performed by: INTERNAL MEDICINE

## 2024-12-10 PROCEDURE — 3072F LOW RISK FOR RETINOPATHY: CPT | Mod: HCNC,CPTII,S$GLB, | Performed by: INTERNAL MEDICINE

## 2024-12-10 PROCEDURE — 4010F ACE/ARB THERAPY RXD/TAKEN: CPT | Mod: HCNC,CPTII,S$GLB, | Performed by: INTERNAL MEDICINE

## 2024-12-10 PROCEDURE — 3008F BODY MASS INDEX DOCD: CPT | Mod: HCNC,CPTII,S$GLB, | Performed by: INTERNAL MEDICINE

## 2024-12-10 PROCEDURE — 1159F MED LIST DOCD IN RCRD: CPT | Mod: HCNC,CPTII,S$GLB, | Performed by: INTERNAL MEDICINE

## 2024-12-10 PROCEDURE — G2211 COMPLEX E/M VISIT ADD ON: HCPCS | Mod: HCNC,S$GLB,, | Performed by: INTERNAL MEDICINE

## 2024-12-10 PROCEDURE — 3044F HG A1C LEVEL LT 7.0%: CPT | Mod: HCNC,CPTII,S$GLB, | Performed by: INTERNAL MEDICINE

## 2024-12-10 PROCEDURE — 1126F AMNT PAIN NOTED NONE PRSNT: CPT | Mod: HCNC,CPTII,S$GLB, | Performed by: INTERNAL MEDICINE

## 2024-12-10 PROCEDURE — 3288F FALL RISK ASSESSMENT DOCD: CPT | Mod: HCNC,CPTII,S$GLB, | Performed by: INTERNAL MEDICINE

## 2024-12-10 PROCEDURE — 99215 OFFICE O/P EST HI 40 MIN: CPT | Mod: HCNC,S$GLB,, | Performed by: INTERNAL MEDICINE

## 2024-12-10 RX ORDER — ERGOCALCIFEROL 1.25 MG/1
50000 CAPSULE ORAL
Qty: 4 CAPSULE | Refills: 6 | Status: SHIPPED | OUTPATIENT
Start: 2024-12-10

## 2024-12-10 RX ORDER — SEMAGLUTIDE 1.34 MG/ML
1 INJECTION, SOLUTION SUBCUTANEOUS
Qty: 3 ML | Refills: 6 | Status: SHIPPED | OUTPATIENT
Start: 2024-12-10 | End: 2025-12-10

## 2024-12-26 DIAGNOSIS — E11.65 UNCONTROLLED TYPE 2 DIABETES MELLITUS WITH HYPERGLYCEMIA: ICD-10-CM

## 2024-12-26 RX ORDER — INSULIN GLARGINE 100 [IU]/ML
40 INJECTION, SOLUTION SUBCUTANEOUS 2 TIMES DAILY
Qty: 75 ML | Refills: 1 | Status: SHIPPED | OUTPATIENT
Start: 2024-12-26

## 2024-12-26 NOTE — TELEPHONE ENCOUNTER
Care Due:                  Date            Visit Type   Department     Provider  --------------------------------------------------------------------------------                                EP -                              PRIMARY      [facility] INTERNAL  Last Visit: 12-      CARE ([facility])   [facility]       Lisette   [address]                              EP -                              PRIMARY      [facility] INTERNAL  Next Visit: 06-      CARE ([facility])   [facility]                                                            Last  Test          Frequency    Reason                     Performed    Due Date  --------------------------------------------------------------------------------    CBC.........  12 months..  fenofibrate..............  07- 07-    [facility] Embedded Care Due Messages. Reference number: [id number].   12/26/2024 11:22:04 AM CST

## 2024-12-27 NOTE — TELEPHONE ENCOUNTER
Provider Staff:  Action required for this patient    Requires labs      Please see care gap opportunities below in Care Due Message.    Thanks!  [facility]     Appointments      Date Provider   Last Visit   12/10/2024 Lisette Corado MD   Next Visit   6/18/2025 Lisette Corado MD     Refill Decision Note   Dallas Garcia  is requesting a refill authorization.  Brief Assessment and Rationale for Refill:  Approve     Medication Therapy Plan:         Comments:     Note composed:9:24 PM 12/26/2024

## 2024-12-30 ENCOUNTER — PATIENT OUTREACH (OUTPATIENT)
Dept: ADMINISTRATIVE | Facility: HOSPITAL | Age: 69
End: 2024-12-30
Payer: MEDICARE

## 2025-01-02 RX ORDER — BUSPIRONE HYDROCHLORIDE 5 MG/1
TABLET ORAL
Qty: 60 TABLET | Refills: 3 | Status: SHIPPED | OUTPATIENT
Start: 2025-01-02

## 2025-01-02 NOTE — TELEPHONE ENCOUNTER
Care Due:                  Date            Visit Type   Department     Provider  --------------------------------------------------------------------------------                                EP -                              PRIMARY      [facility] INTERNAL  Last Visit: 12-      CARE ([facility])   [facility]       [facility]                              EP -                              PRIMARY      [facility] INTERNAL  Next Visit: 06-      CARE ([facility])   [facility]  Test          Frequency    Reason                     Performed    Due Date  --------------------------------------------------------------------------------    Vitamin D...  12 months..  ergocalciferol...........  Not Found    Overdue    [employer] Embedded Care Due Messages. Reference number: [id number].   1/02/2025 3:04:12 PM CST

## 2025-01-02 NOTE — TELEPHONE ENCOUNTER
Refill Routing Note   Medication(s) are not appropriate for processing by [facility] for the following reason(s):        Outside of protocol    [facility] action(s):  Route        Medication Therapy Plan: FLOS; FOVS      Appointments  past 12m or future 3m with PCP    Date Provider   Last Visit   12/10/2024 Lisette Corado MD   Next Visit   6/18/2025 Lisette Corado MD   ED visits in past 90 days: 0        Note composed:3:18 PM 01/02/2025

## 2025-01-30 ENCOUNTER — OFFICE VISIT (OUTPATIENT)
Dept: SPORTS MEDICINE | Facility: CLINIC | Age: 70
End: 2025-01-30
Payer: MEDICARE

## 2025-01-30 VITALS
WEIGHT: 208 LBS | DIASTOLIC BLOOD PRESSURE: 80 MMHG | HEIGHT: 67 IN | HEART RATE: 75 BPM | SYSTOLIC BLOOD PRESSURE: 151 MMHG | BODY MASS INDEX: 32.65 KG/M2

## 2025-01-30 DIAGNOSIS — G89.29 CHRONIC RIGHT SHOULDER PAIN: Primary | ICD-10-CM

## 2025-01-30 DIAGNOSIS — M25.511 CHRONIC RIGHT SHOULDER PAIN: Primary | ICD-10-CM

## 2025-01-30 PROCEDURE — 99999 PR PBB SHADOW E&M-EST. PATIENT-LVL IV: CPT | Mod: PBBFAC,HCNC,, | Performed by: PHYSICIAN ASSISTANT

## 2025-01-30 NOTE — PROGRESS NOTES
ESTABLISHED PATIENT    History 1/30/2025:  History of Present Illness    CHIEF COMPLAINT:  - Right shoulder pain    HPI:  Dallas presents with recurring right shoulder pain. He received a corticosteroid injection in his right shoulder about four months ago, which provided relief for approximately three months. Pain returned about a month ago. He describes it as lingering and reports it sometimes wakes him up at night. Dallas states the pain varies in intensity from day to day, with some days being asymptomatic. Pain is exacerbated when he stretches his arm too far back, such as when reaching into the backseat of a car. He can play golf without problems. Dallas is left-handed and notes having more strength in his left arm.    He denies pain when reaching up to get something overhead, or reaching across his body. He denies difficulty with daily activities like putting on a shirt or jacket.    PREVIOUS TREATMENTS:  - Right shoulder corticosteroid injection in October 2022, which provided relief for about 3 months.    WORK STATUS:  - Able to play golf without problems.  - ```         History 10/9/2024:  69 y.o. Male with a history of right shoulder pain who began having pain after a canoeing trip in [place] back in July.  He denies having one specific injury but attributes his symptoms to flipping in the canoe and having to swim against the current then canoeing for 4 hours.  He has had no limitations to his range of motion or strength but continues to have pain depending on his physical activity.  His pain is most pronounced when his arm is raised overhead and then trying to lower it.  He has discomfort at night while sleeping and when getting dressed.        - mechanical symptoms, - instability    Is affecting ADLs.  Pain is 6/10 at it's worst.        PAST MEDICAL HISTORY    Past Medical History:   Diagnosis Date    Adenomatous colon polyp 02/2022    Repeat due 2/2027 per Dr KRIS Willett    Allergy     Anxiety      "Arthritis     Cataract     Colon polyps 2009    Diabetes mellitus     Diabetes mellitus type II     Fatty liver, alcoholic     s/p Hepatology Eval/2019    GERD (gastroesophageal reflux disease)     History of alcohol abuse     Hyperlipidemia     Hypertension     Neuromuscular disorder     Psoriasis     Base of scalp/behind ears       PAST SURGICAL HISTORY     Past Surgical History:   Procedure Laterality Date    ANKLE FUSION      left    CARPAL TUNNEL RELEASE      left    COLONOSCOPY      COLONOSCOPY N/A 2/18/2022    Procedure: COLONOSCOPY;  Surgeon: NADER Willett MD;  Location: [address]);  Service: Endoscopy;  Laterality: N/A;  fully vacc-inst mail-tb.Instructions emailed per [facility] Covid test on 2/15 at [email or web address]    FRACTURE SURGERY      KNEE SURGERY      Uvalectomy      VASECTOMY         FAMILY HISTORY    Family History   Problem Relation Name Age of Onset    Hypertension Father      Diabetes Father      Aneurysm Father          brain    Diabetes Mother      Diabetes Brother      Amblyopia Neg Hx      Blindness Neg Hx      Cancer Neg Hx      Cataracts Neg Hx      Glaucoma Neg Hx      Macular degeneration Neg Hx      Retinal detachment Neg Hx      Strabismus Neg Hx      Stroke Neg Hx      Thyroid disease Neg Hx         SOCIAL HISTORY    Social History     Socioeconomic History    Marital status: Single    Number of children: 2   Tobacco Use    Smoking status: Former    Smokeless tobacco: Never   Substance and Sexual Activity    Alcohol use: Yes     Comment: socially    Drug use: No    Sexual activity: Not Currently   Social History Narrative    7/2022: Pt  with 2 daughters(Helen and Mary); 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;     Helen is 19 y/o and attending [employer], majoring in Psychology; she has defined herself as "Transsexual"    And is transitioning to "[employer]"    Mary is 13 y/o and attending [employer], where she has been very happy    6/2024    Mary has graduated " form [employer] and is starting [employer] planning to study Engineering     Social Drivers of Health     Financial Resource Strain: Low Risk  (1/24/2025)    Overall Financial Resource Strain (CARDIA)     Difficulty of Paying Living Expenses: Not hard at all   Food Insecurity: No Food Insecurity (1/24/2025)    Hunger Vital Sign     Worried About Running Out of Food in the Last Year: Never true     Ran Out of Food in the Last Year: Never true   Physical Activity: Insufficiently Active (1/24/2025)    Exercise Vital Sign     Days of Exercise per Week: 4 days     Minutes of Exercise per Session: 20 min   Stress: No Stress Concern Present (1/24/2025)    [employer] - Occupational Stress Questionnaire     Feeling of Stress : Only a little   Housing Stability: Unknown (1/24/2025)    Housing Stability Vital Sign     Unable to Pay for Housing in the Last Year: No       MEDICATIONS      Current Outpatient Medications:     amLODIPine (NORVASC) 10 MG tablet, Take 1 tablet (10 mg total) by mouth once daily., Disp: 90 tablet, Rfl: 3    atorvastatin (LIPITOR) 80 MG tablet, Take 1 tablet (80 mg total) by mouth once daily., Disp: 90 tablet, Rfl: 3    blood sugar diagnostic Strp, 1 strip by Misc.(Non-Drug; Combo Route) route 2 (two) times daily before meals. Check glucose 2-3 days/week 2x/day before before meals, Disp: 200 strip, Rfl: 3    blood sugar diagnostic Strp, Check Glucose 2-3x/day before meals, Disp: 200 strip, Rfl: 6    blood-glucose meter kit, Check glucose 2-3 days/week 2x/day before before meals, Disp: 1 each, Rfl: 0    busPIRone (BUSPAR) 5 MG Tab, TAKE 1 TABLET BY MOUTH 1 TO 2 TIMES PER DAY AS NEEDED FOR ANXIETY, Disp: 60 tablet, Rfl: 3    cyanocobalamin (VITAMIN B-12) 1000 MCG tablet, Take 100 mcg by mouth once daily., Disp: , Rfl:     ergocalciferol (ERGOCALCIFEROL) 50,000 unit Cap, Take 1 capsule (50,000 Units total) by mouth every 7 days., Disp: 4 capsule, Rfl: 6    fenofibrate 160 MG Tab, Take 1  "tablet (160 mg total) by mouth once daily., Disp: 90 tablet, Rfl: 2    ibuprofen (ADVIL,MOTRIN) 200 MG tablet, Take 200 mg by mouth every 6 (six) hours as needed for Pain., Disp: , Rfl:     insulin glargine U-100, Lantus, (LANTUS SOLOSTAR U-100 INSULIN) 100 unit/mL (3 mL) InPn pen, Inject 40 Units into the skin 2 (two) times a day., Disp: 75 mL, Rfl: 1    lancets (ONETOUCH ULTRASOFT LANCETS) Misc, TEST four times a day before meals, Disp: 400 each, Rfl: 3    lancets Misc, 1 lancet by Misc.(Non-Drug; Combo Route) route 2 (two) times daily before meals. Check glucose 2-3 days/week 2x/day before before meals, Disp: 100 each, Rfl: 6    loratadine (CLARITIN) 10 mg tablet, TAKE 1 TABLET BY MOUTH EVERY DAY, Disp: 90 tablet, Rfl: 3    metoprolol succinate (TOPROL-XL) 100 MG 24 hr tablet, Take 1 tablet (100 mg total) by mouth once daily., Disp: 90 tablet, Rfl: 2    NOVOLOG FLEXPEN U-100 INSULIN 100 unit/mL (3 mL) InPn pen, INJECT 45 UNITS INTO THE SKIN BID with major meals, Disp: 75 mL, Rfl: 4    omeprazole (PRILOSEC) 20 MG capsule, TAKE 1 CAPSULE(20 MG) BY MOUTH EVERY DAY FOR GERD, Disp: 90 capsule, Rfl: 1    ONETOUCH ULTRA BLUE TEST STRIP Strp, TEST FOUR TIMES DAILY, Disp: 400 strip, Rfl: 1    pen needle, diabetic (BD ULTRA-FINE SHORT PEN NEEDLE) 31 gauge x 5/16" Ndle, Use to administer insulin under the skin four (4) times a day; discard pen needle after each use., Disp: 400 each, Rfl: 3    semaglutide (OZEMPIC) 1 mg/dose (4 mg/3 mL), Inject 1 mg into the skin every 7 days., Disp: 3 mL, Rfl: 6    SITagliptan-metformin (JANUMET) 50-1,000 mg per tablet, Take 1 tablet by mouth 2 (two) times daily with meals. Take in place of Jentadueto for Diabetes, Disp: 180 tablet, Rfl: 1    valsartan (DIOVAN) 320 MG tablet, Take 1 tablet (320 mg total) by mouth once daily., Disp: 90 tablet, Rfl: 3    fluocinolone and shower cap 0.01 % Oil, Use hs for rash on scalp, Disp: 118.28 mL, Rfl: 3    ALLERGIES     Review of patient's allergies " "indicates:  No Known Allergies      REVIEW OF SYSTEMS   Constitution: Negative. Negative for chills, fever and night sweats.   HENT: Negative for congestion and headaches.    Eyes: Negative for blurred vision, left vision loss and right vision loss.   Cardiovascular: Negative for chest pain and syncope.   Respiratory: Negative for cough and shortness of breath.    Endocrine: Negative for polydipsia, polyphagia and polyuria.   Hematologic/Lymphatic: Negative for bleeding problem. Does not bruise/bleed easily.   Skin: Negative for dry skin, itching and rash.   Musculoskeletal: Negative for falls. Positive for right shoulder pain.  Gastrointestinal: Negative for abdominal pain and bowel incontinence.   Genitourinary: Negative for bladder incontinence and nocturia.   Neurological: Negative for disturbances in coordination, loss of balance and seizures.   Psychiatric/Behavioral: Negative for depression. The patient does not have insomnia.    Allergic/Immunologic: Negative for hives and persistent infections.     PHYSICAL EXAMINATION    Vitals: BP (!) 151/80   Pulse 75   Ht 5' 7" (1.702 m)   Wt 94.3 kg (208 lb)   BMI 32.58 kg/m²     General: The patient appears active and healthy with no apparent physical problems.  No disturbance of mood or affect is demonstrated. Alert and Oriented.    HEENT: Eyes normal, pupils equally round, nose normal.    Resp: Equal and symmetrical chest rises. No wheezing    CV: Regular rate    Neck: Supple; nonpainful range of motion.    Extremities: no cyanosis, clubbing, edema, or diffuse swelling.  Palpable pulses, good capillary refill of the digits.  No coolness, discoloration, edema or obvious varicosities.    Skin: no lesions noted.    Lymphatic: no detected adenopathy in the upper or lower extremities.    Neurologic: normal mental status, normal reflexes, normal gait and balance.  Patient is alert and oriented to person, place and time.  No flaccidity or spasticity is noted.  No motor " or sensory deficits are noted.  Light touch is intact    Orthopaedic: SHOULDER EXAM - RIGHT    Inspection:   Normal skin color and appearance with no scars.  No muscle atrophy noted.  No scapular winging.      Palpation: No tenderness of the acromioclavicular joint, lateral edge of the acromion, biceps tendon, trapezius muscle or scapulothoracic bursa.      ROM:      PROM:     FE - 180°    Abd/ER -  90°  Abd/IR -  45°   Add/ER -  60°     AROM:    FE - 180°    Abd/ER -  90°  Abd/IR -  45°   Add/ER -  60°         Tests:     + Holguin, + Neer's, - Cross Arm Adduction, - [place], - [place], - Speed. - Belly Press,  + Jobes, - Lift Off    Stability: - sulcus, - apprehension, - relocation, - load and shift, - DLS      Motor:  Rotator cuff strength is 4+/5 supraspinatus, 4/5 infraspinatus, 5/5 subscapularis. Biceps, triceps and deltoid strength is 5/5.      Neuro     Distally there are no paresthesias, and sensation is intact to light touch in the median, ulnar, and radial distributions.  Reflexes are 2/2 biceps, triceps and brachioradialis.        IMAGING    Sports Medicine US - Guidance for Needle Placement  Paco Vasquez PA-C     10/9/2024  3:46 PM  Sports Medicine US - Guidance for Needle Placement    Date/Time: 10/9/2024 2:00 PM    Performed by: Paco Vasquez PA-C  Authorized by: Paco Vasquez PA-C  Preparation: Patient was prepped   and draped in the usual sterile fashion.  Local anesthesia used: yes    Anesthesia:  Local anesthesia used: yes  Local Anesthetic: co-phenylcaine spray    Sedation:  Patient sedated: no    Patient tolerance: patient tolerated the procedure well with no immediate   complications  X-ray Shoulder 2 or More Views Right  Narrative: EXAMINATION:  XR SHOULDER COMPLETE 2 OR MORE VIEWS RIGHT    CLINICAL HISTORY:  Pain in right shoulder    TECHNIQUE:  Two or three views of the right shoulder were performed.    COMPARISON:  None    FINDINGS:  The glenohumeral joint appears  normal.    There is normal humeral head contour.    There is no significant degenerative change.    The acromioclavicular joint appears normal.  Mild osteophyte at the right acromioclavicular joint.    No fracture, no osseous lesions.    The right upper thoracic region and remainder of the visualized soft tissues and osseous structures appear normal.  Impression: No abnormality seen.    Electronically signed by: Elisabet Rick MD  Date:    10/09/2024  Time:    14:28        IMPRESSION       ICD-10-CM ICD-9-CM   1. Chronic right shoulder pain  M25.511 719.41    G89.29 338.29         MEDICATIONS PRESCRIBED      None    RECOMMENDATIONS     Assessment & Plan    PLAN SUMMARY:  - Perform rotator cuff strengthening exercises as instructed  - MRI of Right Shoulder ordered to evaluate for rotator cuff tear  - Follow-up after MRI results  - Potential surgical treatment if full thickness tear found  - Possible future right shoulder corticosteroid injection if needed  - Dallas prefers to delay potential surgery until fall if necessary    PROCEDURES:  - Discussed possibility of future right shoulder corticosteroid injection if needed.  - If full thickness tear found, surgical treatment may be recommended.  - Dallas prefers to delay potential surgery until fall if needed.    IMAGING:  - Ordered MRI Right Shoulder to evaluate for rotator cuff tear.    FOLLOW UP:  - Follow up after MRI.    PATIENT INSTRUCTIONS:  - Perform rotator cuff strengthening exercises as instructed.         HEP [id number] - Paco Vasquez, instructed and demonstrated a rotator cuff HEP. The patient then demonstrated understanding of exercises and proper technique. This program was performed for 10 minutes.       Procedures        All of their questions were answered.  They will call the clinic with any questions or concerns in the interim.     Should the patient's symptoms worsen, persist, or fail to improve they should return for reevaluation and I would be  happy to see them back anytime.                  Paco Vasquez PA-C       Please be aware that this note has been generated with the assistance of MModal voice-to-text.  Please excuse any spelling or grammatical errors.     Thank you for choosing [facility] for your sports medicine care. It is our goal to provide you with exceptional care that will help keep you healthy, active, and get you back in the game.     If you felt that you received exemplary care today, please consider leaving feedback for Mr. Vasquez PAANTONIA on [employer]ss at [email or web address]       Please do not hesitate to reach out to us via email, phone, or MyChart with any questions, concerns, or feedback.       This note was generated with the assistance of ambient listening technology. Verbal consent was obtained by the patient and accompanying visitor(s) for the recording of patient appointment to facilitate this note. I attest to having reviewed and edited the generated note for accuracy, though some syntax or spelling errors may persist. Please contact the author of this note for any clarification.

## 2025-02-06 ENCOUNTER — HOSPITAL ENCOUNTER (OUTPATIENT)
Dept: RADIOLOGY | Facility: HOSPITAL | Age: 70
Discharge: HOME | End: 2025-02-06
Attending: PHYSICIAN ASSISTANT
Payer: MEDICARE

## 2025-02-06 DIAGNOSIS — G89.29 CHRONIC RIGHT SHOULDER PAIN: ICD-10-CM

## 2025-02-06 DIAGNOSIS — M25.511 CHRONIC RIGHT SHOULDER PAIN: ICD-10-CM

## 2025-02-06 PROCEDURE — 73221 MRI JOINT UPR EXTREM W/O DYE: CPT | Mod: TC,RT

## 2025-02-06 PROCEDURE — 73221 MRI JOINT UPR EXTREM W/O DYE: CPT | Mod: 26,RT,, | Performed by: RADIOLOGY

## 2025-02-12 ENCOUNTER — OFFICE VISIT (OUTPATIENT)
Dept: SPORTS MEDICINE | Facility: CLINIC | Age: 70
End: 2025-02-12
Payer: MEDICARE

## 2025-02-12 VITALS
BODY MASS INDEX: 32.56 KG/M2 | HEART RATE: 76 BPM | WEIGHT: 207.88 LBS | DIASTOLIC BLOOD PRESSURE: 78 MMHG | SYSTOLIC BLOOD PRESSURE: 145 MMHG

## 2025-02-12 DIAGNOSIS — M75.121 NONTRAUMATIC COMPLETE TEAR OF RIGHT ROTATOR CUFF: Primary | ICD-10-CM

## 2025-02-12 DIAGNOSIS — M19.011 OSTEOARTHRITIS OF RIGHT AC (ACROMIOCLAVICULAR) JOINT: ICD-10-CM

## 2025-02-12 PROCEDURE — 3288F FALL RISK ASSESSMENT DOCD: CPT | Mod: CPTII,S$GLB,, | Performed by: PHYSICIAN ASSISTANT

## 2025-02-12 PROCEDURE — 3077F SYST BP >= 140 MM HG: CPT | Mod: CPTII,S$GLB,, | Performed by: PHYSICIAN ASSISTANT

## 2025-02-12 PROCEDURE — 3008F BODY MASS INDEX DOCD: CPT | Mod: CPTII,S$GLB,, | Performed by: PHYSICIAN ASSISTANT

## 2025-02-12 PROCEDURE — 4010F ACE/ARB THERAPY RXD/TAKEN: CPT | Mod: CPTII,S$GLB,, | Performed by: PHYSICIAN ASSISTANT

## 2025-02-12 PROCEDURE — 99999 PR PBB SHADOW E&M-EST. PATIENT-LVL IV: CPT | Mod: PBBFAC,,, | Performed by: PHYSICIAN ASSISTANT

## 2025-02-12 PROCEDURE — 99214 OFFICE O/P EST MOD 30 MIN: CPT | Mod: S$GLB,,, | Performed by: PHYSICIAN ASSISTANT

## 2025-02-12 PROCEDURE — 1125F AMNT PAIN NOTED PAIN PRSNT: CPT | Mod: CPTII,S$GLB,, | Performed by: PHYSICIAN ASSISTANT

## 2025-02-12 PROCEDURE — 1160F RVW MEDS BY RX/DR IN RCRD: CPT | Mod: CPTII,S$GLB,, | Performed by: PHYSICIAN ASSISTANT

## 2025-02-12 PROCEDURE — 1159F MED LIST DOCD IN RCRD: CPT | Mod: CPTII,S$GLB,, | Performed by: PHYSICIAN ASSISTANT

## 2025-02-12 PROCEDURE — 3078F DIAST BP <80 MM HG: CPT | Mod: CPTII,S$GLB,, | Performed by: PHYSICIAN ASSISTANT

## 2025-02-12 PROCEDURE — 1101F PT FALLS ASSESS-DOCD LE1/YR: CPT | Mod: CPTII,S$GLB,, | Performed by: PHYSICIAN ASSISTANT

## 2025-02-12 NOTE — PROGRESS NOTES
ESTABLISHED PATIENT    History 2/12/2025:  Kurt returns here today for follow-up evaluation of his right shoulder and to discuss his MRI results.    History 1/30/2025:  History of Present Illness    CHIEF COMPLAINT:  - Right shoulder pain    HPI:  Dallas presents with recurring right shoulder pain. He received a corticosteroid injection in his right shoulder about four months ago, which provided relief for approximately three months. Pain returned about a month ago. He describes it as lingering and reports it sometimes wakes him up at night. Dallas states the pain varies in intensity from day to day, with some days being asymptomatic. Pain is exacerbated when he stretches his arm too far back, such as when reaching into the backseat of a car. He can play golf without problems. Dallas is left-handed and notes having more strength in his left arm.    He denies pain when reaching up to get something overhead, or reaching across his body. He denies difficulty with daily activities like putting on a shirt or jacket.    PREVIOUS TREATMENTS:  - Right shoulder corticosteroid injection in October 2022, which provided relief for about 3 months.    WORK STATUS:  - Able to play golf without problems.    History 10/9/2024:  69 y.o. Male with a history of right shoulder pain who began having pain after a canoeing trip in [place] back in July.  He denies having one specific injury but attributes his symptoms to flipping in the canoe and having to swim against the current then canoeing for 4 hours.  He has had no limitations to his range of motion or strength but continues to have pain depending on his physical activity.  His pain is most pronounced when his arm is raised overhead and then trying to lower it.  He has discomfort at night while sleeping and when getting dressed.        - mechanical symptoms, - instability    Is affecting ADLs.  Pain is 6/10 at it's worst.        PAST MEDICAL HISTORY    Past Medical History:  "  Diagnosis Date    Adenomatous colon polyp 02/2022    Repeat due 2/2027 per Dr KRIS Willett    Allergy     Anxiety     Arthritis     Cataract     Colon polyps 2009    Diabetes mellitus     Diabetes mellitus type II     Fatty liver, alcoholic     s/p Hepatology Eval/2019    GERD (gastroesophageal reflux disease)     History of alcohol abuse     Hyperlipidemia     Hypertension     Neuromuscular disorder     Psoriasis     Base of scalp/behind ears       PAST SURGICAL HISTORY     Past Surgical History:   Procedure Laterality Date    ANKLE FUSION      left    CARPAL TUNNEL RELEASE      left    COLONOSCOPY      COLONOSCOPY N/A 2/18/2022    Procedure: COLONOSCOPY;  Surgeon: NADER Willett MD;  Location: [facility] ([address] [place] [address]);  Service: Endoscopy;  Laterality: N/A;  fully vacc-inst mail-tb.Instructions emailed per [facility] Covid test on 2/15 at [email or web address]    FRACTURE SURGERY      KNEE SURGERY      Uvalectomy      VASECTOMY         FAMILY HISTORY    Family History   Problem Relation Name Age of Onset    Hypertension Father      Diabetes Father      Aneurysm Father          brain    Diabetes Mother      Diabetes Brother      Amblyopia Neg Hx      Blindness Neg Hx      Cancer Neg Hx      Cataracts Neg Hx      Glaucoma Neg Hx      Macular degeneration Neg Hx      Retinal detachment Neg Hx      Strabismus Neg Hx      Stroke Neg Hx      Thyroid disease Neg Hx         SOCIAL HISTORY    Social History     Socioeconomic History    Marital status: Single    Number of children: 2   Tobacco Use    Smoking status: Former    Smokeless tobacco: Never   Substance and Sexual Activity    Alcohol use: Yes     Comment: socially    Drug use: No    Sexual activity: Not Currently   Social History Narrative    7/2022: Pt  with 2 daughters(Helen and Mary); 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;     Helen is 21 y/o and attending [employer], majoring in Psychology; she has defined herself as "Transsexual"    And is transitioning to " ""Kaden"    Mary is 13 y/o and attending [employer], where she has been very happy    6/2024    Mary has graduated form [employer] and is starting [employer] planning to study Engineering     Social Drivers of Health     Financial Resource Strain: Low Risk  (1/24/2025)    Overall Financial Resource Strain (CARDIA)     Difficulty of Paying Living Expenses: Not hard at all   Food Insecurity: No Food Insecurity (1/24/2025)    Hunger Vital Sign     Worried About Running Out of Food in the Last Year: Never true     Ran Out of Food in the Last Year: Never true   Physical Activity: Insufficiently Active (1/24/2025)    Exercise Vital Sign     Days of Exercise per Week: 4 days     Minutes of Exercise per Session: 20 min   Stress: No Stress Concern Present (1/24/2025)    [employer] [place] - Occupational Stress Questionnaire     Feeling of Stress : Only a little   Housing Stability: Unknown (1/24/2025)    Housing Stability Vital Sign     Unable to Pay for Housing in the Last Year: No       MEDICATIONS      Current Outpatient Medications:     amLODIPine (NORVASC) 10 MG tablet, Take 1 tablet (10 mg total) by mouth once daily., Disp: 90 tablet, Rfl: 3    atorvastatin (LIPITOR) 80 MG tablet, Take 1 tablet (80 mg total) by mouth once daily., Disp: 90 tablet, Rfl: 3    blood sugar diagnostic Strp, 1 strip by Misc.(Non-Drug; Combo Route) route 2 (two) times daily before meals. Check glucose 2-3 days/week 2x/day before before meals, Disp: 200 strip, Rfl: 3    blood sugar diagnostic Strp, Check Glucose 2-3x/day before meals, Disp: 200 strip, Rfl: 6    blood-glucose meter kit, Check glucose 2-3 days/week 2x/day before before meals, Disp: 1 each, Rfl: 0    busPIRone (BUSPAR) 5 MG Tab, TAKE 1 TABLET BY MOUTH 1 TO 2 TIMES PER DAY AS NEEDED FOR ANXIETY, Disp: 60 tablet, Rfl: 3    cyanocobalamin (VITAMIN B-12) 1000 MCG tablet, Take 100 mcg by mouth once daily., Disp: , Rfl:     ergocalciferol (ERGOCALCIFEROL) 50,000 unit " "Cap, Take 1 capsule (50,000 Units total) by mouth every 7 days., Disp: 4 capsule, Rfl: 6    fenofibrate 160 MG Tab, Take 1 tablet (160 mg total) by mouth once daily., Disp: 90 tablet, Rfl: 2    ibuprofen (ADVIL,MOTRIN) 200 MG tablet, Take 200 mg by mouth every 6 (six) hours as needed for Pain., Disp: , Rfl:     insulin glargine U-100, Lantus, (LANTUS SOLOSTAR U-100 INSULIN) 100 unit/mL (3 mL) InPn pen, Inject 40 Units into the skin 2 (two) times a day., Disp: 75 mL, Rfl: 1    lancets (ONETOUCH ULTRASOFT LANCETS) Misc, TEST four times a day before meals, Disp: 400 each, Rfl: 3    lancets Misc, 1 lancet by Misc.(Non-Drug; Combo Route) route 2 (two) times daily before meals. Check glucose 2-3 days/week 2x/day before before meals, Disp: 100 each, Rfl: 6    loratadine (CLARITIN) 10 mg tablet, TAKE 1 TABLET BY MOUTH EVERY DAY, Disp: 90 tablet, Rfl: 3    metoprolol succinate (TOPROL-XL) 100 MG 24 hr tablet, Take 1 tablet (100 mg total) by mouth once daily., Disp: 90 tablet, Rfl: 2    NOVOLOG FLEXPEN U-100 INSULIN 100 unit/mL (3 mL) InPn pen, INJECT 45 UNITS INTO THE SKIN BID with major meals, Disp: 75 mL, Rfl: 4    omeprazole (PRILOSEC) 20 MG capsule, TAKE 1 CAPSULE(20 MG) BY MOUTH EVERY DAY FOR GERD, Disp: 90 capsule, Rfl: 1    ONETOUCH ULTRA BLUE TEST STRIP Strp, TEST FOUR TIMES DAILY, Disp: 400 strip, Rfl: 1    pen needle, diabetic (BD ULTRA-FINE SHORT PEN NEEDLE) 31 gauge x 5/16" Ndle, Use to administer insulin under the skin four (4) times a day; discard pen needle after each use., Disp: 400 each, Rfl: 3    semaglutide (OZEMPIC) 1 mg/dose (4 mg/3 mL), Inject 1 mg into the skin every 7 days., Disp: 3 mL, Rfl: 6    SITagliptan-metformin (JANUMET) 50-1,000 mg per tablet, Take 1 tablet by mouth 2 (two) times daily with meals. Take in place of Jentadueto for Diabetes, Disp: 180 tablet, Rfl: 1    valsartan (DIOVAN) 320 MG tablet, Take 1 tablet (320 mg total) by mouth once daily., Disp: 90 tablet, Rfl: 3    ALLERGIES "     Review of patient's allergies indicates:  No Known Allergies      REVIEW OF SYSTEMS   Constitution: Negative. Negative for chills, fever and night sweats.   HENT: Negative for congestion and headaches.    Eyes: Negative for blurred vision, left vision loss and right vision loss.   Cardiovascular: Negative for chest pain and syncope.   Respiratory: Negative for cough and shortness of breath.    Endocrine: Negative for polydipsia, polyphagia and polyuria.   Hematologic/Lymphatic: Negative for bleeding problem. Does not bruise/bleed easily.   Skin: Negative for dry skin, itching and rash.   Musculoskeletal: Negative for falls. Positive for right shoulder pain.  Gastrointestinal: Negative for abdominal pain and bowel incontinence.   Genitourinary: Negative for bladder incontinence and nocturia.   Neurological: Negative for disturbances in coordination, loss of balance and seizures.   Psychiatric/Behavioral: Negative for depression. The patient does not have insomnia.    Allergic/Immunologic: Negative for hives and persistent infections.     PHYSICAL EXAMINATION    Vitals: BP (!) 145/78 (BP Location: Right arm, Patient Position: Sitting)   Pulse 76   Wt 94.3 kg (207 lb 14.3 oz)   BMI 32.56 kg/m²     General: The patient appears active and healthy with no apparent physical problems.  No disturbance of mood or affect is demonstrated. Alert and Oriented.    HEENT: Eyes normal, pupils equally round, nose normal.    Resp: Equal and symmetrical chest rises. No wheezing    CV: Regular rate    Neck: Supple; nonpainful range of motion.    Extremities: no cyanosis, clubbing, edema, or diffuse swelling.  Palpable pulses, good capillary refill of the digits.  No coolness, discoloration, edema or obvious varicosities.    Skin: no lesions noted.    Lymphatic: no detected adenopathy in the upper or lower extremities.    Neurologic: normal mental status, normal reflexes, normal gait and balance.  Patient is alert and oriented to  person, place and time.  No flaccidity or spasticity is noted.  No motor or sensory deficits are noted.  Light touch is intact    Orthopaedic: SHOULDER EXAM - RIGHT    Inspection:   Normal skin color and appearance with no scars.  No muscle atrophy noted.  No scapular winging.      Palpation: No tenderness of the acromioclavicular joint, lateral edge of the acromion, biceps tendon, trapezius muscle or scapulothoracic bursa.      ROM:      PROM:     FE - 180°    Abd/ER -  90°  Abd/IR -  45°   Add/ER -  60°     AROM:    FE - 180°    Abd/ER -  90°  Abd/IR -  45°   Add/ER -  60°         Tests:     + Holguin, + Neer's, - Cross Arm Adduction, - [place], - [place], - Speed. - Belly Press,  + Jobes, - Lift Off    Stability: - sulcus, - apprehension, - relocation, - load and shift, - DLS      Motor:  Rotator cuff strength is 4+/5 supraspinatus, 4/5 infraspinatus, 5/5 subscapularis. Biceps, triceps and deltoid strength is 5/5.      Neuro     Distally there are no paresthesias, and sensation is intact to light touch in the median, ulnar, and radial distributions.  Reflexes are 2/2 biceps, triceps and brachioradialis.        IMAGING    MRI Shoulder Without Contrast Right  Narrative: EXAMINATION:  MRI SHOULDER WITHOUT CONTRAST RIGHT    CLINICAL HISTORY:  Shoulder pain, rotator cuff disorder suspected, xray done;  Pain in right shoulder    TECHNIQUE:  MRI right shoulder performed without contrast per routine protocol.    COMPARISON:  Right shoulder radiograph 10/09/2024    FINDINGS:  Rotator cuff: High-grade partial thickness to full-thickness tear involving the full width of the supraspinatus tendon at the footprint without tendon retraction.  Tendinosis of the infraspinatus.  Teres minor and subscapularis are intact.  Muscle bulk is maintained.    Labrum: Generalized fraying of the labrum.    Biceps: Tendinosis of the intra-articular portion of the long head biceps tendon.    Bone: No fracture or marrow infiltrative  process.    Acromioclavicular joint: Moderate AC joint arthritis with associated joint effusion and ganglion cyst formation.    Cartilage: Articular cartilage of the glenohumeral joint is preserved.    Miscellaneous: Trace amount of subacromial subdeltoid bursal fluid.  Impression: 1. High-grade partial to full-thickness tear of the supraspinatus tendon.  Infraspinatus tendinosis.  Rotator cuff muscle bulk is maintained.  2. Long head biceps tendinosis.  3. Moderate AC joint arthritis.  4. Mild subacromial subdeltoid bursitis.    Electronically signed by resident: Billy Burnett  Date:    02/06/2025  Time:    16:06    Electronically signed by: Yosi Petersen MD  Date:    02/06/2025  Time:    18:38        IMPRESSION       ICD-10-CM ICD-9-CM   1. Nontraumatic complete tear of right rotator cuff  M75.121 727.61   2. Osteoarthritis of right AC (acromioclavicular) joint  M19.011 715.91           MEDICATIONS PRESCRIBED      None    RECOMMENDATIONS     MRI images and report were reviewed and discussed with the patient in depth today; high-grade partial and full-thickness tear of the supraspinatus with advanced osteoarthritis of the acromioclavicular joint  Surgical and conservative treatment options were discussed in depth today  We discussed the risks of multiple corticosteroid injections which can cause decreased healing potential due to tissue degradation  He would like to pursue surgical intervention plans to wait until October.  He is riding in [employer], plans to purchase a condo in [place] and would like to play golf, and has a scheduled trip to [place] in May for his daughter's college graduation.  In the meantime, I recommend he undergo a formal course of physical therapy focused on rotator cuff strengthening  He will follow up with me again in 6 weeks for repeat evaluation; we will consider a repeat CSI if needed before his trip to [place]  He will follow up Dr. Lucio Sena for surgical planning at the end of  the summer    HEP [id number] - Paco Vasquez, instructed and demonstrated a rotator cuff HEP. The patient then demonstrated understanding of exercises and proper technique. This program was performed for 10 minutes.       Procedures        All of their questions were answered.  They will call the clinic with any questions or concerns in the interim.     Should the patient's symptoms worsen, persist, or fail to improve they should return for reevaluation and I would be happy to see them back anytime.                  Paco Vasquez PA-C       Please be aware that this note has been generated with the assistance of MModal voice-to-text.  Please excuse any spelling or grammatical errors.     Thank you for choosing Paco Vasquez PA-C for your sports medicine care. It is our goal to provide you with exceptional care that will help keep you healthy, active, and get you back in the game.     If you felt that you received exemplary care today, please consider leaving feedback for  SHANTEL Vasquez on [employer] at [email or web address]       Please do not hesitate to reach out to us via email, phone, or MyChart with any questions, concerns, or feedback.       This note was generated with the assistance of ambient listening technology. Verbal consent was obtained by the patient and accompanying visitor(s) for the recording of patient appointment to facilitate this note. I attest to having reviewed and edited the generated note for accuracy, though some syntax or spelling errors may persist. Please contact the author of this note for any clarification.

## 2025-02-17 ENCOUNTER — OFFICE VISIT (OUTPATIENT)
Dept: OPTOMETRY | Facility: CLINIC | Age: 70
End: 2025-02-17
Payer: COMMERCIAL

## 2025-02-17 DIAGNOSIS — E66.9 DIABETES MELLITUS TYPE 2 IN OBESE: ICD-10-CM

## 2025-02-17 DIAGNOSIS — I10 HTN (HYPERTENSION), BENIGN: ICD-10-CM

## 2025-02-17 DIAGNOSIS — Z79.4 TYPE 2 DIABETES MELLITUS WITH MICROALBUMINURIA, WITH LONG-TERM CURRENT USE OF INSULIN: ICD-10-CM

## 2025-02-17 DIAGNOSIS — E11.9 TYPE 2 DIABETES MELLITUS WITHOUT OPHTHALMIC MANIFESTATIONS: ICD-10-CM

## 2025-02-17 DIAGNOSIS — E11.29 TYPE 2 DIABETES MELLITUS WITH MICROALBUMINURIA, WITH LONG-TERM CURRENT USE OF INSULIN: ICD-10-CM

## 2025-02-17 DIAGNOSIS — G47.30 SLEEP APNEA, UNSPECIFIED TYPE: ICD-10-CM

## 2025-02-17 DIAGNOSIS — E11.65 UNCONTROLLED TYPE 2 DIABETES MELLITUS WITH HYPERGLYCEMIA: ICD-10-CM

## 2025-02-17 DIAGNOSIS — H25.13 NUCLEAR SCLEROSIS OF BOTH EYES: ICD-10-CM

## 2025-02-17 DIAGNOSIS — R80.9 TYPE 2 DIABETES MELLITUS WITH MICROALBUMINURIA, WITH LONG-TERM CURRENT USE OF INSULIN: ICD-10-CM

## 2025-02-17 DIAGNOSIS — E11.69 DIABETES MELLITUS TYPE 2 IN OBESE: ICD-10-CM

## 2025-02-17 DIAGNOSIS — H52.4 PRESBYOPIA: Primary | ICD-10-CM

## 2025-02-17 PROCEDURE — 92015 DETERMINE REFRACTIVE STATE: CPT | Mod: S$GLB,,, | Performed by: OPTOMETRIST

## 2025-02-17 RX ORDER — SEMAGLUTIDE 1.34 MG/ML
1 INJECTION, SOLUTION SUBCUTANEOUS
Qty: 9 ML | Refills: 3 | Status: SHIPPED | OUTPATIENT
Start: 2025-02-17 | End: 2026-02-17

## 2025-02-17 NOTE — PROGRESS NOTES
HPI    Patient is here today for a diabetic eye exam. Last seen on 09/28/2023   with Dr. Peralta. He currently wears OTC readers, +1.75-+2.00. Denies eye   pain, flashes and floaters.     Eye Meds: None     Hemoglobin A1C       Date                     Value               Ref Range             Status                12/03/2024               6.0 (H)             4.0 - 5.6 %           Final              Comment:    ADA Screening Guidelines:  5.7-6.4%  Consistent with   prediabetes  >or=6.5%  Consistent with diabetes    High levels of fetal   hemoglobin interfere with the HbA1C  assay. Heterozygous hemoglobin   variants (HbS, HgC, etc)do  not significantly interfere with this assay.     However, presence of multiple variants may affect accuracy.         05/31/2024               6.5 (H)             4.0 - 5.6 %           Final              Comment:    ADA Screening Guidelines:  5.7-6.4%  Consistent with   prediabetes  >or=6.5%  Consistent with diabetes    High levels of fetal   hemoglobin interfere with the HbA1C  assay. Heterozygous hemoglobin   variants (HbS, HgC, etc)do  not significantly interfere with this assay.     However, presence of multiple variants may affect accuracy.         07/14/2023               10.4 (H)            4.0 - 5.6 %           Final              Comment:    ADA Screening Guidelines:  5.7-6.4%  Consistent with   prediabetes  >or=6.5%  Consistent with diabetes    High levels of fetal   hemoglobin interfere with the HbA1C  assay. Heterozygous hemoglobin   variants (HbS, HgC, etc)do  not significantly interfere with this assay.     However, presence of multiple variants may affect accuracy.    ----------   Last edited by Verenice Hernandez on 2/17/2025  2:54 PM.            Assessment /Plan     For exam results, see Encounter Report.    Presbyopia               OK to continue with OTC readers     HTN (hypertension), benign  Uncontrolled type 2 diabetes mellitus with hyperglycemia  Sleep apnea, unspecified  type  Type 2 diabetes mellitus without ophthalmic manifestations               No retinopathy, monitor yearly     Nuclear sclerosis of both eyes              Mild, not visually significant     RTC 1 year, sooner PRN

## 2025-02-19 DIAGNOSIS — E11.65 TYPE 2 DIABETES MELLITUS WITH HYPERGLYCEMIA, UNSPECIFIED WHETHER LONG TERM INSULIN USE: ICD-10-CM

## 2025-02-19 NOTE — TELEPHONE ENCOUNTER
No care due was identified.  [facility] Embedded Care Due Messages. Reference number: [id number].   2/19/2025 2:47:17 PM CST

## 2025-02-19 NOTE — TELEPHONE ENCOUNTER
Refill Decision Note   Dallas Jose  is requesting a refill authorization.  Brief Assessment and Rationale for Refill:  Approve     Medication Therapy Plan:        Pharmacist review requested: Yes   Comments:     Note composed:5:18 PM 02/19/2025

## 2025-02-19 NOTE — TELEPHONE ENCOUNTER
Refill Routing Note   Medication(s) are not appropriate for processing by [facility] for the following reason(s):        Drug-disease interaction    [facility] action(s):  Defer             Pharmacist review requested: Yes     Appointments  past 12m or future 3m with PCP    Date Provider   Last Visit   12/10/2024 Lisette Corado MD   Next Visit   6/18/2025 Lisette Corado MD   ED visits in past 90 days: 0        Note composed:3:44 PM 02/19/2025

## 2025-02-22 DIAGNOSIS — Z00.00 ENCOUNTER FOR MEDICARE ANNUAL WELLNESS EXAM: ICD-10-CM

## 2025-02-25 ENCOUNTER — CLINICAL SUPPORT (OUTPATIENT)
Dept: REHABILITATION | Facility: HOSPITAL | Age: 70
End: 2025-02-25
Payer: MEDICARE

## 2025-02-25 DIAGNOSIS — M25.511 ACUTE PAIN OF RIGHT SHOULDER: Primary | ICD-10-CM

## 2025-02-25 DIAGNOSIS — M75.121 NONTRAUMATIC COMPLETE TEAR OF RIGHT ROTATOR CUFF: ICD-10-CM

## 2025-02-25 PROCEDURE — 97112 NEUROMUSCULAR REEDUCATION: CPT | Mod: HCNC

## 2025-02-25 PROCEDURE — 97161 PT EVAL LOW COMPLEX 20 MIN: CPT | Mod: HCNC

## 2025-03-21 DIAGNOSIS — E11.69 TYPE 2 DIABETES MELLITUS WITH OTHER SPECIFIED COMPLICATION, WITHOUT LONG-TERM CURRENT USE OF INSULIN: ICD-10-CM

## 2025-03-21 RX ORDER — INSULIN ASPART 100 [IU]/ML
INJECTION, SOLUTION INTRAVENOUS; SUBCUTANEOUS
Qty: 75 ML | Refills: 1 | Status: SHIPPED | OUTPATIENT
Start: 2025-03-21

## 2025-03-21 NOTE — TELEPHONE ENCOUNTER
Care Due:                  Date            Visit Type   Department     Provider  --------------------------------------------------------------------------------                                EP -                              PRIMARY      [facility] INTERNAL  Last Visit: 12-      CARE ([facility])   [facility]       [facility] -                              PRIMARY      [facility] INTERNAL  Next Visit: 06-      [facility] ([facility])   [facility]                                                            Last  Test          Frequency    Reason                     Performed    Due Date  --------------------------------------------------------------------------------    CBC.........  12 months..  fenofibrate..............  07- 07-    HBA1C.......  6 months...  NOVOLOG,                   12- 06-                             SITagliptan-metformin,                             insulin, semaglutide.....    Vitamin D...  12 months..  ergocalciferol...........  Not Found    Overdue    [employer] Embedded Care Due Messages. Reference number: [id number].   3/21/2025 1:53:25 PM CDT

## 2025-03-21 NOTE — TELEPHONE ENCOUNTER
Refill Decision Note   Dallas Garcia  is requesting a refill authorization.  Brief Assessment and Rationale for Refill:  Approve     Medication Therapy Plan:  FLOS;WANDA      Comments:     Note composed:6:58 PM 03/21/2025

## 2025-03-31 ENCOUNTER — PATIENT MESSAGE (OUTPATIENT)
Dept: DERMATOLOGY | Facility: CLINIC | Age: 70
End: 2025-03-31
Payer: MEDICARE

## 2025-04-01 ENCOUNTER — PATIENT MESSAGE (OUTPATIENT)
Dept: DERMATOLOGY | Facility: CLINIC | Age: 70
End: 2025-04-01
Payer: MEDICARE

## 2025-04-01 ENCOUNTER — OFFICE VISIT (OUTPATIENT)
Dept: DERMATOLOGY | Facility: CLINIC | Age: 70
End: 2025-04-01
Payer: MEDICARE

## 2025-04-01 DIAGNOSIS — L85.9 HYPERKERATOSIS OF SKIN: ICD-10-CM

## 2025-04-01 DIAGNOSIS — L82.1 SEBORRHEIC KERATOSIS: ICD-10-CM

## 2025-04-01 DIAGNOSIS — L40.9 PSORIASIS: ICD-10-CM

## 2025-04-01 DIAGNOSIS — L91.8 SKIN TAG: Primary | ICD-10-CM

## 2025-04-01 DIAGNOSIS — R20.9 SKIN SENSATION DISTURBANCE: ICD-10-CM

## 2025-04-01 PROCEDURE — 3288F FALL RISK ASSESSMENT DOCD: CPT | Mod: HCNC,CPTII,S$GLB, | Performed by: PHYSICIAN ASSISTANT

## 2025-04-01 PROCEDURE — 99999 PR PBB SHADOW E&M-EST. PATIENT-LVL V: CPT | Mod: PBBFAC,HCNC,, | Performed by: PHYSICIAN ASSISTANT

## 2025-04-01 PROCEDURE — 1126F AMNT PAIN NOTED NONE PRSNT: CPT | Mod: HCNC,CPTII,S$GLB, | Performed by: PHYSICIAN ASSISTANT

## 2025-04-01 PROCEDURE — 1159F MED LIST DOCD IN RCRD: CPT | Mod: HCNC,CPTII,S$GLB, | Performed by: PHYSICIAN ASSISTANT

## 2025-04-01 PROCEDURE — 1101F PT FALLS ASSESS-DOCD LE1/YR: CPT | Mod: HCNC,CPTII,S$GLB, | Performed by: PHYSICIAN ASSISTANT

## 2025-04-01 PROCEDURE — 99213 OFFICE O/P EST LOW 20 MIN: CPT | Mod: HCNC,S$GLB,, | Performed by: PHYSICIAN ASSISTANT

## 2025-04-01 PROCEDURE — 1160F RVW MEDS BY RX/DR IN RCRD: CPT | Mod: HCNC,CPTII,S$GLB, | Performed by: PHYSICIAN ASSISTANT

## 2025-04-01 PROCEDURE — G2211 COMPLEX E/M VISIT ADD ON: HCPCS | Mod: HCNC,S$GLB,, | Performed by: PHYSICIAN ASSISTANT

## 2025-04-01 PROCEDURE — 4010F ACE/ARB THERAPY RXD/TAKEN: CPT | Mod: HCNC,CPTII,S$GLB, | Performed by: PHYSICIAN ASSISTANT

## 2025-04-01 NOTE — PROGRESS NOTES
Subjective:      Patient ID:  Dallas Garcia is a 69 y.o. male who presents for   Chief Complaint   Patient presents with    Skin Tags     Both axilla & left eye     Pt is present for skin tags. He was last seen by Dr. Najera in 2022. No hx of NMSC or melanoma. Hx of psoriasis. Reports that he picks scale from right temporal scalp and never applied the steroid oil that Dr. Najera previously prescribed.     Patient with new area of concern:   Location: both axilla & left eye  Duration: yrs  S/S: none  Previous treatments: none        Review of Systems   Skin:  Negative for itching and rash.       Objective:   Physical Exam   Constitutional: He appears well-developed and well-nourished. No distress.   Neurological: He is alert and oriented to person, place, and time. He is not disoriented.   Psychiatric: He has a normal mood and affect.   Skin:   Areas Examined (abnormalities noted in diagram):   Scalp / Hair Palpated and Inspected  Head / Face Inspection Performed  Chest / Axilla Inspection Performed  LLE Inspection Performed                                       Diagram Legend     Erythematous scaling macule/papule c/w actinic keratosis       Vascular papule c/w angioma      Pigmented verrucoid papule/plaque c/w seborrheic keratosis      Yellow umbilicated papule c/w sebaceous hyperplasia      Irregularly shaped tan macule c/w lentigo     1-2 mm smooth white papules consistent with Milia      Movable subcutaneous cyst with punctum c/w epidermal inclusion cyst      Subcutaneous movable cyst c/w pilar cyst      Firm pink to brown papule c/w dermatofibroma      Pedunculated fleshy papule(s) c/w skin tag(s)      Evenly pigmented macule c/w junctional nevus     Mildly variegated pigmented, slightly irregular-bordered macule c/w mildly atypical nevus      Flesh colored to evenly pigmented papule c/w intradermal nevus       Pink pearly papule/plaque c/w basal cell carcinoma      Erythematous hyperkeratotic cursted  plaque c/w SCC      Surgical scar with no sign of skin cancer recurrence      Open and closed comedones      Inflammatory papules and pustules      Verrucoid papule consistent consistent with wart     Erythematous eczematous patches and plaques     Dystrophic onycholytic nail with subungual debris c/w onychomycosis     Umbilicated papule    Erythematous-base heme-crusted tan verrucoid plaque consistent with inflamed seborrheic keratosis     Erythematous Silvery Scaling Plaque c/w Psoriasis     See annotation      Assessment / Plan:        Skin tag  Skin sensation disturbance  -Appropriate codes given to check with  or insurance company   -Referred to Dr. Kennedy for skin tag on eyelid.    Psoriasis- follow up with Dr. Najera  -     Ambulatory referral/consult to Dermatology    Seborrheic keratosis- right cheek  These are benign inherited growths without a malignant potential. Reassurance given to patient. No treatment is necessary.       Hyperkeratosis of skin  Am lactin foot repair (over the counter)  Over the counter cream with urea in it for rough, cracked skin            Follow up for skin tag removal.

## 2025-04-01 NOTE — PATIENT INSTRUCTIONS
Skin tags- referred to Dr. Kennedy for skin tag on eyelid      If you'd like to reach out to your insurance company and inquire about how much will be covered, the codes are below. You can also call our central pricing office at [phone]. If you have any questions in regard to this, please let us know.       ICD-10 Codes:  L91.8 Skin tags  R20.8 Skin sensation disturbance     CPT Codes:  70762- Removal of up to 15 skin tags     Hyperkeratosis of heel   Am lactin foot repair (over the counter)  Over the counter cream with urea in it for rough, cracked skin       Seborrheic keratosis (macular)- right cheek  SEBORRHEIC KERATOSES        What causes seborrheic keratoses?    Seborrheic keratoses are harmless, common skin growths that first appear during adult life.  As time goes by, more growths appear.  Some persons have a very large number of them.  Seborrheic keratoses appear on both covered and uncovered parts of the body; they are not caused by sunlight.  The tendency to develop seborrheic keratoses is inherited.    Seborrheic keratoses are harmless and never become malignant.  They begin as slightly raised, light brown spots.  Gradually they thicken and take on a rough wartlike surface.  They slowly darken and may turn black.  These color changes are harmless.  Seborrheic keratoses are superficial and look as if they were stuck on the skin.  Persons who have had several seborrheic keratoses can usually recognize this type of benign growth.  However, if you are concerned or unsure about any growth, consult me.    Treatment    Seborrheic keratoses can easily be removed in the office.  The only reason for removing a seborrheic keratosis is your wish to get rid of it.     Psoriasis- follow up with Dr. Najera if he would like further treatment

## 2025-04-23 ENCOUNTER — TELEPHONE (OUTPATIENT)
Dept: DERMATOLOGY | Facility: CLINIC | Age: 70
End: 2025-04-23
Payer: MEDICARE

## 2025-04-23 NOTE — TELEPHONE ENCOUNTER
Spoke to pt to confirm appointment . Pt canceled appointment due to being referred to Dr. Kennedy for removal

## 2025-05-15 DIAGNOSIS — E78.2 MIXED HYPERLIPIDEMIA: ICD-10-CM

## 2025-05-15 DIAGNOSIS — K21.9 GASTROESOPHAGEAL REFLUX DISEASE: ICD-10-CM

## 2025-05-15 RX ORDER — OMEPRAZOLE 20 MG/1
CAPSULE, DELAYED RELEASE ORAL
Qty: 90 CAPSULE | Refills: 1 | Status: SHIPPED | OUTPATIENT
Start: 2025-05-15

## 2025-05-15 RX ORDER — FENOFIBRATE 160 MG/1
160 TABLET ORAL DAILY
Qty: 90 TABLET | Refills: 0 | Status: SHIPPED | OUTPATIENT
Start: 2025-05-15

## 2025-05-15 NOTE — TELEPHONE ENCOUNTER
No care due was identified.  Health [facility] Embedded Care Due Messages. Reference number: [id number].   5/15/2025 11:30:10 AM CDT

## 2025-05-15 NOTE — TELEPHONE ENCOUNTER
Refill Routing Note   Medication(s) are not appropriate for processing by [facility] for the following reason(s):        Required labs outdated    [facility] action(s):  Approve  Defer             Appointments  past 12m or future 3m with PCP    Date Provider   Last Visit   12/10/2024 Lisette Corado MD   Next Visit   6/18/2025 Lisette Corado MD   ED visits in past 90 days: 0        Note composed:1:11 PM 05/15/2025

## 2025-06-10 ENCOUNTER — TELEPHONE (OUTPATIENT)
Dept: INTERNAL MEDICINE | Facility: CLINIC | Age: 70
End: 2025-06-10
Payer: MEDICARE

## 2025-06-10 NOTE — TELEPHONE ENCOUNTER
----- Message from Monika sent at 6/9/2025  4:34 PM CDT -----  Type:  Needs Medical AdviceWho Called: pt Would the patient rather a call back or a response via [facility]? Call Best Call Back Number: [phone] Information: pt would like to know if he should get blood drawn still has steroids in his system can move it to Friday

## 2025-06-10 NOTE — TELEPHONE ENCOUNTER
Spoke to pt and he wanted to know if he should r/s his lab apt since he is still taking steroids. I moved his apt to Friday per his request

## 2025-06-11 DIAGNOSIS — E11.65 UNCONTROLLED TYPE 2 DIABETES MELLITUS WITH HYPERGLYCEMIA: ICD-10-CM

## 2025-06-11 DIAGNOSIS — E11.69 TYPE 2 DIABETES MELLITUS WITH OTHER SPECIFIED COMPLICATION, WITHOUT LONG-TERM CURRENT USE OF INSULIN: ICD-10-CM

## 2025-06-11 NOTE — TELEPHONE ENCOUNTER
Care Due:                  Date            Visit Type   Department     Provider  --------------------------------------------------------------------------------                                EP -                              PRIMARY      [facility] INTERNAL  Last Visit: 12-      CARE ([facility])   [facility]                              PRIMARY      [facility] INTERNAL  Next Visit: 06-      [facility] ([facility])   [facility]                                                            Last  Test          Frequency    Reason                     Performed    Due Date  --------------------------------------------------------------------------------    CBC.........  12 months..  fenofibrate..............  07- 07-    HBA1C.......  6 months...  NOVOLOG, insulin,          12- 06-                             semaglutide..............    Vitamin D...  12 months..  ergocalciferol...........  Not Found    Overdue    [employer] Embedded Care Due Messages. Reference number: [id number].   6/11/2025 11:38:58 AM CDT

## 2025-06-11 NOTE — TELEPHONE ENCOUNTER
Refill Routing Note   Medication(s) are not appropriate for processing by [facility] for the following reason(s):        Required labs outdated    [facility] action(s):  Defer               Appointments  past 12m or future 3m with PCP    Date Provider   Last Visit   12/10/2024 Lisette Corado MD   Next Visit   6/18/2025 Lisette Corado MD   ED visits in past 90 days: 0        Note composed:6:23 PM 06/11/2025

## 2025-06-11 NOTE — TELEPHONE ENCOUNTER
----- Message from Stephanie Stevenson sent at 2/24/2020 10:39 AM CST -----  Contact: Self   Patients state the Rx insulin lispro (HUMALOG KWIKPEN INSULIN) 100 unit/mL pen is no longer covered his insurance and he stated a new Rx was suppose to be called into the pharmacy. Patient state the new Rx have not been called in as of yet. Patient state this has been going on for 3 weeks and would like call back.  Please call and advise.       [id number] - [place] - [address] AT [address] [phone] (Phone)  [phone] (Fax)       Macrobid 100 mg bid x5 days rx pended, please confirm pt's preferred local pharmacy then ok to sign order. Will adjust PRN pending result of finalized cx. Thank you!

## 2025-06-11 NOTE — TELEPHONE ENCOUNTER
Refill Routing Note   Medication(s) are not appropriate for processing by [facility] for the following reason(s):        Required labs outdated    [facility] action(s):  Defer     Requires labs : Yes             Appointments  past 12m or future 3m with PCP    Date Provider   Last Visit   12/10/2024 Lisette Corado MD   Next Visit   6/11/2025 Lisette Corado MD   ED visits in past 90 days: 0        Note composed:6:22 PM 06/11/2025

## 2025-06-11 NOTE — TELEPHONE ENCOUNTER
I prescribed Doxycycline antibiotic for you today.  This antibiotic has been used in medicine since the 1960s and is safe but does have side effects (like anything else).  There are many possible side effects but here are some specific tips.   AVOID extensive exposure to sunlight  Please do not lay down immediately after taking the medication as it can give you acid reflux symptoms.    For the same reason, drink plenty of water when taking the medication.    Also, this is a medication to avoid if trying to conceive or currently pregnant.   Please take a probiotic daily or eat yogurt/drink kombucha more often over the next week (as with any time you take antibiotics).           Please see the attached refill request.

## 2025-06-11 NOTE — TELEPHONE ENCOUNTER
No care due was identified.  Health [facility] Embedded Care Due Messages. Reference number: [id number].   6/11/2025 11:40:02 AM CDT

## 2025-06-12 RX ORDER — INSULIN GLARGINE 100 [IU]/ML
40 INJECTION, SOLUTION SUBCUTANEOUS 2 TIMES DAILY
Qty: 75 ML | Refills: 0 | Status: SHIPPED | OUTPATIENT
Start: 2025-06-12

## 2025-06-12 RX ORDER — INSULIN ASPART 100 [IU]/ML
INJECTION, SOLUTION INTRAVENOUS; SUBCUTANEOUS
Qty: 75 ML | Refills: 0 | Status: SHIPPED | OUTPATIENT
Start: 2025-06-12

## 2025-06-13 ENCOUNTER — TELEPHONE (OUTPATIENT)
Dept: OTOLARYNGOLOGY | Facility: CLINIC | Age: 70
End: 2025-06-13
Payer: MEDICARE

## 2025-06-13 ENCOUNTER — LAB VISIT (OUTPATIENT)
Dept: LAB | Facility: HOSPITAL | Age: 70
End: 2025-06-13
Attending: INTERNAL MEDICINE
Payer: MEDICARE

## 2025-06-13 DIAGNOSIS — E78.2 MIXED HYPERLIPIDEMIA: ICD-10-CM

## 2025-06-13 DIAGNOSIS — I10 ESSENTIAL HYPERTENSION: ICD-10-CM

## 2025-06-13 DIAGNOSIS — E11.29 TYPE 2 DIABETES MELLITUS WITH MICROALBUMINURIA, WITH LONG-TERM CURRENT USE OF INSULIN: ICD-10-CM

## 2025-06-13 DIAGNOSIS — R80.9 TYPE 2 DIABETES MELLITUS WITH MICROALBUMINURIA, WITH LONG-TERM CURRENT USE OF INSULIN: ICD-10-CM

## 2025-06-13 DIAGNOSIS — Z79.4 TYPE 2 DIABETES MELLITUS WITH MICROALBUMINURIA, WITH LONG-TERM CURRENT USE OF INSULIN: ICD-10-CM

## 2025-06-13 DIAGNOSIS — E55.9 VITAMIN D DEFICIENCY: ICD-10-CM

## 2025-06-13 LAB
25(OH)D3+25(OH)D2 SERPL-MCNC: 27 NG/ML (ref 30–96)
ABSOLUTE EOSINOPHIL (OHS): 0.43 K/UL
ABSOLUTE MONOCYTE (OHS): 0.72 K/UL (ref 0.3–1)
ABSOLUTE NEUTROPHIL COUNT (OHS): 3.97 K/UL (ref 1.8–7.7)
ALBUMIN SERPL BCP-MCNC: 4.2 G/DL (ref 3.5–5.2)
ALP SERPL-CCNC: 88 UNIT/L (ref 40–150)
ALT SERPL W/O P-5'-P-CCNC: 31 UNIT/L (ref 10–44)
ANION GAP (OHS): 9 MMOL/L (ref 8–16)
AST SERPL-CCNC: 33 UNIT/L (ref 11–45)
BASOPHILS # BLD AUTO: 0.04 K/UL
BASOPHILS NFR BLD AUTO: 0.6 %
BILIRUB SERPL-MCNC: 0.5 MG/DL (ref 0.1–1)
BUN SERPL-MCNC: 19 MG/DL (ref 8–23)
CALCIUM SERPL-MCNC: 9.9 MG/DL (ref 8.7–10.5)
CHLORIDE SERPL-SCNC: 106 MMOL/L (ref 95–110)
CHOLEST SERPL-MCNC: 165 MG/DL (ref 120–199)
CHOLEST/HDLC SERPL: 3 {RATIO} (ref 2–5)
CO2 SERPL-SCNC: 24 MMOL/L (ref 23–29)
CREAT SERPL-MCNC: 1.2 MG/DL (ref 0.5–1.4)
EAG (OHS): 128 MG/DL (ref 68–131)
ERYTHROCYTE [DISTWIDTH] IN BLOOD BY AUTOMATED COUNT: 12.9 % (ref 11.5–14.5)
GFR SERPLBLD CREATININE-BSD FMLA CKD-EPI: >60 ML/MIN/1.73/M2
GLUCOSE SERPL-MCNC: 161 MG/DL (ref 70–110)
HBA1C MFR BLD: 6.1 % (ref 4–5.6)
HCT VFR BLD AUTO: 39.7 % (ref 40–54)
HDLC SERPL-MCNC: 55 MG/DL (ref 40–75)
HDLC SERPL: 33.3 % (ref 20–50)
HGB BLD-MCNC: 13.3 GM/DL (ref 14–18)
IMM GRANULOCYTES # BLD AUTO: 0.02 K/UL (ref 0–0.04)
IMM GRANULOCYTES NFR BLD AUTO: 0.3 % (ref 0–0.5)
LDLC SERPL CALC-MCNC: 68.8 MG/DL (ref 63–159)
LYMPHOCYTES # BLD AUTO: 1.82 K/UL (ref 1–4.8)
MCH RBC QN AUTO: 31.4 PG (ref 27–31)
MCHC RBC AUTO-ENTMCNC: 33.5 G/DL (ref 32–36)
MCV RBC AUTO: 94 FL (ref 82–98)
NONHDLC SERPL-MCNC: 110 MG/DL
NUCLEATED RBC (/100WBC) (OHS): 0 /100 WBC
PLATELET # BLD AUTO: 206 K/UL (ref 150–450)
PMV BLD AUTO: 11.6 FL (ref 9.2–12.9)
POTASSIUM SERPL-SCNC: 5.8 MMOL/L (ref 3.5–5.1)
PROT SERPL-MCNC: 7.6 GM/DL (ref 6–8.4)
RBC # BLD AUTO: 4.23 M/UL (ref 4.6–6.2)
RELATIVE EOSINOPHIL (OHS): 6.1 %
RELATIVE LYMPHOCYTE (OHS): 26 % (ref 18–48)
RELATIVE MONOCYTE (OHS): 10.3 % (ref 4–15)
RELATIVE NEUTROPHIL (OHS): 56.7 % (ref 38–73)
SODIUM SERPL-SCNC: 139 MMOL/L (ref 136–145)
TRIGL SERPL-MCNC: 206 MG/DL (ref 30–150)
WBC # BLD AUTO: 7 K/UL (ref 3.9–12.7)

## 2025-06-13 PROCEDURE — 82465 ASSAY BLD/SERUM CHOLESTEROL: CPT

## 2025-06-13 PROCEDURE — 83036 HEMOGLOBIN GLYCOSYLATED A1C: CPT

## 2025-06-13 PROCEDURE — 82306 VITAMIN D 25 HYDROXY: CPT

## 2025-06-13 PROCEDURE — 36415 COLL VENOUS BLD VENIPUNCTURE: CPT

## 2025-06-13 PROCEDURE — 80053 COMPREHEN METABOLIC PANEL: CPT

## 2025-06-13 PROCEDURE — 85025 COMPLETE CBC W/AUTO DIFF WBC: CPT

## 2025-06-18 ENCOUNTER — LAB VISIT (OUTPATIENT)
Dept: LAB | Facility: HOSPITAL | Age: 70
End: 2025-06-18
Attending: INTERNAL MEDICINE
Payer: MEDICARE

## 2025-06-18 ENCOUNTER — OFFICE VISIT (OUTPATIENT)
Dept: INTERNAL MEDICINE | Facility: CLINIC | Age: 70
End: 2025-06-18
Payer: MEDICARE

## 2025-06-18 VITALS
TEMPERATURE: 100 F | HEIGHT: 67 IN | DIASTOLIC BLOOD PRESSURE: 70 MMHG | RESPIRATION RATE: 16 BRPM | BODY MASS INDEX: 32.18 KG/M2 | SYSTOLIC BLOOD PRESSURE: 150 MMHG | OXYGEN SATURATION: 97 % | HEART RATE: 71 BPM | WEIGHT: 205 LBS

## 2025-06-18 DIAGNOSIS — M12.811 ROTATOR CUFF TEAR ARTHROPATHY OF RIGHT SHOULDER: ICD-10-CM

## 2025-06-18 DIAGNOSIS — E11.29 TYPE 2 DIABETES MELLITUS WITH MICROALBUMINURIA, WITH LONG-TERM CURRENT USE OF INSULIN: ICD-10-CM

## 2025-06-18 DIAGNOSIS — E87.5 HYPERKALEMIA: ICD-10-CM

## 2025-06-18 DIAGNOSIS — E78.2 MIXED HYPERLIPIDEMIA: ICD-10-CM

## 2025-06-18 DIAGNOSIS — R20.2 NUMBNESS AND TINGLING OF BOTH FEET: ICD-10-CM

## 2025-06-18 DIAGNOSIS — Z79.4 TYPE 2 DIABETES MELLITUS WITH MICROALBUMINURIA, WITH LONG-TERM CURRENT USE OF INSULIN: ICD-10-CM

## 2025-06-18 DIAGNOSIS — Z12.5 PROSTATE CANCER SCREENING: ICD-10-CM

## 2025-06-18 DIAGNOSIS — D12.6 ADENOMATOUS POLYP OF COLON, UNSPECIFIED PART OF COLON: ICD-10-CM

## 2025-06-18 DIAGNOSIS — F41.9 ANXIETY DISORDER, UNSPECIFIED TYPE: ICD-10-CM

## 2025-06-18 DIAGNOSIS — Z79.4 TYPE 2 DIABETES MELLITUS WITH MICROALBUMINURIA, WITH LONG-TERM CURRENT USE OF INSULIN: Primary | ICD-10-CM

## 2025-06-18 DIAGNOSIS — I10 ESSENTIAL HYPERTENSION: ICD-10-CM

## 2025-06-18 DIAGNOSIS — E55.9 VITAMIN D DEFICIENCY: ICD-10-CM

## 2025-06-18 DIAGNOSIS — R20.0 NUMBNESS AND TINGLING OF BOTH FEET: ICD-10-CM

## 2025-06-18 DIAGNOSIS — E11.29 TYPE 2 DIABETES MELLITUS WITH MICROALBUMINURIA, WITH LONG-TERM CURRENT USE OF INSULIN: Primary | ICD-10-CM

## 2025-06-18 DIAGNOSIS — R80.9 TYPE 2 DIABETES MELLITUS WITH MICROALBUMINURIA, WITH LONG-TERM CURRENT USE OF INSULIN: ICD-10-CM

## 2025-06-18 DIAGNOSIS — R80.9 TYPE 2 DIABETES MELLITUS WITH MICROALBUMINURIA, WITH LONG-TERM CURRENT USE OF INSULIN: Primary | ICD-10-CM

## 2025-06-18 DIAGNOSIS — M75.101 ROTATOR CUFF TEAR ARTHROPATHY OF RIGHT SHOULDER: ICD-10-CM

## 2025-06-18 PROBLEM — M54.41 RIGHT-SIDED LOW BACK PAIN WITH SCIATICA: Status: RESOLVED | Noted: 2019-12-11 | Resolved: 2025-06-18

## 2025-06-18 PROBLEM — M25.511 ACUTE PAIN OF RIGHT SHOULDER: Status: RESOLVED | Noted: 2025-02-25 | Resolved: 2025-06-18

## 2025-06-18 PROBLEM — M25.551 RIGHT HIP PAIN: Status: RESOLVED | Noted: 2020-06-26 | Resolved: 2025-06-18

## 2025-06-18 LAB
ALBUMIN/CREAT UR: 478.6 UG/MG
CREAT UR-MCNC: 145 MG/DL (ref 23–375)
MICROALBUMIN UR-MCNC: 694 UG/ML (ref ?–5000)
POTASSIUM SERPL-SCNC: 5.2 MMOL/L (ref 3.5–5.1)

## 2025-06-18 PROCEDURE — 4010F ACE/ARB THERAPY RXD/TAKEN: CPT | Mod: CPTII,HCNC,S$GLB, | Performed by: INTERNAL MEDICINE

## 2025-06-18 PROCEDURE — 3288F FALL RISK ASSESSMENT DOCD: CPT | Mod: CPTII,HCNC,S$GLB, | Performed by: INTERNAL MEDICINE

## 2025-06-18 PROCEDURE — 3078F DIAST BP <80 MM HG: CPT | Mod: CPTII,HCNC,S$GLB, | Performed by: INTERNAL MEDICINE

## 2025-06-18 PROCEDURE — 84132 ASSAY OF SERUM POTASSIUM: CPT | Mod: HCNC

## 2025-06-18 PROCEDURE — 1126F AMNT PAIN NOTED NONE PRSNT: CPT | Mod: CPTII,HCNC,S$GLB, | Performed by: INTERNAL MEDICINE

## 2025-06-18 PROCEDURE — 3044F HG A1C LEVEL LT 7.0%: CPT | Mod: CPTII,HCNC,S$GLB, | Performed by: INTERNAL MEDICINE

## 2025-06-18 PROCEDURE — 1101F PT FALLS ASSESS-DOCD LE1/YR: CPT | Mod: CPTII,HCNC,S$GLB, | Performed by: INTERNAL MEDICINE

## 2025-06-18 PROCEDURE — 3066F NEPHROPATHY DOC TX: CPT | Mod: CPTII,HCNC,S$GLB, | Performed by: INTERNAL MEDICINE

## 2025-06-18 PROCEDURE — 1159F MED LIST DOCD IN RCRD: CPT | Mod: CPTII,HCNC,S$GLB, | Performed by: INTERNAL MEDICINE

## 2025-06-18 PROCEDURE — 82570 ASSAY OF URINE CREATININE: CPT | Mod: HCNC

## 2025-06-18 PROCEDURE — 36415 COLL VENOUS BLD VENIPUNCTURE: CPT | Mod: HCNC,PO

## 2025-06-18 PROCEDURE — 99999 PR PBB SHADOW E&M-EST. PATIENT-LVL V: CPT | Mod: PBBFAC,HCNC,, | Performed by: INTERNAL MEDICINE

## 2025-06-18 PROCEDURE — 3062F POS MACROALBUMINURIA REV: CPT | Mod: CPTII,HCNC,S$GLB, | Performed by: INTERNAL MEDICINE

## 2025-06-18 PROCEDURE — 99215 OFFICE O/P EST HI 40 MIN: CPT | Mod: HCNC,S$GLB,, | Performed by: INTERNAL MEDICINE

## 2025-06-18 PROCEDURE — 3077F SYST BP >= 140 MM HG: CPT | Mod: CPTII,HCNC,S$GLB, | Performed by: INTERNAL MEDICINE

## 2025-06-18 PROCEDURE — 3008F BODY MASS INDEX DOCD: CPT | Mod: CPTII,HCNC,S$GLB, | Performed by: INTERNAL MEDICINE

## 2025-06-18 RX ORDER — HYDROXYZINE HYDROCHLORIDE 25 MG/1
25 TABLET, FILM COATED ORAL EVERY 6 HOURS PRN
COMMUNITY
Start: 2025-06-09 | End: 2025-06-18

## 2025-06-18 RX ORDER — BISACODYL 5 MG/1
1 TABLET, COATED ORAL DAILY
Qty: 30 TABLET | Refills: 11 | Status: SHIPPED | OUTPATIENT
Start: 2025-06-18 | End: 2026-06-18

## 2025-06-18 NOTE — PROGRESS NOTES
Subjective:       Patient ID: Dallas Garcia is a 69 y.o. male.    Chief Complaint:   Follow-up, Diabetes, Hyperlipidemia, Hypertension, Gastroesophageal Reflux, Anxiety, and Obesity    HPI: Kurt presents for follow up DM,HLD and Chronic Issues    DM: Med Tx 1-Janumet 50/1000 mg BID, 2- Novolog 45 units in AM/Breakfast and 45 units in PM/Dinner3-Lantus 40-44 units BID, 4-Ozempic 1.0 mg  QWeek         Accuchecks: 100, 1x 35/nocturnally-awoke with hypoglycemic sx, Ranges: 100-150         Diet Diary/Notes:          Weight( 12/2024)= 205 lbs,Weight(Today)=205 lbs    HLD: Med Tx 1- Fenofibrate 160mg QD, 2-Lipitor 80mg QD      HTN: Med Tx 1- Diovan 320mg QD, 2-Toprol XL 100mg QD, 3-Norvasc 10mg QD     Anxiety: Med Tx 1- Buspar 5mg BID    History of Present Illness    CHIEF COMPLAINT:  Mr. Garcia presents today for follow up.    DIABETES AND HYPOGLYCEMIC EPISODES:  He reports increased frequency of hypoglycemic episodes over the past 6 months, experiencing 4-5 low blood sugar situations. Home glucose readings generally range between 100-150. He experiences a tingly sensation around 11-12 AM when blood sugar drops below 100. He describes one severe hypoglycemic event where he woke up in the middle of the night and had to consume candy. Another incident at home involved waking up sweating with a glucose level of 35, which he treated with orange juice and candy for approximately 45 minutes until blood sugar stabilized. He can recognize early signs of low blood sugar and understands the need to consume food or sugar when symptoms occur. Hemoglobin A1C was 6.1 with an average glucose of 128.    LAB RESULTS:  Recent lab work showed high potassium levels and slightly low vitamin D despite spending time outdoors/taking Ergocalciferol 50,000 unit cap weekly    CURRENT MEDICATIONS:  He takes Ozempic 1.0 mg, Lantus 40-44 units twice daily (morning and afternoon/evening), Janumet 50/1000 twice daily, Novolog 40 units  with meals, Valsartan 320 mg, Vitamin D 50,000 unit capsule once weekly, and Vitamin B complex. He takes both insulins simultaneously to ensure compliance and uses a pill box that he fills once weekly. He takes vitamin D supplement when filling his pill box to maintain a consistent routine and has not changed his medication regimen.    ACTIVITY LEVEL:  He maintains an active lifestyle with moderate physical activities including walking the dog, cutting grass, and performing yard cleanup. He notes decreased participation in golf recently.    MEDICAL HISTORY:  His last colonoscopy was in 2022, which revealed one tubular adenoma polyp. He is due for repeat colonoscopy in 2027 per medical recommendation and understands screening guidelines for individuals over 70-75 years old.      ROS:  Constitutional: +night sweats  Endocrine: +tingling           Past Medical, Surgical, Social History: Please see as stated in Epic chart which has been reviewed.    Current Medications[1]    Review of Systems   Neurological:         +Decreased sensitivity Feet   All other systems reviewed and are negative.      Objective:      Lab Results   Component Value Date    WBC 7.00 06/13/2025    HGB 13.3 (L) 06/13/2025    HCT 39.7 (L) 06/13/2025     06/13/2025    CHOL 165 06/13/2025    TRIG 206 (H) 06/13/2025    HDL 55 06/13/2025    ALT 31 06/13/2025    AST 33 06/13/2025     06/13/2025    K 5.2 (H) 06/18/2025     06/13/2025    CREATININE 1.2 06/13/2025    BUN 19 06/13/2025    CO2 24 06/13/2025    TSH 2.094 07/14/2023    PSA 0.18 12/03/2024    INR 1.1 05/22/2019    HGBA1C 6.1 (H) 06/13/2025     Physical Exam  Vitals reviewed.   Constitutional:       Appearance: Normal appearance.   HENT:      Head: Normocephalic and atraumatic.   Cardiovascular:      Rate and Rhythm: Normal rate and regular rhythm.      Heart sounds: Normal heart sounds.   Pulmonary:      Breath sounds: Normal breath sounds.   Abdominal:      General: Bowel  "sounds are normal.      Palpations: Abdomen is soft. There is no mass.      Tenderness: There is no abdominal tenderness.   Musculoskeletal:      Right lower leg: No edema.      Left lower leg: No edema.   Neurological:      General: No focal deficit present.      Mental Status: He is alert.           Protective Sensation (w/ 10 gram monofilament):  Right: Intact  Left: Decreased    Visual Inspection:  Dry Skin -  Bilateral    Pedal Pulses:   Right: Present  Left: Present    Posterior Tibialis Pulses:   Right:Present  Left: Present     Health Maintenance:  Last Colonoscopy: Last Colonoscopy completed on 2/18/2022    Last PSA: 12/2024- Normal          Vitals:    06/18/25 1020   BP: (!) 150/70   BP Location: Right arm   Patient Position: Sitting   Pulse: 71   Resp: 16   Temp: 99.5 °F (37.5 °C)   TempSrc: Other (see comments)   SpO2: 97%   Weight: 93 kg (205 lb)   Height: 5' 7" (1.702 m)          Assessment:       1. Type 2 diabetes mellitus with microalbuminuria, with long-term current use of insulin    2. Mixed hyperlipidemia    3. Essential hypertension    4. Rotator cuff tear arthropathy of right shoulder    5. Anxiety disorder, unspecified type    6. Adenomatous polyp of colon, unspecified part of colon    7. Hyperkalemia    8. Vitamin D deficiency    9. Numbness and tingling of both feet    10. Prostate cancer screening        Plan:     Health Maintenance   Topic Date Due    COVID-19 Vaccine (4 - 2024-25 season) 09/01/2024    TETANUS VACCINE  10/07/2025    PROSTATE-SPECIFIC ANTIGEN  12/03/2025    Hemoglobin A1c  12/13/2025    Diabetic Eye Exam  02/17/2026    Lipid Panel  06/13/2026    Low Dose Statin  06/18/2026    Diabetes Urine Screening  06/18/2026    Foot Exam  06/18/2026    Colorectal Cancer Screening  02/18/2027    Hepatitis C Screening  Completed    Shingles Vaccine  Completed    Influenza Vaccine  Completed    RSV Vaccine (Age 60+ and Pregnant patients)  Completed    Pneumococcal Vaccines (Age 50+)  " "Completed    Abdominal Aortic Aneurysm Screening  Completed        Dallas "Kurt" was seen today for follow-up, diabetes, hyperlipidemia, hypertension, gastroesophageal reflux, anxiety and obesity.    Diagnoses and all orders for this visit:    Type 2 diabetes mellitus with microalbuminuria, with long-term current use of insulin/+ nocturnal hypoglycemic spells        -     decrease Lantus to 35 units b.i.d. and continue other medications as above  -     Cancel: Microalbumin/Creatinine Ratio, Urine  -     Comprehensive Metabolic Panel; Future  -     Hemoglobin A1C; Future  -     Microalbumin/Creatinine Ratio, Urine; Future    Mixed hyperlipidemia/controlled        -     Continue:  Med Tx 1- Fenofibrate 160mg QD, 2-Lipitor 80mg QD   -     Comprehensive Metabolic Panel; Future  -     Lipid Panel; Future    Essential hypertension/controlled        -     Continue:  Med Tx 1- Diovan 320mg QD, 2-Toprol XL 100mg QD, 3-Norvasc 10mg QD  -     Comprehensive Metabolic Panel; Future    Rotator cuff tear arthropathy of right shoulder    Anxiety disorder, unspecified type/controlled on BuSpar 5 mg b.i.d.    Hyperkalemia  -     Potassium; Future    Vitamin D deficiency  -     add multivitamin-minerals-lutein (MULTIVITAMIN 50 PLUS) Tab; Take 1 tablet by mouth once daily.to weekly vitamin-D 45136 unit cap tab  -     Vitamin D; Future    Numbness and tingling of both feet  -     Vitamin B12; Future    Prostate cancer screening  -     PSA, Screening; Future    Adenomatous polyp of colon, unspecified part of colon        -     repeat colonoscopy February 2027 if health appropriate    Health Maintenance        -     return to clinic x6 months with one-week prior fasting lab or see patient sooner if needed    Assessment & Plan    TYPE 2 DIABETES MELLITUS WITH HYPOGLYCEMIA:   Ordered foot exam to be performed during visit.   Mr. Garcia reports increased instances of hypoglycemia in the last 6 months compared to previous 5 years, " with home glucose levels averaging around 100.   Mr. Garcia experiences hypoglycemic symptoms like paresthesia when glucose is below 100 and had a severe episode with glucose level of 35, managed with orange juice and candy.   Hemoglobin A1C is excellent at 6.1 (average glucose 128).   Due to hypoglycemic episodes, decreased Lantus (insulin glargine) from 40-44 units twice daily to 35 units twice daily.   Mr. Garcia also takes Janumet 50/1000 mg twice daily and Novolog with meals.    HYPERKALEMIA:   Explained that hyperkalemia can cause cardiac arrhythmias and discussed foods high in potassium, including nuts, legumes, bananas, and oranges.   Hyperkalemia is possibly due to Valsartan 320 mg, which causes potassium retention.   Considering the possibility of a laboratory error, ordered repeat serum potassium test to confirm accuracy of results, with patient agreement.    VITAMIN D DEFICIENCY:   Mr. Marroquins vitamin D level is low despite outdoor activities like walking the dog and lawn maintenance.   Currently takes vitamin D 50,000 units weekly.   Recommend and prescribed adding a daily multivitamin with vitamin D to improve levels.    HISTORY OF COLON POLYPS:   Mr. Garcia had a tubular adenoma found during the last colonoscopy in 2022.   Informed patient that after age 70-75, colonoscopy frequency becomes an individualized decision.   Recommend and scheduled follow-up colonoscopy in 2027 due to history of polyp.           This note was generated with the assistance of ambient listening technology. Verbal consent was obtained by the patient and accompanying visitor(s) for the recording of patient appointment to facilitate this note. I attest to having reviewed and edited the generated note for accuracy, though some syntax or spelling errors may persist. Please contact the author of this note for any clarification.           [1]   Current Outpatient Medications   Medication Sig Dispense  "Refill    amLODIPine (NORVASC) 10 MG tablet Take 1 tablet (10 mg total) by mouth once daily. 90 tablet 3    atorvastatin (LIPITOR) 80 MG tablet Take 1 tablet (80 mg total) by mouth once daily. 90 tablet 3    blood sugar diagnostic Strp 1 strip by Misc.(Non-Drug; Combo Route) route 2 (two) times daily before meals. Check glucose 2-3 days/week 2x/day before before meals 200 strip 3    blood sugar diagnostic Strp Check Glucose 2-3x/day before meals 200 strip 6    blood-glucose meter kit Check glucose 2-3 days/week 2x/day before before meals 1 each 0    busPIRone (BUSPAR) 5 MG Tab TAKE 1 TABLET BY MOUTH 1 TO 2 TIMES PER DAY AS NEEDED FOR ANXIETY 60 tablet 3    cyanocobalamin (VITAMIN B-12) 1000 MCG tablet Take 100 mcg by mouth once daily.      ergocalciferol (ERGOCALCIFEROL) 50,000 unit Cap Take 1 capsule (50,000 Units total) by mouth every 7 days. 4 capsule 6    fenofibrate 160 MG Tab Take 1 tablet (160 mg total) by mouth once daily. 90 tablet 0    insulin glargine U-100, Lantus, (LANTUS SOLOSTAR U-100 INSULIN) 100 unit/mL (3 mL) InPn pen Inject 40 Units into the skin 2 (two) times a day. 75 mL 0    lancets (ONETOUCH ULTRASOFT LANCETS) Misc TEST four times a day before meals 400 each 3    lancets Misc 1 lancet by Misc.(Non-Drug; Combo Route) route 2 (two) times daily before meals. Check glucose 2-3 days/week 2x/day before before meals 100 each 6    loratadine (CLARITIN) 10 mg tablet TAKE 1 TABLET BY MOUTH EVERY DAY 90 tablet 3    metoprolol succinate (TOPROL-XL) 100 MG 24 hr tablet Take 1 tablet (100 mg total) by mouth once daily. 90 tablet 2    NOVOLOG FLEXPEN U-100 INSULIN 100 unit/mL (3 mL) InPn pen INJECT 45 UNITS INTO THE SKIN BID with major meals 75 mL 0    omeprazole (PRILOSEC) 20 MG capsule TAKE 1 CAPSULE(20 MG) BY MOUTH EVERY DAY FOR GERD 90 capsule 1    ONETOUCH ULTRA BLUE TEST STRIP Strp TEST FOUR TIMES DAILY 400 strip 1    pen needle, diabetic (BD ULTRA-FINE SHORT PEN NEEDLE) 31 gauge x 5/16" Ndle Use to " administer insulin under the skin four (4) times a day; discard pen needle after each use. 400 each 3    semaglutide (OZEMPIC) 1 mg/dose (4 mg/3 mL) Inject 1 mg into the skin every 7 days. 9 mL 3    SITagliptan-metformin (JANUMET) 50-1,000 mg per tablet Take 1 tablet by mouth 2 (two) times daily with meals. Take in place of Jentadueto for Diabetes 180 tablet 1    valsartan (DIOVAN) 320 MG tablet Take 1 tablet (320 mg total) by mouth once daily. 90 tablet 3    ibuprofen (ADVIL,MOTRIN) 200 MG tablet Take 200 mg by mouth every 6 (six) hours as needed for Pain.      multivitamin-minerals-lutein (MULTIVITAMIN 50 PLUS) Tab Take 1 tablet by mouth once daily. 30 tablet 11     No current facility-administered medications for this visit.

## 2025-06-18 NOTE — PATIENT INSTRUCTIONS
[facility]:  #1-Dr Lucy Navas at [facility]  #2-Dr Taylor iqbal at [facility]    For Diabetes:  #1-Decrease Lantus(gray label) to 35 units 2x/day

## 2025-06-20 ENCOUNTER — RESULTS FOLLOW-UP (OUTPATIENT)
Dept: INTERNAL MEDICINE | Facility: CLINIC | Age: 70
End: 2025-06-20

## 2025-06-20 DIAGNOSIS — I10 ESSENTIAL HYPERTENSION: Primary | ICD-10-CM

## 2025-06-20 DIAGNOSIS — R80.9 MICROALBUMINURIA: ICD-10-CM

## 2025-06-20 DIAGNOSIS — E87.5 HYPERKALEMIA: ICD-10-CM

## 2025-06-20 RX ORDER — VALSARTAN AND HYDROCHLOROTHIAZIDE 160; 12.5 MG/1; MG/1
1 TABLET, FILM COATED ORAL DAILY
Qty: 90 TABLET | Refills: 1 | Status: SHIPPED | OUTPATIENT
Start: 2025-06-20 | End: 2025-06-23 | Stop reason: SDUPTHER

## 2025-06-23 DIAGNOSIS — I10 ESSENTIAL HYPERTENSION: ICD-10-CM

## 2025-06-23 DIAGNOSIS — E55.9 VITAMIN D DEFICIENCY: ICD-10-CM

## 2025-06-23 DIAGNOSIS — E87.5 HYPERKALEMIA: ICD-10-CM

## 2025-06-23 RX ORDER — VALSARTAN AND HYDROCHLOROTHIAZIDE 160; 12.5 MG/1; MG/1
1 TABLET, FILM COATED ORAL DAILY
Qty: 100 TABLET | Refills: 2 | Status: SHIPPED | OUTPATIENT
Start: 2025-06-23 | End: 2026-06-23

## 2025-06-23 RX ORDER — ERGOCALCIFEROL 1.25 MG/1
50000 CAPSULE ORAL
Qty: 4 CAPSULE | Refills: 6 | Status: SHIPPED | OUTPATIENT
Start: 2025-06-23

## 2025-06-23 NOTE — TELEPHONE ENCOUNTER
No care due was identified.  [facility] Embedded Care Due Messages. Reference number: [id number].   6/23/2025 11:45:05 AM CDT

## 2025-06-23 NOTE — TELEPHONE ENCOUNTER
Refill Routing Note   Medication(s) are not appropriate for processing by [facility] for the following reason(s):        Outside of protocol    [facility] action(s):  Route             Appointments  past 12m or future 3m with PCP    Date Provider   Last Visit   6/18/2025 Lisette Corado MD   Next Visit   12/18/2025 Lisette Corado MD   ED visits in past 90 days: 0        Note composed:11:45 AM 06/23/2025

## 2025-07-10 ENCOUNTER — TELEPHONE (OUTPATIENT)
Dept: INTERNAL MEDICINE | Facility: CLINIC | Age: 70
End: 2025-07-10
Payer: MEDICARE

## 2025-07-10 DIAGNOSIS — H61.23 EXCESSIVE EAR WAX, BILATERAL: Primary | ICD-10-CM

## 2025-07-10 NOTE — TELEPHONE ENCOUNTER
Pt is requesting a referral for ENT bc he needs his ears flushed and cleaned  Please advise / put in referral

## 2025-07-10 NOTE — TELEPHONE ENCOUNTER
Copied from CRM #[id number]. Topic: General Inquiry - Patient Advice  >> Jul 9, 2025  4:07 PM Elsy wrote:  .1MEDICALADVICE     Patient is calling for Medical Advice regarding:patient calling because he needs an ear flush and not sure if that would need a referral. Asked for a call back     How long has patient had these symptoms:    Pharmacy name and phone#:    Patient wants a call back or thru [facility], provide patient's call back phone number:[phone]     Comments:    Please advise patient replies from provider may take up to 48 hours.

## 2025-07-28 DIAGNOSIS — E11.65 UNCONTROLLED TYPE 2 DIABETES MELLITUS WITH HYPERGLYCEMIA: ICD-10-CM

## 2025-07-28 NOTE — TELEPHONE ENCOUNTER
Copied from CRM #[id number]. Topic: Medications - Medication Refill  >> Jul 28, 2025 11:57 AM Heather wrote:  Type:  RX Refill Request    Who Called: [facility]  Refill or New Rx:new  RX Name and Strength:   1)insulin glargine U-100, Lantus, (LANTUS SOLOSTAR U-100 INSULIN) 100 unit/mL (3 mL) InPn pen  How is the patient currently taking it? (ex. 1XDay): as directed  Is this a 30 day or 90 day RX:  Preferred Pharmacy with phone number:  [id number] [place], [place] - [address] [facility] [address] [address]  Phone: [phone] Fax: [phone]  Local or Mail Order:local  Ordering Provider:PCP  Would the patient rather a call back or a response via [facility]? Call back   Best Call Back Number:Rosette hermila [facility] [phone]  Additional Information: n/a

## 2025-07-28 NOTE — TELEPHONE ENCOUNTER
No care due was identified.  Health [facility] Embedded Care Due Messages. Reference number: [id number].   7/28/2025 12:39:34 PM CDT

## 2025-07-29 ENCOUNTER — TELEPHONE (OUTPATIENT)
Dept: SPORTS MEDICINE | Facility: CLINIC | Age: 70
End: 2025-07-29
Payer: MEDICARE

## 2025-07-29 ENCOUNTER — PATIENT MESSAGE (OUTPATIENT)
Dept: SPORTS MEDICINE | Facility: CLINIC | Age: 70
End: 2025-07-29
Payer: MEDICARE

## 2025-07-29 DIAGNOSIS — I10 HTN (HYPERTENSION), BENIGN: ICD-10-CM

## 2025-07-29 RX ORDER — AMLODIPINE BESYLATE 10 MG/1
10 TABLET ORAL DAILY
Qty: 90 TABLET | Refills: 3 | Status: SHIPPED | OUTPATIENT
Start: 2025-07-29

## 2025-07-29 RX ORDER — INSULIN GLARGINE 100 [IU]/ML
40 INJECTION, SOLUTION SUBCUTANEOUS 2 TIMES DAILY
Qty: 75 ML | Refills: 1 | Status: SHIPPED | OUTPATIENT
Start: 2025-07-29

## 2025-07-29 NOTE — TELEPHONE ENCOUNTER
No care due was identified.  [facility] Embedded Care Due Messages. Reference number: [id number].   7/29/2025 8:22:42 AM CDT

## 2025-07-29 NOTE — TELEPHONE ENCOUNTER
Refill Decision Note   Dallas Lockeworth  is requesting a refill authorization.  Brief Assessment and Rationale for Refill:  Approve     Medication Therapy Plan:         Comments:     Note composed:8:45 AM 07/29/2025             Appointments     Last Visit   6/18/2025 Lisette Corado MD   Next Visit   7/29/2025 Lisette Corado MD

## 2025-08-12 DIAGNOSIS — E78.2 MIXED HYPERLIPIDEMIA: ICD-10-CM

## 2025-08-12 RX ORDER — ATORVASTATIN CALCIUM 80 MG/1
80 TABLET, FILM COATED ORAL DAILY
Qty: 90 TABLET | Refills: 3 | Status: SHIPPED | OUTPATIENT
Start: 2025-08-12

## 2025-08-12 RX ORDER — FENOFIBRATE 160 MG/1
160 TABLET ORAL DAILY
Qty: 90 TABLET | Refills: 3 | Status: SHIPPED | OUTPATIENT
Start: 2025-08-12

## 2025-08-14 ENCOUNTER — PATIENT OUTREACH (OUTPATIENT)
Dept: ADMINISTRATIVE | Facility: HOSPITAL | Age: 70
End: 2025-08-14
Payer: MEDICARE

## 2025-08-21 DIAGNOSIS — E11.65 TYPE 2 DIABETES MELLITUS WITH HYPERGLYCEMIA, UNSPECIFIED WHETHER LONG TERM INSULIN USE: ICD-10-CM

## 2025-08-21 DIAGNOSIS — I10 ESSENTIAL HYPERTENSION: ICD-10-CM

## 2025-08-21 RX ORDER — BUSPIRONE HYDROCHLORIDE 5 MG/1
TABLET ORAL
Qty: 60 TABLET | Refills: 3 | Status: SHIPPED | OUTPATIENT
Start: 2025-08-21

## 2025-08-21 RX ORDER — METOPROLOL SUCCINATE 100 MG/1
100 TABLET, EXTENDED RELEASE ORAL DAILY
Qty: 90 TABLET | Refills: 2 | Status: SHIPPED | OUTPATIENT
Start: 2025-08-21

## 2025-08-30 DIAGNOSIS — R80.9 TYPE 2 DIABETES MELLITUS WITH MICROALBUMINURIA, WITH LONG-TERM CURRENT USE OF INSULIN: ICD-10-CM

## 2025-08-30 DIAGNOSIS — Z79.4 TYPE 2 DIABETES MELLITUS WITH MICROALBUMINURIA, WITH LONG-TERM CURRENT USE OF INSULIN: ICD-10-CM

## 2025-08-30 DIAGNOSIS — E11.29 TYPE 2 DIABETES MELLITUS WITH MICROALBUMINURIA, WITH LONG-TERM CURRENT USE OF INSULIN: ICD-10-CM

## 2025-08-30 DIAGNOSIS — E11.69 TYPE 2 DIABETES MELLITUS WITH OTHER SPECIFIED COMPLICATION, WITHOUT LONG-TERM CURRENT USE OF INSULIN: ICD-10-CM

## 2025-08-31 RX ORDER — SEMAGLUTIDE 1.34 MG/ML
1 INJECTION, SOLUTION SUBCUTANEOUS
Qty: 9 ML | Refills: 1 | Status: SHIPPED | OUTPATIENT
Start: 2025-08-31 | End: 2026-02-15

## 2025-08-31 RX ORDER — INSULIN ASPART 100 [IU]/ML
INJECTION, SOLUTION INTRAVENOUS; SUBCUTANEOUS
Qty: 90 ML | Refills: 1 | Status: SHIPPED | OUTPATIENT
Start: 2025-08-31

## 2025-09-01 DIAGNOSIS — E11.69 TYPE 2 DIABETES MELLITUS WITH OTHER SPECIFIED COMPLICATION, WITHOUT LONG-TERM CURRENT USE OF INSULIN: ICD-10-CM

## 2025-09-02 RX ORDER — INSULIN ASPART 100 [IU]/ML
INJECTION, SOLUTION INTRAVENOUS; SUBCUTANEOUS
Qty: 90 ML | Refills: 1 | OUTPATIENT
Start: 2025-09-02

## (undated) DEVICE — PAD ELECTRODE STER 1.5X3

## (undated) DEVICE — PAD ABD 8X10 STERILE

## (undated) DEVICE — PAD COLD THERAPY KNEE WRAP ON

## (undated) DEVICE — DRAPE STERI U-SHAPED 47X51IN

## (undated) DEVICE — UNDERGLOVES BIOGEL PI SIZE 8

## (undated) DEVICE — SOL 9P NACL IRR PIC IL

## (undated) DEVICE — SUT MONOCRYL 4-0 PS-2

## (undated) DEVICE — DRESSING XEROFORM FOIL PK 1X8

## (undated) DEVICE — TUBE SET INFLOW/OUTFLOW

## (undated) DEVICE — NDL 18GA X1 1/2 REG BEVEL

## (undated) DEVICE — GAUZE SPONGE 4X4 12PLY

## (undated) DEVICE — PAD CAST SPECIALIST STRL 6

## (undated) DEVICE — SPONGE DERMACEA 4X4IN 12PLY

## (undated) DEVICE — TOURNIQUET SB QC SP 34X4IN

## (undated) DEVICE — GLOVE BIOGEL SKINSENSE PI 8.0

## (undated) DEVICE — SOL IRR NACL .9% 3000ML

## (undated) DEVICE — DRAPE STERI INSTRUMENT 1018

## (undated) DEVICE — SEE MEDLINE ITEM 157150

## (undated) DEVICE — ADHESIVE MASTISOL VIAL 48/BX

## (undated) DEVICE — CLOSURE SKIN STERI STRIP 1/2X4

## (undated) DEVICE — Device

## (undated) DEVICE — APPLICATOR CHLORAPREP ORN 26ML

## (undated) DEVICE — SEE MEDLINE ITEM 157169

## (undated) DEVICE — SHAVER ULTRAFFR 4.2MM

## (undated) DEVICE — SYR 30CC LUER LOCK